# Patient Record
Sex: FEMALE | Race: WHITE | NOT HISPANIC OR LATINO | Employment: FULL TIME | URBAN - METROPOLITAN AREA
[De-identification: names, ages, dates, MRNs, and addresses within clinical notes are randomized per-mention and may not be internally consistent; named-entity substitution may affect disease eponyms.]

---

## 2018-01-03 ENCOUNTER — HOSPITAL ENCOUNTER (EMERGENCY)
Facility: HOSPITAL | Age: 48
Discharge: HOME | End: 2018-01-03
Attending: EMERGENCY MEDICINE
Payer: MEDICAID

## 2018-01-03 VITALS
RESPIRATION RATE: 18 BRPM | DIASTOLIC BLOOD PRESSURE: 84 MMHG | WEIGHT: 170 LBS | BODY MASS INDEX: 27.32 KG/M2 | HEART RATE: 110 BPM | TEMPERATURE: 100 F | SYSTOLIC BLOOD PRESSURE: 164 MMHG | OXYGEN SATURATION: 98 % | HEIGHT: 66 IN

## 2018-01-03 DIAGNOSIS — J40 BRONCHITIS: Primary | ICD-10-CM

## 2018-01-03 DIAGNOSIS — R05.9 COUGH: ICD-10-CM

## 2018-01-03 LAB
ALBUMIN SERPL BCP-MCNC: 3.8 G/DL
ALP SERPL-CCNC: 88 U/L
ALT SERPL W/O P-5'-P-CCNC: 50 U/L
ANION GAP SERPL CALC-SCNC: 11 MMOL/L
ANISOCYTOSIS BLD QL SMEAR: SLIGHT
AST SERPL-CCNC: 73 U/L
B-HCG UR QL: NEGATIVE
BASOPHILS # BLD AUTO: 0.03 K/UL
BASOPHILS NFR BLD: 0.4 %
BILIRUB SERPL-MCNC: 0.5 MG/DL
BUN SERPL-MCNC: 6 MG/DL
CALCIUM SERPL-MCNC: 9.2 MG/DL
CHLORIDE SERPL-SCNC: 101 MMOL/L
CO2 SERPL-SCNC: 26 MMOL/L
CREAT SERPL-MCNC: 0.6 MG/DL
CTP QC/QA: YES
DIFFERENTIAL METHOD: ABNORMAL
EOSINOPHIL # BLD AUTO: 0.2 K/UL
EOSINOPHIL NFR BLD: 2 %
ERYTHROCYTE [DISTWIDTH] IN BLOOD BY AUTOMATED COUNT: 17.1 %
EST. GFR  (AFRICAN AMERICAN): >60 ML/MIN/1.73 M^2
EST. GFR  (NON AFRICAN AMERICAN): >60 ML/MIN/1.73 M^2
FLUAV AG SPEC QL IA: NEGATIVE
FLUBV AG SPEC QL IA: NEGATIVE
GLUCOSE SERPL-MCNC: 104 MG/DL
HCT VFR BLD AUTO: 30 %
HGB BLD-MCNC: 8.4 G/DL
LYMPHOCYTES # BLD AUTO: 0.6 K/UL
LYMPHOCYTES NFR BLD: 7.8 %
MCH RBC QN AUTO: 19.8 PG
MCHC RBC AUTO-ENTMCNC: 28 G/DL
MCV RBC AUTO: 71 FL
MONOCYTES # BLD AUTO: 0.5 K/UL
MONOCYTES NFR BLD: 5.6 %
NEUTROPHILS # BLD AUTO: 6.8 K/UL
NEUTROPHILS NFR BLD: 84.2 %
PLATELET # BLD AUTO: 345 K/UL
PLATELET BLD QL SMEAR: ABNORMAL
PMV BLD AUTO: 9.1 FL
POIKILOCYTOSIS BLD QL SMEAR: SLIGHT
POTASSIUM SERPL-SCNC: 3.7 MMOL/L
PROT SERPL-MCNC: 8.1 G/DL
RBC # BLD AUTO: 4.24 M/UL
SODIUM SERPL-SCNC: 138 MMOL/L
SPECIMEN SOURCE: NORMAL
STOMATOCYTES BLD QL SMEAR: PRESENT
TROPONIN I SERPL DL<=0.01 NG/ML-MCNC: <0.006 NG/ML
WBC # BLD AUTO: 8.1 K/UL

## 2018-01-03 PROCEDURE — 25000003 PHARM REV CODE 250: Performed by: NURSE PRACTITIONER

## 2018-01-03 PROCEDURE — 85025 COMPLETE CBC W/AUTO DIFF WBC: CPT

## 2018-01-03 PROCEDURE — 93010 ELECTROCARDIOGRAM REPORT: CPT | Mod: ,,, | Performed by: INTERNAL MEDICINE

## 2018-01-03 PROCEDURE — 25000003 PHARM REV CODE 250: Performed by: EMERGENCY MEDICINE

## 2018-01-03 PROCEDURE — 96360 HYDRATION IV INFUSION INIT: CPT

## 2018-01-03 PROCEDURE — 94640 AIRWAY INHALATION TREATMENT: CPT

## 2018-01-03 PROCEDURE — 84484 ASSAY OF TROPONIN QUANT: CPT

## 2018-01-03 PROCEDURE — 93005 ELECTROCARDIOGRAM TRACING: CPT

## 2018-01-03 PROCEDURE — 81025 URINE PREGNANCY TEST: CPT | Performed by: EMERGENCY MEDICINE

## 2018-01-03 PROCEDURE — 25000242 PHARM REV CODE 250 ALT 637 W/ HCPCS: Performed by: NURSE PRACTITIONER

## 2018-01-03 PROCEDURE — 99284 EMERGENCY DEPT VISIT MOD MDM: CPT | Mod: 25

## 2018-01-03 PROCEDURE — 87400 INFLUENZA A/B EACH AG IA: CPT

## 2018-01-03 PROCEDURE — 96361 HYDRATE IV INFUSION ADD-ON: CPT

## 2018-01-03 PROCEDURE — 80053 COMPREHEN METABOLIC PANEL: CPT

## 2018-01-03 RX ORDER — ALBUTEROL SULFATE 0.83 MG/ML
2.5 SOLUTION RESPIRATORY (INHALATION) ONCE
Status: COMPLETED | OUTPATIENT
Start: 2018-01-03 | End: 2018-01-03

## 2018-01-03 RX ORDER — ALBUTEROL SULFATE 90 UG/1
1-2 AEROSOL, METERED RESPIRATORY (INHALATION) EVERY 6 HOURS PRN
Qty: 1 INHALER | Refills: 0 | Status: SHIPPED | OUTPATIENT
Start: 2018-01-03 | End: 2023-05-08

## 2018-01-03 RX ORDER — CETIRIZINE HYDROCHLORIDE 10 MG/1
10 TABLET ORAL DAILY
Qty: 30 TABLET | Refills: 0 | COMMUNITY
Start: 2018-01-03 | End: 2023-05-08

## 2018-01-03 RX ORDER — BENZONATATE 100 MG/1
100 CAPSULE ORAL 3 TIMES DAILY PRN
Qty: 20 CAPSULE | Refills: 0 | Status: SHIPPED | OUTPATIENT
Start: 2018-01-03 | End: 2018-01-03 | Stop reason: ALTCHOICE

## 2018-01-03 RX ORDER — ACETAMINOPHEN 325 MG/1
650 TABLET ORAL
Status: COMPLETED | OUTPATIENT
Start: 2018-01-03 | End: 2018-01-03

## 2018-01-03 RX ORDER — FLUTICASONE PROPIONATE 50 MCG
1 SPRAY, SUSPENSION (ML) NASAL 2 TIMES DAILY PRN
Qty: 15 G | Refills: 0 | Status: SHIPPED | OUTPATIENT
Start: 2018-01-03 | End: 2020-03-06

## 2018-01-03 RX ORDER — ALBUTEROL SULFATE 90 UG/1
1-2 AEROSOL, METERED RESPIRATORY (INHALATION) EVERY 6 HOURS PRN
Qty: 1 INHALER | Refills: 0 | Status: SHIPPED | OUTPATIENT
Start: 2018-01-03 | End: 2018-01-03

## 2018-01-03 RX ORDER — PROMETHAZINE HYDROCHLORIDE AND CODEINE PHOSPHATE 6.25; 1 MG/5ML; MG/5ML
5 SOLUTION ORAL EVERY 4 HOURS PRN
Qty: 120 ML | Refills: 0 | Status: SHIPPED | OUTPATIENT
Start: 2018-01-03 | End: 2018-01-13

## 2018-01-03 RX ADMIN — SODIUM CHLORIDE 1000 ML: 0.9 INJECTION, SOLUTION INTRAVENOUS at 04:01

## 2018-01-03 RX ADMIN — ACETAMINOPHEN 650 MG: 325 TABLET ORAL at 04:01

## 2018-01-03 RX ADMIN — ALBUTEROL SULFATE 2.5 MG: 2.5 SOLUTION RESPIRATORY (INHALATION) at 01:01

## 2018-01-03 RX ADMIN — SODIUM CHLORIDE 1000 ML: 0.9 INJECTION, SOLUTION INTRAVENOUS at 03:01

## 2018-01-03 NOTE — ED TRIAGE NOTES
Pt has been experiencing a productive cough for 4 days, fever for 1 day. Pt states she has sharp chest pain with cough.

## 2018-01-03 NOTE — ED PROVIDER NOTES
"Encounter Date: 1/3/2018       History     Chief Complaint   Patient presents with    Cough     productive cough with yellow sputum & chest congestion x 4 days.      46yo female with pmhx of GERD presents to the ED for evaluation of a cough.  Pt states that four days ago, she developed URI symptoms and believed that she got "a cold" from her daughter who had similar symptoms.  The patient believed she would get better, but has instead only felt worse.  She came to the ED today because she feels that her cough is worsening.  Pt reports that her cough was productive once, but is usually non-productive.  She denies SOB.  She does report mid-line chest pain with cough only.  No hemoptysis, palpitations, or LE swelling or pain.  No history of DVT or PE.  She is a former smoker, but denies history of COPD.  Pt denies abd pain, n/v/d, decreased urinary output.  She is tolerating PO fluids without difficulty.  No other complaints at this time.        The history is provided by the patient.   Cough   The current episode started several days ago. The problem occurs constantly. The problem has been gradually worsening. The cough is productive of sputum. Associated symptoms include chest pain (with cough only), chills, rhinorrhea and sore throat. Pertinent negatives include no ear pain, no headaches, no myalgias, no shortness of breath and no wheezing. The treatment provided no relief. She is a smoker (Former). Her past medical history is significant for pneumonia. Her past medical history does not include COPD or asthma.     Review of patient's allergies indicates:  No Known Allergies  Past Medical History:   Diagnosis Date    GERD (gastroesophageal reflux disease)      History reviewed. No pertinent surgical history.  History reviewed. No pertinent family history.  Social History   Substance Use Topics    Smoking status: Never Smoker    Smokeless tobacco: Not on file    Alcohol use Yes      Comment: social     Review of " Systems   Constitutional: Positive for activity change, appetite change, chills, fatigue and fever (subjective).   HENT: Positive for congestion, postnasal drip, rhinorrhea and sore throat. Negative for ear pain and trouble swallowing.    Eyes: Negative for visual disturbance.   Respiratory: Positive for cough. Negative for shortness of breath and wheezing.    Cardiovascular: Positive for chest pain (with cough only). Negative for palpitations and leg swelling.   Gastrointestinal: Negative for abdominal pain, diarrhea, nausea and vomiting.   Genitourinary: Negative for decreased urine volume and dysuria.   Musculoskeletal: Negative for myalgias, neck pain and neck stiffness.   Skin: Negative for rash.   Allergic/Immunologic: Negative for immunocompromised state.   Neurological: Negative for dizziness, weakness, light-headedness, numbness and headaches.   Hematological: Does not bruise/bleed easily.   Psychiatric/Behavioral: Negative for confusion.       Physical Exam     Initial Vitals [01/03/18 1217]   BP Pulse Resp Temp SpO2   (!) 198/84 110 20 98.2 °F (36.8 °C) 99 %      MAP       122         Physical Exam    Nursing note and vitals reviewed.  Constitutional: She appears well-developed and well-nourished. She is active and cooperative. She is easily aroused.  Non-toxic appearance. She does not have a sickly appearance. She appears ill. No distress.   HENT:   Head: Normocephalic and atraumatic.   Right Ear: External ear normal.   Left Ear: External ear normal.   Nose: Rhinorrhea present. No mucosal edema. Right sinus exhibits no maxillary sinus tenderness and no frontal sinus tenderness. Left sinus exhibits no maxillary sinus tenderness and no frontal sinus tenderness.   Mouth/Throat: Uvula is midline and oropharynx is clear and moist. Mucous membranes are dry.   Eyes: Conjunctivae and EOM are normal.   Neck: Normal range of motion. Neck supple.   Cardiovascular: Regular rhythm and normal heart sounds. Tachycardia  present.    Pulmonary/Chest: Effort normal. No accessory muscle usage. No tachypnea. No respiratory distress. She has no decreased breath sounds. She has wheezes (few expiratory scattered bilaterally). She has no rhonchi. She has no rales.   Abdominal: Soft. Normal appearance and bowel sounds are normal. She exhibits no distension. There is no tenderness. There is no rigidity, no rebound and no guarding.   Musculoskeletal:        Right lower leg: Normal.        Left lower leg: Normal.   Negative Selene's sign bilaterally.    Neurological: She is alert, oriented to person, place, and time and easily aroused. She has normal strength. GCS eye subscore is 4. GCS verbal subscore is 5. GCS motor subscore is 6.   Skin: Skin is warm, dry and intact. No rash noted.   Psychiatric: She has a normal mood and affect. Her speech is normal and behavior is normal. Judgment and thought content normal. Cognition and memory are normal.         ED Course   Procedures  Labs Reviewed   CBC W/ AUTO DIFFERENTIAL - Abnormal; Notable for the following:        Result Value    Hemoglobin 8.4 (*)     Hematocrit 30.0 (*)     MCV 71 (*)     MCH 19.8 (*)     MCHC 28.0 (*)     RDW 17.1 (*)     MPV 9.1 (*)     Lymph # 0.6 (*)     Gran% 84.2 (*)     Lymph% 7.8 (*)     All other components within normal limits   COMPREHENSIVE METABOLIC PANEL - Abnormal; Notable for the following:     AST 73 (*)     ALT 50 (*)     All other components within normal limits   TROPONIN I   INFLUENZA A AND B ANTIGEN   POCT URINE PREGNANCY         Imaging Results          X-Ray Chest PA And Lateral (Final result)  Result time 01/03/18 13:47:52    Final result by Mike Hernandez MD (01/03/18 13:47:52)                 Impression:        No acute radiographic findings in the chest.      Electronically signed by: MIKE HERNANDEZ MD  Date:     01/03/18  Time:    13:47              Narrative:    Comparison: 11/27/12    Technique: Chest PA and lateral.    Findings: The cardiac  silhouette and pulmonary vascularity are within normal limits.  The lungs are symmetrically expanded without evidence of significant focal consolidation, effusion, or pneumothorax.  The bones demonstrate no acute abnormality.                                   Medical Decision Making:   Initial Assessment:   48yo female here for URI with cough for four days.  She reports CP with cough only.  No SOB.  Pt is former smoker.  Pt appears ill but nontoxic.  Vitals stable.  MM dry.  HR tachycardic with normal rhythm.  Lungs with few scattered wheezes bilatearlly.  No respiratory distress.  LE normal without pain and swelling.  Negative Selene's bilaterally. Abd soft, non-tender.  No rash.   Differential Diagnosis:   URI, influenza, bronchitis, pneumonia, viral syndrome, electrolyte derangement, dehydration, STEMI, non-stemi, dysrhythmia  Clinical Tests:   Lab Tests: Ordered and Reviewed  Radiological Study: Ordered and Reviewed  Medical Tests: Ordered and Reviewed  ED Management:  Labs, CXR, EKG, Nebulizer, IV fluids, PO tylenol  H&H mildly decreased, but consistent with pt's usual values.  Influenza negative.  CXR read by radiologist and reviewed by me- negative for acute change.  I do not suspect ACS. No respiratory distress. This is a 47 y.o. female  who presents to the ED today with cough, congestion, symptoms consistent with an URI. Ember Rivera workup today does not show any sign of pneumonia on CXR. Likely this is a viral course that will need to run its course.  With rest, the pt's HR decreases to 90.  She is tolerating PO fluids without difficulty.  Pt continues to deny CP and SOB.  The patient is feeling improved following interventions here in the ED. The patient feels back to baseline and is ready to go home. There is no need for emergent intervention at this time. I will discharge home with symptom control. The patient is comfortable with this plan. After taking into careful account the historical factors and  physical exam findings of the patient's presentation today, in conjunction with the empirical and objective data obtained on ED workup, no acute emergent medical condition has been identified. The patient appears to be low risk for an emergent medical condition and I feel it is safe and appropriate at this time for the patient to be discharged to follow-up as detailed in their discharge instructions for reevaluation and possible continued outpatient workup and management. Discharge and follow-up instructions discussed with the patient who expressed understanding and willingness to comply with my recommendations. Pt to follow up with PCP within 2 days.  I reviewed strict return precautions. In addition, pt is to return to the ED if condition changes, progresses, or if there are any concerns.  Pt verbalized understanding, compliance, and agreement with the treatment plan.        This record utilized Dragon voice recognition system.     did see and examine this patient.  Pt's HR elevated at 110 at discharge, but  states that she is stable for discharge.   Past medical records reviewed.   RX Albuterol, Flonase, Zyrtec, Phenergan with Codeine cough syup                Attending Attestation:     Physician Attestation Statement for NP/PA:   I have conducted a face to face encounter with this patient in addition to the NP/PA, due to NP/PA Request    Other NP/PA Attestation Additions:    History of Present Illness: 47-year-old female with a cough productive for yellow sputum for the past 4 days.   Physical Exam: Lungs are clear to auscultation.                  ED Course      Clinical Impression:   The primary encounter diagnosis was Bronchitis. A diagnosis of Cough was also pertinent to this visit.                           MAYRA Méndez  01/03/18 1744       Riana Murguia, MAYRA  01/03/18 1749       Nirmal Bravo MD  01/03/18 2250

## 2019-06-03 ENCOUNTER — HOSPITAL ENCOUNTER (EMERGENCY)
Facility: HOSPITAL | Age: 49
Discharge: HOME | End: 2019-06-03
Attending: EMERGENCY MEDICINE
Payer: MEDICAID

## 2019-06-03 VITALS
HEIGHT: 66 IN | SYSTOLIC BLOOD PRESSURE: 175 MMHG | RESPIRATION RATE: 20 BRPM | DIASTOLIC BLOOD PRESSURE: 80 MMHG | HEART RATE: 88 BPM | TEMPERATURE: 98 F | WEIGHT: 188 LBS | OXYGEN SATURATION: 95 % | BODY MASS INDEX: 30.22 KG/M2

## 2019-06-03 DIAGNOSIS — T23.152A SUPERFICIAL BURN OF PALM OF LEFT HAND, INITIAL ENCOUNTER: Primary | ICD-10-CM

## 2019-06-03 PROCEDURE — 99283 EMERGENCY DEPT VISIT LOW MDM: CPT

## 2019-06-03 RX ORDER — KETOROLAC TROMETHAMINE 30 MG/ML
15 INJECTION, SOLUTION INTRAMUSCULAR; INTRAVENOUS
Status: DISCONTINUED | OUTPATIENT
Start: 2019-06-03 | End: 2019-06-03

## 2019-06-04 NOTE — DISCHARGE INSTRUCTIONS
Apply neosporin ointment and dressing at least twice daily. Take tylenol and motrin for pain. Follow up with your PCP in 2 days for wound check. Return to the ER with any new or worsening symptoms.

## 2019-06-04 NOTE — ED PROVIDER NOTES
Encounter Date: 6/3/2019       History     Chief Complaint   Patient presents with    Hand Burn     pt burned the palm of her left hand with a hot spatula 3 days ago. States it has been having some drainage     A 49-year-old female presents the ED with complaints of a burn to the palm of her left hand x3 days.  Patient states she grabbed a hot spatula.  She states that the burn has blistered and drained clear fluid for the last 2 days.  She states she cut part of the blister off yesterday and states that today the wound began draining some greenish fluid.  She also admits to pain.  She has not been applying anything to the burn.    The history is provided by the patient. No  was used.     Review of patient's allergies indicates:  No Known Allergies  Past Medical History:   Diagnosis Date    GERD (gastroesophageal reflux disease)      History reviewed. No pertinent surgical history.  History reviewed. No pertinent family history.  Social History     Tobacco Use    Smoking status: Former Smoker   Substance Use Topics    Alcohol use: Yes     Comment: social    Drug use: No     Review of Systems   Skin: Positive for wound.   All other systems reviewed and are negative.      Physical Exam     Initial Vitals [06/03/19 2021]   BP Pulse Resp Temp SpO2   (!) 175/80 88 20 98.3 °F (36.8 °C) 95 %      MAP       --         Physical Exam    Nursing note and vitals reviewed.  Constitutional: Vital signs are normal. She appears well-developed and well-nourished. No distress.   HENT:   Head: Normocephalic and atraumatic.   Nose: Nose normal.   Eyes: Conjunctivae and lids are normal.   Neck: Normal range of motion and phonation normal.   Cardiovascular: Normal rate.   Pulmonary/Chest: Effort normal.   Abdominal: Normal appearance.   Musculoskeletal: Normal range of motion.   Neurological: She is alert and oriented to person, place, and time.   Skin: Skin is warm and dry. Burn (2x3 cm superficial partial  thickness burn to palm of left hand partial debrided by patient, no active drainage noted, yellowish crust noted.  No blister formation at this time.  Moderately tender to palpation) noted. No erythema.   Psychiatric: She has a normal mood and affect. Her speech is normal and behavior is normal. Judgment and thought content normal. Cognition and memory are normal.         ED Course   Procedures  Labs Reviewed - No data to display       Imaging Results    None          Medical Decision Making:   Initial Assessment:   41-year-old female with 2 x 3 cm superficial partial-thickness burn to the palm the left hand x3 days, partially debrided by the patient, no active drainage, yellow crust noted, no blister formation at this time.  Differential Diagnosis:   Superficial partial-thickness burn, secondary infection  ED Management:  Wound was cleaned and dressed with triple antibiotic ointment and wet to dry dressing in the ED.  Patient was instructed to continue this wound care twice daily until wound heals.  She is to follow up with her PCP or return to the ED in 2 days for wound recheck.  Instructed to return sooner if wound worsens.  Patient states understanding and agreement with treatment plan.                      Clinical Impression:       ICD-10-CM ICD-9-CM   1. Superficial burn of palm of left hand, initial encounter T23.152A 944.15                                Katja Garcia PA-C  06/04/19 1800

## 2020-02-27 ENCOUNTER — OFFICE VISIT (OUTPATIENT)
Dept: URGENT CARE | Facility: CLINIC | Age: 50
End: 2020-02-27
Payer: MEDICAID

## 2020-02-27 VITALS
HEART RATE: 87 BPM | WEIGHT: 188 LBS | BODY MASS INDEX: 30.22 KG/M2 | OXYGEN SATURATION: 99 % | TEMPERATURE: 99 F | DIASTOLIC BLOOD PRESSURE: 86 MMHG | SYSTOLIC BLOOD PRESSURE: 184 MMHG | RESPIRATION RATE: 16 BRPM | HEIGHT: 66 IN

## 2020-02-27 DIAGNOSIS — R03.0 ELEVATED BLOOD PRESSURE READING: Primary | ICD-10-CM

## 2020-02-27 PROCEDURE — 99213 OFFICE O/P EST LOW 20 MIN: CPT | Mod: S$GLB,,, | Performed by: FAMILY MEDICINE

## 2020-02-27 PROCEDURE — 99213 PR OFFICE/OUTPT VISIT, EST, LEVL III, 20-29 MIN: ICD-10-PCS | Mod: S$GLB,,, | Performed by: FAMILY MEDICINE

## 2020-02-27 RX ORDER — IBUPROFEN 800 MG/1
800 TABLET ORAL 3 TIMES DAILY
COMMUNITY
End: 2022-02-11

## 2020-02-27 RX ORDER — LISINOPRIL 10 MG/1
10 TABLET ORAL DAILY
Qty: 90 TABLET | Refills: 3 | Status: SHIPPED | OUTPATIENT
Start: 2020-02-27 | End: 2020-06-25 | Stop reason: SDUPTHER

## 2020-02-27 RX ORDER — CLINDAMYCIN HYDROCHLORIDE 150 MG/1
150 CAPSULE ORAL EVERY 6 HOURS
COMMUNITY
End: 2022-02-11

## 2020-02-27 NOTE — PATIENT INSTRUCTIONS
Established High Blood Pressure    High blood pressure (hypertension) is a chronic disease. Often, healthcare providers dont know what causes it. But it can be caused by certain health conditions and medicines.  If you have high blood pressure, you may not have any symptoms. If you do have symptoms, they may include headache, dizziness, changes in your vision, chest pain, and shortness of breath. But even without symptoms, high blood pressure thats not treated raises your risk for heart attack and stroke. High blood pressure is a serious health risk and shouldnt be ignored.  A blood pressure reading is made up of two numbers: a higher number over a lower number. The top number is the systolic pressure. The bottom number is the diastolic pressure. A normal blood pressure is a systolic pressure of  less than 120 over a diastolic pressure of less than 80. You will see your blood pressure readings written together. For example, a person with a systolic pressure of 188 and a diastolic pressure of 78 will have 118/78 written in the medical record.  High blood pressure is when either the top number is 140 or higher, or the bottom number is 90 or higher. This must be the result when taking your blood pressure a number of times. The blood pressures between normal and high are called prehypertension.  Home care  If you have high blood pressure, you should do what is listed below to lower your blood pressure. If you are taking medicines for high blood pressure, these methods may reduce or end your need for medicines in the future.  · Begin a weight-loss program if you are overweight.  · Cut back on how much salt you get in your diet. Heres how to do this:  ¨ Dont eat foods that have a lot of salt. These include olives, pickles, smoked meats, and salted potato chips.  ¨ Dont add salt to your food at the table.  ¨ Use only small amounts of salt when cooking.  · Start an exercise program. Talk with your healthcare  provider about the type of exercise program that would be best for you. It doesn't have to be hard. Even brisk walking for 20 minutes 3 times a week is a good form of exercise.  · Dont take medicines that stimulate the heart. This includes many over-the-counter cold and sinus decongestant pills and sprays, as well as diet pills. Check the warnings about hypertension on the label. Before buying any over-the-counter medicines or supplements, always ask the pharmacist about the product's potential interaction with your high blood pressure and your high blood pressure medicines.  · Stimulants such as amphetamine or cocaine could be deadly for someone with high blood pressure. Never take these.  · Limit how much caffeine you get in your diet. Switch to caffeine-free products.  · Stop smoking. If you are a long-time smoker, this can be hard. Talk to your healthcare provider about medicines and nicotine replacement options to help you. Also, enroll in a stop-smoking program to make it more likely that you will quit for good.  · Learn how to handle stress. This is an important part of any program to lower blood pressure. Learn about relaxation methods like meditation, yoga, or biofeedback.  · If your provider prescribed medicines, take them exactly as directed. Missing doses may cause your blood pressure get out of control.  · If you miss a dose or doses, check with your healthcare provider or pharmacist about what to do.  · Consider buying an automatic blood pressure machine. Ask your provider for a recommendation. You can get one of these at most pharmacies.     The American Heart Association recommends the following guidelines for home blood pressure monitoring:  · Don't smoke or drink coffee for 30 minutes before taking your blood pressure.  · Go to the bathroom before the test.  · Relax for 5 minutes before taking the measurement.  · Sit with your back supported (don't sit on a couch or soft chair); keep your feet on  the floor uncrossed. Place your arm on a solid flat surface (like a table) with the upper part of the arm at heart level. Place the middle of the cuff directly above the eye of the elbow. Check the monitor's instruction manual for an illustration.  · Take multiple readings. When you measure, take 2 to 3 readings one minute apart and record all of the results.  · Take your blood pressure at the same time every day, or as your healthcare provider recommends.  · Record the date, time, and blood pressure reading.  · Take the record with you to your next medical appointment. If your blood pressure monitor has a built-in memory, simply take the monitor with you to your next appointment.  · Call your provider if you have several high readings. Don't be frightened by a single high blood pressure reading, but if you get several high readings, check in with your healthcare provider.  · Note: When blood pressure reaches a systolic (top number) of 180 or higher OR diastolic (bottom number) of 110 or higher, seek emergency medical treatment.  Follow-up care  You will need to see your healthcare provider regularly. This is to check your blood pressure and to make changes to your medicines. Make a follow-up appointment as directed. Bring the record of your home blood pressure readings to the appointment.  When to seek medical advice  Call your healthcare provider right away if any of these occur:  · Blood pressure reaches a systolic (upper number) of 180 or higher OR a diastolic (bottom number) of 110 or higher  · Chest pain or shortness of breath  · Severe headache  · Throbbing or rushing sound in the ears  · Nosebleed  · Sudden severe pain in your belly (abdomen)  · Extreme drowsiness, confusion, or fainting  · Dizziness or spinning sensation (vertigo)  · Weakness of an arm or leg or one side of the face  · You have problems speaking or seeing   Date Last Reviewed: 12/1/2016  © 1489-7514 [employer]. [address] [place] [address]  [place] [address]. All rights reserved. This information is not intended as a substitute for professional medical care. Always follow your healthcare professional's instructions.

## 2020-02-27 NOTE — PROGRESS NOTES
"Subjective:       Patient ID: Ember Rivera is a 50 y.o. female.    Vitals:  height is 5' 6" (1.676 m) and weight is 85.3 kg (188 lb). Her oral temperature is 98.5 °F (36.9 °C). Her blood pressure is 184/86 (abnormal) and her pulse is 87. Her respiration is 16 and oxygen saturation is 99%.     Chief Complaint: Hypertension    Patient went to the dentist today for a tooth extraction and was told to see a doctor due to her /90.  Patient states that she had a history of high blood pressure about 10 years ago used to takes questionable medication for blood pressure but then stop smoking and lost some weight and did discontinue blood pressure medicine since it was blood pressure was under normal range denies any headache dizziness or chest pain otherwise she is doing fine awaiting    Hypertension   This is a new problem. The current episode started today. The problem is unchanged. Pertinent negatives include no blurred vision, chest pain, headaches or shortness of breath. There are no associated agents to hypertension. There are no known risk factors for coronary artery disease.       Constitution: Negative for chills, fatigue and fever.   HENT: Negative for congestion and sore throat.    Neck: negative. Negative for painful lymph nodes.   Cardiovascular: Negative.  Negative for chest pain and leg swelling.   Eyes: Negative for double vision and blurred vision.   Respiratory: Negative for cough and shortness of breath.    Gastrointestinal: Negative for nausea, vomiting and diarrhea.   Genitourinary: Negative for dysuria, frequency, urgency and history of kidney stones.   Musculoskeletal: Negative for joint pain, joint swelling, muscle cramps and muscle ache.   Skin: Negative for color change, pale, rash and bruising.   Allergic/Immunologic: Negative for seasonal allergies.   Neurological: Positive for dizziness. Negative for history of vertigo, light-headedness, passing out and headaches.   Hematologic/Lymphatic: " Negative for swollen lymph nodes.   Psychiatric/Behavioral: Negative for nervous/anxious, sleep disturbance and depression. The patient is not nervous/anxious.        Objective:      Physical Exam   Constitutional: She is oriented to person, place, and time. She appears well-developed and well-nourished. She is cooperative.  Non-toxic appearance. She does not appear ill. No distress.   HENT:   Head: Normocephalic and atraumatic.   Right Ear: Hearing, tympanic membrane, external ear and ear canal normal.   Left Ear: Hearing, tympanic membrane, external ear and ear canal normal.   Nose: Nose normal. No mucosal edema, rhinorrhea or nasal deformity. No epistaxis. Right sinus exhibits no maxillary sinus tenderness and no frontal sinus tenderness. Left sinus exhibits no maxillary sinus tenderness and no frontal sinus tenderness.   Mouth/Throat: Uvula is midline, oropharynx is clear and moist and mucous membranes are normal. No trismus in the jaw. Normal dentition. No uvula swelling. No posterior oropharyngeal erythema.   Eyes: Conjunctivae and lids are normal. Right eye exhibits no discharge. Left eye exhibits no discharge. No scleral icterus.   Neck: Trachea normal, normal range of motion, full passive range of motion without pain and phonation normal. Neck supple.   Cardiovascular: Normal rate, regular rhythm, normal heart sounds, intact distal pulses and normal pulses.   Pulmonary/Chest: Effort normal and breath sounds normal. No respiratory distress.   Abdominal: Soft. Normal appearance and bowel sounds are normal. She exhibits no distension, no pulsatile midline mass and no mass. There is no tenderness.   Musculoskeletal: Normal range of motion. She exhibits no edema or deformity.   Neurological: She is alert and oriented to person, place, and time. She exhibits normal muscle tone. Coordination normal.   Skin: Skin is warm, dry, intact, not diaphoretic and not pale.   Psychiatric: She has a normal mood and affect.  Her speech is normal and behavior is normal. Judgment and thought content normal. Cognition and memory are normal.   Nursing note and vitals reviewed.        Assessment:       1. Elevated blood pressure reading        Plan:         Elevated blood pressure reading    Other orders  -     lisinopril 10 MG tablet; Take 1 tablet (10 mg total) by mouth once daily.  Dispense: 90 tablet; Refill: 3        patient advised to monitor her BP at the drug store and follow up with PCP

## 2020-03-06 ENCOUNTER — OFFICE VISIT (OUTPATIENT)
Dept: URGENT CARE | Facility: CLINIC | Age: 50
End: 2020-03-06
Payer: MEDICAID

## 2020-03-06 VITALS
BODY MASS INDEX: 30.22 KG/M2 | SYSTOLIC BLOOD PRESSURE: 168 MMHG | OXYGEN SATURATION: 98 % | DIASTOLIC BLOOD PRESSURE: 84 MMHG | HEIGHT: 66 IN | TEMPERATURE: 98 F | WEIGHT: 188 LBS | RESPIRATION RATE: 16 BRPM | HEART RATE: 81 BPM

## 2020-03-06 DIAGNOSIS — R03.0 ELEVATED BLOOD PRESSURE READING IN OFFICE WITHOUT DIAGNOSIS OF HYPERTENSION: Primary | ICD-10-CM

## 2020-03-06 PROCEDURE — 99214 OFFICE O/P EST MOD 30 MIN: CPT | Mod: S$GLB,,, | Performed by: NURSE PRACTITIONER

## 2020-03-06 PROCEDURE — 99214 PR OFFICE/OUTPT VISIT, EST, LEVL IV, 30-39 MIN: ICD-10-PCS | Mod: S$GLB,,, | Performed by: NURSE PRACTITIONER

## 2020-03-06 NOTE — PATIENT INSTRUCTIONS
Please return here or go to the Emergency Department for any concerns or worsening of condition.  If you were prescribed antibiotics, please take them to completion.  If you were prescribed a narcotic medication, do not drive or operate heavy equipment or machinery while taking these medications.  Please follow up with your primary care doctor or specialist as needed.  If you  smoke, please stop smoking.  Step-by-Step  Checking Your Blood Pressure    Date Last Reviewed: 4/27/2016  © 7731-8588 [employer]. [address] [place] [address]. All rights reserved. This information is not intended as a substitute for professional medical care. Always follow your healthcare professional's instructions.

## 2020-03-06 NOTE — PROGRESS NOTES
"Subjective:       Patient ID: Ember Rivera is a 50 y.o. female.    Vitals:  height is 5' 6" (1.676 m) and weight is 85.3 kg (188 lb). Her oral temperature is 98.1 °F (36.7 °C). Her blood pressure is 168/84 (abnormal) and her pulse is 81. Her respiration is 16 and oxygen saturation is 98%.     Chief Complaint: Hypertension    Patient went to Dentist for a procedure and BP was 190/90 a few weeks ago. They refused to do her procedure. Since then patient seen last week here and started on Lisinopril 10 mg daily for a week. She went to [facility] yesterday and BP was 184/106. Patient states she has a dry cough since the medication.  Patient does not have any recent lab work done and does not have primary care   Patient currently denies fever, chills, body aches, CP, SOB, wheezing, abdominal pain, nausea, vomiting, diarrhea, constipation, headache, blurry vision, dizziness or syncope        Hypertension   This is a new problem. The current episode started yesterday. The problem has been gradually improving since onset. The problem is uncontrolled. Pertinent negatives include no blurred vision, chest pain, headaches or shortness of breath. There are no associated agents to hypertension. Past treatments include nothing.       Constitution: Negative for chills, fatigue and fever.   HENT: Negative for congestion and sore throat.    Neck: Negative for painful lymph nodes.   Cardiovascular: Negative for chest pain and leg swelling.   Eyes: Negative for double vision and blurred vision.   Respiratory: Negative for cough and shortness of breath.    Gastrointestinal: Negative for nausea, vomiting and diarrhea.   Genitourinary: Negative for dysuria, frequency, urgency and history of kidney stones.   Musculoskeletal: Negative for joint pain, joint swelling, muscle cramps and muscle ache.        Hypertension   Skin: Negative for color change, pale, rash and bruising.   Allergic/Immunologic: Negative for seasonal allergies. "   Neurological: Negative for dizziness, history of vertigo, light-headedness, passing out and headaches.   Hematologic/Lymphatic: Negative for swollen lymph nodes.   Psychiatric/Behavioral: Negative for nervous/anxious, sleep disturbance and depression. The patient is not nervous/anxious.        Objective:      Physical Exam   Constitutional: She is oriented to person, place, and time. She appears well-developed and well-nourished. She is cooperative.  Non-toxic appearance. She does not have a sickly appearance. She does not appear ill. No distress.   HENT:   Head: Normocephalic and atraumatic.   Right Ear: Hearing, tympanic membrane, external ear and ear canal normal.   Left Ear: Hearing, tympanic membrane, external ear and ear canal normal.   Nose: Nose normal. No mucosal edema, rhinorrhea or nasal deformity. No epistaxis. Right sinus exhibits no maxillary sinus tenderness and no frontal sinus tenderness. Left sinus exhibits no maxillary sinus tenderness and no frontal sinus tenderness.   Mouth/Throat: Uvula is midline, oropharynx is clear and moist and mucous membranes are normal. No trismus in the jaw. Normal dentition. No uvula swelling. No oropharyngeal exudate, posterior oropharyngeal edema or posterior oropharyngeal erythema.   Eyes: Conjunctivae and lids are normal. No scleral icterus.   Neck: Trachea normal, full passive range of motion without pain and phonation normal. Neck supple. No neck rigidity. No edema and no erythema present.   Cardiovascular: Normal rate, regular rhythm, normal heart sounds, intact distal pulses and normal pulses.   Pulmonary/Chest: Effort normal and breath sounds normal. No respiratory distress. She has no decreased breath sounds. She has no rhonchi.   Abdominal: Normal appearance.   Musculoskeletal: Normal range of motion. She exhibits no edema or deformity.   Neurological: She is alert and oriented to person, place, and time. She exhibits normal muscle tone. Coordination  normal.   Skin: Skin is warm, dry, intact, not diaphoretic and not pale.   Psychiatric: She has a normal mood and affect. Her speech is normal and behavior is normal. Judgment and thought content normal. Cognition and memory are normal.   Nursing note and vitals reviewed.            Advised patient to follow up with primary for further lab work and evaluation and continue lisinopril until the appointment.  Side effects of medication discussed with patient.  Strict precautions given to patient to monitor for worsening signs and symptoms. Advised to follow up with primary.All questions answered. Strict ER precautions given. If your symptoms worsens of fail to improve you should go to the Emergency Room. Patient voiced understanding and in agreement with current treatment plan.          Assessment:       1. Elevated blood pressure reading in office without diagnosis of hypertension        Plan:         Elevated blood pressure reading in office without diagnosis of hypertension  -     Ambulatory referral/consult to Family Practice      Patient Instructions   Please return here or go to the Emergency Department for any concerns or worsening of condition.  If you were prescribed antibiotics, please take them to completion.  If you were prescribed a narcotic medication, do not drive or operate heavy equipment or machinery while taking these medications.  Please follow up with your primary care doctor or specialist as needed.  If you  smoke, please stop smoking.  Step-by-Step  Checking Your Blood Pressure    Date Last Reviewed: 4/27/2016  © 6677-4126 [employer]. [address] [place] [address], [place] [address]. All rights reserved. This information is not intended as a substitute for professional medical care. Always follow your healthcare professional's instructions.

## 2020-03-09 ENCOUNTER — TELEPHONE (OUTPATIENT)
Dept: URGENT CARE | Facility: CLINIC | Age: 50
End: 2020-03-09

## 2020-06-25 RX ORDER — LISINOPRIL 10 MG/1
10 TABLET ORAL DAILY
Qty: 90 TABLET | Refills: 3 | Status: SHIPPED | OUTPATIENT
Start: 2020-06-25 | End: 2021-03-27

## 2021-05-28 ENCOUNTER — HOSPITAL ENCOUNTER (EMERGENCY)
Facility: HOSPITAL | Age: 51
Discharge: HOME | End: 2021-05-28
Attending: EMERGENCY MEDICINE
Payer: MEDICAID

## 2021-05-28 VITALS
WEIGHT: 155 LBS | SYSTOLIC BLOOD PRESSURE: 180 MMHG | BODY MASS INDEX: 24.91 KG/M2 | TEMPERATURE: 100 F | DIASTOLIC BLOOD PRESSURE: 84 MMHG | RESPIRATION RATE: 18 BRPM | HEART RATE: 83 BPM | HEIGHT: 66 IN | OXYGEN SATURATION: 98 %

## 2021-05-28 DIAGNOSIS — S22.32XA CLOSED FRACTURE OF ONE RIB OF LEFT SIDE, INITIAL ENCOUNTER: Primary | ICD-10-CM

## 2021-05-28 DIAGNOSIS — S00.83XA CONTUSION OF FACE, INITIAL ENCOUNTER: ICD-10-CM
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
## 2021-05-28 LAB
B-HCG UR QL: NEGATIVE
CTP QC/QA: YES

## 2021-05-28 PROCEDURE — 99284 EMERGENCY DEPT VISIT MOD MDM: CPT | Mod: 25

## 2021-05-28 PROCEDURE — 81025 URINE PREGNANCY TEST: CPT | Performed by: EMERGENCY MEDICINE

## 2021-05-28 RX ORDER — OXYCODONE AND ACETAMINOPHEN 7.5; 325 MG/1; MG/1
1 TABLET ORAL EVERY 6 HOURS PRN
Qty: 12 TABLET | Refills: 0 | Status: SHIPPED | OUTPATIENT
Start: 2021-05-28 | End: 2022-02-11

## 2022-01-12 ENCOUNTER — LAB VISIT (OUTPATIENT)
Dept: PRIMARY CARE CLINIC | Facility: CLINIC | Age: 52
End: 2022-01-12
Payer: MEDICAID

## 2022-01-12 DIAGNOSIS — Z20.822 CONTACT WITH AND (SUSPECTED) EXPOSURE TO COVID-19: ICD-10-CM

## 2022-01-12 LAB
CTP QC/QA: YES
SARS-COV-2 AG RESP QL IA.RAPID: NEGATIVE

## 2022-01-12 PROCEDURE — 87811 SARS-COV-2 COVID19 W/OPTIC: CPT

## 2022-02-08 ENCOUNTER — OFFICE VISIT (OUTPATIENT)
Dept: FAMILY MEDICINE | Facility: HOSPITAL | Age: 52
End: 2022-02-08
Payer: MEDICAID

## 2022-02-08 VITALS
DIASTOLIC BLOOD PRESSURE: 88 MMHG | SYSTOLIC BLOOD PRESSURE: 168 MMHG | BODY MASS INDEX: 25.26 KG/M2 | HEIGHT: 66 IN | HEART RATE: 78 BPM | WEIGHT: 157.19 LBS

## 2022-02-08 DIAGNOSIS — I10 ESSENTIAL HYPERTENSION: ICD-10-CM

## 2022-02-08 DIAGNOSIS — Z76.89 ENCOUNTER TO ESTABLISH CARE: Primary | ICD-10-CM

## 2022-02-08 DIAGNOSIS — Z11.4 ENCOUNTER FOR SCREENING FOR HIV: ICD-10-CM

## 2022-02-08 DIAGNOSIS — Z72.0 TOBACCO ABUSE: ICD-10-CM

## 2022-02-08 DIAGNOSIS — Z11.59 ENCOUNTER FOR HEPATITIS C SCREENING TEST FOR LOW RISK PATIENT: ICD-10-CM

## 2022-02-08 DIAGNOSIS — Z00.00 ANNUAL PHYSICAL EXAM: ICD-10-CM

## 2022-02-08 PROCEDURE — 99213 OFFICE O/P EST LOW 20 MIN: CPT | Performed by: STUDENT IN AN ORGANIZED HEALTH CARE EDUCATION/TRAINING PROGRAM

## 2022-02-08 RX ORDER — LISINOPRIL 20 MG/1
20 TABLET ORAL DAILY
Qty: 30 TABLET | Refills: 11 | Status: SHIPPED | OUTPATIENT
Start: 2022-02-08 | End: 2023-05-08 | Stop reason: SDUPTHER

## 2022-02-08 NOTE — PROGRESS NOTES
Subjective:       Patient ID: Ember Rivera is a 52 y.o. female.    Chief Complaint: Establish Care and Hypertension    HPI 51 yo female presents to establish care. Has hypertension, takes lisinopril 10 mg. Home check as high as 205/95. Takes melatonin to help sleep. Lost 40-50 pounds in last 9 months which she feels has changed the look of her breasts and is interested in breast augmentation. Smokes cigarettes, since 16 yo smoked ppd, quit in 2012 but took back up 3 months ago 2/2 stress, 0.5 ppd at most. Interested in quitting, feels she can do it herself.     Past Medical History:   Diagnosis Date    GERD (gastroesophageal reflux disease)      Review of Systems     10 point review of systems was conducted and only the pertinent positives and pertinent negatives are noted above in the HPI section.  Objective:      Vitals:    02/08/22 0938   BP: (!) 168/88   Pulse: 78     Physical Exam  Vitals and nursing note reviewed.   Constitutional:       General: She is not in acute distress.     Appearance: Normal appearance. She is not ill-appearing.   HENT:      Head: Normocephalic and atraumatic.      Right Ear: Tympanic membrane, ear canal and external ear normal.      Left Ear: Tympanic membrane, ear canal and external ear normal.      Nose: Nose normal. No congestion.      Mouth/Throat:      Mouth: Mucous membranes are moist.      Pharynx: Oropharynx is clear. No oropharyngeal exudate or posterior oropharyngeal erythema.   Eyes:      General: No scleral icterus.     Extraocular Movements: Extraocular movements intact.      Conjunctiva/sclera: Conjunctivae normal.      Pupils: Pupils are equal, round, and reactive to light.   Cardiovascular:      Rate and Rhythm: Normal rate and regular rhythm.      Pulses: Normal pulses.      Heart sounds: Normal heart sounds. No murmur heard.      Pulmonary:      Effort: Pulmonary effort is normal. No respiratory distress.      Breath sounds: Normal breath sounds. No wheezing.    Abdominal:      General: Bowel sounds are normal. There is no distension.      Palpations: Abdomen is soft. There is no mass.      Tenderness: There is no abdominal tenderness.   Musculoskeletal:         General: Normal range of motion.      Cervical back: Normal range of motion and neck supple.      Right lower leg: No edema.      Left lower leg: No edema.   Skin:     General: Skin is warm.      Capillary Refill: Capillary refill takes less than 2 seconds.   Neurological:      General: No focal deficit present.      Mental Status: She is alert and oriented to person, place, and time.   Psychiatric:         Mood and Affect: Mood normal.         Behavior: Behavior normal.         Assessment:       1. Encounter to establish care    2. Essential hypertension    3. Annual physical exam    4. Tobacco abuse    5. Encounter for screening for HIV    6. Encounter for hepatitis C screening test for low risk patient        Plan:       Encounter to establish care  -     Comprehensive Metabolic Panel; Future; Expected date: 02/08/2022  -     Hepatitis C Antibody; Future; Expected date: 02/08/2022  -     HIV 1/2 Ag/Ab (4th Gen); Future; Expected date: 02/08/2022  -     CBC Auto Differential; Future; Expected date: 02/08/2022  -     Lipid Panel; Future; Expected date: 02/08/2022    Essential hypertension  -     Comprehensive Metabolic Panel; Future; Expected date: 02/08/2022  -     CBC Auto Differential; Future; Expected date: 02/08/2022  -     Lipid Panel; Future; Expected date: 02/08/2022  -     lisinopriL (PRINIVIL,ZESTRIL) 20 MG tablet; Take 1 tablet (20 mg total) by mouth once daily.  Dispense: 30 tablet; Refill: 11    Annual physical exam    Tobacco abuse  -     CT Chest Lung Screening Low Dose; Future; Expected date: 02/08/2022    Encounter for screening for HIV  -     HIV 1/2 Ag/Ab (4th Gen); Future; Expected date: 02/08/2022    Encounter for hepatitis C screening test for low risk patient  -     Hepatitis C Antibody;  Future; Expected date: 02/08/2022    - Colonoscopy: DUE - will discuss at next visit   (Grade A: adults 50-75; colonoscopy q10y or annual fecal immunochemical testing (FIT) as first-tier test; CT colonography q5y, FIT-fecal DNA testing q3y, or flexible sigmoidoscopy q5-10 years as second-tier tests; and capsule colonography q5y as a third-tier test)  - DM: Last A1c: Not Diabetic  (Grade B: adults 40-70 with BMI ?25; if normal, repeat q3y)  - If Diabetic:   - Foot Exam: N/A    - Eye Exam: N/A  - MMG: DUE - will discuss at next visit Has been years, no abnormal mammograms  (Grade B: q1-2y for women 40-75; >75 after discussion on harms and benefits with patient)  - PAP: DUE - will discuss at next visit Has been 9-10 years, no abnormal paps  (Grade A: q3y with cervical cytology alone in women 21-29 years. For women 30-65 years, q3y with cervical cytology alone, q5y with high-risk HPV (hrHPV) testing alone, or q5y with hrHPV testing in combination with cytology)  - Statin: NO The ASCVD Risk score (Ernesto FRANCISCO Jr., et al., 2013) failed to calculate for the following reasons:    Cannot find a previous HDL lab    Cannot find a previous total cholesterol lab  (Grade B: adults 40-75 years with no history of CVD, ?1 CVD risk factors (dyslipidemia, DM, HTN, or smoking), and ASCVD ?10%)  - Flu: Instructed patient to receive vaccine - patient DECLINED  (All patients ?6 months, qyear)  - PCV 13: N/A  (adults ?65 years; adults <64 years with HIV, asplenia, CKD, malignancy or solid organ transplant)  - PPSV 23: N/A  (adults ?65 years; adults <64 years who smoke, long-term facility care resident, heart disease (ex. HTN), lung disease, liver disease, DM or alcohol)   - If PPSV 23 is given, wait 1 year before giving PCV 13  - Shingles: Instructed patient to receive vaccine - patient AGREED  (adults ?50 years)  - HCV: DUE - ORDERED  (Grade B: screen all patients age 18-79)  - HIV: DUE - ORDERED  (Grade A: ages 15-65 years; ?66 if  high-risk)  - DXA: N/A  (Grade B: women ?65 years or post-menopausal women with risk-factors)  - LDCT: DUE - ORDERED  (Grade B: q1yr for adults 50-80 years with 20 PY and currently smoke or have quit ?15 years)  - US abdomen: N/A   (Grade B: one-time screening for AAA in men 65-75 years who have ever smoked)  - ASA: NO  (Grade B: low dose ASA for adults 50-59 years with a ?10% 10-year cardiovascular risk)  - Depression: NO  (All adults)  - Fall risk: NO  Get Up and Go Test (positive >30 seconds, or negative <20; get up, walk 10 feet, turn around and sit; adults ?65 years).  - Tobacco abuse: CURRENT SMOKER - pack years >=20  (Grade A: all adults)  Refused COVID vaccination    DUE AT NEXT/FUTURE VISIT:  Colon cancer screening, Diabetes screening, Mammogram and Pap Smear      Follow up in about 1 month (around 3/8/2022), or if symptoms worsen or fail to improve, for follow up, blood pressure check, pre-op forms.        Haleigh Zacarias MD, [facility] PGY-2  02/08/2022

## 2023-05-08 ENCOUNTER — OFFICE VISIT (OUTPATIENT)
Dept: FAMILY MEDICINE | Facility: HOSPITAL | Age: 53
End: 2023-05-08
Payer: MEDICAID

## 2023-05-08 ENCOUNTER — TELEPHONE (OUTPATIENT)
Dept: FAMILY MEDICINE | Facility: HOSPITAL | Age: 53
End: 2023-05-08

## 2023-05-08 VITALS
HEIGHT: 66 IN | SYSTOLIC BLOOD PRESSURE: 148 MMHG | HEART RATE: 105 BPM | BODY MASS INDEX: 24.73 KG/M2 | DIASTOLIC BLOOD PRESSURE: 82 MMHG | WEIGHT: 153.88 LBS

## 2023-05-08 DIAGNOSIS — G47.9 SLEEP DIFFICULTIES: ICD-10-CM

## 2023-05-08 DIAGNOSIS — I10 ESSENTIAL HYPERTENSION: Primary | ICD-10-CM

## 2023-05-08 DIAGNOSIS — Z11.59 ENCOUNTER FOR HEPATITIS C SCREENING TEST FOR LOW RISK PATIENT: ICD-10-CM

## 2023-05-08 DIAGNOSIS — R10.84 GENERALIZED ABDOMINAL PAIN: ICD-10-CM

## 2023-05-08 DIAGNOSIS — Z12.31 ENCOUNTER FOR SCREENING MAMMOGRAM FOR MALIGNANT NEOPLASM OF BREAST: ICD-10-CM

## 2023-05-08 DIAGNOSIS — G62.9 PERIPHERAL POLYNEUROPATHY: ICD-10-CM

## 2023-05-08 DIAGNOSIS — Z12.12 ENCOUNTER FOR SCREENING FOR COLORECTAL MALIGNANT NEOPLASM: ICD-10-CM

## 2023-05-08 DIAGNOSIS — Z11.4 ENCOUNTER FOR SCREENING FOR HIV: ICD-10-CM

## 2023-05-08 DIAGNOSIS — Z72.0 TOBACCO ABUSE: ICD-10-CM

## 2023-05-08 DIAGNOSIS — Z12.11 ENCOUNTER FOR SCREENING FOR COLORECTAL MALIGNANT NEOPLASM: ICD-10-CM

## 2023-05-08 DIAGNOSIS — F17.200 NEEDS SMOKING CESSATION EDUCATION: ICD-10-CM

## 2023-05-08 PROCEDURE — 99214 OFFICE O/P EST MOD 30 MIN: CPT | Performed by: STUDENT IN AN ORGANIZED HEALTH CARE EDUCATION/TRAINING PROGRAM

## 2023-05-08 RX ORDER — DICYCLOMINE HYDROCHLORIDE 10 MG/1
10 CAPSULE ORAL 3 TIMES DAILY
Qty: 90 CAPSULE | Refills: 0 | Status: SHIPPED | OUTPATIENT
Start: 2023-05-08 | End: 2023-06-07

## 2023-05-08 RX ORDER — DICLOFENAC SODIUM 10 MG/G
2 GEL TOPICAL 4 TIMES DAILY
Qty: 100 G | Refills: 5 | Status: SHIPPED | OUTPATIENT
Start: 2023-05-08 | End: 2023-05-15

## 2023-05-08 RX ORDER — LISINOPRIL 30 MG/1
30 TABLET ORAL DAILY
Qty: 90 TABLET | Refills: 3 | Status: ON HOLD | OUTPATIENT
Start: 2023-05-08 | End: 2023-06-19 | Stop reason: SDUPTHER

## 2023-05-08 RX ORDER — TRAZODONE HYDROCHLORIDE 50 MG/1
50 TABLET ORAL NIGHTLY PRN
Qty: 30 TABLET | Refills: 5 | Status: SHIPPED | OUTPATIENT
Start: 2023-05-08 | End: 2023-06-07 | Stop reason: SDUPTHER

## 2023-05-08 NOTE — PROGRESS NOTES
Subjective:      Patient ID: Ember Rivera is a 53 y.o. female.    Chief Complaint: Hypertension and Abdominal Pain    HPI      #HTN  Last visit increased lisinopril 10 to 20  Adherent to meds and lifestyle changes     #Abdominal pain and bloating  Couple of months, worse last couple of weeks. Not relieved with defecation  Tender to palpation, rib area +nausea and vomiting   LBM yesterday, every other day, normal formed stool   Drinks socially    #Leg and foot pain b/l  10 days, knees to toes. Restless and painful disrupting sleep     #Difficulty sleeping  Tried melatonin 20 mg and Ibuprofen without relief     #HCM  Did not get blood work after last visit  Discussed risk vs benefits of cologuard vs colonoscopy.   PAP due, patient to schedule  Mammo due, ordered     #Tobacco use  Cut back 1/4 to 1/2    Recommend Shingles and Pneumococcal vaccinations.     Past Medical History:   Diagnosis Date    GERD (gastroesophageal reflux disease)      Review of Systems     10 point review of systems was conducted and only the pertinent positives and pertinent negatives are noted above in the HPI section.    Objective:     Vitals:    05/08/23 1328   BP: (!) 148/82   Pulse: 105     Physical Exam  Vitals reviewed.   Constitutional:       General: She is not in acute distress.     Appearance: Normal appearance. She is normal weight. She is not ill-appearing or toxic-appearing.   Eyes:      Extraocular Movements: Extraocular movements intact.      Conjunctiva/sclera: Conjunctivae normal.      Pupils: Pupils are equal, round, and reactive to light.   Cardiovascular:      Rate and Rhythm: Regular rhythm. Tachycardia present.      Pulses: Normal pulses.      Heart sounds: Normal heart sounds. No murmur heard.  Pulmonary:      Effort: Pulmonary effort is normal. No respiratory distress.      Breath sounds: Normal breath sounds. No wheezing.   Abdominal:      General: A surgical scar is present. Bowel sounds are normal. There is  "distension. There is no abdominal bruit.      Palpations: Abdomen is soft. There is hepatomegaly. There is no splenomegaly or mass.      Tenderness: There is generalized abdominal tenderness. There is no guarding. Negative signs include Luevano's sign and McBurney's sign.   Musculoskeletal:         General: Normal range of motion.      Right lower leg: No edema.      Left lower leg: No edema.   Skin:     Coloration: Skin is not jaundiced.      Findings: Rash (post left forearm petechia rash 1" x 3") present. No bruising.   Neurological:      General: No focal deficit present.      Mental Status: She is alert and oriented to person, place, and time.      Cranial Nerves: No cranial nerve deficit.      Motor: No weakness.      Gait: Gait abnormal (mild ataxic).   Psychiatric:         Mood and Affect: Mood normal.         Behavior: Behavior normal.     Assessment:      1. Essential hypertension    2. Tobacco abuse    3. Encounter for screening for HIV    4. Encounter for hepatitis C screening test for low risk patient    5. Encounter for screening mammogram for malignant neoplasm of breast    6. Encounter for screening for colorectal malignant neoplasm    7. Sleep difficulties    8. Peripheral polyneuropathy    9. Generalized abdominal pain      Plan:     Essential hypertension  -     lisinopriL (PRINIVIL,ZESTRIL) 30 MG tablet; Take 1 tablet (30 mg total) by mouth once daily.  Dispense: 90 tablet; Refill: 3  -     CBC Auto Differential; Future; Expected date: 05/08/2023  -     Comprehensive Metabolic Panel; Future; Expected date: 05/08/2023  -     TSH; Future; Expected date: 05/08/2023    Tobacco abuse   Encourage to continue cutting back, next visit reiterate resources available      Encounter for screening for HIV  -     HIV 1/2 Ag/Ab (4th Gen); Future; Expected date: 05/08/2023    Encounter for hepatitis C screening test for low risk patient  -     Hepatitis C Antibody; Future; Expected date: 05/08/2023    Encounter " for screening mammogram for malignant neoplasm of breast  -     Mammo Digital Screening Bilat w/ Prasad; Future; Expected date: 05/08/2023    Encounter for screening for colorectal malignant neoplasm  -     Cologuard Screening (Multitarget Stool DNA); Future; Expected date: 05/08/2023    Sleep difficulties  -     traZODone (DESYREL) 50 MG tablet; Take 1 tablet (50 mg total) by mouth nightly as needed for Insomnia.  Dispense: 30 tablet; Refill: 5    Peripheral polyneuropathy  -     diclofenac sodium (VOLTAREN) 1 % Gel; Apply 2 g topically 4 (four) times daily.  Dispense: 100 g; Refill: 5  -     CBC Auto Differential; Future; Expected date: 05/08/2023  -     Comprehensive Metabolic Panel; Future; Expected date: 05/08/2023  -     Hemoglobin A1C; Future; Expected date: 05/08/2023    Generalized abdominal pain  -     CBC Auto Differential; Future; Expected date: 05/08/2023  -     Comprehensive Metabolic Panel; Future; Expected date: 05/08/2023  -     Hepatitis C Antibody; Future; Expected date: 05/08/2023  -     Lipid Panel; Future; Expected date: 05/08/2023  -     US Abdomen Complete; Future; Expected date: 05/08/2023  -     dicyclomine (BENTYL) 10 MG capsule; Take 1 capsule (10 mg total) by mouth 3 (three) times daily.  Dispense: 90 capsule; Refill: 0      Follow up in about 2 weeks (around 5/22/2023), or if symptoms worsen or fail to improve, for follow up. Scheduled appt 5/22    Haleigh Zacarias MD, [facility] PGY-3  05/08/2023

## 2023-05-09 ENCOUNTER — TELEPHONE (OUTPATIENT)
Dept: FAMILY MEDICINE | Facility: HOSPITAL | Age: 53
End: 2023-05-09
Payer: MEDICAID

## 2023-05-09 ENCOUNTER — HOSPITAL ENCOUNTER (EMERGENCY)
Facility: HOSPITAL | Age: 53
Discharge: HOME | End: 2023-05-09
Attending: STUDENT IN AN ORGANIZED HEALTH CARE EDUCATION/TRAINING PROGRAM
Payer: MEDICAID

## 2023-05-09 VITALS
DIASTOLIC BLOOD PRESSURE: 86 MMHG | WEIGHT: 153 LBS | SYSTOLIC BLOOD PRESSURE: 178 MMHG | OXYGEN SATURATION: 98 % | RESPIRATION RATE: 18 BRPM | TEMPERATURE: 98 F | BODY MASS INDEX: 24.59 KG/M2 | HEART RATE: 80 BPM | HEIGHT: 66 IN

## 2023-05-09 DIAGNOSIS — E83.42 HYPOMAGNESEMIA: ICD-10-CM

## 2023-05-09 DIAGNOSIS — R10.10 UPPER ABDOMINAL PAIN: ICD-10-CM

## 2023-05-09 DIAGNOSIS — R16.0 HEPATOMEGALY: ICD-10-CM

## 2023-05-09 DIAGNOSIS — K76.0 HEPATIC STEATOSIS: Primary | ICD-10-CM

## 2023-05-09 DIAGNOSIS — I10 PRIMARY HYPERTENSION: ICD-10-CM

## 2023-05-09 DIAGNOSIS — R93.89 ABNORMAL ULTRASOUND: ICD-10-CM

## 2023-05-09 DIAGNOSIS — N30.00 ACUTE CYSTITIS WITHOUT HEMATURIA: ICD-10-CM

## 2023-05-09 LAB
ALBUMIN SERPL BCP-MCNC: 3.1 G/DL (ref 3.5–5.2)
ALP SERPL-CCNC: 192 U/L (ref 55–135)
ALT SERPL W/O P-5'-P-CCNC: 38 U/L (ref 10–44)
ANION GAP SERPL CALC-SCNC: 18 MMOL/L (ref 8–16)
AST SERPL-CCNC: 129 U/L (ref 10–40)
B-HCG UR QL: NEGATIVE
BACTERIA #/AREA URNS HPF: ABNORMAL /HPF
BASOPHILS # BLD AUTO: 0.06 K/UL (ref 0–0.2)
BASOPHILS NFR BLD: 0.5 % (ref 0–1.9)
BILIRUB SERPL-MCNC: 0.4 MG/DL (ref 0.1–1)
BILIRUB UR QL STRIP: NEGATIVE
BUN SERPL-MCNC: 3 MG/DL (ref 6–20)
CALCIUM SERPL-MCNC: 9.4 MG/DL (ref 8.7–10.5)
CHLORIDE SERPL-SCNC: 100 MMOL/L (ref 95–110)
CLARITY UR: CLEAR
CO2 SERPL-SCNC: 22 MMOL/L (ref 23–29)
COLOR UR: YELLOW
CREAT SERPL-MCNC: 0.6 MG/DL (ref 0.5–1.4)
CTP QC/QA: YES
DIFFERENTIAL METHOD: ABNORMAL
EOSINOPHIL # BLD AUTO: 0.1 K/UL (ref 0–0.5)
EOSINOPHIL NFR BLD: 0.8 % (ref 0–8)
ERYTHROCYTE [DISTWIDTH] IN BLOOD BY AUTOMATED COUNT: 12.3 % (ref 11.5–14.5)
EST. GFR  (NO RACE VARIABLE): >60 ML/MIN/1.73 M^2
GLUCOSE SERPL-MCNC: 86 MG/DL (ref 70–110)
GLUCOSE UR QL STRIP: NEGATIVE
HCT VFR BLD AUTO: 36.8 % (ref 37–48.5)
HGB BLD-MCNC: 12.5 G/DL (ref 12–16)
HGB UR QL STRIP: NEGATIVE
IMM GRANULOCYTES # BLD AUTO: 0.03 K/UL (ref 0–0.04)
IMM GRANULOCYTES NFR BLD AUTO: 0.3 % (ref 0–0.5)
KETONES UR QL STRIP: NEGATIVE
LEUKOCYTE ESTERASE UR QL STRIP: ABNORMAL
LIPASE SERPL-CCNC: 59 U/L (ref 4–60)
LYMPHOCYTES # BLD AUTO: 1.7 K/UL (ref 1–4.8)
LYMPHOCYTES NFR BLD: 14.6 % (ref 18–48)
MAGNESIUM SERPL-MCNC: 1.5 MG/DL (ref 1.6–2.6)
MCH RBC QN AUTO: 32.1 PG (ref 27–31)
MCHC RBC AUTO-ENTMCNC: 34 G/DL (ref 32–36)
MCV RBC AUTO: 94 FL (ref 82–98)
MICROSCOPIC COMMENT: ABNORMAL
MONOCYTES # BLD AUTO: 0.9 K/UL (ref 0.3–1)
MONOCYTES NFR BLD: 7.6 % (ref 4–15)
NEUTROPHILS # BLD AUTO: 8.6 K/UL (ref 1.8–7.7)
NEUTROPHILS NFR BLD: 76.2 % (ref 38–73)
NITRITE UR QL STRIP: POSITIVE
NRBC BLD-RTO: 0 /100 WBC
PH UR STRIP: 8 [PH] (ref 5–8)
PLATELET # BLD AUTO: 347 K/UL (ref 150–450)
PMV BLD AUTO: 9.7 FL (ref 9.2–12.9)
POCT GLUCOSE: 88 MG/DL (ref 70–110)
POTASSIUM SERPL-SCNC: 4.1 MMOL/L (ref 3.5–5.1)
PROT SERPL-MCNC: 7.5 G/DL (ref 6–8.4)
PROT UR QL STRIP: NEGATIVE
RBC # BLD AUTO: 3.9 M/UL (ref 4–5.4)
SODIUM SERPL-SCNC: 140 MMOL/L (ref 136–145)
SP GR UR STRIP: 1.01 (ref 1–1.03)
SQUAMOUS #/AREA URNS HPF: 3 /HPF
URN SPEC COLLECT METH UR: ABNORMAL
UROBILINOGEN UR STRIP-ACNC: ABNORMAL EU/DL
WBC # BLD AUTO: 11.33 K/UL (ref 3.9–12.7)
WBC #/AREA URNS HPF: >100 /HPF (ref 0–5)

## 2023-05-09 PROCEDURE — 83735 ASSAY OF MAGNESIUM: CPT | Performed by: NURSE PRACTITIONER

## 2023-05-09 PROCEDURE — 93010 EKG 12-LEAD: ICD-10-PCS | Mod: ,,, | Performed by: INTERNAL MEDICINE

## 2023-05-09 PROCEDURE — 87086 URINE CULTURE/COLONY COUNT: CPT | Performed by: NURSE PRACTITIONER

## 2023-05-09 PROCEDURE — 93005 ELECTROCARDIOGRAM TRACING: CPT

## 2023-05-09 PROCEDURE — 25000003 PHARM REV CODE 250: Performed by: NURSE PRACTITIONER

## 2023-05-09 PROCEDURE — 93010 ELECTROCARDIOGRAM REPORT: CPT | Mod: ,,, | Performed by: INTERNAL MEDICINE

## 2023-05-09 PROCEDURE — 80053 COMPREHEN METABOLIC PANEL: CPT | Performed by: NURSE PRACTITIONER

## 2023-05-09 PROCEDURE — 87077 CULTURE AEROBIC IDENTIFY: CPT | Performed by: NURSE PRACTITIONER

## 2023-05-09 PROCEDURE — 96365 THER/PROPH/DIAG IV INF INIT: CPT

## 2023-05-09 PROCEDURE — 96361 HYDRATE IV INFUSION ADD-ON: CPT

## 2023-05-09 PROCEDURE — 81025 URINE PREGNANCY TEST: CPT | Performed by: NURSE PRACTITIONER

## 2023-05-09 PROCEDURE — 87186 SC STD MICRODIL/AGAR DIL: CPT | Performed by: NURSE PRACTITIONER

## 2023-05-09 PROCEDURE — 85025 COMPLETE CBC W/AUTO DIFF WBC: CPT | Performed by: NURSE PRACTITIONER

## 2023-05-09 PROCEDURE — 87088 URINE BACTERIA CULTURE: CPT | Performed by: NURSE PRACTITIONER

## 2023-05-09 PROCEDURE — 63600175 PHARM REV CODE 636 W HCPCS: Performed by: STUDENT IN AN ORGANIZED HEALTH CARE EDUCATION/TRAINING PROGRAM

## 2023-05-09 PROCEDURE — 81000 URINALYSIS NONAUTO W/SCOPE: CPT | Performed by: NURSE PRACTITIONER

## 2023-05-09 PROCEDURE — 63600175 PHARM REV CODE 636 W HCPCS: Performed by: NURSE PRACTITIONER

## 2023-05-09 PROCEDURE — 96375 TX/PRO/DX INJ NEW DRUG ADDON: CPT

## 2023-05-09 PROCEDURE — 83690 ASSAY OF LIPASE: CPT | Performed by: NURSE PRACTITIONER

## 2023-05-09 PROCEDURE — 99285 EMERGENCY DEPT VISIT HI MDM: CPT | Mod: 25

## 2023-05-09 PROCEDURE — 25500020 PHARM REV CODE 255: Performed by: STUDENT IN AN ORGANIZED HEALTH CARE EDUCATION/TRAINING PROGRAM

## 2023-05-09 RX ORDER — MORPHINE SULFATE 2 MG/ML
2 INJECTION, SOLUTION INTRAMUSCULAR; INTRAVENOUS
Status: COMPLETED | OUTPATIENT
Start: 2023-05-09 | End: 2023-05-09

## 2023-05-09 RX ORDER — ONDANSETRON 4 MG/1
4 TABLET, ORALLY DISINTEGRATING ORAL
Status: DISCONTINUED | OUTPATIENT
Start: 2023-05-09 | End: 2023-05-09

## 2023-05-09 RX ORDER — ONDANSETRON 2 MG/ML
4 INJECTION INTRAMUSCULAR; INTRAVENOUS
Status: COMPLETED | OUTPATIENT
Start: 2023-05-09 | End: 2023-05-09

## 2023-05-09 RX ORDER — NITROFURANTOIN 25; 75 MG/1; MG/1
100 CAPSULE ORAL 2 TIMES DAILY
Qty: 14 CAPSULE | Refills: 0 | Status: ON HOLD | OUTPATIENT
Start: 2023-05-09 | End: 2023-05-18 | Stop reason: HOSPADM

## 2023-05-09 RX ORDER — LISINOPRIL 10 MG/1
30 TABLET ORAL
Status: COMPLETED | OUTPATIENT
Start: 2023-05-09 | End: 2023-05-09

## 2023-05-09 RX ADMIN — SODIUM CHLORIDE 1000 ML: 0.9 INJECTION, SOLUTION INTRAVENOUS at 12:05

## 2023-05-09 RX ADMIN — ONDANSETRON 4 MG: 2 INJECTION INTRAMUSCULAR; INTRAVENOUS at 12:05

## 2023-05-09 RX ADMIN — MORPHINE SULFATE 2 MG: 2 INJECTION, SOLUTION INTRAMUSCULAR; INTRAVENOUS at 12:05

## 2023-05-09 RX ADMIN — CEFTRIAXONE 1 G: 1 INJECTION, POWDER, FOR SOLUTION INTRAMUSCULAR; INTRAVENOUS at 02:05

## 2023-05-09 RX ADMIN — IOHEXOL 75 ML: 350 INJECTION, SOLUTION INTRAVENOUS at 01:05

## 2023-05-09 RX ADMIN — LISINOPRIL 30 MG: 10 TABLET ORAL at 02:05

## 2023-05-09 NOTE — Clinical Note
"Ember Castellanomauri Rivera was seen and treated in our emergency department on 5/9/2023.  She may return to work on 05/12/2023.       If you have any questions or concerns, please don't hesitate to call.      Jessi Nathan NP"

## 2023-05-09 NOTE — ED TRIAGE NOTES
Ember Rivera, an 53 y.o. female presents to the ED after she was advised to come in because her labs were abnormal.       Review of patient's allergies indicates:   Allergen Reactions    Pcn [penicillins]      Chief Complaint   Patient presents with    Abnormal Lab     Sent by pcp for abnormal labs result drawn yesterday. She was seen by pcp for numbness/tingling/muscle weakness  in legs for 2 weeks and n/v with eating for approx. 2 months.      Past Medical History:   Diagnosis Date    GERD (gastroesophageal reflux disease)        Patient identifiers verified and correct.     LOC: The patient is awake, alert and aware of environment with an appropriate affect, the patient is oriented x 3 and speaking appropriately.     APPEARANCE: Patient appears comfortable and in no acute distress, patient is clean and well groomed.    HEENT: Head symmetrical. Eyes bilateral.  Bilateral ears without drainage. Bilateral nares patent, throat clear.    SKIN: The skin is warm and dry, color consistent with ethnicity, patient has normal skin turgor and moist mucus membranes, skin intact, no breakdown or bruising noted.     MUSCULOSKELETAL: Patient moving all extremities spontaneously, no swelling noted.    RESPIRATORY: Airway is open and patent, respirations are spontaneous, patient has a normal effort and rate, no accessory muscle use noted.     CARDIAC: Patient has a normal rate and regular rhythm, no edema noted, capillary refill < 3 seconds.     GASTRO: Abdomen soft and non-distended.     NEURO: Pt opens eyes spontaneously pupils equal, round, and reactive. behavior appropriate to situation, follows commands, facial expression symmetrical,    NEUROVASCULAR: All extremities are warm and pink.       Will continue to monitor.

## 2023-05-09 NOTE — DISCHARGE INSTRUCTIONS
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
## 2023-05-09 NOTE — TELEPHONE ENCOUNTER
Called patient to review critical lab results. No answer, left voicemail recommending patient to go to the emergency room for further work up and IV fluids plus potassium. Concern for SIRS criteria with WBC >14 and tachycardic.   K 3.0, Glucose 41, AST//41, AG 21 with delta gap 9.0 suggesting high anion gap acidosis.     Haleigh Zacarias MD, [facility] Family Medicine PGY-3  05/08/2023

## 2023-05-10 ENCOUNTER — TELEPHONE (OUTPATIENT)
Dept: FAMILY MEDICINE | Facility: HOSPITAL | Age: 53
End: 2023-05-10
Payer: MEDICAID

## 2023-05-10 ENCOUNTER — TELEPHONE (OUTPATIENT)
Dept: ADMINISTRATIVE | Facility: OTHER | Age: 53
End: 2023-05-10
Payer: MEDICAID

## 2023-05-10 NOTE — TELEPHONE ENCOUNTER
Dandy from [facility] called with a critical lab value of Glucose-41. High Priority message sent to Dr. Haleigh Zacarias and staff. Value entered in Flowsheets.

## 2023-05-11 LAB — BACTERIA UR CULT: ABNORMAL

## 2023-05-15 ENCOUNTER — HOSPITAL ENCOUNTER (INPATIENT)
Facility: HOSPITAL | Age: 53
LOS: 3 days | Discharge: HOME | DRG: 074 | End: 2023-05-18
Attending: EMERGENCY MEDICINE | Admitting: HOSPITALIST
Payer: MEDICAID

## 2023-05-15 DIAGNOSIS — I10 PRIMARY HYPERTENSION: ICD-10-CM

## 2023-05-15 DIAGNOSIS — G62.9 PERIPHERAL POLYNEUROPATHY: ICD-10-CM

## 2023-05-15 DIAGNOSIS — G62.1 ALCOHOL-INDUCED POLYNEUROPATHY: ICD-10-CM

## 2023-05-15 DIAGNOSIS — R29.898 WEAKNESS OF BOTH LOWER EXTREMITIES: ICD-10-CM

## 2023-05-15 DIAGNOSIS — R53.1 WEAKNESS: Primary | ICD-10-CM

## 2023-05-15 LAB
ALBUMIN SERPL BCP-MCNC: 3.4 G/DL (ref 3.5–5.2)
ALP SERPL-CCNC: 207 U/L (ref 55–135)
ALT SERPL W/O P-5'-P-CCNC: 44 U/L (ref 10–44)
AMORPH CRY URNS QL MICRO: ABNORMAL
ANION GAP SERPL CALC-SCNC: 16 MMOL/L (ref 8–16)
AST SERPL-CCNC: 174 U/L (ref 10–40)
BACTERIA #/AREA URNS HPF: ABNORMAL /HPF
BASOPHILS # BLD AUTO: 0.02 K/UL (ref 0–0.2)
BASOPHILS NFR BLD: 0.2 % (ref 0–1.9)
BILIRUB SERPL-MCNC: 0.8 MG/DL (ref 0.1–1)
BILIRUB UR QL STRIP: NEGATIVE
BUN SERPL-MCNC: 3 MG/DL (ref 6–20)
CALCIUM SERPL-MCNC: 9.3 MG/DL (ref 8.7–10.5)
CHLORIDE SERPL-SCNC: 101 MMOL/L (ref 95–110)
CLARITY UR: ABNORMAL
CO2 SERPL-SCNC: 22 MMOL/L (ref 23–29)
COLOR UR: ABNORMAL
CREAT SERPL-MCNC: 0.6 MG/DL (ref 0.5–1.4)
CRP SERPL-MCNC: 16.91 MG/L (ref 0–3.19)
DIFFERENTIAL METHOD: ABNORMAL
EOSINOPHIL # BLD AUTO: 0 K/UL (ref 0–0.5)
EOSINOPHIL NFR BLD: 0.2 % (ref 0–8)
ERYTHROCYTE [DISTWIDTH] IN BLOOD BY AUTOMATED COUNT: 12.1 % (ref 11.5–14.5)
EST. GFR  (NO RACE VARIABLE): >60 ML/MIN/1.73 M^2
GLUCOSE SERPL-MCNC: 108 MG/DL (ref 70–110)
GLUCOSE UR QL STRIP: NEGATIVE
HCT VFR BLD AUTO: 37 % (ref 37–48.5)
HGB BLD-MCNC: 12.5 G/DL (ref 12–16)
HGB UR QL STRIP: NEGATIVE
HYALINE CASTS #/AREA URNS LPF: 0 /LPF
IMM GRANULOCYTES # BLD AUTO: 0.06 K/UL (ref 0–0.04)
IMM GRANULOCYTES NFR BLD AUTO: 0.5 % (ref 0–0.5)
KETONES UR QL STRIP: ABNORMAL
LEUKOCYTE ESTERASE UR QL STRIP: NEGATIVE
LYMPHOCYTES # BLD AUTO: 0.9 K/UL (ref 1–4.8)
LYMPHOCYTES NFR BLD: 7.4 % (ref 18–48)
MAGNESIUM SERPL-MCNC: 1.7 MG/DL (ref 1.6–2.6)
MCH RBC QN AUTO: 32.2 PG (ref 27–31)
MCHC RBC AUTO-ENTMCNC: 33.8 G/DL (ref 32–36)
MCV RBC AUTO: 95 FL (ref 82–98)
MICROSCOPIC COMMENT: ABNORMAL
MONOCYTES # BLD AUTO: 0.6 K/UL (ref 0.3–1)
MONOCYTES NFR BLD: 5 % (ref 4–15)
NEUTROPHILS # BLD AUTO: 10.1 K/UL (ref 1.8–7.7)
NEUTROPHILS NFR BLD: 86.7 % (ref 38–73)
NITRITE UR QL STRIP: NEGATIVE
NRBC BLD-RTO: 0 /100 WBC
PH UR STRIP: 6 [PH] (ref 5–8)
PLATELET # BLD AUTO: 342 K/UL (ref 150–450)
PMV BLD AUTO: 9.2 FL (ref 9.2–12.9)
POTASSIUM SERPL-SCNC: 3.2 MMOL/L (ref 3.5–5.1)
PROT SERPL-MCNC: 7.6 G/DL (ref 6–8.4)
PROT UR QL STRIP: ABNORMAL
RBC # BLD AUTO: 3.88 M/UL (ref 4–5.4)
RBC #/AREA URNS HPF: 0 /HPF (ref 0–4)
SODIUM SERPL-SCNC: 139 MMOL/L (ref 136–145)
SP GR UR STRIP: 1.02 (ref 1–1.03)
SQUAMOUS #/AREA URNS HPF: 2 /HPF
T4 FREE SERPL-MCNC: 0.88 NG/DL (ref 0.71–1.51)
TSH SERPL DL<=0.005 MIU/L-ACNC: 5.96 UIU/ML (ref 0.4–4)
UNIDENT CRYS URNS QL MICRO: ABNORMAL
URN SPEC COLLECT METH UR: ABNORMAL
UROBILINOGEN UR STRIP-ACNC: NEGATIVE EU/DL
WBC # BLD AUTO: 11.68 K/UL (ref 3.9–12.7)
WBC #/AREA URNS HPF: 0 /HPF (ref 0–5)

## 2023-05-15 PROCEDURE — G0378 HOSPITAL OBSERVATION PER HR: HCPCS

## 2023-05-15 PROCEDURE — 80053 COMPREHEN METABOLIC PANEL: CPT | Performed by: EMERGENCY MEDICINE

## 2023-05-15 PROCEDURE — 63600175 PHARM REV CODE 636 W HCPCS: Performed by: INTERNAL MEDICINE

## 2023-05-15 PROCEDURE — 83735 ASSAY OF MAGNESIUM: CPT | Performed by: EMERGENCY MEDICINE

## 2023-05-15 PROCEDURE — A9585 GADOBUTROL INJECTION: HCPCS | Performed by: INTERNAL MEDICINE

## 2023-05-15 PROCEDURE — 11000001 HC ACUTE MED/SURG PRIVATE ROOM

## 2023-05-15 PROCEDURE — 93005 ELECTROCARDIOGRAM TRACING: CPT

## 2023-05-15 PROCEDURE — 84439 ASSAY OF FREE THYROXINE: CPT | Performed by: INTERNAL MEDICINE

## 2023-05-15 PROCEDURE — 25500020 PHARM REV CODE 255: Performed by: INTERNAL MEDICINE

## 2023-05-15 PROCEDURE — 93010 EKG 12-LEAD: ICD-10-PCS | Mod: ,,, | Performed by: INTERNAL MEDICINE

## 2023-05-15 PROCEDURE — 81000 URINALYSIS NONAUTO W/SCOPE: CPT | Performed by: EMERGENCY MEDICINE

## 2023-05-15 PROCEDURE — 86141 C-REACTIVE PROTEIN HS: CPT | Performed by: INTERNAL MEDICINE

## 2023-05-15 PROCEDURE — 25000003 PHARM REV CODE 250: Performed by: INTERNAL MEDICINE

## 2023-05-15 PROCEDURE — 99285 EMERGENCY DEPT VISIT HI MDM: CPT | Mod: 25

## 2023-05-15 PROCEDURE — 85025 COMPLETE CBC W/AUTO DIFF WBC: CPT | Performed by: EMERGENCY MEDICINE

## 2023-05-15 PROCEDURE — 84443 ASSAY THYROID STIM HORMONE: CPT | Performed by: INTERNAL MEDICINE

## 2023-05-15 PROCEDURE — 93010 ELECTROCARDIOGRAM REPORT: CPT | Mod: ,,, | Performed by: INTERNAL MEDICINE

## 2023-05-15 RX ORDER — THIAMINE HCL 100 MG
100 TABLET ORAL 3 TIMES DAILY
Status: DISCONTINUED | OUTPATIENT
Start: 2023-05-19 | End: 2023-05-17

## 2023-05-15 RX ORDER — KETOROLAC TROMETHAMINE 30 MG/ML
15 INJECTION, SOLUTION INTRAMUSCULAR; INTRAVENOUS EVERY 6 HOURS
Status: DISCONTINUED | OUTPATIENT
Start: 2023-05-16 | End: 2023-05-15

## 2023-05-15 RX ORDER — SODIUM CHLORIDE 0.9 % (FLUSH) 0.9 %
10 SYRINGE (ML) INJECTION
Status: DISCONTINUED | OUTPATIENT
Start: 2023-05-15 | End: 2023-05-18 | Stop reason: HOSPADM

## 2023-05-15 RX ORDER — GABAPENTIN 100 MG/1
200 CAPSULE ORAL 3 TIMES DAILY
Status: DISCONTINUED | OUTPATIENT
Start: 2023-05-15 | End: 2023-05-17

## 2023-05-15 RX ORDER — LANOLIN ALCOHOL/MO/W.PET/CERES
400 CREAM (GRAM) TOPICAL DAILY
COMMUNITY
End: 2023-07-27

## 2023-05-15 RX ORDER — KETOROLAC TROMETHAMINE 30 MG/ML
15 INJECTION, SOLUTION INTRAMUSCULAR; INTRAVENOUS EVERY 6 HOURS
Status: DISCONTINUED | OUTPATIENT
Start: 2023-05-15 | End: 2023-05-16

## 2023-05-15 RX ORDER — MAGNESIUM GLUCONATE 27 MG(500)
1 TABLET ORAL DAILY
Status: ON HOLD | COMMUNITY
End: 2023-05-18 | Stop reason: HOSPADM

## 2023-05-15 RX ORDER — GADOBUTROL 604.72 MG/ML
7.5 INJECTION INTRAVENOUS
Status: COMPLETED | OUTPATIENT
Start: 2023-05-15 | End: 2023-05-15

## 2023-05-15 RX ORDER — NITROFURANTOIN 25; 75 MG/1; MG/1
100 CAPSULE ORAL 2 TIMES DAILY
Status: DISCONTINUED | OUTPATIENT
Start: 2023-05-15 | End: 2023-05-15

## 2023-05-15 RX ORDER — HYDROCODONE BITARTRATE AND ACETAMINOPHEN 5; 325 MG/1; MG/1
1 TABLET ORAL EVERY 6 HOURS PRN
Status: DISCONTINUED | OUTPATIENT
Start: 2023-05-15 | End: 2023-05-18 | Stop reason: HOSPADM

## 2023-05-15 RX ADMIN — POTASSIUM BICARBONATE 50 MEQ: 978 TABLET, EFFERVESCENT ORAL at 07:05

## 2023-05-15 RX ADMIN — GABAPENTIN 200 MG: 100 CAPSULE ORAL at 07:05

## 2023-05-15 RX ADMIN — HYDROCODONE BITARTRATE AND ACETAMINOPHEN 1 TABLET: 5; 325 TABLET ORAL at 08:05

## 2023-05-15 RX ADMIN — KETOROLAC TROMETHAMINE 15 MG: 30 INJECTION, SOLUTION INTRAMUSCULAR; INTRAVENOUS at 08:05

## 2023-05-15 RX ADMIN — GADOBUTROL 7.5 ML: 604.72 INJECTION INTRAVENOUS at 06:05

## 2023-05-15 RX ADMIN — CEFTRIAXONE SODIUM 2 G: 2 INJECTION, POWDER, FOR SOLUTION INTRAMUSCULAR; INTRAVENOUS at 07:05
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
## 2023-05-15 NOTE — ED NOTES
Assisted with bedpan, pt. Had a small amt. Of liquid brown stool. Repositioned in bed for comfort.

## 2023-05-15 NOTE — ED NOTES
Admit team at bedside.  at bedside and brought food for patient at request. 

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
## 2023-05-15 NOTE — ED NOTES
Pt. Arrives via ems from home s/p fall walking out of her home. She reports numbness to her legs for approx. 3 weeks. Severity has been intermittent. She states today was not the first fall related to symptoms but first time she was unable to get up. C/o only (L) knee pain from fall-there is a mild abrasion to area. She is able to lift both legs off bed but with weakness. She also report numbness to all of her fingertips. She does not appear in distress.
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
## 2023-05-16 ENCOUNTER — CLINICAL SUPPORT (OUTPATIENT)
Dept: SMOKING CESSATION | Facility: CLINIC | Age: 53
End: 2023-05-16
Payer: MEDICAID

## 2023-05-16 DIAGNOSIS — F17.210 CIGARETTE SMOKER: Primary | ICD-10-CM

## 2023-05-16 PROBLEM — R94.31 PROLONGED QT INTERVAL: Status: ACTIVE | Noted: 2023-05-16

## 2023-05-16 PROBLEM — I10 PRIMARY HYPERTENSION: Status: ACTIVE | Noted: 2023-05-16

## 2023-05-16 PROBLEM — R74.01 TRANSAMINITIS: Status: ACTIVE | Noted: 2023-05-16

## 2023-05-16 PROBLEM — E87.6 HYPOKALEMIA: Status: ACTIVE | Noted: 2023-05-16

## 2023-05-16 PROBLEM — F10.20 ETOH DEPENDENCE: Status: ACTIVE | Noted: 2023-05-16

## 2023-05-16 PROBLEM — E83.39 HYPOPHOSPHATEMIA: Status: ACTIVE | Noted: 2023-05-16

## 2023-05-16 PROBLEM — E83.42 HYPOMAGNESEMIA: Status: ACTIVE | Noted: 2023-05-16

## 2023-05-16 PROBLEM — R29.898 WEAKNESS OF BOTH LOWER EXTREMITIES: Status: ACTIVE | Noted: 2023-05-16

## 2023-05-16 PROBLEM — N30.00 ACUTE CYSTITIS: Status: ACTIVE | Noted: 2023-05-16

## 2023-05-16 LAB
ALBUMIN SERPL BCP-MCNC: 2.9 G/DL (ref 3.5–5.2)
ALP SERPL-CCNC: 184 U/L (ref 55–135)
ALT SERPL W/O P-5'-P-CCNC: 35 U/L (ref 10–44)
ANION GAP SERPL CALC-SCNC: 14 MMOL/L (ref 8–16)
AST SERPL-CCNC: 105 U/L (ref 10–40)
BASOPHILS # BLD AUTO: 0.02 K/UL (ref 0–0.2)
BASOPHILS NFR BLD: 0.2 % (ref 0–1.9)
BASOPHILS NFR CSF MANUAL: ABNORMAL %
BILIRUB SERPL-MCNC: 0.6 MG/DL (ref 0.1–1)
BUN SERPL-MCNC: 6 MG/DL (ref 6–20)
CALCIUM SERPL-MCNC: 8.7 MG/DL (ref 8.7–10.5)
CHLORIDE SERPL-SCNC: 103 MMOL/L (ref 95–110)
CLARITY CSF: CLEAR
CO2 SERPL-SCNC: 21 MMOL/L (ref 23–29)
COLOR CSF: COLORLESS
CREAT SERPL-MCNC: 0.6 MG/DL (ref 0.5–1.4)
CSF TUBE NUMBER: 1
CSF TUBE NUMBER: 1
DIFFERENTIAL METHOD: ABNORMAL
EOSINOPHIL # BLD AUTO: 0.2 K/UL (ref 0–0.5)
EOSINOPHIL NFR BLD: 1.6 % (ref 0–8)
EOSINOPHIL NFR CSF MANUAL: ABNORMAL %
ERYTHROCYTE [DISTWIDTH] IN BLOOD BY AUTOMATED COUNT: 12.3 % (ref 11.5–14.5)
ERYTHROCYTE [SEDIMENTATION RATE] IN BLOOD BY PHOTOMETRIC METHOD: 58 MM/HR (ref 0–36)
EST. GFR  (NO RACE VARIABLE): >60 ML/MIN/1.73 M^2
GLUCOSE CSF-MCNC: 68 MG/DL (ref 40–70)
GLUCOSE SERPL-MCNC: 104 MG/DL (ref 70–110)
HCT VFR BLD AUTO: 35 % (ref 37–48.5)
HGB BLD-MCNC: 11.8 G/DL (ref 12–16)
IMM GRANULOCYTES # BLD AUTO: 0.02 K/UL (ref 0–0.04)
IMM GRANULOCYTES NFR BLD AUTO: 0.2 % (ref 0–0.5)
LYMPHOCYTES # BLD AUTO: 1.6 K/UL (ref 1–4.8)
LYMPHOCYTES NFR BLD: 16.1 % (ref 18–48)
LYMPHOCYTES NFR CSF MANUAL: ABNORMAL % (ref 40–80)
MAGNESIUM SERPL-MCNC: 1.6 MG/DL (ref 1.6–2.6)
MCH RBC QN AUTO: 32 PG (ref 27–31)
MCHC RBC AUTO-ENTMCNC: 33.7 G/DL (ref 32–36)
MCV RBC AUTO: 95 FL (ref 82–98)
MONOCYTES # BLD AUTO: 0.7 K/UL (ref 0.3–1)
MONOCYTES NFR BLD: 7.7 % (ref 4–15)
MONOS+MACROS NFR CSF MANUAL: ABNORMAL % (ref 15–45)
NEUTROPHILS # BLD AUTO: 7.2 K/UL (ref 1.8–7.7)
NEUTROPHILS NFR BLD: 74.2 % (ref 38–73)
NEUTROPHILS NFR CSF MANUAL: ABNORMAL % (ref 0–6)
NRBC BLD-RTO: 0 /100 WBC
OTHER CELLS CSF: ABNORMAL %
PHOSPHATE SERPL-MCNC: 2.4 MG/DL (ref 2.7–4.5)
PLATELET # BLD AUTO: 288 K/UL (ref 150–450)
PMV BLD AUTO: 9.6 FL (ref 9.2–12.9)
POTASSIUM SERPL-SCNC: 3.3 MMOL/L (ref 3.5–5.1)
PROT CSF-MCNC: 36 MG/DL (ref 15–40)
PROT SERPL-MCNC: 6.8 G/DL (ref 6–8.4)
RBC # BLD AUTO: 3.69 M/UL (ref 4–5.4)
SODIUM SERPL-SCNC: 138 MMOL/L (ref 136–145)
SPECIMEN VOL CSF: 3 ML
VIT B12 SERPL-MCNC: 309 PG/ML (ref 210–950)
WBC # BLD AUTO: 9.67 K/UL (ref 3.9–12.7)
WBC # CSF: 1 /CU MM (ref 0–5)

## 2023-05-16 PROCEDURE — A9585 GADOBUTROL INJECTION: HCPCS | Performed by: HOSPITALIST

## 2023-05-16 PROCEDURE — 25000003 PHARM REV CODE 250: Performed by: INTERNAL MEDICINE

## 2023-05-16 PROCEDURE — 82607 VITAMIN B-12: CPT | Performed by: HOSPITALIST

## 2023-05-16 PROCEDURE — 63600175 PHARM REV CODE 636 W HCPCS: Performed by: INTERNAL MEDICINE

## 2023-05-16 PROCEDURE — 84165 PATHOLOGIST INTERPRETATION SPE: ICD-10-PCS | Mod: 26,,, | Performed by: PATHOLOGY

## 2023-05-16 PROCEDURE — 97165 OT EVAL LOW COMPLEX 30 MIN: CPT

## 2023-05-16 PROCEDURE — 99406 BEHAV CHNG SMOKING 3-10 MIN: CPT | Mod: S$GLB,,,

## 2023-05-16 PROCEDURE — 99235 HOSP IP/OBS SAME DATE MOD 70: CPT | Mod: 25,,, | Performed by: PHYSICIAN ASSISTANT

## 2023-05-16 PROCEDURE — 97535 SELF CARE MNGMENT TRAINING: CPT

## 2023-05-16 PROCEDURE — 86334 IMMUNOFIX E-PHORESIS SERUM: CPT | Performed by: HOSPITALIST

## 2023-05-16 PROCEDURE — 87070 CULTURE OTHR SPECIMN AEROBIC: CPT | Performed by: INTERNAL MEDICINE

## 2023-05-16 PROCEDURE — 25000003 PHARM REV CODE 250: Performed by: HOSPITALIST

## 2023-05-16 PROCEDURE — 87205 SMEAR GRAM STAIN: CPT | Performed by: INTERNAL MEDICINE

## 2023-05-16 PROCEDURE — 94761 N-INVAS EAR/PLS OXIMETRY MLT: CPT

## 2023-05-16 PROCEDURE — 25000003 PHARM REV CODE 250: Performed by: PHYSICIAN ASSISTANT

## 2023-05-16 PROCEDURE — 99000 SPECIMEN HANDLING OFFICE-LAB: CPT | Performed by: HOSPITALIST

## 2023-05-16 PROCEDURE — 99232 PR SUBSEQUENT HOSPITAL CARE,LEVL II: ICD-10-PCS | Mod: ,,, | Performed by: PSYCHIATRY & NEUROLOGY

## 2023-05-16 PROCEDURE — 84165 PROTEIN E-PHORESIS SERUM: CPT | Mod: 26,,, | Performed by: PATHOLOGY

## 2023-05-16 PROCEDURE — 83735 ASSAY OF MAGNESIUM: CPT | Performed by: INTERNAL MEDICINE

## 2023-05-16 PROCEDURE — 80053 COMPREHEN METABOLIC PANEL: CPT | Performed by: INTERNAL MEDICINE

## 2023-05-16 PROCEDURE — 99406 PT REFUSED TOBACCO CESSATION: ICD-10-PCS | Mod: S$GLB,,,

## 2023-05-16 PROCEDURE — 36415 COLL VENOUS BLD VENIPUNCTURE: CPT | Performed by: INTERNAL MEDICINE

## 2023-05-16 PROCEDURE — 83615 LACTATE (LD) (LDH) ENZYME: CPT | Performed by: INTERNAL MEDICINE

## 2023-05-16 PROCEDURE — 86334 IMMUNOFIX E-PHORESIS SERUM: CPT | Mod: 26,,, | Performed by: PATHOLOGY

## 2023-05-16 PROCEDURE — 84157 ASSAY OF PROTEIN OTHER: CPT | Performed by: INTERNAL MEDICINE

## 2023-05-16 PROCEDURE — 89051 BODY FLUID CELL COUNT: CPT | Performed by: HOSPITALIST

## 2023-05-16 PROCEDURE — 99232 SBSQ HOSP IP/OBS MODERATE 35: CPT | Mod: ,,, | Performed by: PSYCHIATRY & NEUROLOGY

## 2023-05-16 PROCEDURE — 99235 PR OBSERV/HOSP SAME DATE,LEVL IV: ICD-10-PCS | Mod: 25,,, | Performed by: PHYSICIAN ASSISTANT

## 2023-05-16 PROCEDURE — 86334 PATHOLOGIST INTERPRETATION IFE: ICD-10-PCS | Mod: 26,,, | Performed by: PATHOLOGY

## 2023-05-16 PROCEDURE — 85025 COMPLETE CBC W/AUTO DIFF WBC: CPT | Performed by: INTERNAL MEDICINE

## 2023-05-16 PROCEDURE — 11000001 HC ACUTE MED/SURG PRIVATE ROOM

## 2023-05-16 PROCEDURE — 63600175 PHARM REV CODE 636 W HCPCS: Performed by: HOSPITALIST

## 2023-05-16 PROCEDURE — 84100 ASSAY OF PHOSPHORUS: CPT | Performed by: INTERNAL MEDICINE

## 2023-05-16 PROCEDURE — 84165 PROTEIN E-PHORESIS SERUM: CPT | Performed by: HOSPITALIST

## 2023-05-16 PROCEDURE — 97162 PT EVAL MOD COMPLEX 30 MIN: CPT

## 2023-05-16 PROCEDURE — 82945 GLUCOSE OTHER FLUID: CPT | Performed by: INTERNAL MEDICINE

## 2023-05-16 PROCEDURE — 84425 ASSAY OF VITAMIN B-1: CPT | Performed by: HOSPITALIST

## 2023-05-16 PROCEDURE — 99900035 HC TECH TIME PER 15 MIN (STAT)

## 2023-05-16 PROCEDURE — 85652 RBC SED RATE AUTOMATED: CPT | Performed by: INTERNAL MEDICINE

## 2023-05-16 PROCEDURE — 25500020 PHARM REV CODE 255: Performed by: HOSPITALIST

## 2023-05-16 RX ORDER — MAGNESIUM SULFATE HEPTAHYDRATE 40 MG/ML
2 INJECTION, SOLUTION INTRAVENOUS ONCE
Status: COMPLETED | OUTPATIENT
Start: 2023-05-16 | End: 2023-05-16

## 2023-05-16 RX ORDER — IBUPROFEN 200 MG
1 TABLET ORAL DAILY
Status: DISCONTINUED | OUTPATIENT
Start: 2023-05-16 | End: 2023-05-18 | Stop reason: HOSPADM

## 2023-05-16 RX ORDER — LIDOCAINE HYDROCHLORIDE 10 MG/ML
INJECTION INFILTRATION; PERINEURAL
Status: COMPLETED | OUTPATIENT
Start: 2023-05-16 | End: 2023-05-16

## 2023-05-16 RX ORDER — GADOBUTROL 604.72 MG/ML
7.5 INJECTION INTRAVENOUS
Status: COMPLETED | OUTPATIENT
Start: 2023-05-16 | End: 2023-05-16

## 2023-05-16 RX ORDER — LISINOPRIL 20 MG/1
20 TABLET ORAL DAILY
Status: DISCONTINUED | OUTPATIENT
Start: 2023-05-16 | End: 2023-05-17

## 2023-05-16 RX ORDER — SODIUM,POTASSIUM PHOSPHATES 280-250MG
1 POWDER IN PACKET (EA) ORAL
Status: COMPLETED | OUTPATIENT
Start: 2023-05-16 | End: 2023-05-17

## 2023-05-16 RX ADMIN — MAGNESIUM SULFATE 2 G: 2 INJECTION INTRAVENOUS at 10:05

## 2023-05-16 RX ADMIN — GABAPENTIN 200 MG: 100 CAPSULE ORAL at 10:05

## 2023-05-16 RX ADMIN — THERA TABS 1 TABLET: TAB at 10:05

## 2023-05-16 RX ADMIN — CEFTRIAXONE SODIUM 2 G: 2 INJECTION, POWDER, FOR SOLUTION INTRAMUSCULAR; INTRAVENOUS at 05:05

## 2023-05-16 RX ADMIN — GABAPENTIN 200 MG: 100 CAPSULE ORAL at 03:05

## 2023-05-16 RX ADMIN — GABAPENTIN 200 MG: 100 CAPSULE ORAL at 08:05

## 2023-05-16 RX ADMIN — POTASSIUM BICARBONATE 40 MEQ: 391 TABLET, EFFERVESCENT ORAL at 10:05

## 2023-05-16 RX ADMIN — POTASSIUM, SODIUM PHOSPHATES 280 MG-160 MG-250 MG ORAL POWDER PACKET 1 PACKET: POWDER IN PACKET at 06:05

## 2023-05-16 RX ADMIN — LIDOCAINE HYDROCHLORIDE 3 ML: 10 INJECTION, SOLUTION INFILTRATION; PERINEURAL at 01:05

## 2023-05-16 RX ADMIN — FOLIC ACID 1 MG: 5 INJECTION, SOLUTION INTRAMUSCULAR; INTRAVENOUS; SUBCUTANEOUS at 12:05

## 2023-05-16 RX ADMIN — POTASSIUM, SODIUM PHOSPHATES 280 MG-160 MG-250 MG ORAL POWDER PACKET 1 PACKET: POWDER IN PACKET at 08:05

## 2023-05-16 RX ADMIN — LISINOPRIL 20 MG: 20 TABLET ORAL at 10:05

## 2023-05-16 RX ADMIN — POTASSIUM BICARBONATE 40 MEQ: 391 TABLET, EFFERVESCENT ORAL at 12:05

## 2023-05-16 RX ADMIN — GADOBUTROL 7.5 ML: 604.72 INJECTION INTRAVENOUS at 04:05

## 2023-05-16 RX ADMIN — THIAMINE HYDROCHLORIDE 250 MG: 100 INJECTION, SOLUTION INTRAMUSCULAR; INTRAVENOUS at 10:05

## 2023-05-16 RX ADMIN — KETOROLAC TROMETHAMINE 15 MG: 30 INJECTION, SOLUTION INTRAMUSCULAR; INTRAVENOUS at 05:05

## 2023-05-16 RX ADMIN — HYDROCODONE BITARTRATE AND ACETAMINOPHEN 1 TABLET: 5; 325 TABLET ORAL at 05:05

## 2023-05-16 RX ADMIN — POTASSIUM, SODIUM PHOSPHATES 280 MG-160 MG-250 MG ORAL POWDER PACKET 1 PACKET: POWDER IN PACKET at 10:05

## 2023-05-16 NOTE — SEDATION DOCUMENTATION
IR LP completed, tolerated well, applied bandaid to puncture site VS stable, placed remote tele box back onto pt, called and gave phone report to LILLIAN Schroeder at bedside, pt left IR with transport via bed, to remain supine for 1 hour, IR care complete

## 2023-05-16 NOTE — PLAN OF CARE
SW met with pt at bedside to discuss dc planning. Pt confirmed information on chart. Pt reports that she resides in home with her s/o and two children. Pt reports that she has no dme/hh services. Pt expressed upon dc family will provide transport home. SW will continue to follow pt throughout her transitions of care and assist with any dc needs.     Future Appointments   Date Time Provider Department Center   5/18/2023  3:20 PM Young Kendall MD [facility]   5/19/2023 11:20 AM [facility]        05/16/23 0913   Discharge Assessment   Assessment Type Discharge Planning Assessment   Confirmed/corrected address, phone number and insurance Yes   Confirmed Demographics Correct on Facesheet   Source of Information patient   Communicated YUN with patient/caregiver Yes   People in Home significant other;child(melvina), dependent   Do you expect to return to your current living situation? Yes   Do you have help at home or someone to help you manage your care at home? Yes   Who are your caregiver(s) and their phone number(s)? Des Parker (Friend)   [phone]   Prior to hospitilization cognitive status: Alert/Oriented   Current cognitive status: Alert/Oriented   Home Layout Able to live on 1st floor   Equipment Currently Used at Home none   Readmission within 30 days? No   Patient currently being followed by outpatient case management? No   Do you currently have service(s) that help you manage your care at home? No   Do you take prescription medications? Yes   Do you have prescription coverage? Yes   Coverage MEDICAID - [place] [employer]   Do you have any problems affording any of your prescribed medications? TBD   Is the patient taking medications as prescribed? yes   Who is going to help you get home at discharge? Des Parker (Friend)   [phone]   How do you get to doctors appointments? car, drives self;family or friend will provide   Are you on dialysis? No   Do you take coumadin? No    Discharge Plan A Home   DME Needed Upon Discharge  none   Discharge Plan discussed with: Patient   Transition of Care Barriers None   Financial Resource Strain   How hard is it for you to pay for the very basics like food, housing, medical care, and heating? Not hard   Housing Stability   In the last 12 months, was there a time when you were not able to pay the mortgage or rent on time? N   In the last 12 months, was there a time when you did not have a steady place to sleep or slept in a shelter (including now)? N   Transportation Needs   In the past 12 months, has lack of transportation kept you from medical appointments or from getting medications? no   In the past 12 months, has lack of transportation kept you from meetings, work, or from getting things needed for daily living? No   Food Insecurity   Within the past 12 months, you worried that your food would run out before you got the money to buy more. Never true   Within the past 12 months, the food you bought just didn't last and you didn't have money to get more. Never true   Alcohol Use   Q1: How often do you have a drink containing alcohol? 4 or more ti   Q2: How many drinks containing alcohol do you have on a typical day when you are drinking? 3 or 4   Q3: How often do you have six or more drinks on one occasion? Weekly   OTHER   Name(s) of People in Home S/O and children.

## 2023-05-16 NOTE — PLAN OF CARE
Problem: Physical Therapy  Goal: Physical Therapy Goal  Description: Goals to be met by: 23     Patient will increase functional independence with mobility by performin. Supine <> sit with supervision  2. Sit to stand transfer with Contact Guard Assistance  3. Bed to chair transfer with Contact Guard Assistance using Rolling Walker  4. Gait  x 10 feet with Minimal Assistance using Rolling Walker.   5. Lower extremity exercise program x 10 reps per handout, with supervision    Outcome: Ongoing, Progressing     PT evaluation completed, note to follow. Pt presenting with ascending progressive BLE numbness/weakness over the last month, BLE ataxia (LLE worse than RLE), and pt becoming significantly fatigued with all mobility.   Despite patient's co-morbidities, patient was living in community setting functioning at independent level for ADLs, and mobility prior to this event.  There is an expectation of returning to prior level of function to maintain independence thus avoiding readmission.  Patient's clinical condition meets full Inpatient Rehab (IPR) criteria; including the ability to actively participate in 3 hours of therapy.

## 2023-05-16 NOTE — HPI
Ember Rivera is a 53 y.o.  female who  has a past medical history of GERD (gastroesophageal reflux disease).. The patient presented to [facility] on 5/15/2023 with a primary complaint of Numbness (Pt presents to Ed from home for bilateral lower extremity weakness, intermittent tingling to bilateral hands and feet, and numbness to bilateral breast, suprapubic region, and parts of her bilateral lower extremities. All s/s have been on going for 2-3 weeks. Today she suffered a fall injuring her knee.  Pt denies relief with magnesium supplements that were prescribed on her last visit. )     The patient was in their usual state of health until Last 2-3 weeks have had difficulty walking and fell today. She was having numbness and pain with ascending symptoms up  to leg and genitalia to breasts then fingers described as numbness. 7 days ago had a blood draw, hypokalemic, then hypomagnesemia and though the be reason of weakness. She has been having pins and needles sensations and has required trazodone at nighttime to help her sleep.. No recent URI, no travel. She has intermittent back pain and hot flashes she believes are due to menopause. Patient is a smoker and drinks 2 beers and 4-5 Fireball whiskey shots.Feet blue, top orange and brown

## 2023-05-16 NOTE — ASSESSMENT & PLAN NOTE
Progressive for the last 2-3 weeks new ascending bilateral weakness and numbness associated with skin color changes and a fall on day of admission  - MRI L spine w/wo contrast unremarkable  - IR guided LP with cell count, protein, glucose today  Consider IVIG   Gabapentin for neuropathic pain    Neurology saw patient in the ED, appreciate recs:   53 yr old female patient with a pmh of HTN, alcohol use, who presents with 1 month of subjective progressive ascending LE numbness paresthesias and weakness.   Differential includes AIDP vs. Acute onset CIDP   Treatment Plan  -Admit to floor with telemetry, pt should be monitored for dysautonomia  -Needs lumbar puncture with CSF protein, glucose, and cell count w/ diff.   -MRI Lumbar spine w wo contrast  -Will consider treatment with IVIG once these studies return.   -PT/OT

## 2023-05-16 NOTE — PROGRESS NOTES
05/15/23 2130   Admission   Initial VN Admission Questions Complete   Shift   Pain Management Interventions pain management plan reviewed with patient/caregiver   Virtual Nurse - Patient Verbalized Approval Of Camera Use;VN Rounding   Safety/Activity   Safety Promotion/Fall Prevention Fall Risk reviewed with patient/family  (room dark; difficult to visualize pt and surroundings)   Pain/Comfort/Sleep   Comfort/Acceptable Pain Level 8   VN cued in to pt's room with permission. Admission questions completed. Plan of care reviewed with pt. Denies any needs at this time. Instructed to call for needs/assist.

## 2023-05-16 NOTE — PHARMACY MED REC
"    Admission Medication History     The home medication history was taken by Bushra Tabares CPhT.    Medication history obtained from, Patient Verified    You may go to "Admission" then "Reconcile Home Medications" tabs to review and/or act upon these items.     The home medication list has been updated by the Pharmacy department.   Please read ALL comments highlighted in yellow.   Please address this information as you see fit.    Feel free to contact us if you have any questions or require assistance.      The medications listed below were removed from the home medication list.  Please reorder if appropriate:  Patient reports no longer taking the following medication(s):  Diclofenac 1% gel      Bushra Tabares CPhT.  Ext [phone]           .        "

## 2023-05-16 NOTE — PROGRESS NOTES
Pt started smoking age 17, quit in 2012, then recently relapsed. She states that she smokes 0.5 tp 1 pk/day cigarettes. Order obtained for 21 mg nicotine patch Q day.  Pt declines referral to Ambulatory Smoking Cessation clinic, stating that she is confident that she will be able to quit on her own again.
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
## 2023-05-16 NOTE — SUBJECTIVE & OBJECTIVE
Interval History: significant bilateral LE weakness, areflexia, burning/pins and needles pain. Also significant numbness as well.    Review of Systems   Neurological:  Positive for weakness and numbness.   Objective:     Vital Signs (Most Recent):  Temp: 96.5 °F (35.8 °C) (05/16/23 1206)  Pulse: 88 (05/16/23 1346)  Resp: 14 (05/16/23 1346)  BP: (!) 166/99 (05/16/23 1346)  SpO2: 98 % (05/16/23 1346) Vital Signs (24h Range):  Temp:  [96.5 °F (35.8 °C)-97.3 °F (36.3 °C)] 96.5 °F (35.8 °C)  Pulse:  [81-99] 88  Resp:  [14-19] 14  SpO2:  [95 %-100 %] 98 %  BP: (160-200)/() 166/99     Weight: 70.7 kg (155 lb 13.8 oz)  Body mass index is 25.16 kg/m².    Intake/Output Summary (Last 24 hours) at 5/16/2023 1347  Last data filed at 5/16/2023 1320  Gross per 24 hour   Intake 240 ml   Output --   Net 240 ml         Physical Exam  Vitals reviewed.   Constitutional:       General: She is not in acute distress.     Appearance: She is well-developed. She is not diaphoretic.   HENT:      Head: Normocephalic and atraumatic.      Nose: Nose normal.   Eyes:      General: No scleral icterus.     Pupils: Pupils are equal, round, and reactive to light.   Neck:      Vascular: No JVD.      Trachea: No tracheal deviation.   Cardiovascular:      Rate and Rhythm: Normal rate and regular rhythm.      Heart sounds: Normal heart sounds.   Pulmonary:      Effort: Pulmonary effort is normal. No respiratory distress.      Breath sounds: Normal breath sounds.   Abdominal:      General: There is no distension.      Palpations: Abdomen is soft.      Tenderness: There is no abdominal tenderness.   Musculoskeletal:         General: No deformity.      Cervical back: Normal range of motion.   Skin:     General: Skin is warm and dry.      Findings: No rash.   Neurological:      Mental Status: She is alert and oriented to person, place, and time.      Sensory: Sensory deficit present.      Motor: Weakness (bilateral LE 4/5) present.      Deep Tendon  Reflexes: Reflexes abnormal.   Psychiatric:         Behavior: Behavior normal.           Significant Labs: All pertinent labs within the past 24 hours have been reviewed.    Significant Imaging: I have reviewed all pertinent imaging results/findings within the past 24 hours.

## 2023-05-16 NOTE — PROCEDURES
Interventional Radiology Immediate Post-Procedure Note    Pre-Op Diagnosis: numbness and weakness  Post-Op Diagnosis: Same    Procedure: LP    Procedure performed by: Buffy Dumont PA-C and Wilder Castorena MD  Assistants: None    Estimated Blood Loss: Minimal  Specimen Removed: Yes    Findings/description of procedure:  Successful LP at L5/S1 with removal of 12 cc spinal fluid.    No immediate complications. Patient tolerated procedure well. Please see full dictated procedure report for additional details and recommendations.    Buffy Dumont PA-C  [facility]  Pager [phone]

## 2023-05-16 NOTE — ASSESSMENT & PLAN NOTE
Alk Phos 207  ALT 44    Reportedly positive sonographic Luevano sign however no secondary sonographic findings to suggest acute cholecystitis.  Correlation is advised, HIDA scan could be performed for further evaluation as warranted.

## 2023-05-16 NOTE — PROGRESS NOTES
[facility] Progress Note    Ember Rivera  1970  [id number]      Subjective:   Patient is a 53 y.o. female who was admitted on 5/15/2023 for ascending progressive numbness and weakness of lower extremities.     Today the patient states that her symptoms are unchanged from yesterday. She has been working with PT/OT to improve mobility. Her paresthesias are somewhat distressing and current dose of gabapentin 200 mg TID only provides mild relief.     ROS: pertinent items noted above    Objective:  Vitals:    05/16/23 1217   BP:    Pulse: 89   Resp:    Temp:      Scheduled Meds:   cefTRIAXone (ROCEPHIN) IVPB  2 g Intravenous Q24H    folic acid (FOLVITE) IVPB  1 mg Intravenous Daily    gabapentin  200 mg Oral TID    lisinopriL  20 mg Oral Daily    multivitamin  1 tablet Oral Daily    nicotine  1 patch Transdermal Daily    potassium, sodium phosphates  1 packet Oral QID (AC & HS)    thiamine (VITAMIN B1) IVPB  250 mg Intravenous Daily    Followed by    [START ON 5/19/2023] thiamine  100 mg Oral TID       Neurologic Physical Examination:   Neurological Examination   Orientation  Alert, awake, oriented to self, place, time, and situation.  Memory  Recent and remote memory intact.  Language  No dysarthria, No aphasia.   Cranial Nerves  PERRL, VF intact, EOMI, V1-V3 intact, symmetric facial expression, hearing grossly intact, SCM & TPZ 5/5, tongue midline, symmetric palate elevation.  Motor  Normal Bulk  Normal Tone  5/5 strength except 4-/5 in bilateral hip flexors and knee flexors, 4+/5 in bilateral knee flexors  Sensory  Decreased sensation to pinprick below knees  Decreased vibration in knees and feet  DTR   +2 symmetric in UE, 0 patellar and Achilles reflexes bilaterally   Cerebellar/Gait  Dysmetria bilaterally on FNF, ataxia on heel to shin.   Gait deferred   Laboratory Findings:   Recent Results (from the past 24 hour(s))   CBC auto differential    Collection Time: 05/15/23  1:02 PM   Result Value Ref Range     WBC 11.68 3.90 - 12.70 K/uL    RBC 3.88 (L) 4.00 - 5.40 M/uL    Hemoglobin 12.5 12.0 - 16.0 g/dL    Hematocrit 37.0 37.0 - 48.5 %    MCV 95 82 - 98 fL    MCH 32.2 (H) 27.0 - 31.0 pg    MCHC 33.8 32.0 - 36.0 g/dL    RDW 12.1 11.5 - 14.5 %    Platelets 342 150 - 450 K/uL    MPV 9.2 9.2 - 12.9 fL    Immature Granulocytes 0.5 0.0 - 0.5 %    Gran # (ANC) 10.1 (H) 1.8 - 7.7 K/uL    Immature Grans (Abs) 0.06 (H) 0.00 - 0.04 K/uL    Lymph # 0.9 (L) 1.0 - 4.8 K/uL    Mono # 0.6 0.3 - 1.0 K/uL    Eos # 0.0 0.0 - 0.5 K/uL    Baso # 0.02 0.00 - 0.20 K/uL    nRBC 0 0 /100 WBC    Gran % 86.7 (H) 38.0 - 73.0 %    Lymph % 7.4 (L) 18.0 - 48.0 %    Mono % 5.0 4.0 - 15.0 %    Eosinophil % 0.2 0.0 - 8.0 %    Basophil % 0.2 0.0 - 1.9 %    Differential Method Automated    Comprehensive metabolic panel    Collection Time: 05/15/23  1:02 PM   Result Value Ref Range    Sodium 139 136 - 145 mmol/L    Potassium 3.2 (L) 3.5 - 5.1 mmol/L    Chloride 101 95 - 110 mmol/L    CO2 22 (L) 23 - 29 mmol/L    Glucose 108 70 - 110 mg/dL    BUN 3 (L) 6 - 20 mg/dL    Creatinine 0.6 0.5 - 1.4 mg/dL    Calcium 9.3 8.7 - 10.5 mg/dL    Total Protein 7.6 6.0 - 8.4 g/dL    Albumin 3.4 (L) 3.5 - 5.2 g/dL    Total Bilirubin 0.8 0.1 - 1.0 mg/dL    Alkaline Phosphatase 207 (H) 55 - 135 U/L     (H) 10 - 40 U/L    ALT 44 10 - 44 U/L    Anion Gap 16 8 - 16 mmol/L    eGFR >60 >60 mL/min/1.73 m^2   Magnesium    Collection Time: 05/15/23  1:02 PM   Result Value Ref Range    Magnesium 1.7 1.6 - 2.6 mg/dL   Urinalysis, Reflex to Urine Culture Urine, Clean Catch    Collection Time: 05/15/23  2:51 PM    Specimen: Urine   Result Value Ref Range    Specimen UA Urine, Clean Catch     Color, UA Orange (A) Yellow, Straw, Celine    Appearance, UA Cloudy (A) Clear    pH, UA 6.0 5.0 - 8.0    Specific Gravity, UA 1.020 1.005 - 1.030    Protein, UA 1+ (A) Negative    Glucose, UA Negative Negative    Ketones, UA 1+ (A) Negative    Bilirubin (UA) Negative Negative    Occult Blood  UA Negative Negative    Nitrite, UA Negative Negative    Urobilinogen, UA Negative <2.0 EU/dL    Leukocytes, UA Negative Negative   Urinalysis Microscopic    Collection Time: 05/15/23  2:51 PM   Result Value Ref Range    RBC, UA 0 0 - 4 /hpf    WBC, UA 0 0 - 5 /hpf    Bacteria None None-Occ /hpf    Squam Epithel, UA 2 /hpf    Hyaline Casts, UA 0 0-1/lpf /lpf    Amorphous, UA Many (A) None-Moderate    Unclass Kyara UA None None-Moderate    Microscopic Comment SEE COMMENT    TSH    Collection Time: 05/15/23  8:57 PM   Result Value Ref Range    TSH 5.957 (H) 0.400 - 4.000 uIU/mL   High sensitivity CRP    Collection Time: 05/15/23  8:57 PM   Result Value Ref Range    CRP, High Sensitivity 16.91 (H) 0.00 - 3.19 mg/L   T4, Free    Collection Time: 05/15/23  8:57 PM   Result Value Ref Range    Free T4 0.88 0.71 - 1.51 ng/dL   Comprehensive metabolic panel    Collection Time: 05/16/23  4:24 AM   Result Value Ref Range    Sodium 138 136 - 145 mmol/L    Potassium 3.3 (L) 3.5 - 5.1 mmol/L    Chloride 103 95 - 110 mmol/L    CO2 21 (L) 23 - 29 mmol/L    Glucose 104 70 - 110 mg/dL    BUN 6 6 - 20 mg/dL    Creatinine 0.6 0.5 - 1.4 mg/dL    Calcium 8.7 8.7 - 10.5 mg/dL    Total Protein 6.8 6.0 - 8.4 g/dL    Albumin 2.9 (L) 3.5 - 5.2 g/dL    Total Bilirubin 0.6 0.1 - 1.0 mg/dL    Alkaline Phosphatase 184 (H) 55 - 135 U/L     (H) 10 - 40 U/L    ALT 35 10 - 44 U/L    Anion Gap 14 8 - 16 mmol/L    eGFR >60 >60 mL/min/1.73 m^2   Magnesium    Collection Time: 05/16/23  4:24 AM   Result Value Ref Range    Magnesium 1.6 1.6 - 2.6 mg/dL   Phosphorus    Collection Time: 05/16/23  4:24 AM   Result Value Ref Range    Phosphorus 2.4 (L) 2.7 - 4.5 mg/dL   CBC auto differential    Collection Time: 05/16/23  4:24 AM   Result Value Ref Range    WBC 9.67 3.90 - 12.70 K/uL    RBC 3.69 (L) 4.00 - 5.40 M/uL    Hemoglobin 11.8 (L) 12.0 - 16.0 g/dL    Hematocrit 35.0 (L) 37.0 - 48.5 %    MCV 95 82 - 98 fL    MCH 32.0 (H) 27.0 - 31.0 pg    MCHC 33.7  32.0 - 36.0 g/dL    RDW 12.3 11.5 - 14.5 %    Platelets 288 150 - 450 K/uL    MPV 9.6 9.2 - 12.9 fL    Immature Granulocytes 0.2 0.0 - 0.5 %    Gran # (ANC) 7.2 1.8 - 7.7 K/uL    Immature Grans (Abs) 0.02 0.00 - 0.04 K/uL    Lymph # 1.6 1.0 - 4.8 K/uL    Mono # 0.7 0.3 - 1.0 K/uL    Eos # 0.2 0.0 - 0.5 K/uL    Baso # 0.02 0.00 - 0.20 K/uL    nRBC 0 0 /100 WBC    Gran % 74.2 (H) 38.0 - 73.0 %    Lymph % 16.1 (L) 18.0 - 48.0 %    Mono % 7.7 4.0 - 15.0 %    Eosinophil % 1.6 0.0 - 8.0 %    Basophil % 0.2 0.0 - 1.9 %    Differential Method Automated        Neuroimaging:   CT Head Without Contrast    Result Date: 5/15/2023  EXAMINATION: CT HEAD WITHOUT CONTRAST CLINICAL HISTORY: Dizziness, persistent/recurrent, cardiac or vascular cause suspected; TECHNIQUE: Low dose axial CT images obtained throughout the head without intravenous contrast. Sagittal and coronal reconstructions were performed. COMPARISON: None. FINDINGS: Intracranial compartment: Ventricles and sulci are normal in size for age without evidence of hydrocephalus. No extra-axial blood or fluid collections. The brain parenchyma appears normal. No parenchymal mass, hemorrhage, edema or major vascular distribution infarct. Skull/extracranial contents (limited evaluation): No fracture. Mastoid air cells and paranasal sinuses are essentially clear, with mucosal thickening in the right maxillary sinus..     No acute abnormality. Electronically signed by: Sage Gregory Date:    05/15/2023 Time:    18:36    MRI Lumbar Spine W WO Cont    Result Date: 5/16/2023  EXAMINATION: MRI LUMBAR SPINE W WO CONTRAST CLINICAL HISTORY: Myelopathy, acute, lumbar spine; TECHNIQUE: Multiplanar multi sequence images of the lumbar spine were obtained both prior to and following the administration of 7.5 mL of Gadavist contrast material.  The COMPARISON: None FINDINGS: Signal alignment morphology of the vertebral bodies appear normal.  There is no evidence of bone marrow replacement or  edema. Schmorl's nodes are noted in the endplates of T11/T12 and T12-L1.  No bone marrow edema to suggest acute or subacute nature.  Hemangioma in L5 is present. There is no disc protrusion or central canal stenosis.  The cauda equina appear normal as do the lateral recesses and subarticular foraminal spaces with normal exiting nerve roots surrounded by epidural fat. Following gadolinium administration there is no abnormal enhancement of the vertebral bodies surrounding soft tissues or neural elements.  The conus terminates at T12-L1 with no enlargement or enhancement. The aorta and kidneys and vena cava appear unremarkable as imaged.  The upper iliac wings and sacrum and SI joints appear unremarkable.     Normal MRI of the lumbar spine with and without gadolinium. Electronically signed by: Sage Gregory Date:    05/16/2023 Time:    00:37    CT Abdomen Pelvis With Contrast    Result Date: 5/9/2023  EXAMINATION: CT ABDOMEN PELVIS WITH CONTRAST CLINICAL HISTORY: Abdominal pain, acute, nonlocalized; TECHNIQUE: Low dose axial images, sagittal and coronal reformations were obtained from the lung bases to the pubic symphysis following the IV administration of 75 mL of Omnipaque 350 .  Oral contrast was not given. COMPARISON: Ultrasound, 05/09/2023. FINDINGS: Lower Chest: Lung bases are clear.  Heart size is normal. Abdomen: Liver is enlarged, measuring up to 24 cm in craniocaudal length.  Diffuse hypoattenuation of the liver parenchyma suggestive of steatosis with smaller areas of focal fatty sparing.  Gallbladder is decompressed and otherwise unremarkable.  No intrahepatic biliary ductal dilatation. Spleen is borderline enlarged and otherwise unremarkable.  Adrenal glands and pancreas are unremarkable. The kidneys are symmetric.  No hydronephrosis. Minimal bilateral perinephric inflammatory fat stranding. Postoperative changes in small bowel in the left hemiabdomen.  No small bowel obstruction.  Appendix is  unremarkable.  Few scattered colonic diverticula.  No convincing findings of acute diverticulitis. No pneumoperitoneum or organized fluid collection.  Mild mesenteric edema. No bulky lymphadenopathy. Abdominal aorta is normal in caliber with mild atherosclerosis. Portal, splenic, and superior mesenteric veins are patent. Pelvis: Rectum and uterus are unremarkable.  Small volume pelvic free fluid.  Probable dominant follicle in the right ovary measuring roughly 2 cm in size.  Urinary bladder is decompressed and demonstrates diffuse symmetric wall thickening.  No bulky pelvic lymphadenopathy. Bones and soft tissues: No aggressive osseous lesions.  Mild degenerative changes.  Minimal body wall edema.  Extraperitoneal soft tissues are negative for acute finding.     Significant hepatomegaly and hepatic steatosis.  Mild splenomegaly. Decompressed urinary bladder with mild diffuse wall thickening.  Suggest correlation with urinalysis if there is concern for cystitis. Mild mesenteric edema and trace pelvic free fluid. Additional findings discussed in the body of the report. Electronically signed by: Jonny Fraser MD Date:    05/09/2023 Time:    14:39    US Abdomen Limited (Gallbladder)    Result Date: 5/9/2023  EXAMINATION: US ABDOMEN LIMITED CLINICAL HISTORY: Upper abdominal pain, unspecified TECHNIQUE: Limited ultrasound of the right upper quadrant of the abdomen (including pancreas, liver, gallbladder, common bile duct, and spleen) was performed. COMPARISON: None. FINDINGS: The visualized portions of the pancreas are unremarkable.  The hepatic parenchyma is echogenic suggesting steatosis.  The liver is enlarged.  The IVC is unremarkable.  The gallbladder is nondistended.  No pericholecystic fluid or gallbladder wall hyperemia.  Sonographic Luevano sign is reportedly positive.  The gallbladder wall is not thickened.  The common duct is not enlarged measuring 0.3 cm.  The visualized portions of the right kidney are  unremarkable.  No ascites.  The spleen is not enlarged.     Reportedly positive sonographic Luevano sign however no secondary sonographic findings to suggest acute cholecystitis.  Correlation is advised, HIDA scan could be performed for further evaluation as warranted. Findings suggesting hepatic steatosis noting hepatomegaly, correlation with LFTs recommended. Electronically signed by: Alex Ni MD Date:    05/09/2023 Time:    13:39          Assessment/Plan:   53 yr old female patient with a pmh of HTN, alcohol use, who presents with 1 month of subjective progressive ascending LE numbness paresthesias and weakness. Differential includes AIDP vs.  CIDP. However, CIDP is more likely given pt's symptoms are predominantly sensory with progression over one month.    -Lumbar puncture with CSF glucose, protein, cell count with differential.   -Obtain MRI C spine and T spine w wo contrast to rule out spinal cord disease  -SPEP and UPEP with immunofixation to rule out secondary causes of potential demyelinating neuropathy.   -Obtain Vit B12 levels and B1 levels  -Empiric supplementation with thiamine 500 mg daily   -Will consider treatment with course of IVIG after LP performed.   -Pt will need NCS/EMG after discharge for more definitive diagnosis.   -Can increase gabapentin to 600 mg TID for painful paresthesias.      Will continue to follow and monitor progression of current neurologic condition.      Mustapha Galvan MD,   [employer] Neurology PGY-3

## 2023-05-16 NOTE — ASSESSMENT & PLAN NOTE
UA c/w infection from 5/9, symptoms have improved  Started on macrobid; will continue while IV abx while inpatient to complete course
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
## 2023-05-16 NOTE — ED NOTES
Bedside care hand off to LIZ Puente RN. Pt. C/o moderate pain in her lower extremities. No visible distress.

## 2023-05-16 NOTE — ASSESSMENT & PLAN NOTE
Drinks 2 beers + 4-5 shots of Fireball. Not sure if she gets withdrawal symptoms  CIWA qshift  Thiamine, folic acid, MVI  PRN Benzo for withdrawals

## 2023-05-16 NOTE — PLAN OF CARE
Problem: Occupational Therapy  Goal: Occupational Therapy Goal  Description: Goals to be met by: 6/16/23     Patient will increase functional independence with ADLs by performing:    LE Dressing with Supervision.  Grooming while standing with Supervision.  Toileting from toilet with Supervision for hygiene and clothing management.   Sitting at edge of bed x10 minutes with Supervision.  Supine to sit with Supervision.  Step transfer with Supervision  Toilet transfer to toilet with Supervision.  Upper extremity exercise program x10 reps per handout, with independence.    Outcome: Ongoing, Progressing    Patient was living in community setting functioning at independent level for ADLs, and mobility prior to this event.  There is an expectation of returning to prior level of function to maintain independence thus avoiding readmission.  Patient's clinical condition meets full Inpatient Rehab (IPR) criteria; including the ability to actively participate in 3 hours of therapy.  A lower level of care(SNF) cannot provide the interdisciplinary treatment approach needed.   Pt has experienced ~ 1 month progression of BLE weakness and numbness/tingling, as well as BLE pain. Pt has experienced falls recently, impairments in ADLs, and inability to perform IADLs. Pt motivated to return to [facility] and is an excellent rehab candidate. Will cont to progress pt as able.

## 2023-05-16 NOTE — PROGRESS NOTES
[facility] Medicine  Progress Note    Patient Name: Ember Rivera  MRN: [id number]  Patient Class: OP- Observation   Admission Date: 5/15/2023  Length of Stay: 0 days  Attending Physician: Miles Berman, *  Primary Care Provider: Primary Doctor No        Subjective:     Principal Problem:Weakness of both lower extremities        HPI:  Ember Rivera is a 53 y.o.  female who  has a past medical history of GERD (gastroesophageal reflux disease).. The patient presented to [facility] Medicine on 5/15/2023 with a primary complaint of Numbness (Pt presents to Ed from home for bilateral lower extremity weakness, intermittent tingling to bilateral hands and feet, and numbness to bilateral breast, suprapubic region, and parts of her bilateral lower extremities. All s/s have been on going for 2-3 weeks. Today she suffered a fall injuring her knee.  Pt denies relief with magnesium supplements that were prescribed on her last visit. )     The patient was in their usual state of health until Last 2-3 weeks have had difficulty walking and fell today. She was having numbness and pain with ascending symptoms up  to leg and genitalia to breasts then fingers described as numbness. 7 days ago had a blood draw, hypokalemic, then hypomagnesemia and though the be reason of weakness. She has been having pins and needles sensations and has required trazodone at nighttime to help her sleep.. No recent URI, no travel. She has intermittent back pain and hot flashes she believes are due to menopause. Patient is a smoker and drinks 2 beers and 4-5 Fireball whiskey shots.Feet blue, top orange and brown      Overview/Hospital Course:  No notes on file    Interval History: significant bilateral LE weakness, areflexia, burning/pins and needles pain. Also significant numbness as well.    Review of Systems   Neurological:  Positive for weakness and numbness.   Objective:     Vital Signs (Most Recent):  Temp: 96.5 °F  (35.8 °C) (05/16/23 1206)  Pulse: 88 (05/16/23 1346)  Resp: 14 (05/16/23 1346)  BP: (!) 166/99 (05/16/23 1346)  SpO2: 98 % (05/16/23 1346) Vital Signs (24h Range):  Temp:  [96.5 °F (35.8 °C)-97.3 °F (36.3 °C)] 96.5 °F (35.8 °C)  Pulse:  [81-99] 88  Resp:  [14-19] 14  SpO2:  [95 %-100 %] 98 %  BP: (160-200)/() 166/99     Weight: 70.7 kg (155 lb 13.8 oz)  Body mass index is 25.16 kg/m².    Intake/Output Summary (Last 24 hours) at 5/16/2023 1347  Last data filed at 5/16/2023 1320  Gross per 24 hour   Intake 240 ml   Output --   Net 240 ml         Physical Exam  Vitals reviewed.   Constitutional:       General: She is not in acute distress.     Appearance: She is well-developed. She is not diaphoretic.   HENT:      Head: Normocephalic and atraumatic.      Nose: Nose normal.   Eyes:      General: No scleral icterus.     Pupils: Pupils are equal, round, and reactive to light.   Neck:      Vascular: No JVD.      Trachea: No tracheal deviation.   Cardiovascular:      Rate and Rhythm: Normal rate and regular rhythm.      Heart sounds: Normal heart sounds.   Pulmonary:      Effort: Pulmonary effort is normal. No respiratory distress.      Breath sounds: Normal breath sounds.   Abdominal:      General: There is no distension.      Palpations: Abdomen is soft.      Tenderness: There is no abdominal tenderness.   Musculoskeletal:         General: No deformity.      Cervical back: Normal range of motion.   Skin:     General: Skin is warm and dry.      Findings: No rash.   Neurological:      Mental Status: She is alert and oriented to person, place, and time.      Sensory: Sensory deficit present.      Motor: Weakness (bilateral LE 4/5) present.      Deep Tendon Reflexes: Reflexes abnormal.   Psychiatric:         Behavior: Behavior normal.           Significant Labs: All pertinent labs within the past 24 hours have been reviewed.    Significant Imaging: I have reviewed all pertinent imaging results/findings within the past  24 hours.      Assessment/Plan:      * Weakness of both lower extremities  Progressive for the last 2-3 weeks new ascending bilateral weakness and numbness associated with skin color changes and a fall on day of admission  - MRI L spine w/wo contrast unremarkable  - IR guided LP with cell count, protein, glucose today  Consider IVIG   Gabapentin for neuropathic pain    Neurology saw patient in the ED, appreciate recs:   53 yr old female patient with a pmh of HTN, alcohol use, who presents with 1 month of subjective progressive ascending LE numbness paresthesias and weakness.   Differential includes AIDP vs. Acute onset CIDP   Treatment Plan  -Admit to floor with telemetry, pt should be monitored for dysautonomia  -Needs lumbar puncture with CSF protein, glucose, and cell count w/ diff.   -MRI Lumbar spine w wo contrast  -Will consider treatment with IVIG once these studies return.   -PT/OT    Primary hypertension  - resume home lisinopril      Hypomagnesemia  - replace      Hypophosphatemia  - replace      Hypokalemia  - replace      EtOH dependence  Drinks 2 beers + 4-5 shots of Fireball. Not sure if she gets withdrawal symptoms  CIWA qshift  Thiamine, folic acid, MVI  PRN Benzo for withdrawals    Transaminitis  Alk Phos 207  ALT 44    Reportedly positive sonographic Luevano sign however no secondary sonographic findings to suggest acute cholecystitis.  Correlation is advised, HIDA scan could be performed for further evaluation as warranted.      Prolonged QT interval  Correct electrolytes  Monitor  Will have to hold trazadone    Acute cystitis  UA c/w infection from 5/9, symptoms have improved  Started on macrobid; will continue while IV abx while inpatient to complete course    VTE Risk Mitigation (From admission, onward)         Ordered     IP VTE LOW RISK PATIENT  Once         05/15/23 1700     Place sequential compression device  Until discontinued         05/15/23 1700                Discharge Planning    YUN:      Code Status: Full Code   Is the patient medically ready for discharge?:     Reason for patient still in hospital (select all that apply): Laboratory test, Treatment and Consult recommendations  Discharge Plan A: Home                  Miles Berman MD  Department of [facility] Medicine   [facility]
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
## 2023-05-17 PROBLEM — E83.39 HYPOPHOSPHATEMIA: Status: RESOLVED | Noted: 2023-05-16 | Resolved: 2023-05-17

## 2023-05-17 PROBLEM — E83.42 HYPOMAGNESEMIA: Status: RESOLVED | Noted: 2023-05-16 | Resolved: 2023-05-17

## 2023-05-17 LAB
ALBUMIN SERPL BCP-MCNC: 2.9 G/DL (ref 3.5–5.2)
ALBUMIN SERPL ELPH-MCNC: 3.27 G/DL (ref 3.35–5.55)
ALP SERPL-CCNC: 176 U/L (ref 55–135)
ALPHA1 GLOB SERPL ELPH-MCNC: 0.42 G/DL (ref 0.17–0.41)
ALPHA2 GLOB SERPL ELPH-MCNC: 0.92 G/DL (ref 0.43–0.99)
ALT SERPL W/O P-5'-P-CCNC: 33 U/L (ref 10–44)
ANION GAP SERPL CALC-SCNC: 13 MMOL/L (ref 8–16)
AST SERPL-CCNC: 87 U/L (ref 10–40)
B-GLOBULIN SERPL ELPH-MCNC: 0.88 G/DL (ref 0.5–1.1)
BILIRUB SERPL-MCNC: 0.5 MG/DL (ref 0.1–1)
BUN SERPL-MCNC: 6 MG/DL (ref 6–20)
CALCIUM SERPL-MCNC: 8.6 MG/DL (ref 8.7–10.5)
CHLORIDE SERPL-SCNC: 102 MMOL/L (ref 95–110)
CO2 SERPL-SCNC: 26 MMOL/L (ref 23–29)
CREAT SERPL-MCNC: 0.5 MG/DL (ref 0.5–1.4)
EST. GFR  (NO RACE VARIABLE): >60 ML/MIN/1.73 M^2
GAMMA GLOB SERPL ELPH-MCNC: 1.02 G/DL (ref 0.67–1.58)
GLUCOSE SERPL-MCNC: 105 MG/DL (ref 70–110)
INTERPRETATION SERPL IFE-IMP: NORMAL
MAGNESIUM SERPL-MCNC: 2 MG/DL (ref 1.6–2.6)
PATHOLOGIST INTERPRETATION IFE: NORMAL
PHOSPHATE SERPL-MCNC: 3.6 MG/DL (ref 2.7–4.5)
POTASSIUM SERPL-SCNC: 3.7 MMOL/L (ref 3.5–5.1)
PROT SERPL-MCNC: 6.5 G/DL (ref 6–8.4)
PROT SERPL-MCNC: 6.7 G/DL (ref 6–8.4)
SODIUM SERPL-SCNC: 141 MMOL/L (ref 136–145)

## 2023-05-17 PROCEDURE — 97116 GAIT TRAINING THERAPY: CPT | Mod: CQ

## 2023-05-17 PROCEDURE — 25000003 PHARM REV CODE 250: Performed by: HOSPITALIST

## 2023-05-17 PROCEDURE — 83735 ASSAY OF MAGNESIUM: CPT | Performed by: HOSPITALIST

## 2023-05-17 PROCEDURE — 25000003 PHARM REV CODE 250: Performed by: INTERNAL MEDICINE

## 2023-05-17 PROCEDURE — 97530 THERAPEUTIC ACTIVITIES: CPT | Mod: CQ

## 2023-05-17 PROCEDURE — 36415 COLL VENOUS BLD VENIPUNCTURE: CPT | Performed by: INTERNAL MEDICINE

## 2023-05-17 PROCEDURE — 11000001 HC ACUTE MED/SURG PRIVATE ROOM

## 2023-05-17 PROCEDURE — 80053 COMPREHEN METABOLIC PANEL: CPT | Performed by: INTERNAL MEDICINE

## 2023-05-17 PROCEDURE — 97110 THERAPEUTIC EXERCISES: CPT | Mod: CQ

## 2023-05-17 PROCEDURE — 94761 N-INVAS EAR/PLS OXIMETRY MLT: CPT

## 2023-05-17 PROCEDURE — 84100 ASSAY OF PHOSPHORUS: CPT | Performed by: HOSPITALIST

## 2023-05-17 PROCEDURE — 97535 SELF CARE MNGMENT TRAINING: CPT | Mod: CO

## 2023-05-17 PROCEDURE — 99232 PR SUBSEQUENT HOSPITAL CARE,LEVL II: ICD-10-PCS | Mod: ,,, | Performed by: PSYCHIATRY & NEUROLOGY

## 2023-05-17 PROCEDURE — 99232 SBSQ HOSP IP/OBS MODERATE 35: CPT | Mod: ,,, | Performed by: PSYCHIATRY & NEUROLOGY

## 2023-05-17 PROCEDURE — 99900035 HC TECH TIME PER 15 MIN (STAT)

## 2023-05-17 PROCEDURE — 97530 THERAPEUTIC ACTIVITIES: CPT | Mod: CO

## 2023-05-17 RX ORDER — FOLIC ACID 1 MG/1
1 TABLET ORAL DAILY
Status: DISCONTINUED | OUTPATIENT
Start: 2023-05-17 | End: 2023-05-18 | Stop reason: HOSPADM

## 2023-05-17 RX ORDER — HYDROCHLOROTHIAZIDE 25 MG/1
25 TABLET ORAL DAILY
Status: DISCONTINUED | OUTPATIENT
Start: 2023-05-18 | End: 2023-05-18 | Stop reason: HOSPADM

## 2023-05-17 RX ORDER — LISINOPRIL 20 MG/1
40 TABLET ORAL DAILY
Status: DISCONTINUED | OUTPATIENT
Start: 2023-05-17 | End: 2023-05-18 | Stop reason: HOSPADM

## 2023-05-17 RX ORDER — GABAPENTIN 300 MG/1
600 CAPSULE ORAL 3 TIMES DAILY
Status: DISCONTINUED | OUTPATIENT
Start: 2023-05-17 | End: 2023-05-18 | Stop reason: HOSPADM

## 2023-05-17 RX ORDER — THIAMINE HCL 100 MG
500 TABLET ORAL DAILY
Status: DISCONTINUED | OUTPATIENT
Start: 2023-05-17 | End: 2023-05-18 | Stop reason: HOSPADM

## 2023-05-17 RX ORDER — HYDROCHLOROTHIAZIDE 12.5 MG/1
12.5 TABLET ORAL DAILY
Status: DISCONTINUED | OUTPATIENT
Start: 2023-05-17 | End: 2023-05-17

## 2023-05-17 RX ADMIN — POTASSIUM, SODIUM PHOSPHATES 280 MG-160 MG-250 MG ORAL POWDER PACKET 1 PACKET: POWDER IN PACKET at 05:05

## 2023-05-17 RX ADMIN — GABAPENTIN 600 MG: 300 CAPSULE ORAL at 02:05

## 2023-05-17 RX ADMIN — GABAPENTIN 600 MG: 300 CAPSULE ORAL at 09:05

## 2023-05-17 RX ADMIN — THERA TABS 1 TABLET: TAB at 09:05

## 2023-05-17 RX ADMIN — HYDROCHLOROTHIAZIDE 12.5 MG: 12.5 TABLET ORAL at 09:05

## 2023-05-17 RX ADMIN — HYDROCODONE BITARTRATE AND ACETAMINOPHEN 1 TABLET: 5; 325 TABLET ORAL at 03:05

## 2023-05-17 RX ADMIN — Medication 500 MG: at 09:05

## 2023-05-17 RX ADMIN — LISINOPRIL 40 MG: 20 TABLET ORAL at 09:05

## 2023-05-17 RX ADMIN — FOLIC ACID 1 MG: 1 TABLET ORAL at 09:05

## 2023-05-17 NOTE — ASSESSMENT & PLAN NOTE
-improved   -right upper quadrant ultrasound at initial ED visit on 05/09 without evidence of cholecystitis although did have positive Luevano's sign; could consider HIDA scan for further evaluation if this becomes concern while in house

## 2023-05-17 NOTE — PLAN OF CARE
Problem: Occupational Therapy  Goal: Occupational Therapy Goal  Description: Goals to be met by: 6/16/23     Patient will increase functional independence with ADLs by performing:    LE Dressing with Supervision.  Grooming while standing with Supervision.  Toileting from toilet with Supervision for hygiene and clothing management.   Sitting at edge of bed x10 minutes with Supervision. --MET 5/17  Supine to sit with Supervision. --MET 5/17  Step transfer with Supervision  Toilet transfer to toilet with Supervision.  Upper extremity exercise program x10 reps per handout, with independence.    5/17/2023 1318 by ZOFIA Fu/L  Outcome: Ongoing, Progressing  5/17/2023 1307 by ZOFIA Fu/L  Outcome: Ongoing, Progressing     Patient making progress towards goals and demo's slightly decreased BLE ataxia during gait. Despite patient's co-morbidities, patient was living in community setting functioning at independent level for ADLs, and mobility prior to this event.  There is an expectation of returning to prior level of function to maintain independence thus avoiding readmission.  Patient's clinical condition meets full Inpatient Rehab (IPR) criteria; including the ability to actively participate in 3 hours of therapy.  A lower level of care(SNF) cannot provide the interdisciplinary treatment approach needed.

## 2023-05-17 NOTE — PLAN OF CARE
SW met with pt during rounding with MD. Pt not medically stable at this time. No dc today. SW will continue to follow pt throughout her transitions of care and assist with any dc needs.     Future Appointments   Date Time Provider Department Center   5/19/2023 11:20 AM [facility]   5/25/2023  3:00 PM Young Kendall MD [facility] GEN LOU [place]   5/29/2023  2:30 PM Tanna Mccabe MD [facility] IMPRI [facility]          05/17/23 0827   Post-Acute Status   Post-Acute Authorization Other

## 2023-05-17 NOTE — PT/OT/SLP PROGRESS
Physical Therapy Treatment    Patient Name:  Ember Rivera   MRN:  [id number]    Recommendations:     Discharge Recommendations: rehabilitation facility  Discharge Equipment Recommendations:  (TBD)  Barriers to discharge: Decreased caregiver support and decreased mobility,strength and endurance    Assessment:     Ember Rivera is a 53 y.o. female admitted with a medical diagnosis of Weakness of both lower extremities.  She presents with the following impairments/functional limitations: weakness, impaired endurance, impaired functional mobility, gait instability, impaired balance, decreased upper extremity function, decreased lower extremity function, pain, decreased ROM, abnormal tone, impaired joint extensibility,pt with good participation and requires assistance with all mobility at this time,pt has limited assistance and will benefit from IPR upon discharge.    Rehab Prognosis: Fair; patient would benefit from acute skilled PT services to address these deficits and reach maximum level of function.    Recent Surgery: * No surgery found *      Plan:     During this hospitalization, patient to be seen 6 x/week to address the identified rehab impairments via gait training, therapeutic activities, therapeutic exercises, neuromuscular re-education and progress toward the following goals:    Plan of Care Expires:  06/16/23    Subjective     Chief Complaint: n/a  Patient/Family Comments/goals: pt has feeling in B calves today.  Pain/Comfort:  Pain Rating 1:  (no rating)  Location - Side 1: Bilateral  Location 1:  (feet to knees)  Pain Addressed 1: Reposition, Distraction      Objective:     Communicated with nsg prior to session.  Patient found supine with bed alarm, telemetry upon PT entry to room.     General Precautions: Standard, fall  Orthopedic Precautions: N/A  Braces: N/A  Respiratory Status: Room air     Functional Mobility:  Bed Mobility:     Supine to Sit: stand by assistance  Transfers:     Sit to Stand:   minimum assistance with rolling walker  Bed to Chair: minimum assistance with  rolling walker  using  ambulation  Gait: amb ~25' with RW and Min A f/b chair for safety,pt's ataxia is improving but still remains.  Balance: fair standing balance with RW      AM-PAC 6 CLICK MOBILITY  Turning over in bed (including adjusting bedclothes, sheets and blankets)?: 3  Sitting down on and standing up from a chair with arms (e.g., wheelchair, bedside commode, etc.): 3  Moving from lying on back to sitting on the side of the bed?: 3  Moving to and from a bed to a chair (including a wheelchair)?: 3  Need to walk in hospital room?: 3 (f/b chair)  Climbing 3-5 steps with a railing?: 1  Basic Mobility Total Score: 16       Treatment & Education: le seated ex's X 10 reps inc: laq's and hip flex,L le is weaker than R,pt performed hygiene at sink with OT.      Patient left up in chair with all lines intact, call button in reach, bed alarm on, and nsg notified..    GOALS: see general POC  Multidisciplinary Problems       Physical Therapy Goals          Problem: Physical Therapy    Goal Priority Disciplines Outcome Goal Variances Interventions   Physical Therapy Goal     PT, PT/OT Ongoing, Progressing     Description: Goals to be met by: 23     Patient will increase functional independence with mobility by performin. Supine <> sit with supervision  2. Sit to stand transfer with Contact Guard Assistance  3. Bed to chair transfer with Contact Guard Assistance using Rolling Walker  4. Gait  x 10 feet with Minimal Assistance using Rolling Walker.   5. Lower extremity exercise program x 10 reps per handout, with supervision                         Time Tracking:     PT Received On: 23  PT Start Time: 917     PT Stop Time: 955  PT Total Time (min): 38 min     Billable Minutes: Gait Training 16, Therapeutic Activity 12, and Therapeutic Exercise 10       PT/PTA: PTA     Number of PTA visits since last PT visit: 1      05/17/2023

## 2023-05-17 NOTE — PROGRESS NOTES
[facility] Medicine  Progress Note    Patient Name: Ember Rivera  MRN: [id number]  Patient Class: IP- Inpatient   Admission Date: 5/15/2023  Length of Stay: 1 days  Attending Physician: Miles Berman, *  Primary Care Provider: Primary Doctor No        Subjective:     Principal Problem:Weakness of both lower extremities        HPI:  Ember Rivera is a 53 y.o.  female who  has a past medical history of GERD (gastroesophageal reflux disease).. The patient presented to [facility] Medicine on 5/15/2023 with a primary complaint of Numbness (Pt presents to Ed from home for bilateral lower extremity weakness, intermittent tingling to bilateral hands and feet, and numbness to bilateral breast, suprapubic region, and parts of her bilateral lower extremities. All s/s have been on going for 2-3 weeks. Today she suffered a fall injuring her knee.  Pt denies relief with magnesium supplements that were prescribed on her last visit. )     The patient was in their usual state of health until Last 2-3 weeks have had difficulty walking and fell today. She was having numbness and pain with ascending symptoms up  to leg and genitalia to breasts then fingers described as numbness. 7 days ago had a blood draw, hypokalemic, then hypomagnesemia and though the be reason of weakness. She has been having pins and needles sensations and has required trazodone at nighttime to help her sleep.. No recent URI, no travel. She has intermittent back pain and hot flashes she believes are due to menopause. Patient is a smoker and drinks 2 beers and 4-5 Fireball whiskey shots.Feet blue, top orange and brown      Overview/Hospital Course:  No notes on file    Interval History:  Reports improvement in calf numbness today as well as with weakness.  Still having significant paresthesias.    Review of Systems   Neurological:  Positive for weakness and numbness.   Objective:     Vital Signs (Most Recent):  Temp: 96.9 °F  (36.1 °C) (05/17/23 0759)  Pulse: 84 (05/17/23 0759)  Resp: 18 (05/17/23 0759)  BP: (!) 160/93 (05/17/23 0759)  SpO2: 97 % (05/17/23 0825) Vital Signs (24h Range):  Temp:  [96.5 °F (35.8 °C)-98.7 °F (37.1 °C)] 96.9 °F (36.1 °C)  Pulse:  [] 84  Resp:  [14-18] 18  SpO2:  [95 %-100 %] 97 %  BP: (160-200)/() 160/93     Weight: 70.7 kg (155 lb 13.8 oz)  Body mass index is 25.16 kg/m².    Intake/Output Summary (Last 24 hours) at 5/17/2023 1044  Last data filed at 5/16/2023 1846  Gross per 24 hour   Intake 360 ml   Output 12 ml   Net 348 ml           Physical Exam  Vitals reviewed.   Constitutional:       General: She is not in acute distress.     Appearance: She is well-developed. She is not diaphoretic.   HENT:      Head: Normocephalic and atraumatic.      Nose: Nose normal.   Eyes:      General: No scleral icterus.     Pupils: Pupils are equal, round, and reactive to light.   Neck:      Vascular: No JVD.      Trachea: No tracheal deviation.   Cardiovascular:      Rate and Rhythm: Normal rate and regular rhythm.      Heart sounds: Normal heart sounds.   Pulmonary:      Effort: Pulmonary effort is normal. No respiratory distress.      Breath sounds: Normal breath sounds.   Abdominal:      General: There is no distension.      Palpations: Abdomen is soft.      Tenderness: There is no abdominal tenderness.   Musculoskeletal:         General: No deformity.      Cervical back: Normal range of motion.   Skin:     General: Skin is warm and dry.      Findings: No rash.   Neurological:      Mental Status: She is alert and oriented to person, place, and time.      Sensory: Sensory deficit present.      Motor: Weakness (bilateral LE 4/5) present.      Deep Tendon Reflexes: Reflexes abnormal.   Psychiatric:         Behavior: Behavior normal.           Significant Labs: All pertinent labs within the past 24 hours have been reviewed.    Significant Imaging: I have reviewed all pertinent imaging results/findings within the  past 24 hours.      Assessment/Plan:      * Weakness of both lower extremities  Progressive for the last 2-3 weeks new ascending bilateral weakness and numbness associated with skin color changes and a fall on day of admission  - MRI C/T/L spine w/wo contrast with multilevel degenerative changes, most advanced at C5 through C6 where there is moderate canal stenosis, mild bilateral foraminal stenosis, and end-plate edema but no evidence of demyelination  - IR guided LP with cell count, protein, glucose unremarkable  - gabapentin for neuropathic pain, increase dose  - SPEP and UPEP with immunofixation pending; B12 low normal, will send MMA  - empiric thiamine supplementation  -  neurology following, appreciate recs    Primary hypertension  - resumed home lisinopril  -add low-dose HCTZ      Hypokalemia  - replace      EtOH dependence  Drinks 2 beers + 4-5 shots of Fireball. Not sure if she gets withdrawal symptoms  CIWA qshift  Thiamine, folic acid, MVI  PRN Benzo for withdrawals    Transaminitis  -improved   -right upper quadrant ultrasound at initial ED visit on 05/09 without evidence of cholecystitis although did have positive Luevano's sign; could consider HIDA scan for further evaluation if this becomes concern while in house      Prolonged QT interval  Correct electrolytes  Monitor  Will have to hold trazadone    Acute cystitis  UA c/w infection from 5/9, symptoms have improved  Started on macrobid at that time; initially started on IV antibiotics to complete a course while in house; will discontinue today given negative UA and improvement in symptoms on antibiotics      VTE Risk Mitigation (From admission, onward)         Ordered     IP VTE LOW RISK PATIENT  Once         05/15/23 1700     Place sequential compression device  Until discontinued         05/15/23 1700                Discharge Planning   YUN:      Code Status: Full Code   Is the patient medically ready for discharge?:     Reason for patient still in  hospital (select all that apply): Patient trending condition, Consult recommendations and PT / OT recommendations  Discharge Plan A: Home                  Miles Berman MD  Department of Hospital Medicine   [facility]

## 2023-05-17 NOTE — NURSING
Informed Dr. Berman of elevated BP with noon VS (see flowsheets). Instructed to continue to monitor as pt started new medication today and wanted to see what BP would be with next assessment of VS.

## 2023-05-17 NOTE — PLAN OF CARE
VIRTUAL NURSE:  Labs, notes, orders, and careplan reviewed. VN to be available as needed  Problem: Adult Inpatient Plan of Care  Goal: Plan of Care Review  Outcome: Ongoing, Progressing  Goal: Patient-Specific Goal (Individualized)  Outcome: Ongoing, Progressing  Goal: Absence of Hospital-Acquired Illness or Injury  Outcome: Ongoing, Progressing  Goal: Optimal Comfort and Wellbeing  Outcome: Ongoing, Progressing  Goal: Readiness for Transition of Care  Outcome: Ongoing, Progressing

## 2023-05-17 NOTE — PROGRESS NOTES
[facility] Progress Note    Ember Rivera  1970  [id number]      Subjective:   Patient is a 53 y.o. female who was admitted on 5/15/2023 for ascending progressive numbness and weakness of lower extremities.     Today the patient states that her symptoms are slightly improved, especially her weakness, she has been able to walk with assistance and continues to work with PT/OT. Current dose of Gabapentin is controlling her pain adequately.     ROS: pertinent items noted above    Objective:  Vitals:    05/17/23 1653   BP: (!) 179/93   Pulse: 92   Resp: 18   Temp: 97.7 °F (36.5 °C)     Scheduled Meds:   folic acid  1 mg Oral Daily    gabapentin  600 mg Oral TID    [START ON 5/18/2023] hydroCHLOROthiazide  25 mg Oral Daily    lisinopriL  40 mg Oral Daily    multivitamin  1 tablet Oral Daily    nicotine  1 patch Transdermal Daily    thiamine  500 mg Oral Daily       Neurologic Physical Examination:   Neurological Examination   Orientation  Alert, awake, oriented to self, place, time, and situation.  Memory  Recent and remote memory intact.  Language  No dysarthria, No aphasia.   Cranial Nerves  PERRL, VF intact, EOMI, V1-V3 intact, symmetric facial expression, hearing grossly intact, SCM & TPZ 5/5, tongue midline, symmetric palate elevation.  Motor  Normal Bulk  Normal Tone  5/5 strength except 4/5 in bilateral hip flexors Sensory  Decreased sensation to pinprick below knees  Decreased vibration in knees and feet  DTR   +2 symmetric in UE, 0 patellar and Achilles reflexes bilaterally   Cerebellar/Gait  Dysmetria bilaterally on FNF, ataxia on heel to shin.   Gait deferred   Laboratory Findings:   Recent Results (from the past 24 hour(s))   Comprehensive metabolic panel    Collection Time: 05/17/23  3:36 AM   Result Value Ref Range    Sodium 141 136 - 145 mmol/L    Potassium 3.7 3.5 - 5.1 mmol/L    Chloride 102 95 - 110 mmol/L    CO2 26 23 - 29 mmol/L    Glucose 105 70 - 110 mg/dL    BUN 6 6 - 20 mg/dL    Creatinine  0.5 0.5 - 1.4 mg/dL    Calcium 8.6 (L) 8.7 - 10.5 mg/dL    Total Protein 6.7 6.0 - 8.4 g/dL    Albumin 2.9 (L) 3.5 - 5.2 g/dL    Total Bilirubin 0.5 0.1 - 1.0 mg/dL    Alkaline Phosphatase 176 (H) 55 - 135 U/L    AST 87 (H) 10 - 40 U/L    ALT 33 10 - 44 U/L    Anion Gap 13 8 - 16 mmol/L    eGFR >60 >60 mL/min/1.73 m^2   Phosphorus    Collection Time: 05/17/23  3:36 AM   Result Value Ref Range    Phosphorus 3.6 2.7 - 4.5 mg/dL   Magnesium    Collection Time: 05/17/23  3:36 AM   Result Value Ref Range    Magnesium 2.0 1.6 - 2.6 mg/dL       Neuroimaging:   CT Head Without Contrast    Result Date: 5/15/2023  EXAMINATION: CT HEAD WITHOUT CONTRAST CLINICAL HISTORY: Dizziness, persistent/recurrent, cardiac or vascular cause suspected; TECHNIQUE: Low dose axial CT images obtained throughout the head without intravenous contrast. Sagittal and coronal reconstructions were performed. COMPARISON: None. FINDINGS: Intracranial compartment: Ventricles and sulci are normal in size for age without evidence of hydrocephalus. No extra-axial blood or fluid collections. The brain parenchyma appears normal. No parenchymal mass, hemorrhage, edema or major vascular distribution infarct. Skull/extracranial contents (limited evaluation): No fracture. Mastoid air cells and paranasal sinuses are essentially clear, with mucosal thickening in the right maxillary sinus..     No acute abnormality. Electronically signed by: Sage Gregory Date:    05/15/2023 Time:    18:36    MRI Lumbar Spine W WO Cont    Result Date: 5/16/2023  EXAMINATION: MRI LUMBAR SPINE W WO CONTRAST CLINICAL HISTORY: Myelopathy, acute, lumbar spine; TECHNIQUE: Multiplanar multi sequence images of the lumbar spine were obtained both prior to and following the administration of 7.5 mL of Gadavist contrast material.  The COMPARISON: None FINDINGS: Signal alignment morphology of the vertebral bodies appear normal.  There is no evidence of bone marrow replacement or edema.  Schmorl's nodes are noted in the endplates of T11/T12 and T12-L1.  No bone marrow edema to suggest acute or subacute nature.  Hemangioma in L5 is present. There is no disc protrusion or central canal stenosis.  The cauda equina appear normal as do the lateral recesses and subarticular foraminal spaces with normal exiting nerve roots surrounded by epidural fat. Following gadolinium administration there is no abnormal enhancement of the vertebral bodies surrounding soft tissues or neural elements.  The conus terminates at T12-L1 with no enlargement or enhancement. The aorta and kidneys and vena cava appear unremarkable as imaged.  The upper iliac wings and sacrum and SI joints appear unremarkable.     Normal MRI of the lumbar spine with and without gadolinium. Electronically signed by: Sage Gregory Date:    05/16/2023 Time:    00:37    CT Abdomen Pelvis With Contrast    Result Date: 5/9/2023  EXAMINATION: CT ABDOMEN PELVIS WITH CONTRAST CLINICAL HISTORY: Abdominal pain, acute, nonlocalized; TECHNIQUE: Low dose axial images, sagittal and coronal reformations were obtained from the lung bases to the pubic symphysis following the IV administration of 75 mL of Omnipaque 350 .  Oral contrast was not given. COMPARISON: Ultrasound, 05/09/2023. FINDINGS: Lower Chest: Lung bases are clear.  Heart size is normal. Abdomen: Liver is enlarged, measuring up to 24 cm in craniocaudal length.  Diffuse hypoattenuation of the liver parenchyma suggestive of steatosis with smaller areas of focal fatty sparing.  Gallbladder is decompressed and otherwise unremarkable.  No intrahepatic biliary ductal dilatation. Spleen is borderline enlarged and otherwise unremarkable.  Adrenal glands and pancreas are unremarkable. The kidneys are symmetric.  No hydronephrosis. Minimal bilateral perinephric inflammatory fat stranding. Postoperative changes in small bowel in the left hemiabdomen.  No small bowel obstruction.  Appendix is unremarkable.  Few  scattered colonic diverticula.  No convincing findings of acute diverticulitis. No pneumoperitoneum or organized fluid collection.  Mild mesenteric edema. No bulky lymphadenopathy. Abdominal aorta is normal in caliber with mild atherosclerosis. Portal, splenic, and superior mesenteric veins are patent. Pelvis: Rectum and uterus are unremarkable.  Small volume pelvic free fluid.  Probable dominant follicle in the right ovary measuring roughly 2 cm in size.  Urinary bladder is decompressed and demonstrates diffuse symmetric wall thickening.  No bulky pelvic lymphadenopathy. Bones and soft tissues: No aggressive osseous lesions.  Mild degenerative changes.  Minimal body wall edema.  Extraperitoneal soft tissues are negative for acute finding.     Significant hepatomegaly and hepatic steatosis.  Mild splenomegaly. Decompressed urinary bladder with mild diffuse wall thickening.  Suggest correlation with urinalysis if there is concern for cystitis. Mild mesenteric edema and trace pelvic free fluid. Additional findings discussed in the body of the report. Electronically signed by: Jonny Fraser MD Date:    05/09/2023 Time:    14:39    US Abdomen Limited (Gallbladder)    Result Date: 5/9/2023  EXAMINATION: US ABDOMEN LIMITED CLINICAL HISTORY: Upper abdominal pain, unspecified TECHNIQUE: Limited ultrasound of the right upper quadrant of the abdomen (including pancreas, liver, gallbladder, common bile duct, and spleen) was performed. COMPARISON: None. FINDINGS: The visualized portions of the pancreas are unremarkable.  The hepatic parenchyma is echogenic suggesting steatosis.  The liver is enlarged.  The IVC is unremarkable.  The gallbladder is nondistended.  No pericholecystic fluid or gallbladder wall hyperemia.  Sonographic Luevano sign is reportedly positive.  The gallbladder wall is not thickened.  The common duct is not enlarged measuring 0.3 cm.  The visualized portions of the right kidney are unremarkable.  No  ascites.  The spleen is not enlarged.     Reportedly positive sonographic Luevano sign however no secondary sonographic findings to suggest acute cholecystitis.  Correlation is advised, HIDA scan could be performed for further evaluation as warranted. Findings suggesting hepatic steatosis noting hepatomegaly, correlation with LFTs recommended. Electronically signed by: Alex Ni MD Date:    05/09/2023 Time:    13:39          Assessment/Plan:   53 yr old female patient with a pmh of HTN, alcohol use, who presents with 1 month of subjective progressive ascending LE numbness paresthesias and weakness. AIDP and CIDP are less likely given normal CSF protein. 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       -Empiric supplementation with thiamine 500 mg daily   -Pt would also benefit from MVI and Vit B6 supplementation   -Counseled pt on cessation of alcohol use.   -Pt will need NCS/EMG after discharge for more definitive diagnosis.   -Continue  gabapentin to 600 mg TID for painful paresthesias.   -stable for discharge from neurologic perspective.      Will continue to follow and monitor progression of current neurologic condition.      Mustapha Galvan MD,   [employer] Neurology PGY-3

## 2023-05-17 NOTE — SUBJECTIVE & OBJECTIVE
Interval History:  Reports improvement in calf numbness today as well as with weakness.  Still having significant paresthesias.    Review of Systems   Neurological:  Positive for weakness and numbness.   Objective:     Vital Signs (Most Recent):  Temp: 96.9 °F (36.1 °C) (05/17/23 0759)  Pulse: 84 (05/17/23 0759)  Resp: 18 (05/17/23 0759)  BP: (!) 160/93 (05/17/23 0759)  SpO2: 97 % (05/17/23 0825) Vital Signs (24h Range):  Temp:  [96.5 °F (35.8 °C)-98.7 °F (37.1 °C)] 96.9 °F (36.1 °C)  Pulse:  [] 84  Resp:  [14-18] 18  SpO2:  [95 %-100 %] 97 %  BP: (160-200)/() 160/93     Weight: 70.7 kg (155 lb 13.8 oz)  Body mass index is 25.16 kg/m².    Intake/Output Summary (Last 24 hours) at 5/17/2023 1044  Last data filed at 5/16/2023 1846  Gross per 24 hour   Intake 360 ml   Output 12 ml   Net 348 ml           Physical Exam  Vitals reviewed.   Constitutional:       General: She is not in acute distress.     Appearance: She is well-developed. She is not diaphoretic.   HENT:      Head: Normocephalic and atraumatic.      Nose: Nose normal.   Eyes:      General: No scleral icterus.     Pupils: Pupils are equal, round, and reactive to light.   Neck:      Vascular: No JVD.      Trachea: No tracheal deviation.   Cardiovascular:      Rate and Rhythm: Normal rate and regular rhythm.      Heart sounds: Normal heart sounds.   Pulmonary:      Effort: Pulmonary effort is normal. No respiratory distress.      Breath sounds: Normal breath sounds.   Abdominal:      General: There is no distension.      Palpations: Abdomen is soft.      Tenderness: There is no abdominal tenderness.   Musculoskeletal:         General: No deformity.      Cervical back: Normal range of motion.   Skin:     General: Skin is warm and dry.      Findings: No rash.   Neurological:      Mental Status: She is alert and oriented to person, place, and time.      Sensory: Sensory deficit present.      Motor: Weakness (bilateral LE 4/5) present.      Deep Tendon  Reflexes: Reflexes abnormal.   Psychiatric:         Behavior: Behavior normal.           Significant Labs: All pertinent labs within the past 24 hours have been reviewed.    Significant Imaging: I have reviewed all pertinent imaging results/findings within the past 24 hours.

## 2023-05-17 NOTE — PLAN OF CARE
Problem: Adjustment to Illness (Stroke, Ischemic/Transient Ischemic Attack)  Goal: Optimal Coping  Outcome: Ongoing, Progressing     Problem: Fall Injury Risk  Goal: Absence of Fall and Fall-Related Injury  Outcome: Ongoing, Progressing     Problem: Adult Inpatient Plan of Care  Goal: Absence of Hospital-Acquired Illness or Injury  Outcome: Ongoing, Progressing  Goal: Optimal Comfort and Wellbeing  Outcome: Ongoing, Progressing  Goal: Readiness for Transition of Care  Outcome: Ongoing, Progressing

## 2023-05-17 NOTE — PROGRESS NOTES
Pharmacist Intervention IV to PO Note    Ember Rivera is a 53 y.o. female being treated with IV medication thiamine    Patient Data:    Vital Signs (Most Recent):  Temp: 96.9 °F (36.1 °C) (05/17/23 0759)  Pulse: 84 (05/17/23 0759)  Resp: 18 (05/17/23 0759)  BP: (!) 160/93 (05/17/23 0759)  SpO2: 97 % (05/17/23 0759) Vital Signs (72h Range):  Temp:  [96.5 °F (35.8 °C)-98.7 °F (37.1 °C)]   Pulse:  []   Resp:  [14-19]   BP: (160-200)/()   SpO2:  [95 %-100 %]      CBC:  Recent Labs   Lab 05/15/23  1302 05/16/23  0424   WBC 11.68 9.67   RBC 3.88* 3.69*   HGB 12.5 11.8*   HCT 37.0 35.0*    288   MCV 95 95   MCH 32.2* 32.0*   MCHC 33.8 33.7     CMP:     Recent Labs   Lab 05/15/23  1302 05/16/23  0424 05/17/23  0336    104 105   CALCIUM 9.3 8.7 8.6*   ALBUMIN 3.4* 2.9* 2.9*   PROT 7.6 6.8 6.7    138 141   K 3.2* 3.3* 3.7   CO2 22* 21* 26    103 102   BUN 3* 6 6   CREATININE 0.6 0.6 0.5   ALKPHOS 207* 184* 176*   ALT 44 35 33   * 105* 87*   BILITOT 0.8 0.6 0.5       Dietary Orders:  Diet Orders            Diet Adult Regular (IDDSI Level 7): Regular starting at 05/16 0916            Based on the following criteria, this patient qualifies for intravenous to oral conversion:  [x] The patients gastrointestinal tract is functioning (tolerating medications via oral or enteral route for 24 hours and tolerating food or enteral feeds for 24 hours).  [x] The patient is hemodynamically stable for 24 hours (heart rate <100 beats per minute, systolic blood pressure >99 mm Hg, and respiratory rate <20 breaths per minute).  [x] The patient shows clinical improvement (afebrile for at least 24 hours and white blood cell count downtrending or normalized). Additionally, the patient must be non-neutropenic (absolute neutrophil count >500 cells/mm3).  [x] For antimicrobials, the patient has received IV therapy for at least 24 hours.    IV medication thiamine will be changed to oral medication      Pharmacist's Name: Juan Tong  Pharmacist's Extension: [phone]

## 2023-05-17 NOTE — PT/OT/SLP PROGRESS
"Occupational Therapy   Treatment    Name: Ember Rivera  MRN: [id number]  Admitting Diagnosis:  Weakness of both lower extremities       Recommendations:     Discharge Recommendations: rehabilitation facility  Discharge Equipment Recommendations:  walker, rolling, shower chair (should patient D/C home)  Barriers to discharge:  None    Assessment:     Ember Rivera is a 53 y.o. female with a medical diagnosis of Weakness of both lower extremities.  Performance deficits affecting function are weakness, impaired endurance, impaired self care skills, impaired functional mobility, gait instability, impaired balance, decreased coordination, impaired coordination, decreased upper extremity function, decreased lower extremity function, decreased safety awareness.     Rehab Prognosis:  Good; patient would benefit from acute skilled OT services to address these deficits and reach maximum level of function.       Plan:     Patient to be seen 5 x/week to address the above listed problems via self-care/home management, therapeutic activities, therapeutic exercises  Plan of Care Expires: 06/16/23  Plan of Care Reviewed with: patient    Subjective     Chief Complaint: "I'm so ready to move"  Patient/Family Comments/goals: return to PLOF  Pain/Comfort:  Pain Rating 1: 8/10  Location - Side 1: Bilateral  Location 1:  (toes, heels and balls of feet)  Pain Addressed 1: Reposition, Distraction, Cessation of Activity, Nurse notified    Objective:     Communicated with: Teri katz prior to session.  Patient found HOB elevated with bed alarm, telemetry upon OT entry to room.    General Precautions: Standard, fall    Orthopedic Precautions:N/A  Braces: N/A  Respiratory Status: Room air    Bed Mobility:    Patient completed Scooting/Bridging with stand by assistance  Patient completed Supine to Sit with supervision and with side rail     Functional Mobility/Transfers:  Patient completed Sit <> Stand Transfer with minimum assistance  with "  rolling walker   Patient completed Bed <> Chair Transfer using Step Transfer technique with minimum assistance with rolling walker    Activities of Daily Living:  Grooming: set-up and CGA/Lavonne for balance in stance at sink; patient /c firm  on counter during tasks    Lower Body Dressing: martha B socks - set-up and increased time/effort from bed level; long-sit position      [facility] 6 Click ADL: 19    Treatment & Education:  Patient eager for therapy and reports feeling slightly improved from previous date. Reports she has more sensation in B calves today. Increased time to transition to EOB sitting. Patient instructed in sit > stand using RW and able to take 4-5 turning steps to chair /c Min/ModA. Seated rest break in chair; coordination activities /c BLEs. Increased difficulty placing LLE in Fig 4 position than RLE. Patient educated on sequencing of gait (LLE first) and demo'd decreased ataxia /c proper sequence. Patient ambulated ~25 ft /c Lavonne.     Patient left up in chair with all lines intact, call button in reach, and chair alarm on    GOALS:   Multidisciplinary Problems       Occupational Therapy Goals          Problem: Occupational Therapy    Goal Priority Disciplines Outcome Interventions   Occupational Therapy Goal     OT, PT/OT Ongoing, Progressing    Description: Goals to be met by: 6/16/23     Patient will increase functional independence with ADLs by performing:    LE Dressing with Supervision.  Grooming while standing with Supervision.  Toileting from toilet with Supervision for hygiene and clothing management.   Sitting at edge of bed x10 minutes with Supervision. --MET 5/17  Supine to sit with Supervision. --MET 5/17  Step transfer with Supervision  Toilet transfer to toilet with Supervision.  Upper extremity exercise program x10 reps per handout, with independence.                         Time Tracking:     OT Date of Treatment: 05/17/23  OT Start Time: 0917  OT Stop Time: 0955  OT Total Time  (min): 38 min  Overlap with PT for portions of session due to complex nature of patient and for safety with mobility to decrease fall risk for patient and caregiver injury requiring two skilled therapists to provide different interventions.    Billable Minutes:Self Care/Home Management 15  Therapeutic Activity 23    OT/LEVI: LEVI     Number of LEVI visits since last OT visit: 1    5/17/2023

## 2023-05-17 NOTE — ASSESSMENT & PLAN NOTE
Progressive for the last 2-3 weeks new ascending bilateral weakness and numbness associated with skin color changes and a fall on day of admission  - MRI C/T/L spine w/wo contrast with multilevel degenerative changes, most advanced at C5 through C6 where there is moderate canal stenosis, mild bilateral foraminal stenosis, and end-plate edema but no evidence of demyelination  - IR guided LP with cell count, protein, glucose unremarkable  - gabapentin for neuropathic pain, increase dose  - SPEP and UPEP with immunofixation pending; B12 low normal, will send MMA  - empiric thiamine supplementation  -  neurology following, appreciate recs

## 2023-05-17 NOTE — ASSESSMENT & PLAN NOTE
UA c/w infection from 5/9, symptoms have improved  Started on macrobid at that time; initially started on IV antibiotics to complete a course while in house; will discontinue today given negative UA and improvement in symptoms on antibiotics

## 2023-05-18 VITALS
HEIGHT: 66 IN | RESPIRATION RATE: 18 BRPM | OXYGEN SATURATION: 98 % | WEIGHT: 155.88 LBS | HEART RATE: 100 BPM | TEMPERATURE: 98 F | BODY MASS INDEX: 25.05 KG/M2 | DIASTOLIC BLOOD PRESSURE: 91 MMHG | SYSTOLIC BLOOD PRESSURE: 125 MMHG

## 2023-05-18 LAB
ANION GAP SERPL CALC-SCNC: 13 MMOL/L (ref 8–16)
BASOPHILS # BLD AUTO: 0.03 K/UL (ref 0–0.2)
BASOPHILS NFR BLD: 0.4 % (ref 0–1.9)
BUN SERPL-MCNC: 5 MG/DL (ref 6–20)
CALCIUM SERPL-MCNC: 9.3 MG/DL (ref 8.7–10.5)
CHLORIDE SERPL-SCNC: 103 MMOL/L (ref 95–110)
CO2 SERPL-SCNC: 26 MMOL/L (ref 23–29)
CREAT SERPL-MCNC: 0.6 MG/DL (ref 0.5–1.4)
DIFFERENTIAL METHOD: ABNORMAL
EOSINOPHIL # BLD AUTO: 0.1 K/UL (ref 0–0.5)
EOSINOPHIL NFR BLD: 1.6 % (ref 0–8)
ERYTHROCYTE [DISTWIDTH] IN BLOOD BY AUTOMATED COUNT: 12.5 % (ref 11.5–14.5)
EST. GFR  (NO RACE VARIABLE): >60 ML/MIN/1.73 M^2
GLUCOSE SERPL-MCNC: 104 MG/DL (ref 70–110)
HCT VFR BLD AUTO: 36.8 % (ref 37–48.5)
HGB BLD-MCNC: 11.9 G/DL (ref 12–16)
IMM GRANULOCYTES # BLD AUTO: 0.05 K/UL (ref 0–0.04)
IMM GRANULOCYTES NFR BLD AUTO: 0.6 % (ref 0–0.5)
LACTATE DEHYDROGENASE FLUID: 16 U/L
LYMPHOCYTES # BLD AUTO: 1.6 K/UL (ref 1–4.8)
LYMPHOCYTES NFR BLD: 19.4 % (ref 18–48)
MCH RBC QN AUTO: 31.4 PG (ref 27–31)
MCHC RBC AUTO-ENTMCNC: 32.3 G/DL (ref 32–36)
MCV RBC AUTO: 97 FL (ref 82–98)
MONOCYTES # BLD AUTO: 0.6 K/UL (ref 0.3–1)
MONOCYTES NFR BLD: 7.5 % (ref 4–15)
NEUTROPHILS # BLD AUTO: 5.7 K/UL (ref 1.8–7.7)
NEUTROPHILS NFR BLD: 70.5 % (ref 38–73)
NRBC BLD-RTO: 0 /100 WBC
PATHOLOGIST INTERPRETATION SPE: NORMAL
PLATELET # BLD AUTO: 283 K/UL (ref 150–450)
PMV BLD AUTO: 9.2 FL (ref 9.2–12.9)
POTASSIUM SERPL-SCNC: 3.7 MMOL/L (ref 3.5–5.1)
RBC # BLD AUTO: 3.79 M/UL (ref 4–5.4)
SODIUM SERPL-SCNC: 142 MMOL/L (ref 136–145)
WBC # BLD AUTO: 8.14 K/UL (ref 3.9–12.7)

## 2023-05-18 PROCEDURE — 97110 THERAPEUTIC EXERCISES: CPT | Mod: CQ

## 2023-05-18 PROCEDURE — 80048 BASIC METABOLIC PNL TOTAL CA: CPT | Performed by: HOSPITALIST

## 2023-05-18 PROCEDURE — 25000003 PHARM REV CODE 250: Performed by: HOSPITALIST

## 2023-05-18 PROCEDURE — 97530 THERAPEUTIC ACTIVITIES: CPT

## 2023-05-18 PROCEDURE — 94761 N-INVAS EAR/PLS OXIMETRY MLT: CPT

## 2023-05-18 PROCEDURE — 25000003 PHARM REV CODE 250: Performed by: INTERNAL MEDICINE

## 2023-05-18 PROCEDURE — 25000003 PHARM REV CODE 250: Performed by: STUDENT IN AN ORGANIZED HEALTH CARE EDUCATION/TRAINING PROGRAM

## 2023-05-18 PROCEDURE — 97116 GAIT TRAINING THERAPY: CPT | Mod: CQ

## 2023-05-18 PROCEDURE — 36415 COLL VENOUS BLD VENIPUNCTURE: CPT | Performed by: HOSPITALIST

## 2023-05-18 PROCEDURE — 97535 SELF CARE MNGMENT TRAINING: CPT

## 2023-05-18 PROCEDURE — 85025 COMPLETE CBC W/AUTO DIFF WBC: CPT | Performed by: HOSPITALIST

## 2023-05-18 RX ORDER — GABAPENTIN 300 MG/1
600 CAPSULE ORAL 3 TIMES DAILY
Qty: 180 CAPSULE | Refills: 2 | Status: SHIPPED | OUTPATIENT
Start: 2023-05-18 | End: 2023-06-07 | Stop reason: SDUPTHER

## 2023-05-18 RX ORDER — HYDROCHLOROTHIAZIDE 25 MG/1
25 TABLET ORAL DAILY
Qty: 60 TABLET | Refills: 2 | Status: ON HOLD | OUTPATIENT
Start: 2023-05-19 | End: 2023-06-19 | Stop reason: HOSPADM

## 2023-05-18 RX ORDER — FOLIC ACID 1 MG/1
1 TABLET ORAL DAILY
Qty: 60 TABLET | Refills: 2 | Status: ON HOLD | OUTPATIENT
Start: 2023-05-19 | End: 2023-06-19 | Stop reason: SDUPTHER

## 2023-05-18 RX ORDER — LANOLIN ALCOHOL/MO/W.PET/CERES
1000 CREAM (GRAM) TOPICAL DAILY
Qty: 60 TABLET | Refills: 2 | Status: ON HOLD | OUTPATIENT
Start: 2023-05-18 | End: 2023-06-19 | Stop reason: SDUPTHER

## 2023-05-18 RX ORDER — THIAMINE HCL 500 MG
500 TABLET ORAL DAILY
Qty: 60 TABLET | Refills: 2 | Status: SHIPPED | OUTPATIENT
Start: 2023-05-19 | End: 2023-09-15

## 2023-05-18 RX ORDER — POTASSIUM CHLORIDE 750 MG/1
10 TABLET, EXTENDED RELEASE ORAL DAILY
Qty: 30 TABLET | Refills: 0 | Status: ON HOLD | OUTPATIENT
Start: 2023-05-18 | End: 2023-06-19 | Stop reason: HOSPADM

## 2023-05-18 RX ORDER — IBUPROFEN 200 MG
1 TABLET ORAL DAILY
Qty: 30 PATCH | Refills: 1 | Status: SHIPPED | OUTPATIENT
Start: 2023-05-18 | End: 2023-05-18 | Stop reason: HOSPADM

## 2023-05-18 RX ORDER — IBUPROFEN 200 MG
1 TABLET ORAL DAILY
Qty: 28 PATCH | Refills: 1 | Status: SHIPPED | OUTPATIENT
Start: 2023-05-19 | End: 2023-06-07 | Stop reason: ALTCHOICE

## 2023-05-18 RX ORDER — LANOLIN ALCOHOL/MO/W.PET/CERES
1000 CREAM (GRAM) TOPICAL DAILY
Status: DISCONTINUED | OUTPATIENT
Start: 2023-05-18 | End: 2023-05-18 | Stop reason: HOSPADM

## 2023-05-18 RX ADMIN — HYDROCODONE BITARTRATE AND ACETAMINOPHEN 1 TABLET: 5; 325 TABLET ORAL at 01:05

## 2023-05-18 RX ADMIN — FOLIC ACID 1 MG: 1 TABLET ORAL at 08:05

## 2023-05-18 RX ADMIN — CYANOCOBALAMIN TAB 1000 MCG 1000 MCG: 1000 TAB at 12:05

## 2023-05-18 RX ADMIN — THERA TABS 1 TABLET: TAB at 08:05

## 2023-05-18 RX ADMIN — LISINOPRIL 40 MG: 20 TABLET ORAL at 08:05

## 2023-05-18 RX ADMIN — Medication 500 MG: at 08:05

## 2023-05-18 RX ADMIN — HYDROCODONE BITARTRATE AND ACETAMINOPHEN 1 TABLET: 5; 325 TABLET ORAL at 03:05

## 2023-05-18 RX ADMIN — GABAPENTIN 600 MG: 300 CAPSULE ORAL at 08:05

## 2023-05-18 RX ADMIN — HYDROCHLOROTHIAZIDE 25 MG: 25 TABLET ORAL at 08:05

## 2023-05-18 NOTE — PLAN OF CARE
Problem: Physical Therapy  Goal: Physical Therapy Goal  Description: Goals to be met by: 23     Patient will increase functional independence with mobility by performin. Supine <> sit with supervision  2. Sit to stand transfer with Contact Guard Assistance  3. Bed to chair transfer with Contact Guard Assistance using Rolling Walker  4. Gait  x 10 feet with Minimal Assistance using Rolling Walker.   5. Lower extremity exercise program x 10 reps per handout, with supervision    Outcome: Ongoing, Progressing

## 2023-05-18 NOTE — PT/OT/SLP PROGRESS
Physical Therapy Treatment    Patient Name:  Ember Rivera   MRN:  [id number]    Recommendations:     Discharge Recommendations: outpatient PT (Pt has no HH benefits and declined IPR)  Discharge Equipment Recommendations: shower chair, walker, rolling  Barriers to discharge:  decreased mobility,strength and endurance    Assessment:     Ember Rivera is a 53 y.o. female admitted with a medical diagnosis of Weakness of both lower extremities.  She presents with the following impairments/functional limitations: weakness, impaired endurance, impaired functional mobility, gait instability, impaired balance, decreased upper extremity function, decreased lower extremity function, abnormal tone, decreased ROM, impaired coordination,pt with improving status and requires assistance with gait secondary decreased strength and mild ataxia,pt will benefit from continuing PT services upon discharge.    Rehab Prognosis: Good; patient would benefit from acute skilled PT services to address these deficits and reach maximum level of function.    Recent Surgery: * No surgery found *      Plan:     During this hospitalization, patient to be seen 6 x/week to address the identified rehab impairments via gait training, therapeutic activities, therapeutic exercises, neuromuscular re-education and progress toward the following goals:    Plan of Care Expires:  06/16/23    Subjective     Chief Complaint: n/a  Patient/Family Comments/goals: Pt agreeable to rx.  Pain/Comfort:  Pain Rating 1:  (no c/o's)      Objective:     Communicated with nsg prior to session.  Patient found supine with telemetry upon PT entry to room.     General Precautions: Standard, fall  Orthopedic Precautions: N/A  Braces: N/A  Respiratory Status: Room air     Functional Mobility:  Bed Mobility:     Supine to Sit: modified independence  Sit to Supine: modified independence  Transfers:     Sit to Stand:  stand by assistance with rolling walker  Bed to Chair: stand by  assistance with  rolling walker  using  ambulation  Gait: amb ~70' with RW and SBA/CGA  Balance: fair standing balance with RW      AM-PAC 6 CLICK MOBILITY  Turning over in bed (including adjusting bedclothes, sheets and blankets)?: 4  Sitting down on and standing up from a chair with arms (e.g., wheelchair, bedside commode, etc.): 3  Moving from lying on back to sitting on the side of the bed?: 4  Moving to and from a bed to a chair (including a wheelchair)?: 3  Need to walk in hospital room?: 3  Climbing 3-5 steps with a railing?: 1  Basic Mobility Total Score: 18       Treatment & Education: le seated ex's x 10-12 reps inc:laq's and hip flex,pt remains fearful of legs wanting to buckle with standing activities,no buckling during rx, states pt has out pt benefits.      Patient left supine with all lines intact and call button in reach..    GOALS: see general POC  Multidisciplinary Problems       Physical Therapy Goals          Problem: Physical Therapy    Goal Priority Disciplines Outcome Goal Variances Interventions   Physical Therapy Goal     PT, PT/OT Ongoing, Progressing     Description: Goals to be met by: 23     Patient will increase functional independence with mobility by performin. Supine <> sit with supervision  2. Sit to stand transfer with Contact Guard Assistance  3. Bed to chair transfer with Contact Guard Assistance using Rolling Walker  4. Gait  x 10 feet with Minimal Assistance using Rolling Walker.   5. Lower extremity exercise program x 10 reps per handout, with supervision                         Time Tracking:     PT Received On: 23  PT Start Time: 1300     PT Stop Time: 1324  PT Total Time (min): 24 min     Billable Minutes: Gait Training 14 and Therapeutic Exercise 10    Treatment Type: Treatment  PT/PTA: PTA     Number of PTA visits since last PT visit: 2     2023

## 2023-05-18 NOTE — PLAN OF CARE
"  Problem: Adjustment to Illness (Stroke, Ischemic/Transient Ischemic Attack)  Goal: Optimal Coping  Outcome: Ongoing, Progressing     Problem: Adult Inpatient Plan of Care  Goal: Optimal Comfort and Wellbeing  Outcome: Ongoing, Progressing     Problem: Adult Inpatient Plan of Care  Goal: Plan of Care Review  Outcome: Ongoing, Progressing     Problem: Alcohol Withdrawal  Goal: Alcohol Withdrawal Symptom Control  Outcome: Ongoing, Progressing     Problem: Acute Neurologic Deterioration (Alcohol Withdrawal)  Goal: Optimal Neurologic Function  Outcome: Ongoing, Progressing     Problem: Balance Impairment (Functional Deficit)  Goal: Improved Balance and Postural Control  Outcome: Ongoing, Progressing     Problem: Muscle Strength Impairment (Functional Deficit)  Goal: Improved Muscle Strength  Outcome: Ongoing, Progressing    .Plan of care reviewed with the patient. Scheduled medicines given and the patient tolerated well. Given Hydrocodone- acetaminophen 5- 325 mg for feet pain 9/10. Fall and safety precautions taken and the standard interventions are in place. On Telemetry monitoring with NSR, no true "red alarms' noted, no acute distress reported either in the shift. Advised the patient to call for the assistance. Continued monitoring the patient.      "

## 2023-05-18 NOTE — HOSPITAL COURSE
53-year-old female with PMH of GERD, reported significant alcohol use presented to the ER with bilateral lower extremity numbness and tingling progressively ascending in last 2-3 weeks.  MRI cervical, thoracolumbar spine with and without contrast showed multilevel degenerated dizzy but no evidence of demyelination.  IR guided lumbar puncture initial cell count and studies are unremarkable.  Patient was seen by Neurology and started on thiamine, folic acid, multivitamin and increased dose of gabapentin.  SPEP, UPEP, MMA were sent. Neurology recommended outpatient follow-up for nerve conduction studies if symptoms did not improve. Neurology referral placed at [place]. Patient was on Macrobid for UTI and per discussion with pharmacy, peripheral neuropathy is a rare side effect of Macrobid.  Patient's neuropathy symptoms gradually improved and was thought to be in setting of nutritional deficiency/ alcohol use.  She was able to work with physical therapy and discharged home with outpatient PT/OT.  Therapy recommended IPR but patient declined and home health would not be covered per insurance.  HCTZ added for hypertension.  Low-dose potassium supplements added for hypokalemia.  Recommend follow-up BMP in 2 weeks and blood pressure to modify medications as needed.    Physical Exam  Vitals reviewed.   Constitutional:       General: She is not in acute distress.  Eyes:      General: No scleral icterus.     Pupils: Pupils are equal, round, and reactive to light.   Cardiovascular:      Rate and Rhythm: Normal rate and regular rhythm.      Heart sounds: Normal heart sounds.   Pulmonary:      Effort: Pulmonary effort is normal. No respiratory distress.      Breath sounds: Normal breath sounds.   Abdominal:      General: There is no distension.      Palpations: Abdomen is soft.      Tenderness: There is no abdominal tenderness.      General: Skin is warm and dry.      Findings: No rash.   Neurological:      Mental Status: She is  alert and oriented to person, place, and time.      Sensory: Sensory deficit present.      Motor: Weakness (bilateral LE 4/5) present.   Psychiatric:         Behavior: Behavior normal.        This report has been created through the use of M-Modal dictation software. Typographical and content errors may occur with this process. While efforts are made to detect and correct such errors, in some cases errors will persist. For this reason, wording in this document should be considered in the proper context and not strictly verbatim

## 2023-05-18 NOTE — PLAN OF CARE
SW notified of possible dc today. Pt reports that her  will provide transport home. Pt declined shower chair. SW will continue to follow pt throughout her transitions of care and assist with any dc needs. Pt will dc with outpatient PT/OT. SW placed medicaid transport contact information on avs. SW will continue to follow pt throughout her transitions of care and assist with any dc needs.     SW sent RW for review to [facility] with [facility]. If dme is improved it will be delivered to pts bedside.     SW requested Neuro follow up.     Cleared from  . Bedside Nurse and VN notified.      SCHED NEURO at [facility]/Dr Ricki Sandoval on 10/3 at 9:00    Future Appointments   Date Time Provider Department Center   5/19/2023 11:20 AM [facility] PITER[facility]   5/29/2023  2:30 PM Tanna Mccabe MD [facility]        05/18/23 0909   Final Note   Assessment Type Final Discharge Note   Anticipated Discharge Disposition Home   Hospital Resources/Appts/Education Provided Appointments scheduled by Navigator/Coordinator   Post-Acute Status   Discharge Delays None known at this time

## 2023-05-18 NOTE — DISCHARGE SUMMARY
[facility] Medicine  Discharge Summary      Patient Name: Ember Rivera  MRN: [id number]  NAVIN: [id number]  Patient Class: IP- Inpatient  Admission Date: 5/15/2023  Hospital Length of Stay: 2 days  Discharge Date and Time: No discharge date for patient encounter.  Attending Physician: Kelsey John MD   Discharging Provider: Kelsey John MD  Primary Care Provider: Primary Doctor No    Primary Care Team: Networked reference to record PCT     HPI:   Ember Rivera is a 53 y.o.  female who  has a past medical history of GERD (gastroesophageal reflux disease).. The patient presented to [facility] [place], operated by [facility] on 5/15/2023 with a primary complaint of Numbness (Pt presents to Ed from home for bilateral lower extremity weakness, intermittent tingling to bilateral hands and feet, and numbness to bilateral breast, suprapubic region, and parts of her bilateral lower extremities. All s/s have been on going for 2-3 weeks. Today she suffered a fall injuring her knee.  Pt denies relief with magnesium supplements that were prescribed on her last visit. )     The patient was in their usual state of health until Last 2-3 weeks have had difficulty walking and fell today. She was having numbness and pain with ascending symptoms up  to leg and genitalia to breasts then fingers described as numbness. 7 days ago had a blood draw, hypokalemic, then hypomagnesemia and though the be reason of weakness. She has been having pins and needles sensations and has required trazodone at nighttime to help her sleep.. No recent URI, no travel. She has intermittent back pain and hot flashes she believes are due to menopause. Patient is a smoker and drinks 2 beers and 4-5 Fireball whiskey shots.Feet blue, top orange and brown      * No surgery found *      Hospital Course:   53-year-old female with PMH of GERD, reported significant alcohol use presented to the ER with bilateral lower extremity numbness and tingling  progressively ascending in last 2-3 weeks.  MRI cervical, thoracolumbar spine with and without contrast showed multilevel degenerated dizzy but no evidence of demyelination.  IR guided lumbar puncture initial cell count and studies are unremarkable.  Patient was seen by Neurology and started on thiamine, folic acid, multivitamin and increased dose of gabapentin.  SPEP, UPEP, MMA were sent. Neurology recommended outpatient follow-up for nerve conduction studies if symptoms did not improve. Neurology referral placed at [place]. Patient was on Macrobid for UTI and per discussion with pharmacy, peripheral neuropathy is a rare side effect of Macrobid.  Patient's neuropathy symptoms gradually improved and was thought to be in setting of nutritional deficiency/ alcohol use.  She was able to work with physical therapy and discharged home with outpatient PT/OT.  Therapy recommended IPR but patient declined and home health would not be covered per insurance.  HCTZ added for hypertension.  Low-dose potassium supplements added for hypokalemia.  Recommend follow-up BMP in 2 weeks and blood pressure to modify medications as needed.    Physical Exam  Vitals reviewed.   Constitutional:       General: She is not in acute distress.  Eyes:      General: No scleral icterus.     Pupils: Pupils are equal, round, and reactive to light.   Cardiovascular:      Rate and Rhythm: Normal rate and regular rhythm.      Heart sounds: Normal heart sounds.   Pulmonary:      Effort: Pulmonary effort is normal. No respiratory distress.      Breath sounds: Normal breath sounds.   Abdominal:      General: There is no distension.      Palpations: Abdomen is soft.      Tenderness: There is no abdominal tenderness.      General: Skin is warm and dry.      Findings: No rash.   Neurological:      Mental Status: She is alert and oriented to person, place, and time.      Sensory: Sensory deficit present.      Motor: Weakness (bilateral LE 4/5) present.    Psychiatric:         Behavior: Behavior normal.        This report has been created through the use of [employer] dictation software. Typographical and content errors may occur with this process. While efforts are made to detect and correct such errors, in some cases errors will persist. For this reason, wording in this document should be considered in the proper context and not strictly verbatim             Goals of Care Treatment Preferences:  Code Status: Full Code      Consults:   Consults (From admission, onward)          Status Ordering Provider     Inpatient consult to Interventional Radiology  Once        Provider:  (Not yet assigned)    Completed MISA LAWRENCE     Inpatient consult to Social Work/Case Management  Once        Provider:  (Not yet assigned)    Completed KATLYN SCHMID     Inpatient consult to Physical Medicine Rehab  Once        Provider:  (Not yet assigned)    Acknowledged KATLYN SCHMID     IP consult to case management/social work  Once        Provider:  (Not yet assigned)    Completed KATLYN SCHMID     Inpatient consult to [facility]  Once        Provider:  (Not yet assigned)    Completed ATTILA ERAZO            Final Active Diagnoses:    Diagnosis Date Noted POA    PRINCIPAL PROBLEM:  Weakness of both lower extremities [R29.898] 05/16/2023 Yes    Acute cystitis [N30.00] 05/16/2023 Yes    Prolonged QT interval [R94.31] 05/16/2023 Yes    Transaminitis [R74.01] 05/16/2023 Yes    EtOH dependence [F10.20] 05/16/2023 Yes    Hypokalemia [E87.6] 05/16/2023 Yes    Primary hypertension [I10] 05/16/2023 Yes      Problems Resolved During this Admission:    Diagnosis Date Noted Date Resolved POA    Hypophosphatemia [E83.39] 05/16/2023 05/17/2023 Yes    Hypomagnesemia [E83.42] 05/16/2023 05/17/2023 Yes       Discharged Condition: stable    Disposition: Home or Self Care    Follow Up:   Follow-up Information       [facility]. Call.    Why: Please call  "medicaid transportation 24/48 before scheduling appointment.  [facility]  [phone]  Contact information:  [facility]  [phone]             [facility] Neurology. Go on 10/3/2023.    Why: Patient has Neurology follow up at [facility] with Dr. Ricki Sandoval on 10/3/23 at 9:00 am  Contact information:  [address]  [address]  [place], [place] [address] [place] [address] on corner of [address] and [address] on [facility] side of [facility]  PH: [phone]                         Patient Instructions:      WALKER FOR HOME USE     Order Specific Question Answer Comments   Type of Walker: Adult (5'4"-6'6")    With wheels? No    Height: 5' 6" (1.676 m)    Weight: 70.7 kg (155 lb 13.8 oz)    Length of need (1-99 months): 99    Does patient have medical equipment at home? none    Please check all that apply: Patient's condition impairs ambulation.    Please check all that apply: Walker will be used for gait training.    Please check all that apply: Patient needs help to get in and out of chair.      Ambulatory referral/consult to Neurology   Standing Status: Future   Referral Priority: Routine Referral Type: Consultation   Referral Reason: Specialty Services Required   Requested Specialty: Neurology   Number of Visits Requested: 1     Ambulatory referral/consult to Physical/Occupational Therapy   Standing Status: Future   Referral Priority: Routine Referral Type: Physical Medicine   Referral Reason: Specialty Services Required   Number of Visits Requested: 1       Significant Diagnostic Studies: Labs:   BMP:   Recent Labs   Lab 05/17/23  0336 05/18/23  0405    104    142   K 3.7 3.7    103   CO2 26 26   BUN 6 5*   CREATININE 0.5 0.6   CALCIUM 8.6* 9.3   MG 2.0  --     and CBC   Recent Labs   Lab 05/18/23  0405   WBC 8.14   HGB 11.9*   HCT 36.8*          Pending Diagnostic Studies:       Procedure Component Value Units Date/Time    Freeze and Hold,  " [[id number]] Collected: 05/16/23 1342    Order Status: Sent Lab Status: No result     Specimen: CSF (Spinal Fluid) from Cerebrospinal Fluid     Vitamin B1 [[id number]] Collected: 05/16/23 1551    Order Status: Sent Lab Status: In process Updated: 05/16/23 1843    Specimen: Blood            Medications:  Reconciled Home Medications:      Medication List        START taking these medications      cyanocobalamin 1000 MCG tablet  Commonly known as: VITAMIN B-12  Take 1 tablet (1,000 mcg total) by mouth once daily.     folic acid 1 MG tablet  Commonly known as: FOLVITE  Take 1 tablet (1 mg total) by mouth once daily.  Start taking on: May 19, 2023     gabapentin 300 MG capsule  Commonly known as: NEURONTIN  Take 2 capsules (600 mg total) by mouth 3 (three) times daily.     hydroCHLOROthiazide 25 MG tablet  Commonly known as: HYDRODIURIL  Take 1 tablet (25 mg total) by mouth once daily.  Start taking on: May 19, 2023     nicotine 21 mg/24 hr  Commonly known as: NICODERM CQ  Place 1 patch onto the skin once daily.  Start taking on: May 19, 2023     potassium chloride 10 MEQ Tbsr  Commonly known as: KLOR-CON  Take 1 tablet (10 mEq total) by mouth once daily.     THERA-M 9 mg iron-400 mcg Tab tablet  Generic drug: multivit-iron-FA-calcium-mins  Take 1 tablet by mouth once daily.  Start taking on: May 19, 2023     vitamin B-1 500 MG tablet  Take 1 tablet (500 mg total) by mouth once daily.  Start taking on: May 19, 2023            CONTINUE taking these medications      dicyclomine 10 MG capsule  Commonly known as: BENTYL  Take 1 capsule (10 mg total) by mouth 3 (three) times daily.     lisinopriL 30 MG tablet  Commonly known as: PRINIVIL,ZESTRIL  Take 1 tablet (30 mg total) by mouth once daily.     magnesium oxide 400 mg (241.3 mg magnesium) tablet  Commonly known as: MAG-OX  Take 400 mg by mouth once daily.     traZODone 50 MG tablet  Commonly known as: DESYREL  Take 1 tablet (50 mg total) by mouth nightly as needed for  Insomnia.            STOP taking these medications      nitrofurantoin (macrocrystal-monohydrate) 100 MG capsule  Commonly known as: MACROBID     potassium gluconate 600 mg (99 mg) Tab              Indwelling Lines/Drains at time of discharge:   Lines/Drains/Airways       None                   Time spent on the discharge of patient: 38 minutes         Kelsey John MD  Department of Hospital Medicine  [facility]

## 2023-05-18 NOTE — PROGRESS NOTES
Virtual Nurse:Discharge orders noted; additional clinical references attached.  and pharmacy tech notified.  Patient's discharge instruction packet given by bedside RN.    Cued into patient's room.  Permission received per patient to turn camera to view patient.  Introduced as VN that will be instructing on discharge instructions. Educated patient on reason for admission; medications to hold, continue, and start, appointment to follow-up with doctor, and when to return to ED. Teach back method used. Verbalized understanding  Number given for 24/7 Nurse Line. Opportunity given for questions and questions answered.  Bedside nurse updated.        05/18/23 1540   Shift   Virtual Nurse - Patient Verbalized Approval Of Camera Use;VN Rounding

## 2023-05-18 NOTE — PT/OT/SLP PROGRESS
Occupational Therapy   Treatment    Name: Ember Rivera  MRN: [id number]  Admitting Diagnosis:  Weakness of both lower extremities       Recommendations:     Discharge Recommendations:  (per chart, pt to d/c wtih OP OT/PT)  Discharge Equipment Recommendations:  walker, rolling, bath bench, shower chair  Barriers to discharge:  None    Assessment:     Ember Rivera is a 53 y.o. female with a medical diagnosis of Weakness of both lower extremities.  She presents with The primary encounter diagnosis was Weakness. Diagnoses of Weakness of both lower extremities, Primary hypertension, and Peripheral polyneuropathy were also pertinent to this visit.  . Performance deficits affecting function are weakness, impaired endurance, abnormal tone, impaired sensation, decreased coordination, impaired coordination, impaired self care skills, impaired functional mobility, gait instability, impaired balance, decreased lower extremity function.   Pt progressing towards goals. Improvement noted in coordination, ADLs and functional mobility. Participating in toileting trials, G&H axs standing at sink, functional mobility, and education on home safety. Discussed DME needs. Cont OT POC     Rehab Prognosis:  Good; patient would benefit from acute skilled OT services to address these deficits and reach maximum level of function.       Plan:     Patient to be seen 5 x/week to address the above listed problems via self-care/home management, therapeutic activities, therapeutic exercises  Plan of Care Expires: 06/16/23  Plan of Care Reviewed with: patient    Subjective     Chief Complaint: concerns about when to d/c home   Patient/Family Comments/goals: be home with dghtrs   Pain/Comfort:  Pain Rating 1: 0/10    Objective:     Communicated with: nsmiguel angel prior to session.  Patient found supine with   upon OT entry to room.    General Precautions: Standard, fall    Orthopedic Precautions:N/A  Braces: N/A  Respiratory Status: Room air     Occupational  Performance:     Bed Mobility:    Patient completed Scooting/Bridging with stand by assistance  Patient completed Supine to Sit with stand by assistance     Functional Mobility/Transfers:  Patient completed Sit <> Stand Transfer with contact guard assistance and minimum assistance  with  rolling walker   Patient completed Bed <> Chair Transfer using Step Transfer technique with minimum assistance with rolling walker  Patient completed Toilet Transfer Step Transfer technique with moderate assistance with  rolling walker  Functional Mobility: CGA- Min A with RW in room; Min A with HHA     Activities of Daily Living:  Grooming: contact guard assistance standing at sink  Toileting: moderate assistance clothing management; simulated       AMPAC 6 Click ADL: 20    Treatment & Education:  Pt found supine; concerned with returning home and current functional level   Improvement noted in bed mobility and standing from EOB  Functional mobility performed in room with RW for participation in ADLs   Pt requires increased assist to stand from toilet ; educated on toilet riser and/or BSC frame over toilet   Stood at sink for G&H axs; pt with impaired balance; demo'd adaptive ways to improve balance with BUE tasks in stance   3 seated rest breaks required during session; education on home safety and DME recs; demo'd how to set up RW, navigation of narrow doorways with RW, and given written instructions per pt request; distributed gait belt to pt with demo on donning/doffing and how family to utilize for assist   Pt requesting to ambulate without AD; Min- Mod A required with HHA; discussed/urged use of RW for decreased falls risk   Left UIC     Patient left up in chair with all lines intact, call button in reach, chair alarm on, and nsg notified    GOALS:   Multidisciplinary Problems       Occupational Therapy Goals          Problem: Occupational Therapy    Goal Priority Disciplines Outcome Interventions   Occupational Therapy Goal      OT, PT/OT Ongoing, Progressing    Description: Goals to be met by: 6/16/23     Patient will increase functional independence with ADLs by performing:    LE Dressing with Supervision.  Grooming while standing with Supervision.  Toileting from toilet with Supervision for hygiene and clothing management.   Sitting at edge of bed x10 minutes with Supervision. --MET 5/17  Supine to sit with Supervision. --MET 5/17  Step transfer with Supervision  Toilet transfer to toilet with Supervision.  Upper extremity exercise program x10 reps per handout, with independence.                         Time Tracking:     OT Date of Treatment: 05/18/23  OT Start Time: 1038  OT Stop Time: 1117  OT Total Time (min): 39 min    Billable Minutes:Self Care/Home Management 25  Therapeutic Activity 14    OT/LEVI: OT     Number of LEVI visits since last OT visit: 0    5/18/2023

## 2023-05-18 NOTE — PLAN OF CARE
Problem: Occupational Therapy  Goal: Occupational Therapy Goal  Description: Goals to be met by: 6/16/23     Patient will increase functional independence with ADLs by performing:    LE Dressing with Supervision.  Grooming while standing with Supervision.  Toileting from toilet with Supervision for hygiene and clothing management.   Sitting at edge of bed x10 minutes with Supervision. --MET 5/17  Supine to sit with Supervision. --MET 5/17  Step transfer with Supervision  Toilet transfer to toilet with Supervision.  Upper extremity exercise program x10 reps per handout, with independence.    Outcome: Ongoing, Progressing     Pt progressing towards goals. Improvement noted in coordination, ADLs and functional mobility. Participating in toileting trials, G&H axs standing at sink, functional mobility, and education on home safety. Discussed DME needs. Cont OT POC

## 2023-05-21 LAB
BACTERIA CSF CULT: NO GROWTH
GRAM STN SPEC: NORMAL
GRAM STN SPEC: NORMAL

## 2023-05-22 LAB — VIT B1 BLD-MCNC: 96 UG/L (ref 38–122)

## 2023-05-23 PROBLEM — G62.1 ALCOHOL-INDUCED POLYNEUROPATHY: Status: ACTIVE | Noted: 2023-05-23

## 2023-05-24 ENCOUNTER — CLINICAL SUPPORT (OUTPATIENT)
Dept: REHABILITATION | Facility: HOSPITAL | Age: 53
End: 2023-05-24
Payer: MEDICAID

## 2023-05-24 DIAGNOSIS — R29.898 WEAKNESS OF BOTH LOWER EXTREMITIES: ICD-10-CM

## 2023-05-24 DIAGNOSIS — G62.9 PERIPHERAL POLYNEUROPATHY: ICD-10-CM

## 2023-05-24 PROCEDURE — 97162 PT EVAL MOD COMPLEX 30 MIN: CPT
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
## 2023-05-29 ENCOUNTER — LAB VISIT (OUTPATIENT)
Dept: LAB | Facility: HOSPITAL | Age: 53
End: 2023-05-29
Attending: INTERNAL MEDICINE
Payer: MEDICAID

## 2023-05-29 ENCOUNTER — OFFICE VISIT (OUTPATIENT)
Dept: PRIMARY CARE CLINIC | Facility: CLINIC | Age: 53
End: 2023-05-29
Payer: MEDICAID

## 2023-05-29 VITALS
HEART RATE: 107 BPM | BODY MASS INDEX: 25.34 KG/M2 | OXYGEN SATURATION: 98 % | WEIGHT: 156.94 LBS | SYSTOLIC BLOOD PRESSURE: 105 MMHG | DIASTOLIC BLOOD PRESSURE: 71 MMHG | TEMPERATURE: 99 F

## 2023-05-29 DIAGNOSIS — T14.8XXA BRUISING: ICD-10-CM

## 2023-05-29 DIAGNOSIS — R29.898 WEAKNESS OF BOTH LOWER EXTREMITIES: ICD-10-CM

## 2023-05-29 DIAGNOSIS — G62.9 PERIPHERAL POLYNEUROPATHY: Primary | ICD-10-CM

## 2023-05-29 DIAGNOSIS — F10.20 UNCOMPLICATED ALCOHOL DEPENDENCE: ICD-10-CM

## 2023-05-29 DIAGNOSIS — E87.6 HYPOKALEMIA: ICD-10-CM

## 2023-05-29 DIAGNOSIS — Z72.0 TOBACCO ABUSE: ICD-10-CM

## 2023-05-29 PROBLEM — N30.00 ACUTE CYSTITIS: Status: RESOLVED | Noted: 2023-05-16 | Resolved: 2023-05-29

## 2023-05-29 LAB
ALBUMIN SERPL BCP-MCNC: 4 G/DL (ref 3.5–5.2)
ALP SERPL-CCNC: 133 U/L (ref 55–135)
ALT SERPL W/O P-5'-P-CCNC: 68 U/L (ref 10–44)
ANION GAP SERPL CALC-SCNC: 16 MMOL/L (ref 8–16)
AST SERPL-CCNC: 134 U/L (ref 10–40)
BASOPHILS # BLD AUTO: 0.04 K/UL (ref 0–0.2)
BASOPHILS NFR BLD: 0.4 % (ref 0–1.9)
BILIRUB SERPL-MCNC: 0.4 MG/DL (ref 0.1–1)
BUN SERPL-MCNC: 5 MG/DL (ref 6–20)
CALCIUM SERPL-MCNC: 10.1 MG/DL (ref 8.7–10.5)
CHLORIDE SERPL-SCNC: 98 MMOL/L (ref 95–110)
CO2 SERPL-SCNC: 21 MMOL/L (ref 23–29)
CREAT SERPL-MCNC: 0.7 MG/DL (ref 0.5–1.4)
DIFFERENTIAL METHOD: ABNORMAL
EOSINOPHIL # BLD AUTO: 0 K/UL (ref 0–0.5)
EOSINOPHIL NFR BLD: 0.4 % (ref 0–8)
ERYTHROCYTE [DISTWIDTH] IN BLOOD BY AUTOMATED COUNT: 12.7 % (ref 11.5–14.5)
EST. GFR  (NO RACE VARIABLE): >60 ML/MIN/1.73 M^2
GLUCOSE SERPL-MCNC: 77 MG/DL (ref 70–110)
HCT VFR BLD AUTO: 36.5 % (ref 37–48.5)
HGB BLD-MCNC: 12.2 G/DL (ref 12–16)
IMM GRANULOCYTES # BLD AUTO: 0.05 K/UL (ref 0–0.04)
IMM GRANULOCYTES NFR BLD AUTO: 0.5 % (ref 0–0.5)
INR PPP: 1 (ref 0.8–1.2)
LYMPHOCYTES # BLD AUTO: 2.1 K/UL (ref 1–4.8)
LYMPHOCYTES NFR BLD: 18.9 % (ref 18–48)
MCH RBC QN AUTO: 32.9 PG (ref 27–31)
MCHC RBC AUTO-ENTMCNC: 33.4 G/DL (ref 32–36)
MCV RBC AUTO: 98 FL (ref 82–98)
MONOCYTES # BLD AUTO: 0.7 K/UL (ref 0.3–1)
MONOCYTES NFR BLD: 6.2 % (ref 4–15)
NEUTROPHILS # BLD AUTO: 8.2 K/UL (ref 1.8–7.7)
NEUTROPHILS NFR BLD: 73.6 % (ref 38–73)
NRBC BLD-RTO: 0 /100 WBC
PLATELET # BLD AUTO: 431 K/UL (ref 150–450)
PMV BLD AUTO: 9.7 FL (ref 9.2–12.9)
POTASSIUM SERPL-SCNC: 4.2 MMOL/L (ref 3.5–5.1)
PROT SERPL-MCNC: 8 G/DL (ref 6–8.4)
PROTHROMBIN TIME: 11.2 SEC (ref 9–12.5)
RBC # BLD AUTO: 3.71 M/UL (ref 4–5.4)
SODIUM SERPL-SCNC: 135 MMOL/L (ref 136–145)
WBC # BLD AUTO: 11.09 K/UL (ref 3.9–12.7)

## 2023-05-29 PROCEDURE — 3008F BODY MASS INDEX DOCD: CPT | Mod: CPTII,,, | Performed by: INTERNAL MEDICINE

## 2023-05-29 PROCEDURE — 3074F SYST BP LT 130 MM HG: CPT | Mod: CPTII,,, | Performed by: INTERNAL MEDICINE

## 2023-05-29 PROCEDURE — 1159F MED LIST DOCD IN RCRD: CPT | Mod: CPTII,,, | Performed by: INTERNAL MEDICINE

## 2023-05-29 PROCEDURE — 1111F PR DISCHARGE MEDS RECONCILED W/ CURRENT OUTPATIENT MED LIST: ICD-10-PCS | Mod: CPTII,,, | Performed by: INTERNAL MEDICINE

## 2023-05-29 PROCEDURE — 1111F DSCHRG MED/CURRENT MED MERGE: CPT | Mod: CPTII,,, | Performed by: INTERNAL MEDICINE

## 2023-05-29 PROCEDURE — 3044F HG A1C LEVEL LT 7.0%: CPT | Mod: CPTII,,, | Performed by: INTERNAL MEDICINE

## 2023-05-29 PROCEDURE — 99205 PR OFFICE/OUTPT VISIT, NEW, LEVL V, 60-74 MIN: ICD-10-PCS | Mod: S$PBB,,, | Performed by: INTERNAL MEDICINE

## 2023-05-29 PROCEDURE — 1159F PR MEDICATION LIST DOCUMENTED IN MEDICAL RECORD: ICD-10-PCS | Mod: CPTII,,, | Performed by: INTERNAL MEDICINE

## 2023-05-29 PROCEDURE — 3078F DIAST BP <80 MM HG: CPT | Mod: CPTII,,, | Performed by: INTERNAL MEDICINE

## 2023-05-29 PROCEDURE — 3044F PR MOST RECENT HEMOGLOBIN A1C LEVEL <7.0%: ICD-10-PCS | Mod: CPTII,,, | Performed by: INTERNAL MEDICINE

## 2023-05-29 PROCEDURE — 3074F PR MOST RECENT SYSTOLIC BLOOD PRESSURE < 130 MM HG: ICD-10-PCS | Mod: CPTII,,, | Performed by: INTERNAL MEDICINE

## 2023-05-29 PROCEDURE — 3008F PR BODY MASS INDEX (BMI) DOCUMENTED: ICD-10-PCS | Mod: CPTII,,, | Performed by: INTERNAL MEDICINE

## 2023-05-29 PROCEDURE — 80053 COMPREHEN METABOLIC PANEL: CPT | Performed by: INTERNAL MEDICINE

## 2023-05-29 PROCEDURE — 99205 OFFICE O/P NEW HI 60 MIN: CPT | Mod: S$PBB,,, | Performed by: INTERNAL MEDICINE

## 2023-05-29 PROCEDURE — 85025 COMPLETE CBC W/AUTO DIFF WBC: CPT | Performed by: INTERNAL MEDICINE

## 2023-05-29 PROCEDURE — 85610 PROTHROMBIN TIME: CPT | Performed by: INTERNAL MEDICINE

## 2023-05-29 PROCEDURE — 99999 PR PBB SHADOW E&M-EST. PATIENT-LVL III: ICD-10-PCS | Mod: PBBFAC,,, | Performed by: INTERNAL MEDICINE

## 2023-05-29 PROCEDURE — 99999 PR PBB SHADOW E&M-EST. PATIENT-LVL III: CPT | Mod: PBBFAC,,, | Performed by: INTERNAL MEDICINE

## 2023-05-29 PROCEDURE — 3078F PR MOST RECENT DIASTOLIC BLOOD PRESSURE < 80 MM HG: ICD-10-PCS | Mod: CPTII,,, | Performed by: INTERNAL MEDICINE

## 2023-05-29 PROCEDURE — 1160F RVW MEDS BY RX/DR IN RCRD: CPT | Mod: CPTII,,, | Performed by: INTERNAL MEDICINE

## 2023-05-29 PROCEDURE — 36415 COLL VENOUS BLD VENIPUNCTURE: CPT | Performed by: INTERNAL MEDICINE

## 2023-05-29 PROCEDURE — 4010F PR ACE/ARB THEARPY RXD/TAKEN: ICD-10-PCS | Mod: CPTII,,, | Performed by: INTERNAL MEDICINE

## 2023-05-29 PROCEDURE — 1160F PR REVIEW ALL MEDS BY PRESCRIBER/CLIN PHARMACIST DOCUMENTED: ICD-10-PCS | Mod: CPTII,,, | Performed by: INTERNAL MEDICINE

## 2023-05-29 PROCEDURE — 99213 OFFICE O/P EST LOW 20 MIN: CPT | Mod: PBBFAC,PO | Performed by: INTERNAL MEDICINE

## 2023-05-29 PROCEDURE — 4010F ACE/ARB THERAPY RXD/TAKEN: CPT | Mod: CPTII,,, | Performed by: INTERNAL MEDICINE
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
## 2023-05-30 ENCOUNTER — CLINICAL SUPPORT (OUTPATIENT)
Dept: REHABILITATION | Facility: HOSPITAL | Age: 53
End: 2023-05-30
Payer: MEDICAID

## 2023-05-30 ENCOUNTER — TELEPHONE (OUTPATIENT)
Dept: PRIMARY CARE CLINIC | Facility: CLINIC | Age: 53
End: 2023-05-30
Payer: MEDICAID

## 2023-05-30 DIAGNOSIS — Z74.09 IMPAIRED FUNCTIONAL MOBILITY, BALANCE, GAIT, AND ENDURANCE: ICD-10-CM

## 2023-05-30 PROCEDURE — 97110 THERAPEUTIC EXERCISES: CPT | Mod: PO

## 2023-05-30 NOTE — TELEPHONE ENCOUNTER
----- Message from Tanna Mccabe MD sent at 5/30/2023 12:31 PM CDT -----  Please let her know labs look good with exception of mildly elevated liver enzymes which will need to be followed. I have communicated this to her PCP, who patient sees on 6/7/23.

## 2023-05-30 NOTE — PROGRESS NOTES
[facility]   Physical Therapy Treatment Note     Name: Ember Rivera  Clinic Number: [id number]    Therapy Diagnosis:   Encounter Diagnosis   Name Primary?    Impaired functional mobility, balance, gait, and endurance      Physician: Kelsey John MD    Visit Date: 5/30/2023    Physician Orders: PT Eval and Treat   Medical Diagnosis from Referral: Weakness of both lower extremities [R29.898], Peripheral polyneuropathy [G62.9]  Evaluation Date: 5/24/2023  Authorization Period Expiration: 08/28/2023  Plan of Care Expiration: 8/24/2023  Progress Note Due: 7/12/2023  Visit # / Visits authorized: 1/ 8 (+eval)   FOTO: 1/3     Precautions: Standard      Time In: 715  Time Out: 800  Total Appointment Time (timed & untimed codes): 45 minutes    SUBJECTIVE     Pt reports: that's she was completely independent and living with  and two kids prior to onset of medical issues. The patient reports she is in significant pain in BLE due to pins and needle sensation and reports it feels like she just got numbed at this dentist in her calves. She reports this onset of this conditions began following a kidney infection that spread to her kidneys. The patient reports she is no longer able to cook/clean and she requires assistance for getting into the shower. The patient reports her knees frequently buckle but she does not fall due to having RW.      She will be given home exercise program at follow up session due to objective measures lasting duration of session.   Response to previous treatment: good  Functional change: ongoing     Pain: 9/10  Location: Bilateral feet      OBJECTIVE     Objective Measures updated at progress report unless specified.   Sensation: Light Touch: Impaired: impaired light touch sensation to BLE and bilateral calves         Tone: no increase in tone noted     Visual/Auditory: denies changes in vision or hearing     Coordination:   - UE coordination: intact     - LE  coordination:  impaired coordination of BLE, unable to run heel of foot against opposite shin     ROM:            RANGE OF MOTION--LOWER EXTREMITIES  (R) LE Hip: normal   Knee: normal   Ankle: normal    (L) LE: Hip: normal   Knee: normal   Ankle: normal    Strength: manual muscle test grades below     Lower Extremity Strength  Right LE  Left LE    Hip Flexion: 3+/5 Hip Flexion: 3+/5   Hip Extension:  4-/5 Hip Extension: 4-/5   Hip Abduction: 4/5 Hip Abduction: 4/5   Hip Adduction: 4/5 Hip Adduction 4/5   Knee Extension: 4/5 Knee Extension: 4/5   Knee Flexion: 4-/5 Knee Flexion: 4-/5   Ankle Dorsiflexion: 3/5 Ankle Dorsiflexion: 3/5   Ankle Plantarflexion: 4/5 Ankle Plantarflexion: 4/5     Abdominal Strength: 3/5    Flexibility: WNL     5/30/2023   Single Limb Stance R LE 0  (<10 sec = HIGH FALL RISK)   Single Limb Stance L LE 0  (<10 sec = HIGH FALL RISK)      5/30/2023   30 second Chair Rise  (adults > 61 y/o) 6 completed with arms with RW   5 times sit-stand  (adults 18-65 y/o) 25 seconds with arms with RW   >12 sec= fall risk for general elderly  >16 sec= fall risk for Parkinson's disease  >10 sec= balance/vestibular dysfunction (<61 y/o)  >14.2 sec= balance/vestibular dysfunction (>61 y/o)  >12 sec= fall risk for CVA     Postural control:  MCTSIB:  1. Eyes Open/feet together/Firm: 30 seconds moderate sway   2. Eyes Closed/feet together/Firm: 6 seconds  3. Eyes Open/feet together/Foam: 24  seconds  4. Eyes Closed/feet together/Foam: 0 seconds    Gait Assessment:   - AD used: RW  - Assistance: SBA  - Distance: ambulatory for short distances throughout session     GAIT DEVIATIONS:  Ember displays the following deviations with ambulation: significant hyperextension of BLE knees in stance phase of gait with occasional buckling of RLE. Flat foot initial contact. Decreased BLE foot clearance, aurea and velocity. Decreased BLE step length.     Impairments contributing to deviations: impaired motor control, decreased  coordination, impaired BLE strength, impaired endurance     Endurance Deficit: severe       5/30/2023   Timed Up and Go 37 sec with RW (score taken at evaluation)  < 20 sec safe for independent transfers, < 30 sec safe for dependent transfers/assist required   Self Selected Walking Speed 0.4 m/sec (6m/15s)        Treatment     Ember received the treatments listed below:      therapeutic exercises to develop strength, endurance, ROM, flexibility, posture, and core stabilization for 45 minutes including:    Time above includes time to complete objective measures and discuss the purpose of neuro PT, etc.       neuromuscular re-education activities to improve: Balance, Coordination, Kinesthetic, Sense, Proprioception, and Posture for 0 minutes. The following activities were included:      therapeutic activities to improve functional performance for 0  minutes, including:      gait training to improve functional mobility and safety for 0  minutes, including:            Patient Education and Home Exercises     Home Exercises Provided and Patient Education Provided     Education provided:   - POC, purpose of PT, discharge planning    Written Home Exercises Provided:home exercise program to be given at following session     ASSESSMENT     Ember tolerated today's first follow up session well.  Based on the patient's MMT scores, the patient presents with impaired BLE strength. Hip flexion and ankle DF most limited. Based on the patient's SSWS, the patient falls into the household ambulator only category. The patient's time to complete the TUG places the patient in the elevated fall risk category. The patient's 5 time sit to stand score also places the patient in the elevated fall risk category and indicates impaired LE strength and endurance. Decreased eccentric control when sitting down noted. Based on the patient's performance on the MCTSIB, the patient has the most difficulty with vision eliminated conditions. The  patient's SLS time places the patient in the elevated fall risk category. The patient will benefit from skilled physical therapy intervention to address the deficits listed above. Plan to focus on endurance, balance and LE strength.      Ember Is progressing well towards her goals.   Pt prognosis is Good.     Pt will continue to benefit from skilled outpatient physical therapy to address the deficits listed in the problem list box on initial evaluation, provide pt/family education and to maximize pt's level of independence in the home and community environment.     Pt's spiritual, cultural and educational needs considered and pt agreeable to plan of care and goals.     Anticipated barriers to physical therapy: co-morbidities     Goals:   Short Term Goals: 4 weeks   1. Patient to be (I) with established home exercise program.  2. Patient to improve bilateral hip flexion by 1/3 MMT to improve balance and functional mobility.   3. Patient to improve bilateral knee flexion strength by 1/3 MMT to improve balance and functional mobility.   4. Patient to improve TUG time to 30 seconds to decrease with of falls when ambulating in the community.  5. Patient to improve 5 time sit to stand time to 20 seconds for improved functional mobility and strength.   6. Patient to improve SSWS to 0.66  m/sec for improved safety with ambulation.   7. Patient to improve MCTSIB condition 4 to 7 seconds for  improved static balance with vision eliminated.      Long Term Goals: 8 weeks   1. Patient to be (I) with advanced home exercise program.  2. Patient to improve bilateral hip flexion by 2/3 MMT to improve balance and functional mobility.   3. Patient to improve bilateral knee flexion strength by 2/3 MMT to improve balance and functional mobility.   4. Patient to improve TUG time to 22 seconds to decrease with of falls when ambulating in the community.  5. Patient to improve 5 time sit to stand time to 14 seconds for improved functional  mobility and strength.   6. Patient to improve SSWS to 0.71  m/sec for improved safety with ambulation.   7. Patient to improve MCTSIB condition 4 to 14 seconds for  improved static balance with vision eliminated.         PLAN     Continue to progress strength, endurance and balance     Give home exercise program at following session     Shantal Colby, PT

## 2023-05-31 ENCOUNTER — TELEPHONE (OUTPATIENT)
Dept: NEUROLOGY | Facility: CLINIC | Age: 53
End: 2023-05-31
Payer: MEDICAID

## 2023-05-31 NOTE — TELEPHONE ENCOUNTER
----- Message from Kerrie Baum sent at 5/31/2023 10:17 AM CDT -----  Type:  Sooner Apoointment Request    Caller is requesting a sooner appointment.  Caller declined first available appointment listed below.  Caller will not accept being placed on the waitlist and is requesting a message be sent to doctor.  Name of Caller:pt   When is the first available appointment?none   Symptoms:weakness   Would the patient rather a call back or a response via [facility]? call  Best Call Back Number:[phone]  Additional Information: pt would like appt asap with first available provider that accepts insurance      Called pt about scheduling an appt, she's scheduled for 8/4.

## 2023-06-05 NOTE — PROGRESS NOTES
[facility]   Physical Therapy Treatment Note     Name: Ember Rivera  Clinic Number: [id number]    Therapy Diagnosis:   Encounter Diagnosis   Name Primary?    Impaired functional mobility, balance, gait, and endurance Yes       Physician: Kelsey John MD    Visit Date: 6/6/2023    Physician Orders: PT Eval and Treat   Medical Diagnosis from Referral: Weakness of both lower extremities [R29.898], Peripheral polyneuropathy [G62.9]  Evaluation Date: 5/24/2023  Authorization Period Expiration: 08/28/2023  Plan of Care Expiration: 8/24/2023  Progress Note Due: 7/12/2023  Visit # / Visits authorized: 1/ 8 (+eval)   FOTO: 1/3     Precautions: Standard      Time In: 0815  Time Out: 0900  Total Appointment Time (timed & untimed codes): 45 minutes (medicaid, charge TE)     SUBJECTIVE     Pt reports: that's she is feeling a little weak today but reports no recent falls      She will be given home exercise program at follow up session due to objective measures lasting duration of session.   Response to previous treatment: good  Functional change: ongoing     Pain: 9/10  Location: Bilateral feet      OBJECTIVE     Objective Measures updated at progress report unless specified.     Treatment     Ember received the treatments listed below:      therapeutic exercises to develop strength, endurance, ROM, flexibility, posture, and core stabilization for 47 minutes including:    10 minutes on SCIFIT seated elliptical at level 1.0 for CV endurance and LE strength     Patient performed 3 x 10 reps of the following exercises that are included in the patient's home exercise program. Patient demonstrates and verbalizes understanding of home exercise program.    Sit to stands  Seated marches   Seated hip abduction   Supine bridges     neuromuscular re-education activities to improve: Balance, Coordination, Kinesthetic, Sense, Proprioception, and Posture for 8 minutes. The following activities were  included:    2 x 30 seconds static standing one foam pad with minimal assist   30 seconds attempted static standing on foam pad but patient reports she is unable to do this and that it is too challenging for her     2 x 30 seconds static standing on firm ground with no UE support and CGA    therapeutic activities to improve functional performance for 0  minutes, including:      gait training to improve functional mobility and safety for 0  minutes, including:            Patient Education and Home Exercises     Home Exercises Provided and Patient Education Provided     Education provided:   - POC, purpose of PT, discharge planning    Written Home Exercises Provided:home exercise program to be given at following session     ASSESSMENT     Ember tolerated today's session fairly well. Session focused primarily on giving patient home exercise program centered on LE strength training. The patient verbalizes/demonstrates understanding of home exercise program. The patient reports significant anxiety with balance exercises and reports she is unable to perform static standing on foam pad with eyes closed. Multiple rest breaks due to fatigue when on SCIFIT seated elliptical.The patient will benefit from physical therapy intervention to address remaining functional mobility deficit.       Ember Is progressing well towards her goals.   Pt prognosis is Good.     Pt will continue to benefit from skilled outpatient physical therapy to address the deficits listed in the problem list box on initial evaluation, provide pt/family education and to maximize pt's level of independence in the home and community environment.     Pt's spiritual, cultural and educational needs considered and pt agreeable to plan of care and goals.     Anticipated barriers to physical therapy: co-morbidities     Goals:   Short Term Goals: 4 weeks   1. Patient to be (I) with established home exercise program.  2. Patient to improve bilateral hip flexion by 1/3  MMT to improve balance and functional mobility. Ongoing  3. Patient to improve bilateral knee flexion strength by 1/3 MMT to improve balance and functional mobility. Ongoing  4. Patient to improve TUG time to 30 seconds to decrease with of falls when ambulating in the community.Ongoing  5. Patient to improve 5 time sit to stand time to 20 seconds for improved functional mobility and strength. Ongoing  6. Patient to improve SSWS to 0.66  m/sec for improved safety with ambulation. Ongoing  7. Patient to improve MCTSIB condition 4 to 7 seconds for  improved static balance with vision eliminated. Ongoing     Long Term Goals: 8 weeks   1. Patient to be (I) with advanced home exercise program.Ongoing  2. Patient to improve bilateral hip flexion by 2/3 MMT to improve balance and functional mobility. Ongoing  3. Patient to improve bilateral knee flexion strength by 2/3 MMT to improve balance and functional mobility. Ongoing  4. Patient to improve TUG time to 22 seconds to decrease with of falls when ambulating in the community.Ongoing  5. Patient to improve 5 time sit to stand time to 14 seconds for improved functional mobility and strength. Ongoing  6. Patient to improve SSWS to 0.71  m/sec for improved safety with ambulation. Ongoing  7. Patient to improve MCTSIB condition 4 to 14 seconds for  improved static balance with vision eliminated. Ongoing        PLAN     Continue to progress strength, endurance and balance       Shantal Colby, PT

## 2023-06-06 ENCOUNTER — CLINICAL SUPPORT (OUTPATIENT)
Dept: REHABILITATION | Facility: HOSPITAL | Age: 53
End: 2023-06-06
Payer: MEDICAID

## 2023-06-06 DIAGNOSIS — Z74.09 IMPAIRED FUNCTIONAL MOBILITY, BALANCE, GAIT, AND ENDURANCE: Primary | ICD-10-CM

## 2023-06-06 PROCEDURE — 97110 THERAPEUTIC EXERCISES: CPT | Mod: PO

## 2023-06-06 NOTE — PATIENT INSTRUCTIONS
Home exercise program:   1 . sit to stands: 3 sets of 10 reps   2. seated marches: 3 x 10 reps  3. seated hip abduction against theraband: 3 x 10 reps   4. laying on back hip raises: 2 x 10 reps

## 2023-06-07 ENCOUNTER — OFFICE VISIT (OUTPATIENT)
Dept: FAMILY MEDICINE | Facility: HOSPITAL | Age: 53
End: 2023-06-07
Payer: MEDICAID

## 2023-06-07 ENCOUNTER — TELEPHONE (OUTPATIENT)
Dept: FAMILY MEDICINE | Facility: HOSPITAL | Age: 53
End: 2023-06-07

## 2023-06-07 VITALS
HEART RATE: 95 BPM | HEIGHT: 66 IN | WEIGHT: 157.88 LBS | SYSTOLIC BLOOD PRESSURE: 123 MMHG | DIASTOLIC BLOOD PRESSURE: 78 MMHG | BODY MASS INDEX: 25.37 KG/M2

## 2023-06-07 DIAGNOSIS — R74.01 TRANSAMINITIS: ICD-10-CM

## 2023-06-07 DIAGNOSIS — G47.9 SLEEP DIFFICULTIES: ICD-10-CM

## 2023-06-07 DIAGNOSIS — Z72.0 TOBACCO ABUSE: ICD-10-CM

## 2023-06-07 DIAGNOSIS — I10 PRIMARY HYPERTENSION: ICD-10-CM

## 2023-06-07 DIAGNOSIS — E87.6 HYPOKALEMIA: ICD-10-CM

## 2023-06-07 DIAGNOSIS — G62.1 ALCOHOL-INDUCED POLYNEUROPATHY: Primary | ICD-10-CM

## 2023-06-07 DIAGNOSIS — Z12.2 ENCOUNTER FOR SCREENING FOR LUNG CANCER: ICD-10-CM

## 2023-06-07 PROCEDURE — 99214 OFFICE O/P EST MOD 30 MIN: CPT | Performed by: STUDENT IN AN ORGANIZED HEALTH CARE EDUCATION/TRAINING PROGRAM

## 2023-06-07 RX ORDER — GABAPENTIN 400 MG/1
800 CAPSULE ORAL 3 TIMES DAILY
Qty: 180 CAPSULE | Refills: 11 | Status: SHIPPED | OUTPATIENT
Start: 2023-06-07 | End: 2023-07-05 | Stop reason: ALTCHOICE

## 2023-06-07 RX ORDER — TRAZODONE HYDROCHLORIDE 100 MG/1
100 TABLET ORAL NIGHTLY
Qty: 30 TABLET | Refills: 11 | Status: ON HOLD | OUTPATIENT
Start: 2023-06-07 | End: 2023-06-19 | Stop reason: SDUPTHER

## 2023-06-07 RX ORDER — DICLOFENAC SODIUM 10 MG/G
2 GEL TOPICAL 4 TIMES DAILY
Qty: 200 G | Refills: 5 | Status: SHIPPED | OUTPATIENT
Start: 2023-06-07 | End: 2023-07-27

## 2023-06-07 NOTE — PROGRESS NOTES
I assume primary medical responsibility for this patient. I have reviewed the history, physical, and assessment & treatment plan with the resident and agree that the care is reasonable and necessary. This service has been performed by a resident without the presence of a teaching physician under the primary care exception. If necessary, an addendum of additional findings or evaluation beyond the resident documentation will be noted below.        Valente Marino Jr., DO    \[facility]\"" Family Medicine

## 2023-06-07 NOTE — TELEPHONE ENCOUNTER
Called and spoke with patient regarding lab results showing hyponatremia 122. Instructed patient to report to the ED for monitored correction of levels.     Patient negative for confusion, seizures, obtundation, respiratory depression.     Hyponatremia   Multifactorial with potentially caused by pharmacology (gabapentin, trazodone), but most likely induced by ETOH. Patient has cut back alcohol consumption but still consuming beer.    Recommend repeat labs, serum osmolality and urine studies    Answered all questions, patient voiced understanding.     Haleigh Zacarias MD, [facility] Family Medicine PGY-3  06/07/2023

## 2023-06-07 NOTE — PROGRESS NOTES
Subjective:      Patient ID: Ember Rivera is a 53 y.o. female.    Chief Complaint: Follow-up (Essential hypertension f/u)    HPI 54 yo female for follow up    #ER follow up  5/9/23 for hypokalemia and hypoglycemia s/p clinic visit for abdominal distension with hepatomegaly and polyneuropathy  5/15/23 for lower extremity weakness, now in physical therapy for week 1 of 8 weeks. Therapist requesting 16 weeks.   Adherent gabapentin, not sufficient to stop pain  Feeling better    #Sleeping difficulty   Adherent trazodone, still having trouble    #HTN  Adherent lisinopril 30    #Tobacco use  Half pack per day, plans to continue cutting back on her own   Greater than 20-30 pack years     #Alcohol use  3 beers a day, occasional shot     Pap due, patient to schedule unsure on last  LDCT never done, due   Recommend Shingles and pneumococcal vaccinations    Past Medical History:   Diagnosis Date    GERD (gastroesophageal reflux disease)      Review of Systems     10 point review of systems was conducted and only the pertinent positives and pertinent negatives are noted above in the HPI section.    Objective:     Vitals:    06/07/23 0932   BP: 123/78   Pulse: 95     Physical Exam  Vitals reviewed.   Constitutional:       General: She is not in acute distress.     Appearance: Normal appearance. She is not toxic-appearing.      Comments: Ambulating with rolling walker   Eyes:      Conjunctiva/sclera: Conjunctivae normal.      Pupils: Pupils are equal, round, and reactive to light.   Cardiovascular:      Rate and Rhythm: Normal rate.   Pulmonary:      Effort: Pulmonary effort is normal.   Abdominal:      General: There is distension.   Musculoskeletal:      Right lower leg: No edema.      Left lower leg: No edema.   Skin:     General: Skin is warm.      Findings: Bruising (bottom of right foot, improving) present.   Neurological:      General: No focal deficit present.      Mental Status: She is alert and oriented to person,  place, and time.   Psychiatric:         Mood and Affect: Mood normal.         Behavior: Behavior normal.     Assessment:      1. Alcohol-induced polyneuropathy    2. Primary hypertension    3. Tobacco abuse    4. Encounter for screening for lung cancer    5. Sleep difficulties    6. Transaminitis    7. Hypokalemia      Plan:     Alcohol-induced polyneuropathy  Counseled alcohol cessation   Continue supplementation as prescribed   Continue physical therapy as ordered, recommended 16 weeks   -     diclofenac sodium (VOLTAREN) 1 % Gel; Apply 2 g topically 4 (four) times daily.  Dispense: 200 g; Refill: 5  -     gabapentin (NEURONTIN) 400 MG capsule; Take 2 capsules (800 mg total) by mouth 3 (three) times daily.  Dispense: 180 capsule; Refill: 11  -     Ambulatory referral/consult to Physical/Occupational Therapy; Future; Expected date: 06/14/2023    Primary hypertension   Stable. Continue current management    Tobacco abuse  Counseled smoking cessation   -     CT Chest Lung Screening Low Dose; Future; Expected date: 06/07/2023    Encounter for screening for lung cancer  -     CT Chest Lung Screening Low Dose; Future; Expected date: 06/07/2023    Sleep difficulties  -     traZODone (DESYREL) 100 MG tablet; Take 1 tablet (100 mg total) by mouth every evening.  Dispense: 30 tablet; Refill: 11    Transaminitis  -     CBC Auto Differential; Future; Expected date: 06/07/2023  -     Comprehensive Metabolic Panel; Future; Expected date: 06/07/2023    Hypokalemia  -     CBC Auto Differential; Future; Expected date: 06/07/2023  -     Comprehensive Metabolic Panel; Future; Expected date: 06/07/2023      Follow up in about 3 months (around 9/7/2023), or if symptoms worsen or fail to improve, for follow up.    Haleigh Zacarias MD, [facility] Family Medicine PGY-3  06/07/2023

## 2023-06-09 ENCOUNTER — TELEPHONE (OUTPATIENT)
Dept: FAMILY MEDICINE | Facility: HOSPITAL | Age: 53
End: 2023-06-09
Payer: MEDICAID

## 2023-06-12 ENCOUNTER — TELEPHONE (OUTPATIENT)
Dept: HEPATOLOGY | Facility: CLINIC | Age: 53
End: 2023-06-12
Payer: MEDICAID

## 2023-06-13 ENCOUNTER — TELEPHONE (OUTPATIENT)
Dept: ADMINISTRATIVE | Facility: OTHER | Age: 53
End: 2023-06-13
Payer: MEDICAID

## 2023-06-13 ENCOUNTER — CLINICAL SUPPORT (OUTPATIENT)
Dept: REHABILITATION | Facility: HOSPITAL | Age: 53
End: 2023-06-13
Payer: MEDICAID

## 2023-06-13 DIAGNOSIS — Z74.09 IMPAIRED FUNCTIONAL MOBILITY, BALANCE, GAIT, AND ENDURANCE: Primary | ICD-10-CM

## 2023-06-13 PROCEDURE — 97110 THERAPEUTIC EXERCISES: CPT | Mod: PO

## 2023-06-13 NOTE — TELEPHONE ENCOUNTER
Referral to hepatology for fatty liver, hepatomegaly    Scheduling attempt # 1    Please call pt to schedule appt with JAQUI  Encourage video visits for new patient appts with APPs

## 2023-06-13 NOTE — PROGRESS NOTES
[facility]   Physical Therapy Treatment Note     Name: Ember Rivera  Clinic Number: [id number]    Therapy Diagnosis:   Encounter Diagnosis   Name Primary?    Impaired functional mobility, balance, gait, and endurance Yes         Physician: Kelsey John MD    Visit Date: 6/13/2023    Physician Orders: PT Eval and Treat   Medical Diagnosis from Referral: Weakness of both lower extremities [R29.898], Peripheral polyneuropathy [G62.9]  Evaluation Date: 5/24/2023  Authorization Period Expiration: 08/28/2023  Plan of Care Expiration: 8/24/2023  Progress Note Due: 7/12/2023  Visit # / Visits authorized: 2/ 8 (+eval)   FOTO: 1/3     Precautions: Standard      Time In: 1145  Time Out: 1230  Total Appointment Time (timed & untimed codes): 45 minutes (medicaid, charge TE)     SUBJECTIVE     Pt reports: that she has been in a lot of pain recently    She will be given home exercise program at follow up session due to objective measures lasting duration of session.   Response to previous treatment: good  Functional change: ongoing     Pain: 9/10  Location: Bilateral feet      OBJECTIVE     Objective Measures updated at progress report unless specified.     Treatment     Ember received the treatments listed below:      therapeutic exercises to develop strength, endurance, ROM, flexibility, posture, and core stabilization for 30 minutes including:    10 minutes on SCIFIT seated elliptical at level 2.0 for CV endurance and LE strength     5 reps step up to 6 inch step with one UE support     3 x 10 reps sit to stands from blue bench    2 x 30 seconds prone hamstring stretch    neuromuscular re-education activities to improve: Balance, Coordination, Kinesthetic, Sense, Proprioception, and Posture for 15 minutes. The following activities were included:    2 x 30 seconds static standing on foam pad with CGA     2 x 30 seconds RLE on gound and LLE on 4 inch step, CGA   2 x 30 seconds LLE on gound and RLE  on 4 inch step, CGA     30 seconds attempted static standing on ground with eyes closed, CGA         therapeutic activities to improve functional performance for 0  minutes, including:      gait training to improve functional mobility and safety for 0  minutes, including:            Patient Education and Home Exercises     Home Exercises Provided and Patient Education Provided     Education provided:   - POC, purpose of PT, discharge planning    Written Home Exercises Provided:home exercise program to be given at following session     ASSESSMENT     Ember tolerated today's session well. The patient did well despite reports of being in significant pain today. Two seated rest breaks required throughout the session. The patient continued to demonstrate anxious/self limiting behavior with balance activities, especially with vision eliminated. The patient will benefit from physical therapy intervention to address remaining functional mobility deficit.       Ember Is progressing well towards her goals.   Pt prognosis is Good.     Pt will continue to benefit from skilled outpatient physical therapy to address the deficits listed in the problem list box on initial evaluation, provide pt/family education and to maximize pt's level of independence in the home and community environment.     Pt's spiritual, cultural and educational needs considered and pt agreeable to plan of care and goals.     Anticipated barriers to physical therapy: co-morbidities     Goals:   Short Term Goals: 4 weeks   1. Patient to be (I) with established home exercise program.  2. Patient to improve bilateral hip flexion by 1/3 MMT to improve balance and functional mobility. Ongoing  3. Patient to improve bilateral knee flexion strength by 1/3 MMT to improve balance and functional mobility. Ongoing  4. Patient to improve TUG time to 30 seconds to decrease with of falls when ambulating in the community.Ongoing  5. Patient to improve 5 time sit to stand  time to 20 seconds for improved functional mobility and strength. Ongoing  6. Patient to improve SSWS to 0.66  m/sec for improved safety with ambulation. Ongoing  7. Patient to improve MCTSIB condition 4 to 7 seconds for  improved static balance with vision eliminated. Ongoing     Long Term Goals: 8 weeks   1. Patient to be (I) with advanced home exercise program.Ongoing  2. Patient to improve bilateral hip flexion by 2/3 MMT to improve balance and functional mobility. Ongoing  3. Patient to improve bilateral knee flexion strength by 2/3 MMT to improve balance and functional mobility. Ongoing  4. Patient to improve TUG time to 22 seconds to decrease with of falls when ambulating in the community.Ongoing  5. Patient to improve 5 time sit to stand time to 14 seconds for improved functional mobility and strength. Ongoing  6. Patient to improve SSWS to 0.71  m/sec for improved safety with ambulation. Ongoing  7. Patient to improve MCTSIB condition 4 to 14 seconds for  improved static balance with vision eliminated. Ongoing        PLAN     Continue to progress strength, endurance and balance       Shantal Colby, PT

## 2023-06-15 ENCOUNTER — CLINICAL SUPPORT (OUTPATIENT)
Dept: REHABILITATION | Facility: HOSPITAL | Age: 53
End: 2023-06-15
Payer: MEDICAID

## 2023-06-15 DIAGNOSIS — G62.1 ALCOHOL-INDUCED POLYNEUROPATHY: ICD-10-CM

## 2023-06-15 DIAGNOSIS — Z74.09 IMPAIRED FUNCTIONAL MOBILITY, BALANCE, GAIT, AND ENDURANCE: Primary | ICD-10-CM

## 2023-06-15 PROCEDURE — 97110 THERAPEUTIC EXERCISES: CPT

## 2023-06-15 NOTE — PROGRESS NOTES
"[facility]   Physical Therapy Treatment Note     Name: Ember Rivera  Clinic Number: [id number]    Therapy Diagnosis:   Encounter Diagnoses   Name Primary?    Alcohol-induced polyneuropathy     Impaired functional mobility, balance, gait, and endurance Yes       Physician: Kelsey John MD    Visit Date: 6/15/2023    Physician Orders: PT Eval and Treat   Medical Diagnosis from Referral: Weakness of both lower extremities [R29.898], Peripheral polyneuropathy [G62.9]  Evaluation Date: 5/24/2023  Authorization Period Expiration: 08/28/2023  Plan of Care Expiration: 8/24/2023  Progress Note Due: 7/12/2023  Visit # / Visits authorized: 4/ 8 (+eval)   FOTO: 1/3     Precautions: Standard      Time In: 1020  Time Out: 1045  Total Appointment Time (timed & untimed codes): 25 minutes (medicaid, charge TE)     SUBJECTIVE     Pt reports: "I'm not doing good at all today. I just feel really weak. It's the same thing I've been coming here for, my legs. I feel dizzy and lightheaded."    She will be given home exercise program at follow up session due to objective measures lasting duration of session.   Response to previous treatment: good  Functional change: ongoing     Pain: 9/10  Location: Bilateral feet      OBJECTIVE     Objective Measures updated at progress report unless specified.     Vitals at start of session; Blood pressure: 89/62 mmHg, Heart rate: 101 bpm    Treatment     Ember received the treatments listed below:      therapeutic exercises to develop strength, endurance, ROM, flexibility, posture, and core stabilization for 25 minutes including:    Gait training lobby to gym x~120 feet with rolling walker  CGA from PT and with decreased gait speed  Sit to supine CGA   Ankle pumps 2 x15  SAQ's 2 x10  Bridges 3 x5    Vitals check; BP: 76/54 mmHg, HR: 99 bpm    neuromuscular re-education activities to improve: Balance, Coordination, Kinesthetic, Sense, Proprioception, and Posture for 00 " minutes. The following activities were included:    therapeutic activities to improve functional performance for 0  minutes, including:      gait training to improve functional mobility and safety for 0  minutes, including:    Patient Education and Home Exercises     Home Exercises Provided and Patient Education Provided     Education provided:   - POC, purpose of PT, discharge planning    Written Home Exercises Provided:home exercise program to be given at following session     ASSESSMENT     Ember put forth great effort during today's session however did not tolerate well on today. Presented with complaints of feeling weak, lightheaded, and dizzy especially upon standing. Vitals checked and listed above. PT assisted patient into supine and performed BLE therex to assist with muscle pump and assess vitals response; upon return to sitting, vitals showed continued decline. PT assisted patient to lobby and recommended that patient and caregiver get further medical work up via urgent care or emergency room to assess patient's reports of symptoms which began last night.     Ember Is progressing well towards her goals.   Pt prognosis is Good.     Pt will continue to benefit from skilled outpatient physical therapy to address the deficits listed in the problem list box on initial evaluation, provide pt/family education and to maximize pt's level of independence in the home and community environment.     Pt's spiritual, cultural and educational needs considered and pt agreeable to plan of care and goals.     Anticipated barriers to physical therapy: co-morbidities     Goals:   Short Term Goals: 4 weeks   1. Patient to be (I) with established home exercise program.  2. Patient to improve bilateral hip flexion by 1/3 MMT to improve balance and functional mobility. Ongoing  3. Patient to improve bilateral knee flexion strength by 1/3 MMT to improve balance and functional mobility. Ongoing  4. Patient to improve TUG time to 30  seconds to decrease with of falls when ambulating in the community.Ongoing  5. Patient to improve 5 time sit to stand time to 20 seconds for improved functional mobility and strength. Ongoing  6. Patient to improve SSWS to 0.66  m/sec for improved safety with ambulation. Ongoing  7. Patient to improve MCTSIB condition 4 to 7 seconds for  improved static balance with vision eliminated. Ongoing     Long Term Goals: 8 weeks   1. Patient to be (I) with advanced home exercise program.Ongoing  2. Patient to improve bilateral hip flexion by 2/3 MMT to improve balance and functional mobility. Ongoing  3. Patient to improve bilateral knee flexion strength by 2/3 MMT to improve balance and functional mobility. Ongoing  4. Patient to improve TUG time to 22 seconds to decrease with of falls when ambulating in the community.Ongoing  5. Patient to improve 5 time sit to stand time to 14 seconds for improved functional mobility and strength. Ongoing  6. Patient to improve SSWS to 0.71  m/sec for improved safety with ambulation. Ongoing  7. Patient to improve MCTSIB condition 4 to 14 seconds for  improved static balance with vision eliminated. Ongoing        PLAN     Continue to progress strength, endurance and balance     Mirna Bautista, PT

## 2023-06-16 NOTE — TELEPHONE ENCOUNTER
Referral to hepatology for fatty liver, hepatomegaly     Scheduling attempt # 3  Called the patient to schedule a hepatology consult from referral.  No answer, left voicemail with call back # [phone]. Letter mailed out.

## 2023-06-17 ENCOUNTER — HOSPITAL ENCOUNTER (INPATIENT)
Facility: HOSPITAL | Age: 53
LOS: 2 days | Discharge: HOME | DRG: 641 | End: 2023-06-19
Attending: EMERGENCY MEDICINE | Admitting: FAMILY MEDICINE
Payer: MEDICAID

## 2023-06-17 DIAGNOSIS — E87.1 HYPONATREMIA: Primary | ICD-10-CM

## 2023-06-17 DIAGNOSIS — R07.9 CHEST PAIN: ICD-10-CM

## 2023-06-17 DIAGNOSIS — R53.1 WEAKNESS: ICD-10-CM

## 2023-06-17 DIAGNOSIS — G47.9 SLEEP DIFFICULTIES: ICD-10-CM

## 2023-06-17 DIAGNOSIS — R55 NEAR SYNCOPE: ICD-10-CM

## 2023-06-17 DIAGNOSIS — I10 ESSENTIAL HYPERTENSION: ICD-10-CM

## 2023-06-17 DIAGNOSIS — N17.9 AKI (ACUTE KIDNEY INJURY): ICD-10-CM

## 2023-06-17 PROBLEM — D64.9 NORMOCYTIC ANEMIA: Status: ACTIVE | Noted: 2023-06-17

## 2023-06-17 LAB
ALBUMIN SERPL BCP-MCNC: 3.7 G/DL (ref 3.5–5.2)
ALP SERPL-CCNC: 124 U/L (ref 55–135)
ALT SERPL W/O P-5'-P-CCNC: 40 U/L (ref 10–44)
AMPHET+METHAMPHET UR QL: NEGATIVE
ANION GAP SERPL CALC-SCNC: 14 MMOL/L (ref 8–16)
ANION GAP SERPL CALC-SCNC: 14 MMOL/L (ref 8–16)
AST SERPL-CCNC: 101 U/L (ref 10–40)
BACTERIA #/AREA URNS HPF: ABNORMAL /HPF
BARBITURATES UR QL SCN>200 NG/ML: NEGATIVE
BASOPHILS # BLD AUTO: 0.03 K/UL (ref 0–0.2)
BASOPHILS NFR BLD: 0.3 % (ref 0–1.9)
BENZODIAZ UR QL SCN>200 NG/ML: NEGATIVE
BILIRUB SERPL-MCNC: 0.3 MG/DL (ref 0.1–1)
BILIRUB UR QL STRIP: NEGATIVE
BUN SERPL-MCNC: 10 MG/DL (ref 6–20)
BUN SERPL-MCNC: 10 MG/DL (ref 6–20)
BZE UR QL SCN: NEGATIVE
CALCIUM SERPL-MCNC: 8.7 MG/DL (ref 8.7–10.5)
CALCIUM SERPL-MCNC: 9 MG/DL (ref 8.7–10.5)
CANNABINOIDS UR QL SCN: NEGATIVE
CHLORIDE SERPL-SCNC: 82 MMOL/L (ref 95–110)
CHLORIDE SERPL-SCNC: 85 MMOL/L (ref 95–110)
CK SERPL-CCNC: 36 U/L (ref 20–180)
CLARITY UR: ABNORMAL
CO2 SERPL-SCNC: 22 MMOL/L (ref 23–29)
CO2 SERPL-SCNC: 24 MMOL/L (ref 23–29)
COLOR UR: ABNORMAL
CREAT SERPL-MCNC: 1.1 MG/DL (ref 0.5–1.4)
CREAT SERPL-MCNC: 1.4 MG/DL (ref 0.5–1.4)
CREAT UR-MCNC: 32 MG/DL (ref 15–325)
DIFFERENTIAL METHOD: ABNORMAL
EOSINOPHIL # BLD AUTO: 0 K/UL (ref 0–0.5)
EOSINOPHIL NFR BLD: 0.3 % (ref 0–8)
ERYTHROCYTE [DISTWIDTH] IN BLOOD BY AUTOMATED COUNT: 11.9 % (ref 11.5–14.5)
EST. GFR  (NO RACE VARIABLE): 45 ML/MIN/1.73 M^2
EST. GFR  (NO RACE VARIABLE): >60 ML/MIN/1.73 M^2
ETHANOL SERPL-MCNC: 146 MG/DL
GLUCOSE SERPL-MCNC: 89 MG/DL (ref 70–110)
GLUCOSE SERPL-MCNC: 91 MG/DL (ref 70–110)
GLUCOSE UR QL STRIP: NEGATIVE
HCT VFR BLD AUTO: 31.1 % (ref 37–48.5)
HGB BLD-MCNC: 11.2 G/DL (ref 12–16)
HGB UR QL STRIP: ABNORMAL
HYALINE CASTS #/AREA URNS LPF: 0 /LPF
IMM GRANULOCYTES # BLD AUTO: 0.03 K/UL (ref 0–0.04)
IMM GRANULOCYTES NFR BLD AUTO: 0.3 % (ref 0–0.5)
KETONES UR QL STRIP: NEGATIVE
LEUKOCYTE ESTERASE UR QL STRIP: ABNORMAL
LYMPHOCYTES # BLD AUTO: 2.2 K/UL (ref 1–4.8)
LYMPHOCYTES NFR BLD: 21.4 % (ref 18–48)
MAGNESIUM SERPL-MCNC: 1.8 MG/DL (ref 1.6–2.6)
MCH RBC QN AUTO: 33.6 PG (ref 27–31)
MCHC RBC AUTO-ENTMCNC: 36 G/DL (ref 32–36)
MCV RBC AUTO: 93 FL (ref 82–98)
METHADONE UR QL SCN>300 NG/ML: NEGATIVE
MICROSCOPIC COMMENT: ABNORMAL
MONOCYTES # BLD AUTO: 1.3 K/UL (ref 0.3–1)
MONOCYTES NFR BLD: 12.7 % (ref 4–15)
NEUTROPHILS # BLD AUTO: 6.7 K/UL (ref 1.8–7.7)
NEUTROPHILS NFR BLD: 65 % (ref 38–73)
NITRITE UR QL STRIP: NEGATIVE
NRBC BLD-RTO: 0 /100 WBC
OPIATES UR QL SCN: NEGATIVE
PCP UR QL SCN>25 NG/ML: NEGATIVE
PH UR STRIP: 6 [PH] (ref 5–8)
PLATELET # BLD AUTO: 240 K/UL (ref 150–450)
PMV BLD AUTO: 8.8 FL (ref 9.2–12.9)
POCT GLUCOSE: 94 MG/DL (ref 70–110)
POTASSIUM SERPL-SCNC: 3.6 MMOL/L (ref 3.5–5.1)
POTASSIUM SERPL-SCNC: 3.6 MMOL/L (ref 3.5–5.1)
PROT SERPL-MCNC: 6.5 G/DL (ref 6–8.4)
PROT UR QL STRIP: ABNORMAL
RBC # BLD AUTO: 3.33 M/UL (ref 4–5.4)
RBC #/AREA URNS HPF: 46 /HPF (ref 0–4)
SODIUM SERPL-SCNC: 118 MMOL/L (ref 136–145)
SODIUM SERPL-SCNC: 123 MMOL/L (ref 136–145)
SP GR UR STRIP: 1 (ref 1–1.03)
SQUAMOUS #/AREA URNS HPF: 12 /HPF
TOXICOLOGY INFORMATION: NORMAL
TROPONIN I SERPL DL<=0.01 NG/ML-MCNC: 0.01 NG/ML (ref 0–0.03)
URN SPEC COLLECT METH UR: ABNORMAL
UROBILINOGEN UR STRIP-ACNC: NEGATIVE EU/DL
WBC # BLD AUTO: 10.33 K/UL (ref 3.9–12.7)
WBC #/AREA URNS HPF: 10 /HPF (ref 0–5)
YEAST URNS QL MICRO: ABNORMAL

## 2023-06-17 PROCEDURE — G0378 HOSPITAL OBSERVATION PER HR: HCPCS

## 2023-06-17 PROCEDURE — 63600175 PHARM REV CODE 636 W HCPCS: Performed by: STUDENT IN AN ORGANIZED HEALTH CARE EDUCATION/TRAINING PROGRAM

## 2023-06-17 PROCEDURE — 99292 CRITICAL CARE ADDL 30 MIN: CPT | Mod: 25

## 2023-06-17 PROCEDURE — 82962 GLUCOSE BLOOD TEST: CPT

## 2023-06-17 PROCEDURE — 80307 DRUG TEST PRSMV CHEM ANLYZR: CPT | Performed by: EMERGENCY MEDICINE

## 2023-06-17 PROCEDURE — 11000001 HC ACUTE MED/SURG PRIVATE ROOM

## 2023-06-17 PROCEDURE — 25000003 PHARM REV CODE 250: Performed by: EMERGENCY MEDICINE

## 2023-06-17 PROCEDURE — 93010 EKG 12-LEAD: ICD-10-PCS | Mod: ,,, | Performed by: INTERNAL MEDICINE

## 2023-06-17 PROCEDURE — 93005 ELECTROCARDIOGRAM TRACING: CPT

## 2023-06-17 PROCEDURE — 99291 CRITICAL CARE FIRST HOUR: CPT

## 2023-06-17 PROCEDURE — 82550 ASSAY OF CK (CPK): CPT | Performed by: EMERGENCY MEDICINE

## 2023-06-17 PROCEDURE — 83735 ASSAY OF MAGNESIUM: CPT | Performed by: EMERGENCY MEDICINE

## 2023-06-17 PROCEDURE — 96360 HYDRATION IV INFUSION INIT: CPT

## 2023-06-17 PROCEDURE — 80053 COMPREHEN METABOLIC PANEL: CPT | Performed by: EMERGENCY MEDICINE

## 2023-06-17 PROCEDURE — 80048 BASIC METABOLIC PNL TOTAL CA: CPT | Mod: XB | Performed by: STUDENT IN AN ORGANIZED HEALTH CARE EDUCATION/TRAINING PROGRAM

## 2023-06-17 PROCEDURE — 25000003 PHARM REV CODE 250: Performed by: STUDENT IN AN ORGANIZED HEALTH CARE EDUCATION/TRAINING PROGRAM

## 2023-06-17 PROCEDURE — 82077 ASSAY SPEC XCP UR&BREATH IA: CPT | Performed by: EMERGENCY MEDICINE

## 2023-06-17 PROCEDURE — 81000 URINALYSIS NONAUTO W/SCOPE: CPT | Mod: 59 | Performed by: EMERGENCY MEDICINE

## 2023-06-17 PROCEDURE — 85025 COMPLETE CBC W/AUTO DIFF WBC: CPT | Performed by: EMERGENCY MEDICINE

## 2023-06-17 PROCEDURE — 84484 ASSAY OF TROPONIN QUANT: CPT | Performed by: EMERGENCY MEDICINE

## 2023-06-17 PROCEDURE — 93010 ELECTROCARDIOGRAM REPORT: CPT | Mod: ,,, | Performed by: INTERNAL MEDICINE

## 2023-06-17 RX ORDER — SODIUM CHLORIDE 450 MG/100ML
INJECTION, SOLUTION INTRAVENOUS CONTINUOUS
Status: DISCONTINUED | OUTPATIENT
Start: 2023-06-17 | End: 2023-06-18

## 2023-06-17 RX ORDER — FOLIC ACID 1 MG/1
1 TABLET ORAL
Status: COMPLETED | OUTPATIENT
Start: 2023-06-17 | End: 2023-06-17

## 2023-06-17 RX ORDER — GLUCAGON 1 MG
1 KIT INJECTION
Status: DISCONTINUED | OUTPATIENT
Start: 2023-06-17 | End: 2023-06-19 | Stop reason: HOSPADM

## 2023-06-17 RX ORDER — SODIUM CHLORIDE 0.9 % (FLUSH) 0.9 %
10 SYRINGE (ML) INJECTION EVERY 12 HOURS PRN
Status: DISCONTINUED | OUTPATIENT
Start: 2023-06-17 | End: 2023-06-19 | Stop reason: HOSPADM

## 2023-06-17 RX ORDER — LANOLIN ALCOHOL/MO/W.PET/CERES
400 CREAM (GRAM) TOPICAL DAILY
Status: DISCONTINUED | OUTPATIENT
Start: 2023-06-18 | End: 2023-06-19 | Stop reason: HOSPADM

## 2023-06-17 RX ORDER — ONDANSETRON 2 MG/ML
4 INJECTION INTRAMUSCULAR; INTRAVENOUS EVERY 8 HOURS PRN
Status: DISCONTINUED | OUTPATIENT
Start: 2023-06-17 | End: 2023-06-19 | Stop reason: HOSPADM

## 2023-06-17 RX ORDER — NALOXONE HCL 0.4 MG/ML
0.02 VIAL (ML) INJECTION
Status: DISCONTINUED | OUTPATIENT
Start: 2023-06-17 | End: 2023-06-19 | Stop reason: HOSPADM

## 2023-06-17 RX ORDER — GABAPENTIN 400 MG/1
800 CAPSULE ORAL 3 TIMES DAILY
Status: DISCONTINUED | OUTPATIENT
Start: 2023-06-17 | End: 2023-06-19 | Stop reason: HOSPADM

## 2023-06-17 RX ORDER — DEXTROSE 40 %
30 GEL (GRAM) ORAL
Status: DISCONTINUED | OUTPATIENT
Start: 2023-06-17 | End: 2023-06-19 | Stop reason: HOSPADM

## 2023-06-17 RX ORDER — DEXTROSE 40 %
15 GEL (GRAM) ORAL
Status: DISCONTINUED | OUTPATIENT
Start: 2023-06-17 | End: 2023-06-19 | Stop reason: HOSPADM

## 2023-06-17 RX ORDER — FOLIC ACID 1 MG/1
1 TABLET ORAL DAILY
Status: DISCONTINUED | OUTPATIENT
Start: 2023-06-18 | End: 2023-06-19 | Stop reason: HOSPADM

## 2023-06-17 RX ORDER — THIAMINE HCL 100 MG
100 TABLET ORAL
Status: COMPLETED | OUTPATIENT
Start: 2023-06-17 | End: 2023-06-17

## 2023-06-17 RX ORDER — HYDROCODONE BITARTRATE AND ACETAMINOPHEN 5; 325 MG/1; MG/1
1 TABLET ORAL EVERY 6 HOURS PRN
Status: DISCONTINUED | OUTPATIENT
Start: 2023-06-17 | End: 2023-06-19 | Stop reason: HOSPADM

## 2023-06-17 RX ORDER — MORPHINE SULFATE 4 MG/ML
4 INJECTION, SOLUTION INTRAMUSCULAR; INTRAVENOUS EVERY 4 HOURS PRN
Status: DISCONTINUED | OUTPATIENT
Start: 2023-06-17 | End: 2023-06-19 | Stop reason: HOSPADM

## 2023-06-17 RX ORDER — TALC
6 POWDER (GRAM) TOPICAL NIGHTLY PRN
Status: DISCONTINUED | OUTPATIENT
Start: 2023-06-17 | End: 2023-06-19 | Stop reason: HOSPADM

## 2023-06-17 RX ORDER — TRAZODONE HYDROCHLORIDE 100 MG/1
100 TABLET ORAL NIGHTLY
Status: DISCONTINUED | OUTPATIENT
Start: 2023-06-17 | End: 2023-06-19 | Stop reason: HOSPADM

## 2023-06-17 RX ORDER — THIAMINE HCL 100 MG
500 TABLET ORAL DAILY
Status: DISCONTINUED | OUTPATIENT
Start: 2023-06-18 | End: 2023-06-19 | Stop reason: HOSPADM

## 2023-06-17 RX ORDER — ACETAMINOPHEN 325 MG/1
650 TABLET ORAL EVERY 4 HOURS PRN
Status: DISCONTINUED | OUTPATIENT
Start: 2023-06-17 | End: 2023-06-19 | Stop reason: HOSPADM

## 2023-06-17 RX ADMIN — HYDROCODONE BITARTRATE AND ACETAMINOPHEN 1 TABLET: 5; 325 TABLET ORAL at 08:06

## 2023-06-17 RX ADMIN — MORPHINE SULFATE 4 MG: 4 INJECTION INTRAVENOUS at 09:06

## 2023-06-17 RX ADMIN — TRAZODONE HYDROCHLORIDE 100 MG: 100 TABLET ORAL at 08:06

## 2023-06-17 RX ADMIN — Medication 6 MG: at 08:06

## 2023-06-17 RX ADMIN — GABAPENTIN 800 MG: 400 CAPSULE ORAL at 08:06

## 2023-06-17 RX ADMIN — SODIUM CHLORIDE: 0.45 INJECTION, SOLUTION INTRAVENOUS at 08:06

## 2023-06-17 RX ADMIN — FOLIC ACID 1 MG: 1 TABLET ORAL at 05:06

## 2023-06-17 RX ADMIN — SODIUM CHLORIDE 1000 ML: 9 INJECTION, SOLUTION INTRAVENOUS at 05:06

## 2023-06-17 RX ADMIN — Medication 100 MG: at 05:06

## 2023-06-17 RX ADMIN — THERA TABS 1 TABLET: TAB at 05:06

## 2023-06-17 NOTE — ED NOTES
Pt presents to ED for dizziness and blurred vision associated with low BP taken at home, states has not BP meds today.     Review of patient's allergies indicates:  No Known Allergies    APPEARANCE: Alert, oriented x 4, and in no acute distress.  NEURO: +dizziness, blurred vision, HA, 5/5 strength major flexors/extensors bilaterally. Sensory intact to light touch bilaterally. [place] coma scale: eyes open spontaneously-4, oriented & converses-5, obeys commands-6. No neurological abnormalities. Denies photophobia.  CARDIAC: Normal rate and rhythm through apical/radial pulse, S1 and S2 noted, denies CP.   RESPIRATORY:Normal rate and effort, breath sounds clear bilaterally throughout chest anterior and posterior. Respirations are equal and unlabored, no obvious signs of distress. No SOB reported.  PERIPHERAL VASCULAR: +1/weak dorsalis pedis pulses BLE, Normal cap refill <3sec. No edema. Warm to touch. No discoloration to extremities.   GASTRO: Abdomen taut, old sutures scars noted; bowel sounds normal and active in all 4 quadrants, no tenderness, no abdominal distention. Denies NVD.  MUSC: +generalized weakness pta, No bony tenderness or soft tissue tenderness. No obvious deformity.  SKIN: Skin is warm and dry, normal skin turgor, mucous membranes moist.   GENITOURINARY: Voids spontaneously, denies itching, burning, hematuria.    Patient changed into hospital gown with assistance. Side rails x 2, bed low and locked position, call light in reach and instructed how to use. Pt educated on fall risk and verbalized understanding. Cardiac monitor, pulse ox, and automatic BP cuff applied; alarms set and audible.
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
## 2023-06-18 LAB
ANION GAP SERPL CALC-SCNC: 10 MMOL/L (ref 8–16)
ANION GAP SERPL CALC-SCNC: 10 MMOL/L (ref 8–16)
ANION GAP SERPL CALC-SCNC: 15 MMOL/L (ref 8–16)
ANION GAP SERPL CALC-SCNC: 7 MMOL/L (ref 8–16)
ANION GAP SERPL CALC-SCNC: 8 MMOL/L (ref 8–16)
ANION GAP SERPL CALC-SCNC: 9 MMOL/L (ref 8–16)
BASOPHILS # BLD AUTO: 0.04 K/UL (ref 0–0.2)
BASOPHILS NFR BLD: 0.6 % (ref 0–1.9)
BUN SERPL-MCNC: 11 MG/DL (ref 6–20)
BUN SERPL-MCNC: 12 MG/DL (ref 6–20)
BUN SERPL-MCNC: 12 MG/DL (ref 6–20)
CALCIUM SERPL-MCNC: 8.5 MG/DL (ref 8.7–10.5)
CALCIUM SERPL-MCNC: 8.5 MG/DL (ref 8.7–10.5)
CALCIUM SERPL-MCNC: 8.7 MG/DL (ref 8.7–10.5)
CALCIUM SERPL-MCNC: 8.9 MG/DL (ref 8.7–10.5)
CALCIUM SERPL-MCNC: 9 MG/DL (ref 8.7–10.5)
CALCIUM SERPL-MCNC: 9.3 MG/DL (ref 8.7–10.5)
CHLORIDE SERPL-SCNC: 87 MMOL/L (ref 95–110)
CHLORIDE SERPL-SCNC: 89 MMOL/L (ref 95–110)
CHLORIDE SERPL-SCNC: 89 MMOL/L (ref 95–110)
CHLORIDE SERPL-SCNC: 90 MMOL/L (ref 95–110)
CHLORIDE SERPL-SCNC: 92 MMOL/L (ref 95–110)
CHLORIDE SERPL-SCNC: 93 MMOL/L (ref 95–110)
CO2 SERPL-SCNC: 22 MMOL/L (ref 23–29)
CO2 SERPL-SCNC: 23 MMOL/L (ref 23–29)
CO2 SERPL-SCNC: 25 MMOL/L (ref 23–29)
CO2 SERPL-SCNC: 27 MMOL/L (ref 23–29)
CO2 SERPL-SCNC: 28 MMOL/L (ref 23–29)
CO2 SERPL-SCNC: 30 MMOL/L (ref 23–29)
CREAT SERPL-MCNC: 0.6 MG/DL (ref 0.5–1.4)
CREAT SERPL-MCNC: 0.6 MG/DL (ref 0.5–1.4)
CREAT SERPL-MCNC: 0.7 MG/DL (ref 0.5–1.4)
CREAT SERPL-MCNC: 0.8 MG/DL (ref 0.5–1.4)
CREAT SERPL-MCNC: 0.8 MG/DL (ref 0.5–1.4)
CREAT SERPL-MCNC: 0.9 MG/DL (ref 0.5–1.4)
DIFFERENTIAL METHOD: ABNORMAL
EOSINOPHIL # BLD AUTO: 0.1 K/UL (ref 0–0.5)
EOSINOPHIL NFR BLD: 1.2 % (ref 0–8)
ERYTHROCYTE [DISTWIDTH] IN BLOOD BY AUTOMATED COUNT: 11.9 % (ref 11.5–14.5)
EST. GFR  (NO RACE VARIABLE): >60 ML/MIN/1.73 M^2
GLUCOSE SERPL-MCNC: 100 MG/DL (ref 70–110)
GLUCOSE SERPL-MCNC: 102 MG/DL (ref 70–110)
GLUCOSE SERPL-MCNC: 102 MG/DL (ref 70–110)
GLUCOSE SERPL-MCNC: 106 MG/DL (ref 70–110)
GLUCOSE SERPL-MCNC: 89 MG/DL (ref 70–110)
GLUCOSE SERPL-MCNC: 96 MG/DL (ref 70–110)
HCT VFR BLD AUTO: 29.7 % (ref 37–48.5)
HGB BLD-MCNC: 10.6 G/DL (ref 12–16)
IMM GRANULOCYTES # BLD AUTO: 0.02 K/UL (ref 0–0.04)
IMM GRANULOCYTES NFR BLD AUTO: 0.3 % (ref 0–0.5)
LYMPHOCYTES # BLD AUTO: 2.1 K/UL (ref 1–4.8)
LYMPHOCYTES NFR BLD: 31.4 % (ref 18–48)
MCH RBC QN AUTO: 33.5 PG (ref 27–31)
MCHC RBC AUTO-ENTMCNC: 35.7 G/DL (ref 32–36)
MCV RBC AUTO: 94 FL (ref 82–98)
MONOCYTES # BLD AUTO: 0.6 K/UL (ref 0.3–1)
MONOCYTES NFR BLD: 9.5 % (ref 4–15)
NEUTROPHILS # BLD AUTO: 3.8 K/UL (ref 1.8–7.7)
NEUTROPHILS NFR BLD: 57 % (ref 38–73)
NRBC BLD-RTO: 0 /100 WBC
PLATELET # BLD AUTO: 180 K/UL (ref 150–450)
PMV BLD AUTO: 8.9 FL (ref 9.2–12.9)
POTASSIUM SERPL-SCNC: 3.6 MMOL/L (ref 3.5–5.1)
POTASSIUM SERPL-SCNC: 3.8 MMOL/L (ref 3.5–5.1)
POTASSIUM SERPL-SCNC: 3.9 MMOL/L (ref 3.5–5.1)
POTASSIUM SERPL-SCNC: 4 MMOL/L (ref 3.5–5.1)
POTASSIUM SERPL-SCNC: 4.2 MMOL/L (ref 3.5–5.1)
POTASSIUM SERPL-SCNC: 4.3 MMOL/L (ref 3.5–5.1)
RBC # BLD AUTO: 3.16 M/UL (ref 4–5.4)
SODIUM SERPL-SCNC: 122 MMOL/L (ref 136–145)
SODIUM SERPL-SCNC: 123 MMOL/L (ref 136–145)
SODIUM SERPL-SCNC: 124 MMOL/L (ref 136–145)
SODIUM SERPL-SCNC: 127 MMOL/L (ref 136–145)
SODIUM SERPL-SCNC: 129 MMOL/L (ref 136–145)
SODIUM SERPL-SCNC: 129 MMOL/L (ref 136–145)
T4 FREE SERPL-MCNC: 1 NG/DL (ref 0.71–1.51)
WBC # BLD AUTO: 6.62 K/UL (ref 3.9–12.7)

## 2023-06-18 PROCEDURE — 84439 ASSAY OF FREE THYROXINE: CPT | Performed by: STUDENT IN AN ORGANIZED HEALTH CARE EDUCATION/TRAINING PROGRAM

## 2023-06-18 PROCEDURE — 85025 COMPLETE CBC W/AUTO DIFF WBC: CPT | Performed by: STUDENT IN AN ORGANIZED HEALTH CARE EDUCATION/TRAINING PROGRAM

## 2023-06-18 PROCEDURE — 63600175 PHARM REV CODE 636 W HCPCS: Performed by: STUDENT IN AN ORGANIZED HEALTH CARE EDUCATION/TRAINING PROGRAM

## 2023-06-18 PROCEDURE — 97165 OT EVAL LOW COMPLEX 30 MIN: CPT

## 2023-06-18 PROCEDURE — 80048 BASIC METABOLIC PNL TOTAL CA: CPT | Performed by: STUDENT IN AN ORGANIZED HEALTH CARE EDUCATION/TRAINING PROGRAM

## 2023-06-18 PROCEDURE — 80048 BASIC METABOLIC PNL TOTAL CA: CPT | Mod: 91 | Performed by: STUDENT IN AN ORGANIZED HEALTH CARE EDUCATION/TRAINING PROGRAM

## 2023-06-18 PROCEDURE — 97162 PT EVAL MOD COMPLEX 30 MIN: CPT

## 2023-06-18 PROCEDURE — 25000003 PHARM REV CODE 250: Performed by: NURSE PRACTITIONER

## 2023-06-18 PROCEDURE — 36415 COLL VENOUS BLD VENIPUNCTURE: CPT | Performed by: STUDENT IN AN ORGANIZED HEALTH CARE EDUCATION/TRAINING PROGRAM

## 2023-06-18 PROCEDURE — 11000001 HC ACUTE MED/SURG PRIVATE ROOM

## 2023-06-18 PROCEDURE — 25000003 PHARM REV CODE 250: Performed by: STUDENT IN AN ORGANIZED HEALTH CARE EDUCATION/TRAINING PROGRAM

## 2023-06-18 PROCEDURE — 97530 THERAPEUTIC ACTIVITIES: CPT

## 2023-06-18 RX ORDER — DEXTROSE MONOHYDRATE 50 MG/ML
INJECTION, SOLUTION INTRAVENOUS CONTINUOUS
Status: ACTIVE | OUTPATIENT
Start: 2023-06-18 | End: 2023-06-19

## 2023-06-18 RX ORDER — CHLORDIAZEPOXIDE HYDROCHLORIDE 5 MG/1
5 CAPSULE, GELATIN COATED ORAL 4 TIMES DAILY PRN
Status: DISCONTINUED | OUTPATIENT
Start: 2023-06-18 | End: 2023-06-19 | Stop reason: HOSPADM

## 2023-06-18 RX ADMIN — FOLIC ACID 1 MG: 1 TABLET ORAL at 09:06

## 2023-06-18 RX ADMIN — Medication 400 MG: at 09:06

## 2023-06-18 RX ADMIN — MORPHINE SULFATE 4 MG: 4 INJECTION INTRAVENOUS at 10:06

## 2023-06-18 RX ADMIN — GABAPENTIN 800 MG: 400 CAPSULE ORAL at 08:06

## 2023-06-18 RX ADMIN — HYDROCODONE BITARTRATE AND ACETAMINOPHEN 1 TABLET: 5; 325 TABLET ORAL at 05:06

## 2023-06-18 RX ADMIN — SODIUM CHLORIDE: 0.45 INJECTION, SOLUTION INTRAVENOUS at 09:06

## 2023-06-18 RX ADMIN — Medication 500 MG: at 09:06

## 2023-06-18 RX ADMIN — DEXTROSE MONOHYDRATE: 50 INJECTION, SOLUTION INTRAVENOUS at 04:06

## 2023-06-18 RX ADMIN — GABAPENTIN 800 MG: 400 CAPSULE ORAL at 09:06

## 2023-06-18 RX ADMIN — SODIUM CHLORIDE: 0.45 INJECTION, SOLUTION INTRAVENOUS at 03:06

## 2023-06-18 RX ADMIN — GABAPENTIN 800 MG: 400 CAPSULE ORAL at 04:06

## 2023-06-18 RX ADMIN — HYDROCODONE BITARTRATE AND ACETAMINOPHEN 1 TABLET: 5; 325 TABLET ORAL at 07:06

## 2023-06-18 RX ADMIN — MORPHINE SULFATE 4 MG: 4 INJECTION INTRAVENOUS at 08:06

## 2023-06-18 RX ADMIN — MORPHINE SULFATE 4 MG: 4 INJECTION INTRAVENOUS at 04:06

## 2023-06-18 RX ADMIN — TRAZODONE HYDROCHLORIDE 100 MG: 100 TABLET ORAL at 08:06

## 2023-06-18 RX ADMIN — THERA TABS 1 TABLET: TAB at 09:06

## 2023-06-18 NOTE — ASSESSMENT & PLAN NOTE
Patient reports significant alcohol use  Significantly elevated alcohol serum level presentation, the patient able to communicate well.    Plan:  CIWA protocol, plan to report elevated CIWA greater then 8 to provider  May consider Librium if patient's starts withdrawing  IV Ativan 1mg  for CIWA greater than 8  IV Ativan 2 mg for CIWA greater then 15

## 2023-06-18 NOTE — ASSESSMENT & PLAN NOTE
Hyponatremic to 118 presentation  Likely hyponatremia due to poor intake and HCTZ use  S/p normal saline bolus in the ED, with next BNP showing 123 sodium    Plan:  Q.6 BMP  Start 1/2 normal saline at 150 cc.  Attempt to have no greater than 8 units change in sodium over 24 hour.  Discontinue HCTZ

## 2023-06-18 NOTE — HPI
Ember Rivera is a 53 yr old with PMH of Guillain-[place], hypertension, GERD who presents with generalized weakness and low blood pressure.    Patient states that yesterday she began feeling weaker than usual, and she took her blood pressure reading multiple times that showed low readings.  She reports associated blurry vision.  She says that as she attempts to stand she feels very lightheaded as if she would pass out.  She denies any recent falls.  She states that her appetite has been poor over the last couple of days, with her only eating a few bites during her 2 meals a day.  She does report recent changes in her gabapentin and trazodone.  She states that she takes 3-4 shots of fireball a day and 3 or 4 beers a day.  She has never been in withdrawals, including her last hospitalization.  She decided to present to the ED due to the low blood pressures.    In the ED, triage vitals were significant for blood pressures of 70/43 in the right arm and 64/32 and her left arm.  CBC was significant for normocytic anemia.  CMP was significant for hyponatremia to 118.  CPK and troponin were negative.  Alcohol serum was 146.      Patient was very coherent throughout the entire examination despite alcohol serum level 146.  Drug U tox was unremarkable.  UA was significant for WBCs and RBCs.  Chest x-ray was negative for acute disease.    Patient was given a fluid bolus with improvement blood pressures.    Medicine was consulted for admission for patient's severe hyponatremia.

## 2023-06-18 NOTE — ASSESSMENT & PLAN NOTE
Elevated AST:  101  ALT: 40    Likely due to excessive alcohol use    Plan:  May continue trend CMPs  May consider ultrasound

## 2023-06-18 NOTE — ASSESSMENT & PLAN NOTE
Hypotensive on presentation  May resume BP medications in a.m.      Plan:  Holding lisinopril 30 mg daily, since her BP is still borderline  Stop hydrochlorothiazide, since can be associated with hyponatremia

## 2023-06-18 NOTE — ASSESSMENT & PLAN NOTE
Still reporting neuropathy  Still reporting significant pain    Plan:  Continue gabapentin dose  Use IV morphine for severe pain
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
## 2023-06-18 NOTE — PLAN OF CARE
Pt would benefit from cont OT services in order to maximize functional independence. Recommending high intensity therapy upon d/c. Pt agreeable to EOB ax this date, deferring mobility OOB 2/2 pain. Pt performing bed mobility with SBA. Pt reports 10/10 pain in B feet. BP EOB taken at 128/75. Will progress as able.     Problem: Occupational Therapy  Goal: Occupational Therapy Goal  Description: Goals to be met by: 7/18/2023     Patient will increase functional independence with ADLs by performing:    UE Dressing with Modified Wheeler.  LE Dressing with Modified Wheeler.  Grooming while seated with Modified Wheeler.  Toileting from toilet with Modified Wheeler for hygiene and clothing management.   Step transfer with Modified Wheeler & appropriate AD.  Toilet transfer to toilet with Modified Wheeler & appropriate AD.    Outcome: Ongoing, Progressing

## 2023-06-18 NOTE — ASSESSMENT & PLAN NOTE
Likely in part due to Guillain-[place] and poor oral intake    Plan:  Continue PT/OT  Encourage good intake

## 2023-06-18 NOTE — NURSING
Assumed care of patient from Emergency Department, RN, patient is AAOX$, room air, vitals WNL, c/o 6/10 pain in bilateral LE, PRN meds given, 1/2 NS running at 150 ml/hr, perineum cleaned, purewick placed, CIWA Q12 = 1, all safety precautions are in place, call bell and tray table are within reach of the patient and will continue to monitor.

## 2023-06-18 NOTE — ASSESSMENT & PLAN NOTE
Hypotensive on presentation  May resume BP medications in a.m.      Plan:  Continue with lisinopril 30 mg daily  Hold hydrochlorothiazide, since can be associated with hyponatremia

## 2023-06-18 NOTE — ASSESSMENT & PLAN NOTE
Hyponatremic to 118 presentation  Likely hyponatremia due to poor intake and HCTZ use  S/p normal saline bolus in the ED, with next BNP showing 123 sodium    Plan:  Q.4 BMP  Start 1/2 normal saline at 150 cc.  Attempt to have no greater than 8 units change in sodium over 24 hour.  Discontinue HCTZ
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
## 2023-06-18 NOTE — PLAN OF CARE
Problem: Physical Therapy  Goal: Physical Therapy Goal  Description: Goals to be met by: 23     Patient will increase functional independence with mobility by performin. Supine <> sit with Modified [place]  2. Sit to stand transfer with Minimal Assistance  3. Bed to chair transfer with Minimal Assistance using Rolling Walker  4. Gait  x 20 feet with Minimal Assistance using Rolling Walker.   5. Lower extremity exercise program 2 x 10 reps  with supervision    Outcome: Ongoing, Progressing   Limited PT evaluation was completed.  Pt c/o 10/10  B foot  pain allowing assessment of bed mobility/ sitting at EOB only. Pt required SBA with supine<>sit,  sat at EOB with G static sit balance and F+ dynamic sit balance.  Pt deferred transfer/gait assessment due to pain. Pt will benefit from increased PT training to maximize functional mobility level. Recommending high intensity therapy upon d/c.

## 2023-06-18 NOTE — SUBJECTIVE & OBJECTIVE
Interval History:  Sodium has remained stable ~ 123. Will spread out lab draws to q 6 h. Not displaying signs of alcohol withdrawal. CIWA and prn benzo added.     Review of Systems   Constitutional:  Negative for chills.   HENT:  Negative for drooling and postnasal drip.    Eyes:  Negative for pain and redness.   Respiratory:  Negative for choking and chest tightness.    Cardiovascular:  Negative for palpitations and leg swelling.   Gastrointestinal:  Negative for constipation and diarrhea.   Endocrine: Negative for polyphagia and polyuria.   Genitourinary:  Negative for enuresis and flank pain.   Musculoskeletal:  Negative for myalgias and neck pain.   Skin:  Negative for pallor and rash.   Allergic/Immunologic: Negative for immunocompromised state.   Neurological:  Positive for weakness.        Use a wheelchair since Guillain-[place] diagnosis.  Reports weakness   Psychiatric/Behavioral:  Negative for hallucinations. The patient is not hyperactive.    Objective:     Vital Signs (Most Recent):  Temp: 97.9 °F (36.6 °C) (06/18/23 0845)  Pulse: 78 (06/18/23 0845)  Resp: 18 (06/18/23 1009)  BP: 128/75 (06/18/23 0955)  SpO2: 97 % (06/18/23 0845) Vital Signs (24h Range):  Temp:  [97.9 °F (36.6 °C)-98.4 °F (36.9 °C)] 97.9 °F (36.6 °C)  Pulse:  [76-99] 78  Resp:  [14-20] 18  SpO2:  [97 %-100 %] 97 %  BP: ()/(43-94) 128/75     Weight: 70.2 kg (154 lb 12.2 oz)  Body mass index is 24.98 kg/m².    Intake/Output Summary (Last 24 hours) at 6/18/2023 1146  Last data filed at 6/18/2023 0400  Gross per 24 hour   Intake 360 ml   Output 100 ml   Net 260 ml         Physical Exam  Vitals reviewed.   Constitutional:       General: She is not in acute distress.     Appearance: Normal appearance. She is not ill-appearing.   HENT:      Head: Normocephalic and atraumatic.      Right Ear: There is no impacted cerumen.      Nose: No congestion or rhinorrhea.      Mouth/Throat:      Pharynx: No oropharyngeal exudate or posterior  oropharyngeal erythema.   Eyes:      General:         Right eye: No discharge.         Left eye: No discharge.   Cardiovascular:      Rate and Rhythm: Normal rate.      Heart sounds:     No gallop.   Pulmonary:      Breath sounds: No wheezing or rales.   Abdominal:      General: There is no distension.      Tenderness: There is no abdominal tenderness.   Musculoskeletal:         General: No swelling or deformity.      Cervical back: No rigidity.   Lymphadenopathy:      Cervical: No cervical adenopathy.   Skin:     Coloration: Skin is not jaundiced.      Findings: No bruising.   Neurological:      General: No focal deficit present.      Mental Status: She is alert.      Cranial Nerves: No cranial nerve deficit.      Motor: No weakness.   Psychiatric:         Mood and Affect: Mood normal.         Behavior: Behavior normal.           Significant Labs: All pertinent labs within the past 24 hours have been reviewed.  Bilirubin:   Recent Labs   Lab 05/29/23  1539 06/07/23  1011 06/17/23  1741   BILITOT 0.4 0.7 0.3     CBC:   Recent Labs   Lab 06/17/23  1741 06/18/23  0342   WBC 10.33 6.62   HGB 11.2* 10.6*   HCT 31.1* 29.7*    180     CMP:   Recent Labs   Lab 06/17/23  1741 06/17/23  1919 06/18/23  0001 06/18/23  0342 06/18/23  0747   *   < > 124* 122* 123*   K 3.6   < > 3.8 3.6 4.2   CL 82*   < > 87* 89* 89*   CO2 22*   < > 22* 23 25   GLU 91   < > 100 96 89   BUN 10   < > 11 12 11   CREATININE 1.4   < > 0.9 0.8 0.7   CALCIUM 9.0   < > 8.7 8.5* 8.5*   PROT 6.5  --   --   --   --    ALBUMIN 3.7  --   --   --   --    BILITOT 0.3  --   --   --   --    ALKPHOS 124  --   --   --   --    *  --   --   --   --    ALT 40  --   --   --   --    ANIONGAP 14   < > 15 10 9    < > = values in this interval not displayed.     Magnesium:   Recent Labs   Lab 06/17/23  1741   MG 1.8     TSH:   Recent Labs   Lab 05/15/23  2057   TSH 5.957*       Significant Imaging: I have reviewed all pertinent imaging results/findings  within the past 24 hours.  Imaging Results              X-Ray Chest AP Portable (Final result)  Result time 06/17/23 18:12:00      Final result by Lam Corona DO (06/17/23 18:12:00)                   Impression:      No acute abnormality.      Electronically signed by: Lam Corona  Date:    06/17/2023  Time:    18:12               Narrative:    EXAMINATION:  XR CHEST AP PORTABLE    CLINICAL HISTORY:  Weakness;    TECHNIQUE:  Single frontal view of the chest was performed.    COMPARISON:  05/28/2021.    FINDINGS:  The lungs are well expanded and clear. No focal opacities are seen. The pleural spaces are clear.    The cardiac silhouette is unremarkable.    The visualized osseous structures are unremarkable.

## 2023-06-18 NOTE — ASSESSMENT & PLAN NOTE
Elevated AST:  101  ALT: 40    Likely due to excessive alcohol use    Plan:  May continue trend CMPs  May consider ultrasound. But not having any pain

## 2023-06-18 NOTE — PLAN OF CARE
Problem: Adult Inpatient Plan of Care  Goal: Plan of Care Review  Outcome: Ongoing, Progressing  Goal: Patient-Specific Goal (Individualized)  Outcome: Ongoing, Progressing  Goal: Absence of Hospital-Acquired Illness or Injury  Outcome: Ongoing, Progressing  Goal: Optimal Comfort and Wellbeing  Outcome: Ongoing, Progressing  Goal: Readiness for Transition of Care  Outcome: Ongoing, Progressing     Problem: Hypertension Comorbidity  Goal: Blood Pressure in Desired Range  Outcome: Ongoing, Progressing     Problem: Electrolyte Imbalance  Goal: Electrolyte Balance  Outcome: Ongoing, Progressing     Problem: Fatigue  Goal: Improved Activity Tolerance  Outcome: Ongoing, Progressing     Problem: Alcohol Withdrawal  Goal: Alcohol Withdrawal Symptom Control  Outcome: Ongoing, Progressing     Problem: Acute Neurologic Deterioration (Alcohol Withdrawal)  Goal: Optimal Neurologic Function  Outcome: Ongoing, Progressing     Problem: Substance Misuse (Alcohol Withdrawal)  Goal: Readiness for Change Identified  Outcome: Ongoing, Progressing     Problem: Fall Injury Risk  Goal: Absence of Fall and Fall-Related Injury  Outcome: Ongoing, Progressing     Problem: Pain Acute  Goal: Acceptable Pain Control and Functional Ability  Outcome: Ongoing, Progressing     Problem: Skin Injury Risk Increased  Goal: Skin Health and Integrity  Outcome: Ongoing, Progressing

## 2023-06-18 NOTE — ASSESSMENT & PLAN NOTE
Hemoglobin:  11.2  MCV:  93  Likely anemia of chronic disease    Plan:  Transfuse hemoglobin for goal greater than 7

## 2023-06-18 NOTE — PLAN OF CARE
Problem: Adult Inpatient Plan of Care  Goal: Plan of Care Review  Outcome: Ongoing, Progressing     VIRTUAL NURSE:  Cued into patient's room.  Permission received per patient to turn camera to view patient.  Introduced as VN for night shift that will be working with floor nurse and nursing assistant.  Educated patient on VN's role in patient care and  [facility] model.  Plan of care reviewed with patient.  Education per flowsheet.   Informed patient that staff will round on them every 2 hours but to use call light for any other needs they may have; informed of fall risk and fall precautions.  Patient verbalized understanding.  Call light within reach; bed siderails up x3.  Opportunity given for questions and questions answered.  Admission assessment questions answered.  Patient denies complaints or any needs at this time. Instructed to call for assistance.  Will cont to monitor and intervene as needed.    Labs, notes, orders, and careplan reviewed.       06/17/23 2039   Patient Request   Patient Requested c/o bilateral leg and foot pain 10/10- received pain medication 5 minutes ago   Admission   Initial VN Admission Questions Complete   Communication Issues? None   Shift   Virtual Nurse - Rounding Complete   Pain Management Interventions pain management plan reviewed with patient/caregiver   Virtual Nurse - Patient Verbalized Approval Of Camera Use;VN Rounding   Type of Frequent Check   Type Patient Rounds   Safety/Activity   Patient Rounds bed in low position;placement of personal items at bedside;bed wheels locked;call light in patient/parent reach;visualized patient;clutter free environment maintained   Safety Promotion/Fall Prevention assistive device/personal item within reach;diversional activities provided;Fall Risk reviewed with patient/family;high risk medications identified;medications reviewed;side rails raised x 3;supervised activity;instructed to call staff for mobility   Safety Precautions emergency  equipment at bedside   Positioning   Body Position neutral body alignment;neutral head position   Head of Bed (HOB) Positioning HOB at 60 degrees   Pain/Comfort/Sleep   Preferred Pain Scale number (Numeric Rating Pain Scale)   Comfort/Acceptable Pain Level 7   Pain Body Location - Side Bilateral   Pain Body Location - Orientation lower   Pain Body Location extremity   Pain Rating (0-10): Rest 10   Sleep/Rest/Relaxation no problem identified;awake
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
## 2023-06-18 NOTE — ASSESSMENT & PLAN NOTE
Likely in part due to Guillain-[place] and poor oral intake    Plan:  Continue PT/OT  S/p 1 L fluid bolus  Encourage good intake

## 2023-06-18 NOTE — PROGRESS NOTES
[facility] Medicine  Progress Note    Patient Name: Ember Rivera  MRN: [id number]  Patient Class: OP- Observation   Admission Date: 6/17/2023  Length of Stay: 0 days  Attending Physician: Maggy Sprague*  Primary Care Provider: Haleigh Zacarias MD        Subjective:     Principal Problem:Hyponatremia    HPI:  Ember Rivera is a 53 yr old with PMH of Guillain-[place], hypertension, GERD who presents with generalized weakness and low blood pressure.    Patient states that yesterday she began feeling weaker than usual, and she took her blood pressure reading multiple times that showed low readings.  She reports associated blurry vision.  She says that as she attempts to stand she feels very lightheaded as if she would pass out.  She denies any recent falls.  She states that her appetite has been poor over the last couple of days, with her only eating a few bites during her 2 meals a day.  She does report recent changes in her gabapentin and trazodone.  She states that she takes 3-4 shots of fireball a day and 3 or 4 beers a day.  She has never been in withdrawals, including her last hospitalization.  She decided to present to the ED due to the low blood pressures.    In the ED, triage vitals were significant for blood pressures of 70/43 in the right arm and 64/32 and her left arm.  CBC was significant for normocytic anemia.  CMP was significant for hyponatremia to 118.  CPK and troponin were negative.  Alcohol serum was 146.      Patient was very coherent throughout the entire examination despite alcohol serum level 146.  Drug U tox was unremarkable.  UA was significant for WBCs and RBCs.  Chest x-ray was negative for acute disease.    Patient was given a fluid bolus with improvement blood pressures.    Medicine was consulted for admission for patient's severe hyponatremia.      Overview/Hospital Course:  No notes on file    Interval History:  Sodium has remained stable ~ 123. Will spread out  lab draws to q 6 h. Not displaying signs of alcohol withdrawal. CIWA and prn benzo added.     Review of Systems   Constitutional:  Negative for chills.   HENT:  Negative for drooling and postnasal drip.    Eyes:  Negative for pain and redness.   Respiratory:  Negative for choking and chest tightness.    Cardiovascular:  Negative for palpitations and leg swelling.   Gastrointestinal:  Negative for constipation and diarrhea.   Endocrine: Negative for polyphagia and polyuria.   Genitourinary:  Negative for enuresis and flank pain.   Musculoskeletal:  Negative for myalgias and neck pain.   Skin:  Negative for pallor and rash.   Allergic/Immunologic: Negative for immunocompromised state.   Neurological:  Positive for weakness.        Use a wheelchair since Guillain-[place] diagnosis.  Reports weakness   Psychiatric/Behavioral:  Negative for hallucinations. The patient is not hyperactive.    Objective:     Vital Signs (Most Recent):  Temp: 97.9 °F (36.6 °C) (06/18/23 0845)  Pulse: 78 (06/18/23 0845)  Resp: 18 (06/18/23 1009)  BP: 128/75 (06/18/23 0955)  SpO2: 97 % (06/18/23 0845) Vital Signs (24h Range):  Temp:  [97.9 °F (36.6 °C)-98.4 °F (36.9 °C)] 97.9 °F (36.6 °C)  Pulse:  [76-99] 78  Resp:  [14-20] 18  SpO2:  [97 %-100 %] 97 %  BP: ()/(43-94) 128/75     Weight: 70.2 kg (154 lb 12.2 oz)  Body mass index is 24.98 kg/m².    Intake/Output Summary (Last 24 hours) at 6/18/2023 1146  Last data filed at 6/18/2023 0400  Gross per 24 hour   Intake 360 ml   Output 100 ml   Net 260 ml         Physical Exam  Vitals reviewed.   Constitutional:       General: She is not in acute distress.     Appearance: Normal appearance. She is not ill-appearing.   HENT:      Head: Normocephalic and atraumatic.      Right Ear: There is no impacted cerumen.      Nose: No congestion or rhinorrhea.      Mouth/Throat:      Pharynx: No oropharyngeal exudate or posterior oropharyngeal erythema.   Eyes:      General:         Right eye: No discharge.          Left eye: No discharge.   Cardiovascular:      Rate and Rhythm: Normal rate.      Heart sounds:     No gallop.   Pulmonary:      Breath sounds: No wheezing or rales.   Abdominal:      General: There is no distension.      Tenderness: There is no abdominal tenderness.   Musculoskeletal:         General: No swelling or deformity.      Cervical back: No rigidity.   Lymphadenopathy:      Cervical: No cervical adenopathy.   Skin:     Coloration: Skin is not jaundiced.      Findings: No bruising.   Neurological:      General: No focal deficit present.      Mental Status: She is alert.      Cranial Nerves: No cranial nerve deficit.      Motor: No weakness.   Psychiatric:         Mood and Affect: Mood normal.         Behavior: Behavior normal.           Significant Labs: All pertinent labs within the past 24 hours have been reviewed.  Bilirubin:   Recent Labs   Lab 05/29/23  1539 06/07/23  1011 06/17/23  1741   BILITOT 0.4 0.7 0.3     CBC:   Recent Labs   Lab 06/17/23  1741 06/18/23  0342   WBC 10.33 6.62   HGB 11.2* 10.6*   HCT 31.1* 29.7*    180     CMP:   Recent Labs   Lab 06/17/23  1741 06/17/23  1919 06/18/23  0001 06/18/23  0342 06/18/23  0747   *   < > 124* 122* 123*   K 3.6   < > 3.8 3.6 4.2   CL 82*   < > 87* 89* 89*   CO2 22*   < > 22* 23 25   GLU 91   < > 100 96 89   BUN 10   < > 11 12 11   CREATININE 1.4   < > 0.9 0.8 0.7   CALCIUM 9.0   < > 8.7 8.5* 8.5*   PROT 6.5  --   --   --   --    ALBUMIN 3.7  --   --   --   --    BILITOT 0.3  --   --   --   --    ALKPHOS 124  --   --   --   --    *  --   --   --   --    ALT 40  --   --   --   --    ANIONGAP 14   < > 15 10 9    < > = values in this interval not displayed.     Magnesium:   Recent Labs   Lab 06/17/23  1741   MG 1.8     TSH:   Recent Labs   Lab 05/15/23  2057   TSH 5.957*       Significant Imaging: I have reviewed all pertinent imaging results/findings within the past 24 hours.  Imaging Results              X-Ray Chest AP Portable  (Final result)  Result time 06/17/23 18:12:00      Final result by Lam Corona DO (06/17/23 18:12:00)                   Impression:      No acute abnormality.      Electronically signed by: Lam Corona  Date:    06/17/2023  Time:    18:12               Narrative:    EXAMINATION:  XR CHEST AP PORTABLE    CLINICAL HISTORY:  Weakness;    TECHNIQUE:  Single frontal view of the chest was performed.    COMPARISON:  05/28/2021.    FINDINGS:  The lungs are well expanded and clear. No focal opacities are seen. The pleural spaces are clear.    The cardiac silhouette is unremarkable.    The visualized osseous structures are unremarkable.                                         Assessment/Plan:      * Hyponatremia  Hyponatremic to 118 presentation  Likely hyponatremia due to poor intake and HCTZ use  S/p normal saline bolus in the ED, with next BNP showing 123 sodium    Plan:  Q.6 BMP  Start 1/2 normal saline at 150 cc.  Attempt to have no greater than 8 units change in sodium over 24 hour.  Discontinue HCTZ      Normocytic anemia  Hemoglobin:  11.2  MCV:  93  Likely anemia of chronic disease    Plan:  Transfuse hemoglobin for goal greater than 7      Impaired functional mobility, balance, gait, and endurance  Patient recently diagnosed with Guillain-[place]    Plan:  Consult PT/OT      Peripheral polyneuropathy  Still reporting neuropathy  Still reporting significant pain    Plan:  Continue gabapentin dose  Use IV morphine for severe pain    Alcohol-induced polyneuropathy  Patient with neuropathy likely due to alcohol use    Plan:  Encouraged against alcohol use  Continue with gabapentin  Continue with thiamine, folate      Primary hypertension  Hypotensive on presentation  May resume BP medications in a.m.      Plan:  Holding lisinopril 30 mg daily, since her BP is still borderline  Stop hydrochlorothiazide, since can be associated with hyponatremia    Weakness of both lower extremities  Likely in part due to  Guillain-[place] and poor oral intake    Plan:  Continue PT/OT  Encourage good intake      EtOH dependence  Patient reports significant alcohol use  Significantly elevated alcohol serum level presentation, the patient able to communicate well.    Plan:  CIWA protocol, plan to report elevated CIWA greater then 8 to provider  May consider Librium if patient's starts withdrawing  IV Ativan 1mg  for CIWA greater than 8  IV Ativan 2 mg for CIWA greater then 15      Transaminitis  Elevated AST:  101  ALT: 40    Likely due to excessive alcohol use    Plan:  May continue trend CMPs  May consider ultrasound. But not having any pain          VTE Risk Mitigation (From admission, onward)         Ordered     IP VTE LOW RISK PATIENT  Once         06/17/23 1851     Place sequential compression device  Until discontinued         06/17/23 1851              Discharge Planning   YUN:      Code Status: Full Code   Is the patient medically ready for discharge?:     Reason for patient still in hospital (select all that apply): Patient trending condition and Treatment         Kendra Espana NP  Department of Hospital Medicine   [facility]

## 2023-06-18 NOTE — ASSESSMENT & PLAN NOTE
Patient reports significant alcohol use  Significantly elevated alcohol serum level presentation, the patient able to communicate well.    Plan:  CIWA protocol, plan to report elevated CIWA greater then 8 to provider  May consider Valium if patient's starts withdrawing  IV Ativan 1mg  for CIWA greater than 8  IV Ativan 2 mg for CIWA greater then 15

## 2023-06-18 NOTE — ASSESSMENT & PLAN NOTE
Patient with neuropathy likely due to alcohol use    Plan:  Encouraged against alcohol use  Continue with gabapentin  Continue with thiamine, folate

## 2023-06-19 ENCOUNTER — CLINICAL SUPPORT (OUTPATIENT)
Dept: SMOKING CESSATION | Facility: CLINIC | Age: 53
End: 2023-06-19

## 2023-06-19 VITALS
HEIGHT: 66 IN | RESPIRATION RATE: 18 BRPM | WEIGHT: 155.19 LBS | OXYGEN SATURATION: 97 % | DIASTOLIC BLOOD PRESSURE: 78 MMHG | TEMPERATURE: 99 F | HEART RATE: 75 BPM | BODY MASS INDEX: 24.94 KG/M2 | SYSTOLIC BLOOD PRESSURE: 141 MMHG

## 2023-06-19 DIAGNOSIS — F17.210 CIGARETTE SMOKER: Primary | ICD-10-CM

## 2023-06-19 LAB
ANION GAP SERPL CALC-SCNC: 11 MMOL/L (ref 8–16)
ANION GAP SERPL CALC-SCNC: 9 MMOL/L (ref 8–16)
BASOPHILS # BLD AUTO: 0.04 K/UL (ref 0–0.2)
BASOPHILS NFR BLD: 0.8 % (ref 0–1.9)
BUN SERPL-MCNC: 11 MG/DL (ref 6–20)
BUN SERPL-MCNC: 11 MG/DL (ref 6–20)
CALCIUM SERPL-MCNC: 9 MG/DL (ref 8.7–10.5)
CALCIUM SERPL-MCNC: 9.1 MG/DL (ref 8.7–10.5)
CHLORIDE SERPL-SCNC: 92 MMOL/L (ref 95–110)
CHLORIDE SERPL-SCNC: 93 MMOL/L (ref 95–110)
CO2 SERPL-SCNC: 27 MMOL/L (ref 23–29)
CO2 SERPL-SCNC: 27 MMOL/L (ref 23–29)
CREAT SERPL-MCNC: 0.7 MG/DL (ref 0.5–1.4)
CREAT SERPL-MCNC: 0.7 MG/DL (ref 0.5–1.4)
DIFFERENTIAL METHOD: ABNORMAL
EOSINOPHIL # BLD AUTO: 0 K/UL (ref 0–0.5)
EOSINOPHIL NFR BLD: 0.8 % (ref 0–8)
ERYTHROCYTE [DISTWIDTH] IN BLOOD BY AUTOMATED COUNT: 12.1 % (ref 11.5–14.5)
EST. GFR  (NO RACE VARIABLE): >60 ML/MIN/1.73 M^2
EST. GFR  (NO RACE VARIABLE): >60 ML/MIN/1.73 M^2
GLUCOSE SERPL-MCNC: 103 MG/DL (ref 70–110)
GLUCOSE SERPL-MCNC: 95 MG/DL (ref 70–110)
HCT VFR BLD AUTO: 30.5 % (ref 37–48.5)
HGB BLD-MCNC: 10.5 G/DL (ref 12–16)
IMM GRANULOCYTES # BLD AUTO: 0.01 K/UL (ref 0–0.04)
IMM GRANULOCYTES NFR BLD AUTO: 0.2 % (ref 0–0.5)
LYMPHOCYTES # BLD AUTO: 1.5 K/UL (ref 1–4.8)
LYMPHOCYTES NFR BLD: 31.4 % (ref 18–48)
MCH RBC QN AUTO: 33.3 PG (ref 27–31)
MCHC RBC AUTO-ENTMCNC: 34.4 G/DL (ref 32–36)
MCV RBC AUTO: 97 FL (ref 82–98)
MONOCYTES # BLD AUTO: 0.5 K/UL (ref 0.3–1)
MONOCYTES NFR BLD: 10.3 % (ref 4–15)
NEUTROPHILS # BLD AUTO: 2.8 K/UL (ref 1.8–7.7)
NEUTROPHILS NFR BLD: 56.5 % (ref 38–73)
NRBC BLD-RTO: 0 /100 WBC
PLATELET # BLD AUTO: 187 K/UL (ref 150–450)
PMV BLD AUTO: 8.9 FL (ref 9.2–12.9)
POTASSIUM SERPL-SCNC: 4.2 MMOL/L (ref 3.5–5.1)
POTASSIUM SERPL-SCNC: 4.3 MMOL/L (ref 3.5–5.1)
RBC # BLD AUTO: 3.15 M/UL (ref 4–5.4)
SODIUM SERPL-SCNC: 129 MMOL/L (ref 136–145)
SODIUM SERPL-SCNC: 130 MMOL/L (ref 136–145)
WBC # BLD AUTO: 4.87 K/UL (ref 3.9–12.7)

## 2023-06-19 PROCEDURE — 63600175 PHARM REV CODE 636 W HCPCS: Performed by: STUDENT IN AN ORGANIZED HEALTH CARE EDUCATION/TRAINING PROGRAM

## 2023-06-19 PROCEDURE — 25000003 PHARM REV CODE 250: Performed by: STUDENT IN AN ORGANIZED HEALTH CARE EDUCATION/TRAINING PROGRAM

## 2023-06-19 PROCEDURE — 80048 BASIC METABOLIC PNL TOTAL CA: CPT | Performed by: STUDENT IN AN ORGANIZED HEALTH CARE EDUCATION/TRAINING PROGRAM

## 2023-06-19 PROCEDURE — 99406 BEHAV CHNG SMOKING 3-10 MIN: CPT | Mod: S$GLB,,,

## 2023-06-19 PROCEDURE — 85025 COMPLETE CBC W/AUTO DIFF WBC: CPT | Performed by: STUDENT IN AN ORGANIZED HEALTH CARE EDUCATION/TRAINING PROGRAM

## 2023-06-19 PROCEDURE — 99406 PT REFUSED TOBACCO CESSATION: ICD-10-PCS | Mod: S$GLB,,,

## 2023-06-19 PROCEDURE — 36415 COLL VENOUS BLD VENIPUNCTURE: CPT | Performed by: STUDENT IN AN ORGANIZED HEALTH CARE EDUCATION/TRAINING PROGRAM

## 2023-06-19 RX ORDER — FOLIC ACID 1 MG/1
1 TABLET ORAL DAILY
Qty: 60 TABLET | Refills: 2 | Status: SHIPPED | OUTPATIENT
Start: 2023-06-19 | End: 2024-01-31 | Stop reason: SDUPTHER

## 2023-06-19 RX ORDER — LANOLIN ALCOHOL/MO/W.PET/CERES
1000 CREAM (GRAM) TOPICAL DAILY
Qty: 60 TABLET | Refills: 2 | Status: SHIPPED | OUTPATIENT
Start: 2023-06-19 | End: 2023-12-19

## 2023-06-19 RX ORDER — LISINOPRIL 10 MG/1
10 TABLET ORAL DAILY
Qty: 90 TABLET | Refills: 3 | Status: SHIPPED | OUTPATIENT
Start: 2023-06-19 | End: 2023-09-14

## 2023-06-19 RX ORDER — TRAZODONE HYDROCHLORIDE 100 MG/1
100 TABLET ORAL NIGHTLY
Qty: 30 TABLET | Refills: 11 | Status: SHIPPED | OUTPATIENT
Start: 2023-06-19 | End: 2024-02-21

## 2023-06-19 RX ADMIN — GABAPENTIN 800 MG: 400 CAPSULE ORAL at 08:06

## 2023-06-19 RX ADMIN — THERA TABS 1 TABLET: TAB at 08:06

## 2023-06-19 RX ADMIN — FOLIC ACID 1 MG: 1 TABLET ORAL at 08:06

## 2023-06-19 RX ADMIN — ACETAMINOPHEN 650 MG: 325 TABLET ORAL at 10:06

## 2023-06-19 RX ADMIN — MORPHINE SULFATE 4 MG: 4 INJECTION INTRAVENOUS at 12:06

## 2023-06-19 RX ADMIN — MORPHINE SULFATE 4 MG: 4 INJECTION INTRAVENOUS at 04:06

## 2023-06-19 RX ADMIN — Medication 400 MG: at 08:06

## 2023-06-19 RX ADMIN — Medication 500 MG: at 08:06

## 2023-06-19 RX ADMIN — HYDROCODONE BITARTRATE AND ACETAMINOPHEN 1 TABLET: 5; 325 TABLET ORAL at 07:06

## 2023-06-19 NOTE — ASSESSMENT & PLAN NOTE
Patient reports significant alcohol use  Significantly elevated alcohol serum level presentation, the patient able to communicate well.  No evidence of withdrawel  Cessation counseling given.

## 2023-06-19 NOTE — DISCHARGE SUMMARY
[facility] Medicine  Discharge Summary      Patient Name: Ember Rivera  MRN: [id number]  NAVIN: [id number]  Patient Class: IP- Inpatient  Admission Date: 6/17/2023  Hospital Length of Stay: 1 days  Discharge Date and Time:  06/19/2023 10:07 AM  Attending Physician: Maggy Sprague*   Discharging Provider: Kendra Espana NP  Primary Care Provider: Haleigh Zacarias MD    Primary Care Team: Networked reference to record PCT     HPI:   Ebmer Rivera is a 53 yr old with PMH of Guillain-[place], hypertension, GERD who presents with generalized weakness and low blood pressure.    Patient states that yesterday she began feeling weaker than usual, and she took her blood pressure reading multiple times that showed low readings.  She reports associated blurry vision.  She says that as she attempts to stand she feels very lightheaded as if she would pass out.  She denies any recent falls.  She states that her appetite has been poor over the last couple of days, with her only eating a few bites during her 2 meals a day.  She does report recent changes in her gabapentin and trazodone.  She states that she takes 3-4 shots of fireball a day and 3 or 4 beers a day.  She has never been in withdrawals, including her last hospitalization.  She decided to present to the ED due to the low blood pressures.    In the ED, triage vitals were significant for blood pressures of 70/43 in the right arm and 64/32 and her left arm.  CBC was significant for normocytic anemia.  CMP was significant for hyponatremia to 118.  CPK and troponin were negative.  Alcohol serum was 146.      Patient was very coherent throughout the entire examination despite alcohol serum level 146.  Drug U tox was unremarkable.  UA was significant for WBCs and RBCs.  Chest x-ray was negative for acute disease.    Patient was given a fluid bolus with improvement blood pressures.    Medicine was consulted for admission for patient's severe  hyponatremia.          * No surgery found *      Hospital Course:   Her sodium corrected to 129-130 with IV Fluids.  She is now normotensive.  Home Blood Pressure Medications adjusted.  She is discharge to home.        Goals of Care Treatment Preferences:  Code Status: Full Code      Consults:   Consults (From admission, onward)        Status Ordering Provider     IP consult to case management  Once        Provider:  (Not yet assigned)    Acknowledged INNOCENT-ITDAVID, RASHAUN N.          Neuro  Alcohol-induced polyneuropathy  Peripheral polyneuropathy  Continue with gabapentin        Cardiac/Vascular  Primary hypertension  Hypotensive on presentation  Her SBP is 112-138 on no BP medications  Stop home HCTZ  Decrease home lisinopril from 30 mg to 10 mg  Close outpatient follow up    Renal/  * Hyponatremia  Hyponatremic to 118 presentation after 2 days stabilized at 120-130  Discontinue  HCTZ  Outpatient follow up          Oncology  Normocytic anemia  Hemoglobin:  11.2  MCV:  93  Likely anemia of chronic disease      GI  Transaminitis  Elevated AST:  101  ALT: 40  Likely due to excessive alcohol use.   Outpatient following up            Orthopedic  Weakness of both lower extremities  Likely in part due to Guillain-[place] and poor oral intake  Continue outpatient PT/OT  Outpatient follow up as scheduled      Other  Impaired functional mobility, balance, gait, and endurance  Patient recently diagnosed with Guillain-[place]  PT/OT      EtOH dependence  Patient reports significant alcohol use  Significantly elevated alcohol serum level presentation, the patient able to communicate well.  No evidence of withdrawel  Cessation counseling given.          Final Active Diagnoses:    Diagnosis Date Noted POA    PRINCIPAL PROBLEM:  Hyponatremia [E87.1] 06/17/2023 Yes     Chronic    Normocytic anemia [D64.9] 06/17/2023 Yes    Impaired functional mobility, balance, gait, and endurance [Z74.09] 05/30/2023 Yes    Peripheral  polyneuropathy [G62.9] 05/24/2023 Yes    Alcohol-induced polyneuropathy [G62.1] 05/23/2023 Yes    Weakness of both lower extremities [R29.898] 05/16/2023 Yes    Transaminitis [R74.01] 05/16/2023 Yes    Primary hypertension [I10] 05/16/2023 Yes    EtOH dependence [F10.20] 05/16/2023 Yes      Problems Resolved During this Admission:       Discharged Condition: stable    Disposition: Home or Self Care    Follow Up:   Follow-up Information     Haleigh Zacarias MD Follow up in 1 week(s).    Specialty: Family Medicine  Contact information:  [address]  [facility] [address]  [phone]                       Patient Instructions:      Diet Adult Regular     Notify your health care provider if you experience any of the following:  difficulty breathing or increased cough     Notify your health care provider if you experience any of the following:  increased confusion or weakness     Activity as tolerated     Physical Exam  Vitals reviewed.   Constitutional:       General: She is not in acute distress.     Appearance: Normal appearance. She is not ill-appearing.   HENT:      Head: Normocephalic and atraumatic.      Right Ear: There is no impacted cerumen.      Nose: No congestion or rhinorrhea.      Mouth/Throat:      Pharynx: No oropharyngeal exudate or posterior oropharyngeal erythema.   Eyes:      General:         Right eye: No discharge.         Left eye: No discharge.   Cardiovascular:      Rate and Rhythm: Normal rate.      Heart sounds:     No gallop.   Pulmonary:      Breath sounds: No wheezing or rales.   Abdominal:      General: There is no distension.      Tenderness: There is no abdominal tenderness.   Musculoskeletal:         General: No swelling or deformity.      Cervical back: No rigidity.   Lymphadenopathy:      Cervical: No cervical adenopathy.   Skin:     Coloration: Skin is not jaundiced.      Findings: No bruising.   Neurological:      General: No focal deficit present.      Mental  Status: She is alert.      Cranial Nerves: No cranial nerve deficit.      Motor: No weakness.   Psychiatric:         Mood and Affect: Mood normal.         Behavior: Behavior normal.     Significant Diagnostic Studies: Labs:   CMP   Recent Labs   Lab 06/17/23  1741 06/17/23  1919 06/18/23  2019 06/18/23  2359 06/19/23  0425   *   < > 129* 130* 129*   K 3.6   < > 4.3 4.3 4.2   CL 82*   < > 92* 92* 93*   CO2 22*   < > 30* 27 27   GLU 91   < > 106 103 95   BUN 10   < > 11 11 11   CREATININE 1.4   < > 0.8 0.7 0.7   CALCIUM 9.0   < > 9.3 9.1 9.0   PROT 6.5  --   --   --   --    ALBUMIN 3.7  --   --   --   --    BILITOT 0.3  --   --   --   --    ALKPHOS 124  --   --   --   --    *  --   --   --   --    ALT 40  --   --   --   --    ANIONGAP 14   < > 7* 11 9    < > = values in this interval not displayed.   , CBC   Recent Labs   Lab 06/17/23  1741 06/18/23  0342 06/19/23  0425   WBC 10.33 6.62 4.87   HGB 11.2* 10.6* 10.5*   HCT 31.1* 29.7* 30.5*    180 187    and All labs within the past 24 hours have been reviewed    Pending Diagnostic Studies:     None         Imaging Results          X-Ray Chest AP Portable (Final result)  Result time 06/17/23 18:12:00    Final result by Lam Corona DO (06/17/23 18:12:00)                 Impression:      No acute abnormality.      Electronically signed by: Lam Corona  Date:    06/17/2023  Time:    18:12             Narrative:    EXAMINATION:  XR CHEST AP PORTABLE    CLINICAL HISTORY:  Weakness;    TECHNIQUE:  Single frontal view of the chest was performed.    COMPARISON:  05/28/2021.    FINDINGS:  The lungs are well expanded and clear. No focal opacities are seen. The pleural spaces are clear.    The cardiac silhouette is unremarkable.    The visualized osseous structures are unremarkable.                                Medications:  Reconciled Home Medications:      Medication List      CHANGE how you take these medications    lisinopriL 10 MG tablet  Take 1  tablet (10 mg total) by mouth once daily.  What changed:   · medication strength  · how much to take        CONTINUE taking these medications    cyanocobalamin 1000 MCG tablet  Commonly known as: VITAMIN B-12  Take 1 tablet (1,000 mcg total) by mouth once daily.     diclofenac sodium 1 % Gel  Commonly known as: VOLTAREN  Apply 2 g topically 4 (four) times daily.     folic acid 1 MG tablet  Commonly known as: FOLVITE  Take 1 tablet (1 mg total) by mouth once daily.     gabapentin 400 MG capsule  Commonly known as: NEURONTIN  Take 2 capsules (800 mg total) by mouth 3 (three) times daily.     magnesium oxide 400 mg (241.3 mg magnesium) tablet  Commonly known as: MAG-OX  Take 400 mg by mouth once daily.     THERA-M 9 mg iron-400 mcg Tab tablet  Generic drug: multivit-iron-FA-calcium-mins  Take 1 tablet by mouth once daily.     traZODone 100 MG tablet  Commonly known as: DESYREL  Take 1 tablet (100 mg total) by mouth every evening.     vitamin B-1 500 MG tablet  Take 1 tablet (500 mg total) by mouth once daily.        STOP taking these medications    hydroCHLOROthiazide 25 MG tablet  Commonly known as: HYDRODIURIL     potassium chloride 10 MEQ Tbsr  Commonly known as: KLOR-CON            Indwelling Lines/Drains at time of discharge:   Lines/Drains/Airways     None                 Time spent on the discharge of patient: 45 minutes         Kendra Espana NP  Department of Hospital Medicine  [facility]

## 2023-06-19 NOTE — PROGRESS NOTES
[facility]                    Pharmacy       Discharge Medication Education    Patient ACCEPTED medication education. Pharmacy has provided education on the name, indication, and possible side effects of the medication(s) prescribed, using teach-back method.     The following medications have also been discussed, during this admission.        Medication List        CHANGE how you take these medications      lisinopriL 10 MG tablet  Take 1 tablet (10 mg total) by mouth once daily.  What changed:   medication strength  how much to take            CONTINUE taking these medications      cyanocobalamin 1000 MCG tablet  Commonly known as: VITAMIN B-12  Take 1 tablet (1,000 mcg total) by mouth once daily.     diclofenac sodium 1 % Gel  Commonly known as: VOLTAREN  Apply 2 g topically 4 (four) times daily.     folic acid 1 MG tablet  Commonly known as: FOLVITE  Take 1 tablet (1 mg total) by mouth once daily.     gabapentin 400 MG capsule  Commonly known as: NEURONTIN  Take 2 capsules (800 mg total) by mouth 3 (three) times daily.     magnesium oxide 400 mg (241.3 mg magnesium) tablet  Commonly known as: MAG-OX     THERA-M 9 mg iron-400 mcg Tab tablet  Generic drug: multivit-iron-FA-calcium-mins  Take 1 tablet by mouth once daily.     traZODone 100 MG tablet  Commonly known as: DESYREL  Take 1 tablet (100 mg total) by mouth every evening.     vitamin B-1 500 MG tablet  Take 1 tablet (500 mg total) by mouth once daily.            STOP taking these medications      hydroCHLOROthiazide 25 MG tablet  Commonly known as: HYDRODIURIL     potassium chloride 10 MEQ Tbsr  Commonly known as: KLOR-CON               Where to Get Your Medications        These medications were sent to [facility]  [address], [place] [address]      Hours: Mon-Fri, 8a-5:30p Phone: [phone]   cyanocobalamin 1000 MCG tablet  folic acid 1 MG tablet  lisinopriL 10 MG tablet  traZODone 100 MG tablet           Thank you  Shefali Thomas, PharmD  [phone]

## 2023-06-19 NOTE — ASSESSMENT & PLAN NOTE
Elevated AST:  101  ALT: 40  Likely due to excessive alcohol use.   Outpatient following up

## 2023-06-19 NOTE — ASSESSMENT & PLAN NOTE
Hypotensive on presentation  Her SBP is 112-138 on no BP medications  Stop home HCTZ  Decrease home lisinopril from 30 mg to 10 mg  Close outpatient follow up

## 2023-06-19 NOTE — ASSESSMENT & PLAN NOTE
Likely in part due to Guillain-[place] and poor oral intake  Continue outpatient PT/OT  Outpatient follow up as scheduled

## 2023-06-19 NOTE — PT/OT/SLP PROGRESS
Occupational Therapy      Patient Name:  Ember Rivera   MRN:  [id number]    Patient not seen today secondary to Other (Comment) (Pt declining therapy stating she is discharged & awaiting her ride home).    6/19/2023

## 2023-06-19 NOTE — PLAN OF CARE
AVS and educational attachments shared with patient via [employer]. Discussed thoroughly. Patient verbalized clear understanding using teach back method. Notified bedside nurse of completion of education. At present no distress noted. Patient to be discharged via w/c with escort service and family with all of patient's belonings. Will cont to be available to patient and family and intervene prn.

## 2023-06-19 NOTE — ASSESSMENT & PLAN NOTE
Hyponatremic to 118 presentation after 2 days stabilized at 120-130  Discontinue  HCTZ  Outpatient follow up

## 2023-06-19 NOTE — HOSPITAL COURSE
Her sodium corrected to 129-130 with IV Fluids.  She is now normotensive.  Home Blood Pressure Medications adjusted.  She is discharge to home.

## 2023-06-19 NOTE — PLAN OF CARE
CM met with pt - lives with  Des  [phone] and two teens   Pt's Godmother will pick her up at d/c   pt has a RW, SC and WC   - no hh prior to admit      Future Appointments   Date Time Provider Department Center   6/20/2023 11:15 AM Shantal Andries, PT [facility]   6/23/2023 11:00 AM Shantal Andries, PT [facility]   6/27/2023  8:45 AM Shantal Andries, PT [facility]   6/30/2023 11:00 AM Shantal Andries, PT [facility]   7/5/2023  8:00 AM Manjit Wynn PA-C [facility]   8/4/2023  7:30 AM Xiomara Tong MD [facility]     dx:  Weakness        06/19/23 1112   Discharge Assessment   Assessment Type Discharge Planning Assessment   Confirmed/corrected address, phone number and insurance Yes   Confirmed Demographics Correct on Facesheet   Source of Information patient;health record   Communicated YUN with patient/caregiver Yes   Reason For Admission weakness   People in Home spouse   Do you expect to return to your current living situation? Yes   Do you have help at home or someone to help you manage your care at home? Yes   Who are your caregiver(s) and their phone number(s)?  Des Parker  [phone]   Prior to hospitilization cognitive status: Alert/Oriented   Current cognitive status: Alert/Oriented   Walking or Climbing Stairs ambulation difficulty, requires equipment   Dressing/Bathing bathing difficulty, requires equipment   Equipment Currently Used at Home walker, rolling;shower chair;wheelchair   Patient currently being followed by outpatient case management? No   Do you currently have service(s) that help you manage your care at home? No   Do you take prescription medications? Yes  ([facility] [address])   Do you have any problems affording any of your prescribed medications? No   Is the patient taking medications as prescribed? yes   Who is going to help you get home at discharge? Pt's Godmother   How do you get to  doctors appointments? car, drives self   Are you on dialysis? No   Do you take coumadin? No   Discharge Plan A Home;Home with family   DME Needed Upon Discharge  none   Discharge Plan discussed with: Patient   Transition of Care Barriers None   Financial Resource Strain   How hard is it for you to pay for the very basics like food, housing, medical care, and heating? Not very   Housing Stability   In the last 12 months, was there a time when you were not able to pay the mortgage or rent on time? N   In the last 12 months, was there a time when you did not have a steady place to sleep or slept in a shelter (including now)? N   Transportation Needs   In the past 12 months, has lack of transportation kept you from medical appointments or from getting medications? no   In the past 12 months, has lack of transportation kept you from meetings, work, or from getting things needed for daily living? No   Food Insecurity   Within the past 12 months, you worried that your food would run out before you got the money to buy more. Never true   Within the past 12 months, the food you bought just didn't last and you didn't have money to get more. Never true   Stress   Do you feel stress - tense, restless, nervous, or anxious, or unable to sleep at night because your mind is troubled all the time - these days? Not at all   Social Connections   In a typical week, how many times do you talk on the phone with family, friends, or neighbors? More than 3   How often do you get together with friends or relatives? More than 3   How often do you attend Restoration or Alevism services? Never   Do you belong to any clubs or organizations such as Restoration groups, unions, fraternal or athletic groups, or school groups? No   How often do you attend meetings of the clubs or organizations you belong to? Never   Are you , , , , never , or living with a partner?    OTHER   Name(s) of People in Home   Des and 2 teens

## 2023-06-19 NOTE — PLAN OF CARE
Pt for d/c to home today - family will provide transportation to home   Pt is on a wait list for a sooner f/u apt with [facility]       Future Appointments   Date Time Provider Department Center   6/20/2023 11:15 AM Shantal Colby, PT [facility]   6/23/2023 11:00 AM Shantal Colby, PT [facility]   6/27/2023  8:45 AM Shantal Colby, PT [facility]   6/30/2023 11:00 AM Shantal oClby, PT [facility]   7/5/2023  8:00 AM [facility]   8/4/2023  7:30 AM Xiomara Tong MD [facility]     no dme, no hh ordered.     Discharging nurse to review all d/c meds/instructions         06/19/23 1117   Final Note   Assessment Type Final Discharge Note   Anticipated Discharge Disposition Home   What phone number can be called within the next 1-3 days to see how you are doing after discharge? [phone]   Hospital Resources/Appts/Education Provided Appointments scheduled and added to AVS   Post-Acute Status   Discharge Delays None known at this time

## 2023-06-19 NOTE — PT/OT/SLP PROGRESS
Physical Therapy      Patient Name:  Ember Rivera   MRN:  [id number]    Patient not seen today secondary to Patient unwilling to participate (Patient is waiting for her ride to go home; she has a neurologist appt this friday to have nerve conduction studies; she wants answers so that she can have the nerve pain treated in her legs so that she can participate with therapy.).   6/19/2023

## 2023-06-19 NOTE — PLAN OF CARE
VN note: VN completed AVS and attachments and notified bedside nurse, Gabrielle BEAVERS Will cont to be available and intervene prn.

## 2023-06-20 ENCOUNTER — PATIENT OUTREACH (OUTPATIENT)
Dept: ADMINISTRATIVE | Facility: CLINIC | Age: 53
End: 2023-06-20
Payer: MEDICAID

## 2023-06-20 NOTE — PROGRESS NOTES
C3 nurse spoke with Ember Rivera  for a TCC post hospital discharge follow up call. The patient has a scheduled HOSFU appointment with Manjit Wynn on 7/5/23 @ 0800.

## 2023-06-27 ENCOUNTER — TELEPHONE (OUTPATIENT)
Dept: FAMILY MEDICINE | Facility: HOSPITAL | Age: 53
End: 2023-06-27
Payer: MEDICAID

## 2023-06-27 NOTE — TELEPHONE ENCOUNTER
----- Message from Clare Osuna sent at 6/27/2023  9:23 AM CDT -----  Regarding: referral  Pt called to see if the Dr can send in a referral for her a neurologist .. call back # [phone]

## 2023-07-05 ENCOUNTER — OFFICE VISIT (OUTPATIENT)
Dept: FAMILY MEDICINE | Facility: HOSPITAL | Age: 53
End: 2023-07-05
Payer: MEDICAID

## 2023-07-05 VITALS
BODY MASS INDEX: 24.59 KG/M2 | SYSTOLIC BLOOD PRESSURE: 129 MMHG | HEART RATE: 95 BPM | WEIGHT: 153 LBS | HEIGHT: 66 IN | DIASTOLIC BLOOD PRESSURE: 87 MMHG

## 2023-07-05 DIAGNOSIS — F32.A ANXIETY AND DEPRESSION: ICD-10-CM

## 2023-07-05 DIAGNOSIS — F10.90 ALCOHOL USE DISORDER: ICD-10-CM

## 2023-07-05 DIAGNOSIS — F41.9 ANXIETY AND DEPRESSION: ICD-10-CM

## 2023-07-05 DIAGNOSIS — G62.1 ALCOHOL-INDUCED POLYNEUROPATHY: Primary | ICD-10-CM

## 2023-07-05 DIAGNOSIS — E61.1 IRON DEFICIENCY: ICD-10-CM

## 2023-07-05 PROCEDURE — 99214 OFFICE O/P EST MOD 30 MIN: CPT | Performed by: STUDENT IN AN ORGANIZED HEALTH CARE EDUCATION/TRAINING PROGRAM

## 2023-07-05 RX ORDER — ESCITALOPRAM OXALATE 10 MG/1
10 TABLET ORAL DAILY
Qty: 90 TABLET | Refills: 1 | Status: SHIPPED | OUTPATIENT
Start: 2023-07-05 | End: 2024-01-31

## 2023-07-05 RX ORDER — PREGABALIN 100 MG/1
100 CAPSULE ORAL 2 TIMES DAILY
Qty: 180 CAPSULE | Refills: 1 | Status: SHIPPED | OUTPATIENT
Start: 2023-07-05 | End: 2023-08-04 | Stop reason: SDUPTHER

## 2023-07-05 RX ORDER — IRON,CARBONYL 45 MG
45 TABLET ORAL DAILY
Qty: 90 EACH | Refills: 2 | Status: SHIPPED | OUTPATIENT
Start: 2023-07-05 | End: 2023-09-14

## 2023-07-05 RX ORDER — NALTREXONE HYDROCHLORIDE 50 MG/1
50 TABLET, FILM COATED ORAL DAILY
Qty: 30 TABLET | Refills: 0 | Status: SHIPPED | OUTPATIENT
Start: 2023-07-05 | End: 2023-07-10

## 2023-07-05 NOTE — PROGRESS NOTES
Clinic Note  [facility]    Subjective:      Ember Rivera is a 53 y.o. female with PMHx alcohol use disorder, alcohol-induced neuropathy. Recent hospitalizations for concern for ascending neuropathy (CSF reassuring against demyelinating process), hyponatremia - recommendation for outpatient nerve conduction studies.    Today in clinic, patient complaining of severe LE neuropathy and weakness, hand tingling as well. Patient currently taking 400 mg gabapentin TID (was recommended 600 TID while inpatient) - still drinking 4-5 drinks per day. Patient at this time states she is amenable to naltrexone therapy, antidepressant.    Review of Systems   Constitutional:  Negative for chills and fever.   HENT:  Negative for congestion and sore throat.    Respiratory:  Negative for cough.    Cardiovascular:  Negative for chest pain and palpitations.   Gastrointestinal:  Negative for abdominal pain, diarrhea, nausea and vomiting.   Genitourinary:  Negative for dysuria.   Musculoskeletal:  Negative for joint pain and myalgias.   Neurological:  Positive for tingling, sensory change and weakness. Negative for dizziness and headaches.   Psychiatric/Behavioral:  Negative for depression. The patient is not nervous/anxious.         Objective:      Vitals:    07/05/23 0840   BP: 129/87   Pulse: 95     Body mass index is 24.69 kg/m².      Physical Exam  Constitutional:       Appearance: Normal appearance. She is well-developed.   Cardiovascular:      Rate and Rhythm: Normal rate and regular rhythm.      Pulses: Normal pulses.   Pulmonary:      Effort: Pulmonary effort is normal.      Breath sounds: Normal breath sounds.   Abdominal:      General: Abdomen is flat. Bowel sounds are normal.      Palpations: Abdomen is soft.   Musculoskeletal:         General: Normal range of motion.      Cervical back: Normal range of motion.   Skin:     General: Skin is warm and dry.      Capillary Refill: Capillary refill takes less than 2  seconds.      Coloration: Skin is not pale.   Neurological:      General: No focal deficit present.      Mental Status: She is alert and oriented to person, place, and time.      Sensory: Sensory deficit present.      Motor: Weakness present.   Psychiatric:         Mood and Affect: Mood normal.         Behavior: Behavior normal.          Assessment/Plan:      Chart review with reassuring CSF studies (patient concerned about GBS). Patient understands neuropathy improvement depends on alcohol cessation - at this time patient is not entirely committed to this, but states she will try naltrexone. Patient not maxxed on gabapentin but in light of what appears to be minimal benefit will try switching to gabapentin - believe addition of SSRI/SNRI could also be beneficial for patient's coping with pain. Not interested in therapy at this time. Patient with neurology follow scheduled next month. EMG ordered.      1. Alcohol-induced polyneuropathy    - naltrexone (DEPADE) 50 mg tablet; Take 50 mg by mouth once daily.  Dispense: 30 tablet; Refill: 0  - pregabalin (LYRICA) 100 MG capsule; Take 1 capsule (100 mg total) by mouth 2 (two) times daily.  Dispense: 180 capsule; Refill: 1  - EMG W/ ULTRASOUND AND NERVE CONDUCTION TEST 4 Extremities; Future    2. Alcohol use disorder    - naltrexone (DEPADE) 50 mg tablet; Take 50 mg by mouth once daily.  Dispense: 30 tablet; Refill: 0    3. Anxiety and depression    - EScitalopram oxalate (LEXAPRO) 10 MG tablet; Take 1 tablet (10 mg total) by mouth once daily.  Dispense: 90 tablet; Refill: 1    4. Iron deficiency    - iron, carbonyl (FEOSOL) 45 mg Tab; Take 45 mg by mouth once daily.  Dispense: 90 each; Refill: 2      Patient discussed with attending physician, Dr. Magnolia Neil MD  [facility] PGY-3  07/05/2023      The following information is provided to all patients.  This information is to help you find resources for any of the problems found today that may be  affecting your health:                Living healthy guide: www.[facility].[place].[facility]       Understanding Diabetes: www.diabetes.org       Eating healthy: www.cdc.gov/healthyweight      CDC home safety checklist: www.cdc.gov/steadi/patient.html      Agency on Aging: www.goea.[place].[facility]       Alcoholics anonymous (AA): www.aa.org      Physical Activity: www.rashad.nih.gov/mi4uzll       Tobacco use: www.quitwithusla.org

## 2023-07-06 NOTE — PROGRESS NOTES
I assume primary medical responsibility for this patient. I have reviewed the history, physical, and assessement & treatment plan with the resident and agree that the care is reasonable and necessary. This service has been performed by a resident without the presence of a teaching physician under the primary care exception. If necessary, an addendum of additional findings or evaluation beyond the resident documentation will be noted below.     Prachi Merida MD

## 2023-07-10 RX ORDER — NALTREXONE HYDROCHLORIDE 50 MG/1
50 TABLET, FILM COATED ORAL DAILY
Qty: 90 TABLET | Refills: 0 | Status: ON HOLD | OUTPATIENT
Start: 2023-07-10 | End: 2023-07-28 | Stop reason: SDUPTHER

## 2023-07-12 ENCOUNTER — DOCUMENTATION ONLY (OUTPATIENT)
Dept: REHABILITATION | Facility: HOSPITAL | Age: 53
End: 2023-07-12
Payer: MEDICAID

## 2023-07-12 NOTE — PROGRESS NOTES
OUTPATIENT PHYSICAL THERAPY DISCHARGE SUMMARY     Name: Ember Rivera  [facility] Number: [id number]    Diagnosis: No diagnosis found.  Physician: No ref. provider found  Treatment Orders: PT Eval and Treat  Past Medical History:   Diagnosis Date    GERD (gastroesophageal reflux disease)     Guillain-Stanfield syndrome     Hypertension        Initial visit: 5/30/2023  Date of Last visit: 6/13/2023  Date of Discharge Note:  7/12/2023  Total Visits Received: 4  Missed Visits: 4  ASSESSMENT   Status Towards Goals Met:   The patient did not make progress to goals due to no reassessment being performed. After 3 neuro PT sessions the patient was admitted to the hospital and seen by therapy services in the hospital. The patient will need a new referral and evaluation to continue with outpatient therapy.       Discharge reason : Hospital admission    PLAN   This patient is discharged from Outpatient Physical Therapy Services.     Shantal Colby, PT  07/12/2023

## 2023-07-27 ENCOUNTER — OFFICE VISIT (OUTPATIENT)
Dept: FAMILY MEDICINE | Facility: HOSPITAL | Age: 53
DRG: 641 | End: 2023-07-27
Payer: MEDICAID

## 2023-07-27 ENCOUNTER — HOSPITAL ENCOUNTER (INPATIENT)
Facility: HOSPITAL | Age: 53
LOS: 1 days | Discharge: HOME | DRG: 641 | End: 2023-07-28
Attending: EMERGENCY MEDICINE | Admitting: STUDENT IN AN ORGANIZED HEALTH CARE EDUCATION/TRAINING PROGRAM
Payer: MEDICAID

## 2023-07-27 VITALS
HEART RATE: 92 BPM | DIASTOLIC BLOOD PRESSURE: 50 MMHG | HEIGHT: 66 IN | WEIGHT: 158.5 LBS | BODY MASS INDEX: 25.47 KG/M2 | SYSTOLIC BLOOD PRESSURE: 85 MMHG

## 2023-07-27 DIAGNOSIS — F10.90 ALCOHOL USE DISORDER: ICD-10-CM

## 2023-07-27 DIAGNOSIS — E87.6 HYPOKALEMIA: ICD-10-CM

## 2023-07-27 DIAGNOSIS — K76.0 HEPATIC STEATOSIS: ICD-10-CM

## 2023-07-27 DIAGNOSIS — E87.29 HIGH ANION GAP METABOLIC ACIDOSIS: Primary | ICD-10-CM

## 2023-07-27 DIAGNOSIS — E87.6 HYPOKALEMIA: Primary | ICD-10-CM

## 2023-07-27 DIAGNOSIS — D64.9 NORMOCYTIC ANEMIA: ICD-10-CM

## 2023-07-27 DIAGNOSIS — G62.1 ALCOHOL-INDUCED POLYNEUROPATHY: Primary | ICD-10-CM

## 2023-07-27 DIAGNOSIS — I95.9 HYPOTENSION, UNSPECIFIED HYPOTENSION TYPE: ICD-10-CM

## 2023-07-27 DIAGNOSIS — G62.1 ALCOHOL-INDUCED POLYNEUROPATHY: ICD-10-CM

## 2023-07-27 PROBLEM — E87.20 ACIDOSIS: Status: ACTIVE | Noted: 2023-07-27

## 2023-07-27 LAB
ALLENS TEST: ABNORMAL
AMPHET+METHAMPHET UR QL: NEGATIVE
ANION GAP SERPL CALC-SCNC: 20 MMOL/L (ref 8–16)
B-HCG UR QL: NEGATIVE
BACTERIA #/AREA URNS HPF: ABNORMAL /HPF
BARBITURATES UR QL SCN>200 NG/ML: NEGATIVE
BENZODIAZ UR QL SCN>200 NG/ML: NEGATIVE
BILIRUB UR QL STRIP: NEGATIVE
BUN SERPL-MCNC: 5 MG/DL (ref 6–20)
BZE UR QL SCN: NEGATIVE
CALCIUM SERPL-MCNC: 8.3 MG/DL (ref 8.7–10.5)
CANNABINOIDS UR QL SCN: NEGATIVE
CHLORIDE SERPL-SCNC: 93 MMOL/L (ref 95–110)
CLARITY UR: ABNORMAL
CO2 SERPL-SCNC: 16 MMOL/L (ref 23–29)
COLOR UR: YELLOW
CREAT SERPL-MCNC: 0.7 MG/DL (ref 0.5–1.4)
CREAT UR-MCNC: 66.7 MG/DL (ref 15–325)
CTP QC/QA: YES
CTP QC/QA: YES
DELSYS: ABNORMAL
EST. GFR  (NO RACE VARIABLE): >60 ML/MIN/1.73 M^2
ETHANOL SERPL-MCNC: 109 MG/DL
GLUCOSE SERPL-MCNC: 121 MG/DL (ref 70–110)
GLUCOSE UR QL STRIP: NEGATIVE
HCO3 UR-SCNC: 22.2 MMOL/L (ref 24–28)
HGB UR QL STRIP: NEGATIVE
HYALINE CASTS #/AREA URNS LPF: 14 /LPF
KETONES UR QL STRIP: NEGATIVE
LACTATE SERPL-SCNC: 4.7 MMOL/L (ref 0.5–2.2)
LEUKOCYTE ESTERASE UR QL STRIP: ABNORMAL
MAGNESIUM SERPL-MCNC: 1.5 MG/DL (ref 1.6–2.6)
METHADONE UR QL SCN>300 NG/ML: NEGATIVE
MICROSCOPIC COMMENT: ABNORMAL
MODE: ABNORMAL
NITRITE UR QL STRIP: NEGATIVE
OPIATES UR QL SCN: NEGATIVE
PCO2 BLDA: 36.5 MMHG (ref 35–45)
PCP UR QL SCN>25 NG/ML: NEGATIVE
PH SMN: 7.39 [PH] (ref 7.35–7.45)
PH UR STRIP: 5 [PH] (ref 5–8)
PO2 BLDA: 47 MMHG (ref 40–60)
POC BE: -3 MMOL/L
POC SATURATED O2: 82 % (ref 95–100)
POC TCO2: 23 MMOL/L (ref 24–29)
POTASSIUM SERPL-SCNC: 2.4 MMOL/L (ref 3.5–5.1)
PROT UR QL STRIP: NEGATIVE
RBC #/AREA URNS HPF: 5 /HPF (ref 0–4)
SAMPLE: ABNORMAL
SARS-COV-2 RDRP RESP QL NAA+PROBE: NEGATIVE
SITE: ABNORMAL
SODIUM SERPL-SCNC: 129 MMOL/L (ref 136–145)
SP GR UR STRIP: 1 (ref 1–1.03)
SP02: 98
SQUAMOUS #/AREA URNS HPF: 6 /HPF
TOXICOLOGY INFORMATION: NORMAL
URN SPEC COLLECT METH UR: ABNORMAL
UROBILINOGEN UR STRIP-ACNC: NEGATIVE EU/DL
WBC #/AREA URNS HPF: 6 /HPF (ref 0–5)

## 2023-07-27 PROCEDURE — 25000003 PHARM REV CODE 250: Performed by: EMERGENCY MEDICINE

## 2023-07-27 PROCEDURE — 81000 URINALYSIS NONAUTO W/SCOPE: CPT | Mod: 59 | Performed by: NURSE PRACTITIONER

## 2023-07-27 PROCEDURE — 82803 BLOOD GASES ANY COMBINATION: CPT

## 2023-07-27 PROCEDURE — 80048 BASIC METABOLIC PNL TOTAL CA: CPT | Mod: XB

## 2023-07-27 PROCEDURE — 83605 ASSAY OF LACTIC ACID: CPT

## 2023-07-27 PROCEDURE — G0378 HOSPITAL OBSERVATION PER HR: HCPCS

## 2023-07-27 PROCEDURE — 82728 ASSAY OF FERRITIN: CPT

## 2023-07-27 PROCEDURE — 81025 URINE PREGNANCY TEST: CPT | Performed by: NURSE PRACTITIONER

## 2023-07-27 PROCEDURE — 83735 ASSAY OF MAGNESIUM: CPT | Performed by: NURSE PRACTITIONER

## 2023-07-27 PROCEDURE — 84466 ASSAY OF TRANSFERRIN: CPT

## 2023-07-27 PROCEDURE — 11000001 HC ACUTE MED/SURG PRIVATE ROOM

## 2023-07-27 PROCEDURE — 93010 EKG 12-LEAD: ICD-10-PCS | Mod: ,,, | Performed by: INTERNAL MEDICINE

## 2023-07-27 PROCEDURE — 87635 SARS-COV-2 COVID-19 AMP PRB: CPT | Performed by: EMERGENCY MEDICINE

## 2023-07-27 PROCEDURE — 63600175 PHARM REV CODE 636 W HCPCS: Performed by: EMERGENCY MEDICINE

## 2023-07-27 PROCEDURE — 80307 DRUG TEST PRSMV CHEM ANLYZR: CPT

## 2023-07-27 PROCEDURE — 63600175 PHARM REV CODE 636 W HCPCS

## 2023-07-27 PROCEDURE — 93005 ELECTROCARDIOGRAM TRACING: CPT

## 2023-07-27 PROCEDURE — 99900035 HC TECH TIME PER 15 MIN (STAT)

## 2023-07-27 PROCEDURE — 25000003 PHARM REV CODE 250

## 2023-07-27 PROCEDURE — 93010 ELECTROCARDIOGRAM REPORT: CPT | Mod: ,,, | Performed by: INTERNAL MEDICINE

## 2023-07-27 PROCEDURE — 99213 OFFICE O/P EST LOW 20 MIN: CPT | Mod: 25 | Performed by: STUDENT IN AN ORGANIZED HEALTH CARE EDUCATION/TRAINING PROGRAM

## 2023-07-27 PROCEDURE — 96361 HYDRATE IV INFUSION ADD-ON: CPT

## 2023-07-27 PROCEDURE — 99285 EMERGENCY DEPT VISIT HI MDM: CPT | Mod: 25,27

## 2023-07-27 PROCEDURE — 82077 ASSAY SPEC XCP UR&BREATH IA: CPT

## 2023-07-27 PROCEDURE — 96374 THER/PROPH/DIAG INJ IV PUSH: CPT

## 2023-07-27 RX ORDER — IBUPROFEN 200 MG
16 TABLET ORAL
Status: DISCONTINUED | OUTPATIENT
Start: 2023-07-27 | End: 2023-07-29 | Stop reason: HOSPADM

## 2023-07-27 RX ORDER — TALC
9 POWDER (GRAM) TOPICAL NIGHTLY PRN
Status: DISCONTINUED | OUTPATIENT
Start: 2023-07-27 | End: 2023-07-29 | Stop reason: HOSPADM

## 2023-07-27 RX ORDER — LIDOCAINE 50 MG/G
1 PATCH TOPICAL DAILY PRN
Status: DISCONTINUED | OUTPATIENT
Start: 2023-07-27 | End: 2023-07-29 | Stop reason: HOSPADM

## 2023-07-27 RX ORDER — IBUPROFEN 200 MG
24 TABLET ORAL
Status: DISCONTINUED | OUTPATIENT
Start: 2023-07-27 | End: 2023-07-29 | Stop reason: HOSPADM

## 2023-07-27 RX ORDER — TRAZODONE HYDROCHLORIDE 100 MG/1
100 TABLET ORAL NIGHTLY
Status: DISCONTINUED | OUTPATIENT
Start: 2023-07-27 | End: 2023-07-29 | Stop reason: HOSPADM

## 2023-07-27 RX ORDER — IBUPROFEN 600 MG/1
600 TABLET ORAL EVERY 6 HOURS PRN
Status: DISCONTINUED | OUTPATIENT
Start: 2023-07-27 | End: 2023-07-29 | Stop reason: HOSPADM

## 2023-07-27 RX ORDER — POTASSIUM CHLORIDE 750 MG/1
50 TABLET, EXTENDED RELEASE ORAL ONCE
Status: COMPLETED | OUTPATIENT
Start: 2023-07-27 | End: 2023-07-27

## 2023-07-27 RX ORDER — SODIUM CHLORIDE, SODIUM LACTATE, POTASSIUM CHLORIDE, CALCIUM CHLORIDE 600; 310; 30; 20 MG/100ML; MG/100ML; MG/100ML; MG/100ML
INJECTION, SOLUTION INTRAVENOUS CONTINUOUS
Status: ACTIVE | OUTPATIENT
Start: 2023-07-27 | End: 2023-07-28

## 2023-07-27 RX ORDER — MAGNESIUM SULFATE HEPTAHYDRATE 40 MG/ML
2 INJECTION, SOLUTION INTRAVENOUS ONCE
Status: COMPLETED | OUTPATIENT
Start: 2023-07-27 | End: 2023-07-27

## 2023-07-27 RX ORDER — PREGABALIN 50 MG/1
100 CAPSULE ORAL 2 TIMES DAILY
Status: DISCONTINUED | OUTPATIENT
Start: 2023-07-27 | End: 2023-07-29 | Stop reason: HOSPADM

## 2023-07-27 RX ORDER — LANOLIN ALCOHOL/MO/W.PET/CERES
1000 CREAM (GRAM) TOPICAL DAILY
Status: DISCONTINUED | OUTPATIENT
Start: 2023-07-28 | End: 2023-07-29 | Stop reason: HOSPADM

## 2023-07-27 RX ORDER — GLUCAGON 1 MG
1 KIT INJECTION
Status: DISCONTINUED | OUTPATIENT
Start: 2023-07-27 | End: 2023-07-29 | Stop reason: HOSPADM

## 2023-07-27 RX ORDER — LANOLIN ALCOHOL/MO/W.PET/CERES
1 CREAM (GRAM) TOPICAL DAILY
Status: DISCONTINUED | OUTPATIENT
Start: 2023-07-27 | End: 2023-07-27

## 2023-07-27 RX ORDER — LORAZEPAM 2 MG/ML
2 INJECTION INTRAMUSCULAR EVERY 10 MIN PRN
Status: DISCONTINUED | OUTPATIENT
Start: 2023-07-27 | End: 2023-07-29 | Stop reason: HOSPADM

## 2023-07-27 RX ORDER — FOLIC ACID 1 MG/1
1 TABLET ORAL DAILY
Status: DISCONTINUED | OUTPATIENT
Start: 2023-07-28 | End: 2023-07-29 | Stop reason: HOSPADM

## 2023-07-27 RX ORDER — ESCITALOPRAM OXALATE 10 MG/1
10 TABLET ORAL DAILY
Status: DISCONTINUED | OUTPATIENT
Start: 2023-07-28 | End: 2023-07-29 | Stop reason: HOSPADM

## 2023-07-27 RX ORDER — ONDANSETRON 2 MG/ML
4 INJECTION INTRAMUSCULAR; INTRAVENOUS EVERY 8 HOURS PRN
Status: DISCONTINUED | OUTPATIENT
Start: 2023-07-27 | End: 2023-07-29 | Stop reason: HOSPADM

## 2023-07-27 RX ORDER — ACETAMINOPHEN 325 MG/1
650 TABLET ORAL EVERY 8 HOURS PRN
Status: DISCONTINUED | OUTPATIENT
Start: 2023-07-27 | End: 2023-07-29 | Stop reason: HOSPADM

## 2023-07-27 RX ORDER — SODIUM CHLORIDE 0.9 % (FLUSH) 0.9 %
5 SYRINGE (ML) INJECTION
Status: DISCONTINUED | OUTPATIENT
Start: 2023-07-27 | End: 2023-07-29 | Stop reason: HOSPADM

## 2023-07-27 RX ORDER — NALTREXONE HYDROCHLORIDE 50 MG/1
50 TABLET, FILM COATED ORAL DAILY
Status: DISCONTINUED | OUTPATIENT
Start: 2023-07-28 | End: 2023-07-28

## 2023-07-27 RX ORDER — AMOXICILLIN 250 MG
1 CAPSULE ORAL 2 TIMES DAILY PRN
Status: DISCONTINUED | OUTPATIENT
Start: 2023-07-27 | End: 2023-07-29 | Stop reason: HOSPADM

## 2023-07-27 RX ORDER — THIAMINE HCL 100 MG
500 TABLET ORAL DAILY
Status: DISCONTINUED | OUTPATIENT
Start: 2023-07-27 | End: 2023-07-29 | Stop reason: HOSPADM

## 2023-07-27 RX ADMIN — PREGABALIN 100 MG: 50 CAPSULE ORAL at 10:07

## 2023-07-27 RX ADMIN — SODIUM CHLORIDE, POTASSIUM CHLORIDE, SODIUM LACTATE AND CALCIUM CHLORIDE: 600; 310; 30; 20 INJECTION, SOLUTION INTRAVENOUS at 10:07

## 2023-07-27 RX ADMIN — SODIUM CHLORIDE, POTASSIUM CHLORIDE, SODIUM LACTATE AND CALCIUM CHLORIDE 1000 ML: 600; 310; 30; 20 INJECTION, SOLUTION INTRAVENOUS at 05:07

## 2023-07-27 RX ADMIN — POTASSIUM CHLORIDE 50 MEQ: 750 TABLET, EXTENDED RELEASE ORAL at 05:07

## 2023-07-27 RX ADMIN — Medication 500 MG: at 08:07

## 2023-07-27 RX ADMIN — TRAZODONE HYDROCHLORIDE 100 MG: 100 TABLET ORAL at 10:07

## 2023-07-27 RX ADMIN — MAGNESIUM SULFATE 2 G: 2 INJECTION INTRAVENOUS at 08:07
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
## 2023-07-27 NOTE — HPI
54 y/o F with PMHx alcohol use disorder, alcohol-induced neuropathy, hepatic steatosis, chronic paresthesias to hands and feet.  Presents after notification about routine lab results with K+ 2.7, Anion Gap 21 and weakness for 2-3 days with no other symptoms including fevers, cough, n/v, diarrhea. Patient's last drink was yesterday. She typically has 8 drinks per day. She has never had withdrawal symptoms. She smokes 1/4 pack per day, denies other substances.    In Emergency, 86/56, 81 HR, 98.2F, SpO2 97% on RA. Labs were significant for Na+ 130, K+ 2.7, CO2 15, AST//74, Anion Gap 21, CBC wnl, INR 1.1, COVID-, no imaging was done. She was treated with supplemental K+ and was admitted to [facility] for evaluation of acidosis and hypokalemia.

## 2023-07-27 NOTE — PROGRESS NOTES
"Clinic Note  [facility]    Subjective:      Ember Rivera is a 53 y.o. female with PMHx alcohol use disorder, alcohol-induced neuropathy, hepatic steatosis. Patient here for neuropathy and alcohol use follow up.     Neuropathy - concern initially for GBS due to extensiveness of LE disease with reassuring workup. Patient nonetheless remains dependent on walker for ambulation. Gabapentin switched at last appointment to lyrica, which patient says is "fabulous" - now on 100 mg BID. Has EMG coming up and neurology appointment next week.    Alcohol use - picked up naltrexone prescribed to her at last visit. Initially did not tolerate full 50 mg dose, but has since titrated herself up to 25 mg and notices she is not drinking as much (but still drinking).    Chronic anemia - normocytic, patient on iron. Asymptomatic.    Review of Systems   Constitutional:  Negative for chills and fever.   HENT:  Negative for congestion and sore throat.    Respiratory:  Negative for cough.    Cardiovascular:  Negative for chest pain and palpitations.   Gastrointestinal:  Negative for abdominal pain, diarrhea, nausea and vomiting.   Genitourinary:  Negative for dysuria.   Musculoskeletal:  Negative for joint pain and myalgias.   Neurological:  Positive for tingling, sensory change and weakness. Negative for dizziness and headaches.   Psychiatric/Behavioral:  Negative for depression. The patient is not nervous/anxious.         Objective:      Vitals:    07/27/23 1006   BP: (!) 85/50   Pulse: 92     Body mass index is 25.58 kg/m².      Physical Exam  Constitutional:       Appearance: Normal appearance. She is well-developed.      Comments: Patient ambulates with walker   Cardiovascular:      Rate and Rhythm: Normal rate and regular rhythm.      Pulses: Normal pulses.   Pulmonary:      Effort: Pulmonary effort is normal.      Breath sounds: Normal breath sounds.   Abdominal:      General: Abdomen is flat. Bowel sounds are normal.      " Palpations: Abdomen is soft.   Musculoskeletal:         General: Normal range of motion.      Cervical back: Normal range of motion.   Skin:     General: Skin is warm and dry.      Capillary Refill: Capillary refill takes less than 2 seconds.      Coloration: Skin is not pale.   Neurological:      General: No focal deficit present.      Mental Status: She is alert and oriented to person, place, and time.      Sensory: Sensory deficit present.      Motor: Weakness present.      Coordination: Coordination abnormal.      Gait: Gait abnormal.   Psychiatric:         Mood and Affect: Mood normal.         Behavior: Behavior normal.          Assessment/Plan:      Patient doing well since starting naltrexone therapy, switch from gabapentin to lyrica. Follow up CMP and also will get PT/INR for liver function. Will get repeat CBC to assess response to iron for anemia as well as B12/folate labs. Return to clinic for follow up in 6 weeks.    1. Alcohol-induced polyneuropathy    - Comprehensive Metabolic Panel; Future    2. Alcohol use disorder    - Comprehensive Metabolic Panel; Future  - Protime-INR; Future    3. Hepatic steatosis    - Comprehensive Metabolic Panel; Future  - Protime-INR; Future    4. Normocytic anemia    - Vitamin B12; Future  - Folate; Future  - CBC Auto Differential; Future  - METHYLMALONIC ACID, SERUM; Future        Patient discussed with attending physician, Dr. Magnolia Neil MD  [facility] PGY-3  07/27/2023      The following information is provided to all patients.  This information is to help you find resources for any of the problems found today that may be affecting your health:                Living healthy guide: www.[facility].[place].gov       Understanding Diabetes: www.diabetes.org       Eating healthy: www.cdc.gov/healthyweight      CDC home safety checklist: www.cdc.gov/steadi/patient.html      Agency on Aging: www.goea.[place].gov       Alcoholics anonymous (AA): www.aa.org       Physical Activity: www.rashad.nih.gov/ef0dtmi       Tobacco use: www.quitwithusla.org
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
## 2023-07-27 NOTE — ED NOTES
Pt presents to ED for hypotension, was sent for evaluation by PCP. Pt started Lisinopril 1yr ago at 30mg, was cut down to 10mg; lost 40lbs within the passed year. Pt endorses lightheaded and dizziness x 3d.

## 2023-07-27 NOTE — ASSESSMENT & PLAN NOTE
Acidosis, AG 21  Possible alcohol ketoacidosis or iron toxicity (Hgb currently normal)    PLAN:  VBG  Iron studies  Hold Iron

## 2023-07-27 NOTE — PROGRESS NOTES
[facility]           Pharmacy        Current Drug Shortage     Due to national backorder and [facility] is critically low on inventory of Dextrose 50% (D50) Syringes and Vials, pharmacy has automatically switched from D50% to D10% IVPB at the equivalent dose until resolution of the shortage per P&T approved protocol.               Alissa Bonner, PharmD  [phone]

## 2023-07-27 NOTE — PROGRESS NOTES
Called patient regarding her critical potassium of 2.7 discovered on CMP follow up for naltrexone therapy for her alcohol use disorder. Patient asymptomatic, stated in appointment that since starting on lyrica, feeling much better than when on gabapentin. Incidentally noted to be hypotensive in clinic 85/50, however asymptomatic, which patient continued to affirm on our phone call just now. Patient states she will present to ED.    Young Neil MD  [facility] PGY-3

## 2023-07-27 NOTE — ASSESSMENT & PLAN NOTE
K+ on admit 2.7 from outside lab  Asymptomatic  Repleated w/50mEq in ER  No significant EKG changes    PLAN:  BMP at 2030 - replete PRN  EKG if arrhythmia suspected

## 2023-07-27 NOTE — PHARMACY MED REC
"  [facility]           Pharmacy  Admission Medication History     The home medication history was taken by Lila Sharpe.      Medication history obtained from Medications listed below were obtained from: Patient/family    Based on information gathered for medication list, you may go to "Admission" then "Reconcile Home Medications" tabs to review and/or act upon those items.     The home medication list has been updated by the Pharmacy department.   Please read ALL comments highlighted in yellow.   Please address this information as you see fit.    Feel free to contact us if you have any questions or require assistance.    The medications listed below were removed from the home medication list.  Please reorder if appropriate:    Patient reports NOT TAKING the following medication(s):  Voltaren gel  Mag ox 400mg      No current facility-administered medications on file prior to encounter.     Current Outpatient Medications on File Prior to Encounter   Medication Sig Dispense Refill    cyanocobalamin (VITAMIN B-12) 1000 MCG tablet Take 1 tablet (1,000 mcg total) by mouth once daily. 60 tablet 2    EScitalopram oxalate (LEXAPRO) 10 MG tablet Take 1 tablet (10 mg total) by mouth once daily. 90 tablet 1    folic acid (FOLVITE) 1 MG tablet Take 1 tablet (1 mg total) by mouth once daily. 60 tablet 2    iron, carbonyl (FEOSOL) 45 mg Tab Take 45 mg by mouth once daily. 90 each 2    multivitamin Tab Take 1 tablet by mouth once daily. 60 tablet 2    naltrexone (DEPADE) 50 mg tablet Take 50 mg by mouth once daily. 90 tablet 0    pregabalin (LYRICA) 100 MG capsule Take 1 capsule (100 mg total) by mouth 2 (two) times daily. 180 capsule 1    thiamine HCl (VITAMIN B-1) 500 MG tablet Take 1 tablet (500 mg total) by mouth once daily. 60 tablet 2    traZODone (DESYREL) 100 MG tablet Take 1 tablet (100 mg total) by mouth every evening. 30 tablet 11    lisinopriL 10 MG tablet Take 1 tablet (10 mg total) by mouth " once daily. (Patient not taking: Reported on 7/27/2023) 90 tablet 3       Please address this information as you see fit.  Feel free to contact us if you have any questions or require assistance.    Lila Sharpe  [phone]                .

## 2023-07-28 ENCOUNTER — CLINICAL SUPPORT (OUTPATIENT)
Dept: SMOKING CESSATION | Facility: CLINIC | Age: 53
End: 2023-07-28

## 2023-07-28 VITALS
DIASTOLIC BLOOD PRESSURE: 87 MMHG | TEMPERATURE: 97 F | BODY MASS INDEX: 26.4 KG/M2 | RESPIRATION RATE: 18 BRPM | SYSTOLIC BLOOD PRESSURE: 179 MMHG | HEART RATE: 71 BPM | HEIGHT: 66 IN | WEIGHT: 164.25 LBS | OXYGEN SATURATION: 96 %

## 2023-07-28 DIAGNOSIS — F17.210 CIGARETTE SMOKER: Primary | ICD-10-CM

## 2023-07-28 PROBLEM — E87.29 HIGH ANION GAP METABOLIC ACIDOSIS: Status: ACTIVE | Noted: 2023-07-27

## 2023-07-28 LAB
ALBUMIN SERPL BCP-MCNC: 2.9 G/DL (ref 3.5–5.2)
ALP SERPL-CCNC: 121 U/L (ref 55–135)
ALT SERPL W/O P-5'-P-CCNC: 66 U/L (ref 10–44)
ANION GAP SERPL CALC-SCNC: 14 MMOL/L (ref 8–16)
AST SERPL-CCNC: 158 U/L (ref 10–40)
BASOPHILS # BLD AUTO: 0.03 K/UL (ref 0–0.2)
BASOPHILS NFR BLD: 0.4 % (ref 0–1.9)
BILIRUB SERPL-MCNC: 0.5 MG/DL (ref 0.1–1)
BUN SERPL-MCNC: 5 MG/DL (ref 6–20)
CALCIUM SERPL-MCNC: 8.3 MG/DL (ref 8.7–10.5)
CHLORIDE SERPL-SCNC: 97 MMOL/L (ref 95–110)
CO2 SERPL-SCNC: 20 MMOL/L (ref 23–29)
CREAT SERPL-MCNC: 0.7 MG/DL (ref 0.5–1.4)
DIFFERENTIAL METHOD: ABNORMAL
EOSINOPHIL # BLD AUTO: 0.1 K/UL (ref 0–0.5)
EOSINOPHIL NFR BLD: 1.1 % (ref 0–8)
ERYTHROCYTE [DISTWIDTH] IN BLOOD BY AUTOMATED COUNT: 11.9 % (ref 11.5–14.5)
EST. GFR  (NO RACE VARIABLE): >60 ML/MIN/1.73 M^2
ESTIMATED AVG GLUCOSE: 94 MG/DL (ref 68–131)
FERRITIN SERPL-MCNC: 239 NG/ML (ref 20–300)
GLUCOSE SERPL-MCNC: 97 MG/DL (ref 70–110)
HBA1C MFR BLD: 4.9 % (ref 4–5.6)
HCT VFR BLD AUTO: 32.5 % (ref 37–48.5)
HGB BLD-MCNC: 11.8 G/DL (ref 12–16)
IMM GRANULOCYTES # BLD AUTO: 0.03 K/UL (ref 0–0.04)
IMM GRANULOCYTES NFR BLD AUTO: 0.4 % (ref 0–0.5)
IRON SERPL-MCNC: 101 UG/DL (ref 30–160)
LACTATE SERPL-SCNC: 3 MMOL/L (ref 0.5–2.2)
LACTATE SERPL-SCNC: 3.6 MMOL/L (ref 0.5–2.2)
LACTATE SERPL-SCNC: 5 MMOL/L (ref 0.5–2.2)
LACTATE SERPL-SCNC: 6.8 MMOL/L (ref 0.5–2.2)
LYMPHOCYTES # BLD AUTO: 1.4 K/UL (ref 1–4.8)
LYMPHOCYTES NFR BLD: 19 % (ref 18–48)
MAGNESIUM SERPL-MCNC: 1.9 MG/DL (ref 1.6–2.6)
MCH RBC QN AUTO: 32.9 PG (ref 27–31)
MCHC RBC AUTO-ENTMCNC: 36.3 G/DL (ref 32–36)
MCV RBC AUTO: 91 FL (ref 82–98)
MONOCYTES # BLD AUTO: 0.7 K/UL (ref 0.3–1)
MONOCYTES NFR BLD: 9.3 % (ref 4–15)
NEUTROPHILS # BLD AUTO: 5.1 K/UL (ref 1.8–7.7)
NEUTROPHILS NFR BLD: 69.8 % (ref 38–73)
NRBC BLD-RTO: 0 /100 WBC
PHOSPHATE SERPL-MCNC: 3.7 MG/DL (ref 2.7–4.5)
PLATELET # BLD AUTO: 187 K/UL (ref 150–450)
PMV BLD AUTO: 9.1 FL (ref 9.2–12.9)
POTASSIUM SERPL-SCNC: 3.5 MMOL/L (ref 3.5–5.1)
PROT SERPL-MCNC: 5.7 G/DL (ref 6–8.4)
RBC # BLD AUTO: 3.59 M/UL (ref 4–5.4)
SATURATED IRON: 34 % (ref 20–50)
SODIUM SERPL-SCNC: 131 MMOL/L (ref 136–145)
TOTAL IRON BINDING CAPACITY: 300 UG/DL (ref 250–450)
TRANSFERRIN SERPL-MCNC: 203 MG/DL (ref 200–375)
TSH SERPL DL<=0.005 MIU/L-ACNC: 1.85 UIU/ML (ref 0.4–4)
WBC # BLD AUTO: 7.33 K/UL (ref 3.9–12.7)

## 2023-07-28 PROCEDURE — 36415 COLL VENOUS BLD VENIPUNCTURE: CPT

## 2023-07-28 PROCEDURE — 25000003 PHARM REV CODE 250

## 2023-07-28 PROCEDURE — 83735 ASSAY OF MAGNESIUM: CPT

## 2023-07-28 PROCEDURE — 63600175 PHARM REV CODE 636 W HCPCS

## 2023-07-28 PROCEDURE — 11000001 HC ACUTE MED/SURG PRIVATE ROOM

## 2023-07-28 PROCEDURE — 99406 PT REFUSED TOBACCO CESSATION: ICD-10-PCS | Mod: S$GLB,,,

## 2023-07-28 PROCEDURE — 83605 ASSAY OF LACTIC ACID: CPT | Mod: 91 | Performed by: STUDENT IN AN ORGANIZED HEALTH CARE EDUCATION/TRAINING PROGRAM

## 2023-07-28 PROCEDURE — 99406 BEHAV CHNG SMOKING 3-10 MIN: CPT | Mod: S$GLB,,,

## 2023-07-28 PROCEDURE — 25000003 PHARM REV CODE 250: Performed by: STUDENT IN AN ORGANIZED HEALTH CARE EDUCATION/TRAINING PROGRAM

## 2023-07-28 PROCEDURE — 84443 ASSAY THYROID STIM HORMONE: CPT

## 2023-07-28 PROCEDURE — 80053 COMPREHEN METABOLIC PANEL: CPT

## 2023-07-28 PROCEDURE — 83605 ASSAY OF LACTIC ACID: CPT | Mod: 91

## 2023-07-28 PROCEDURE — 83605 ASSAY OF LACTIC ACID: CPT

## 2023-07-28 PROCEDURE — 85025 COMPLETE CBC W/AUTO DIFF WBC: CPT

## 2023-07-28 PROCEDURE — 83036 HEMOGLOBIN GLYCOSYLATED A1C: CPT

## 2023-07-28 PROCEDURE — 36415 COLL VENOUS BLD VENIPUNCTURE: CPT | Performed by: STUDENT IN AN ORGANIZED HEALTH CARE EDUCATION/TRAINING PROGRAM

## 2023-07-28 PROCEDURE — 63600175 PHARM REV CODE 636 W HCPCS: Performed by: STUDENT IN AN ORGANIZED HEALTH CARE EDUCATION/TRAINING PROGRAM

## 2023-07-28 PROCEDURE — 84100 ASSAY OF PHOSPHORUS: CPT

## 2023-07-28 RX ORDER — POTASSIUM CHLORIDE 7.45 MG/ML
10 INJECTION INTRAVENOUS
Status: DISPENSED | OUTPATIENT
Start: 2023-07-28 | End: 2023-07-28

## 2023-07-28 RX ORDER — SODIUM CHLORIDE, SODIUM LACTATE, POTASSIUM CHLORIDE, CALCIUM CHLORIDE 600; 310; 30; 20 MG/100ML; MG/100ML; MG/100ML; MG/100ML
INJECTION, SOLUTION INTRAVENOUS CONTINUOUS
Status: ACTIVE | OUTPATIENT
Start: 2023-07-28 | End: 2023-07-28

## 2023-07-28 RX ORDER — NALTREXONE HYDROCHLORIDE 50 MG/1
25 TABLET, FILM COATED ORAL DAILY
Status: DISCONTINUED | OUTPATIENT
Start: 2023-07-29 | End: 2023-07-29 | Stop reason: HOSPADM

## 2023-07-28 RX ORDER — POTASSIUM CHLORIDE 20 MEQ/1
40 TABLET, EXTENDED RELEASE ORAL ONCE
Status: COMPLETED | OUTPATIENT
Start: 2023-07-28 | End: 2023-07-28

## 2023-07-28 RX ORDER — NALTREXONE HYDROCHLORIDE 50 MG/1
25 TABLET, FILM COATED ORAL DAILY
Qty: 90 TABLET | Refills: 0 | Status: SHIPPED | OUTPATIENT
Start: 2023-07-28 | End: 2023-09-14

## 2023-07-28 RX ADMIN — POTASSIUM CHLORIDE 10 MEQ: 7.45 INJECTION INTRAVENOUS at 06:07

## 2023-07-28 RX ADMIN — CYANOCOBALAMIN TAB 1000 MCG 1000 MCG: 1000 TAB at 09:07

## 2023-07-28 RX ADMIN — SODIUM CHLORIDE, POTASSIUM CHLORIDE, SODIUM LACTATE AND CALCIUM CHLORIDE: 600; 310; 30; 20 INJECTION, SOLUTION INTRAVENOUS at 05:07

## 2023-07-28 RX ADMIN — POTASSIUM CHLORIDE 10 MEQ: 7.45 INJECTION INTRAVENOUS at 01:07

## 2023-07-28 RX ADMIN — FOLIC ACID: 5 INJECTION, SOLUTION INTRAMUSCULAR; INTRAVENOUS; SUBCUTANEOUS at 12:07

## 2023-07-28 RX ADMIN — THERA TABS 1 TABLET: TAB at 09:07

## 2023-07-28 RX ADMIN — ESCITALOPRAM OXALATE 10 MG: 10 TABLET ORAL at 09:07

## 2023-07-28 RX ADMIN — FOLIC ACID 1 MG: 1 TABLET ORAL at 09:07

## 2023-07-28 RX ADMIN — POTASSIUM CHLORIDE 10 MEQ: 7.45 INJECTION INTRAVENOUS at 04:07

## 2023-07-28 RX ADMIN — Medication 500 MG: at 09:07

## 2023-07-28 RX ADMIN — PREGABALIN 100 MG: 50 CAPSULE ORAL at 09:07

## 2023-07-28 RX ADMIN — POTASSIUM CHLORIDE 40 MEQ: 1500 TABLET, EXTENDED RELEASE ORAL at 12:07

## 2023-07-28 RX ADMIN — SODIUM CHLORIDE, POTASSIUM CHLORIDE, SODIUM LACTATE AND CALCIUM CHLORIDE: 600; 310; 30; 20 INJECTION, SOLUTION INTRAVENOUS at 10:07

## 2023-07-28 RX ADMIN — NALTREXONE HYDROCHLORIDE 50 MG: 50 TABLET, FILM COATED ORAL at 09:07

## 2023-07-28 RX ADMIN — POTASSIUM CHLORIDE 10 MEQ: 7.45 INJECTION INTRAVENOUS at 03:07

## 2023-07-28 NOTE — PLAN OF CARE
SW met with pt at bedside to complete assessment. Pt is AxO x3  and able to verbally answer assessment questions.  Pt confirmed demographic information. Pt reports living at home with her spouse, children, and fur babies(dogs). Pt reports feeling safe and supported at home. Pt joked often having too much support. Pt reports while at home being mainly independent but need assistance getting in and out of the tub. Pt has support of spouse. Pt further reports having support of DME to include shower chair, grab bars, and rolling walker. Pt reports no home health and have completed outpatient therapy already. Pt reports no dialysis. Pt does not drive. Pt family to transport home. SW updated whiteboard with [facility] name and contact information. SW confirmed pt understanding of Observation unit and expected discharge plan. SW will continue to follow pt throughout care and assist with any discharge needs.         07/28/23 1009   Discharge Assessment   Assessment Type Discharge Planning Assessment   Confirmed/corrected address, phone number and insurance Yes   Confirmed Demographics Correct on Facesheet   Source of Information patient   When was your last doctors appointment? 07/27/23   Does patient/caregiver understand observation status Yes  (Pt now upgraded to inpatient.)   Communicated YUN with patient/caregiver Yes   Reason For Admission Acidosis   People in Home child(melvina), dependent;spouse   Do you expect to return to your current living situation? Yes   Do you have help at home or someone to help you manage your care at home? Yes   Prior to hospitilization cognitive status: No Deficits;Alert/Oriented   Current cognitive status: Alert/Oriented;No Deficits   Dressing/Bathing bathing difficulty, requires equipment;bathing difficulty, assistance 1 person   Do you have any problems with: Needs other help   Equipment Currently Used at Home shower chair;grab bar;walker, rolling   Readmission within 30 days? No   Patient  currently being followed by outpatient case management? No   Do you currently have service(s) that help you manage your care at home? No   Do you take prescription medications? Yes   Do you have prescription coverage? Yes   Do you have any problems affording any of your prescribed medications? No   Is the patient taking medications as prescribed? yes   Who is going to help you get home at discharge? Family   How do you get to doctors appointments? family or friend will provide   Are you on dialysis? No   Do you take coumadin? No   Discharge Plan A Home with family   DME Needed Upon Discharge  none   Discharge Plan discussed with: Patient   Transition of Care Barriers None       Future Appointments   Date Time Provider Department Center   8/4/2023  7:30 AM Xiomara Tong MD [facility]   8/23/2023 10:00 AM Young Neil MD [facility]  [phone]

## 2023-07-28 NOTE — ASSESSMENT & PLAN NOTE
Acidosis, AG 21  Possible alcohol ketoacidosis or iron toxicity (Hgb currently normal)  RESOLVED      PLAN:

## 2023-07-28 NOTE — PLAN OF CARE
"   07/28/23 1354   Post-Acute Status   Discharge Delays (!) Procedure Scheduling (IR, OR, Labs, Echo, Cath, Echo, EEG)  (Anticipated DC today. Pending Rpt Lactic Acid for 2 pm draw. If stable per attending team, DC today.)      1521 pm - Lactic came back at 3.6. No DC today per attending team.       Recent Labs     07/28/23  1407   LACTATE 3.6*     Future Appointments   Date Time Provider Department Center   8/4/2023  7:30 AM Xiomara Tong MD [facility]O [facility]   8/23/2023 10:00 AM [facility]     BP (!) 149/78 (BP Location: Left arm, Patient Position: Lying)   Pulse 70   Temp 97.7 °F (36.5 °C) (Oral)   Resp 18   Ht 5' 6" (1.676 m)   Wt 74.5 kg (164 lb 3.9 oz)   LMP  (LMP Unknown) Comment: every 3 months  SpO2 97%   BMI 26.51 kg/m²      cyanocobalamin  1,000 mcg Oral Daily    EScitalopram oxalate  10 mg Oral Daily    folic acid  1 mg Oral Daily    multivitamin  1 tablet Oral Daily    naltrexone  50 mg Oral Daily    pregabalin  100 mg Oral BID    vitamin B-1  500 mg Oral Daily    traZODone  100 mg Oral QHS       "

## 2023-07-28 NOTE — ASSESSMENT & PLAN NOTE
Patient has hyponatremia which is uncontrolled,We will aim to correct the sodium by 4-6mEq in 24 hours. We will monitor sodium Daily. 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 We will treat the hyponatremia with Fluid restriction of:  1.5 liter per day. The patient's sodium results have been reviewed and are listed below.  Recent Labs   Lab 07/27/23  1044   *       PLAN:  Fluid restrict 1500cc  Monitor

## 2023-07-28 NOTE — ASSESSMENT & PLAN NOTE
Last drink yesterday (8 drinks per day)  No hx of withdrawals  Recently started naltrexone... 50mg presumed dose (patient will update)    PLAN:  Continue Naltrexone 50mg   CIWA q4hr  2mg ativan for seizures

## 2023-07-28 NOTE — PROGRESS NOTES
Smoking cessation education note: Pt started smoking age 17, quit in 2012, then recently relapsed. She states that she smokes 0.5 tp 1 pk/day cigarettes. Pt denies nicotine withdrawal symptoms, and she declines referral to Ambulatory Smoking Cessation clinic.  Handout provided.
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
## 2023-07-28 NOTE — ASSESSMENT & PLAN NOTE
K+ on admit 2.7 from outside lab  Asymptomatic  Repleated w/50mEq in ER  No significant EKG changes  RESOLVED    PLAN:  EKG if arrhythmia suspected

## 2023-07-28 NOTE — HOSPITAL COURSE
Pt was admitted from [facility] outpatient clinic following high anion gap metabolic acidosis, lo bp and electrolyte abnormalities.  She endorsed BLE weakness;  pt responded well to Potassium repletion.  Her anion gap swiftly closed and lactic acid downtrended from 6.8 to 3.0 with low dose LR. Pt's acidosis was likely due to Type B Lactic Acidosis caused by chronic alcohol consumption.  Pt's home naltrexone 25mg was continued with q4h CIWA checks, no symptoms of withdrawal. With resolution of AGMA, pt was determined to be fit for discharge. She was counseled on alcohol consumption as it relates to her pertinent sequelae (alcohol-induced neuropathy). She was scheduled for close outpatient follow up with Family Medicine in 1 week in addition to her previously scheduled upcoming Neurology visit. She should also be considered for RU US to evaluate for alcoholic hepatitis or other pathologies. She should rehydrate with OTC electrolyte solutions and eat K+ rich foods.    Pt was agreeable to discharge planning and expressed understanding.  All questions were answered and concern precautions were discussed. She was discharged in stable condition.

## 2023-07-28 NOTE — SUBJECTIVE & OBJECTIVE
Interval History:   NAEON. VSSAF RA.    Review of Systems   Constitutional:  Positive for fatigue.   Respiratory:  Negative for cough and shortness of breath.    Cardiovascular:  Negative for chest pain, palpitations and leg swelling.   Gastrointestinal:  Negative for abdominal pain.   Genitourinary:  Negative for dysuria.   Musculoskeletal:  Negative for arthralgias and myalgias.   Neurological:  Positive for weakness and numbness. Negative for dizziness and headaches.   Objective:     Vital Signs (Most Recent):  Temp: 97.7 °F (36.5 °C) (07/28/23 1145)  Pulse: 70 (07/28/23 1148)  Resp: 18 (07/28/23 1145)  BP: (!) 149/78 (07/28/23 1145)  SpO2: 97 % (07/28/23 1145) Vital Signs (24h Range):  Temp:  [96.4 °F (35.8 °C)-98.2 °F (36.8 °C)] 97.7 °F (36.5 °C)  Pulse:  [64-83] 70  Resp:  [16-22] 18  SpO2:  [96 %-98 %] 97 %  BP: ()/(56-84) 149/78     Weight: 74.5 kg (164 lb 3.9 oz)  Body mass index is 26.51 kg/m².  No intake or output data in the 24 hours ending 07/28/23 1201      Physical Exam  Constitutional:       Appearance: Normal appearance.   Eyes:      Extraocular Movements: Extraocular movements intact.      Pupils: Pupils are equal, round, and reactive to light.   Cardiovascular:      Rate and Rhythm: Normal rate and regular rhythm.      Pulses: Normal pulses.      Heart sounds: Normal heart sounds.   Pulmonary:      Effort: Pulmonary effort is normal.      Breath sounds: Normal breath sounds. No wheezing.   Abdominal:      General: Abdomen is flat.      Palpations: Abdomen is soft.      Tenderness: There is no abdominal tenderness.   Musculoskeletal:      Cervical back: Normal range of motion and neck supple.      Right lower leg: No edema.      Left lower leg: No edema.   Neurological:      Mental Status: She is alert and oriented to person, place, and time.      Cranial Nerves: No cranial nerve deficit.      Sensory: Sensory deficit present.      Motor: No weakness.   Psychiatric:         Mood and Affect:  Mood normal.         Behavior: Behavior normal.         Thought Content: Thought content normal.         Judgment: Judgment normal.           Significant Labs: All pertinent labs within the past 24 hours have been reviewed.  CBC:   Recent Labs   Lab 07/27/23  1044 07/28/23  0312   WBC 9.90 7.33   HGB 13.2 11.8*   HCT 37.1 32.5*    187     CMP:   Recent Labs   Lab 07/27/23  1044 07/27/23  2049 07/28/23 0312   * 129* 131*   K 2.7* 2.4* 3.5   CL 94* 93* 97   CO2 15* 16* 20*    121* 97   BUN 6 5* 5*   CREATININE 0.8 0.7 0.7   CALCIUM 8.6* 8.3* 8.3*   PROT 6.7  --  5.7*   ALBUMIN 3.3*  --  2.9*   BILITOT 0.4  --  0.5   ALKPHOS 126  --  121   *  --  158*   ALT 74*  --  66*   ANIONGAP 21* 20* 14       Significant Imaging: I have reviewed all pertinent imaging results/findings within the past 24 hours.

## 2023-07-28 NOTE — ASSESSMENT & PLAN NOTE
Patient has hyponatremia which is uncontrolled,We will aim to correct the sodium by 4-6mEq in 24 hours. We will monitor sodium Daily. 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 We will treat the hyponatremia with Fluid restriction of:  1.5 liter per day. The patient's sodium results have been reviewed and are listed below. Likely due to potomania secondary to alcohol consumption.  Recent Labs   Lab 07/28/23  0312   *       PLAN:  Fluid restrict 1500cc  Monitor
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
## 2023-07-28 NOTE — PROGRESS NOTES
[facility] Medicine  Progress Note    Patient Name: Ember Rivera  MRN: [id number]  Patient Class: IP- Inpatient   Admission Date: 7/27/2023  Length of Stay: 0 days  Attending Physician: Valente Marino DO  Primary Care Provider: Young Neil MD        Subjective:     Principal Problem:High anion gap metabolic acidosis        HPI:  54 y/o F with PMHx alcohol use disorder, alcohol-induced neuropathy, hepatic steatosis, chronic paresthesias to hands and feet.  Presents after notification about routine lab results with K+ 2.7, Anion Gap 21 and weakness for 2-3 days with no other symptoms including fevers, cough, n/v, diarrhea. Patient's last drink was yesterday. She typically has 8 drinks per day. She has never had withdrawal symptoms. She smokes 1/4 pack per day, denies other substances.    In Emergency, 86/56, 81 HR, 98.2F, SpO2 97% on RA. Labs were significant for Na+ 130, K+ 2.7, CO2 15, AST//74, Anion Gap 21, CBC wnl, INR 1.1, COVID-, no imaging was done. She was treated with supplemental K+ and was admitted to [facility] for evaluation of acidosis and hypokalemia.      Overview/Hospital Course:  Pt was admitted from [facility] outpatient clinic following electrolyte abnormalities.  She endorsed BLE weakness;  pt responded well to Potassium repletion.  Her anion gap swiftly closed and lactic acid downtrended with low dose LR. Pt's acidosis was likely due to Type B Lactic Acidosis caused by chronic alcohol consumption.  Pt's home naltrexone 50mg was continued with q4h CIWA checks.  With resolution of AGMA, pt was determined to be fit for discharge. She was counseled on alcohol consumption as it relates to her pertinent sequelae (alcohol-induced neuropathy). She was scheduled for close outpatient follow up with Family Medicine in 1 week in addition to her previously scheduled upcoming Neurology visit.      Pt was agreeable to discharge planning and expressed  understanding.  All questions were answered and concern precautions were discussed.       Interval History:   NAEON. VSSAF RA.    Review of Systems   Constitutional:  Positive for fatigue.   Respiratory:  Negative for cough and shortness of breath.    Cardiovascular:  Negative for chest pain, palpitations and leg swelling.   Gastrointestinal:  Negative for abdominal pain.   Genitourinary:  Negative for dysuria.   Musculoskeletal:  Negative for arthralgias and myalgias.   Neurological:  Positive for weakness and numbness. Negative for dizziness and headaches.   Objective:     Vital Signs (Most Recent):  Temp: 97.7 °F (36.5 °C) (07/28/23 1145)  Pulse: 70 (07/28/23 1148)  Resp: 18 (07/28/23 1145)  BP: (!) 149/78 (07/28/23 1145)  SpO2: 97 % (07/28/23 1145) Vital Signs (24h Range):  Temp:  [96.4 °F (35.8 °C)-98.2 °F (36.8 °C)] 97.7 °F (36.5 °C)  Pulse:  [64-83] 70  Resp:  [16-22] 18  SpO2:  [96 %-98 %] 97 %  BP: ()/(56-84) 149/78     Weight: 74.5 kg (164 lb 3.9 oz)  Body mass index is 26.51 kg/m².  No intake or output data in the 24 hours ending 07/28/23 1201      Physical Exam  Constitutional:       Appearance: Normal appearance.   Eyes:      Extraocular Movements: Extraocular movements intact.      Pupils: Pupils are equal, round, and reactive to light.   Cardiovascular:      Rate and Rhythm: Normal rate and regular rhythm.      Pulses: Normal pulses.      Heart sounds: Normal heart sounds.   Pulmonary:      Effort: Pulmonary effort is normal.      Breath sounds: Normal breath sounds. No wheezing.   Abdominal:      General: Abdomen is flat.      Palpations: Abdomen is soft.      Tenderness: There is no abdominal tenderness.   Musculoskeletal:      Cervical back: Normal range of motion and neck supple.      Right lower leg: No edema.      Left lower leg: No edema.   Neurological:      Mental Status: She is alert and oriented to person, place, and time.      Cranial Nerves: No cranial nerve deficit.      Sensory:  Sensory deficit present.      Motor: No weakness.   Psychiatric:         Mood and Affect: Mood normal.         Behavior: Behavior normal.         Thought Content: Thought content normal.         Judgment: Judgment normal.           Significant Labs: All pertinent labs within the past 24 hours have been reviewed.  CBC:   Recent Labs   Lab 07/27/23  1044 07/28/23 0312   WBC 9.90 7.33   HGB 13.2 11.8*   HCT 37.1 32.5*    187     CMP:   Recent Labs   Lab 07/27/23  1044 07/27/23  2049 07/28/23 0312   * 129* 131*   K 2.7* 2.4* 3.5   CL 94* 93* 97   CO2 15* 16* 20*    121* 97   BUN 6 5* 5*   CREATININE 0.8 0.7 0.7   CALCIUM 8.6* 8.3* 8.3*   PROT 6.7  --  5.7*   ALBUMIN 3.3*  --  2.9*   BILITOT 0.4  --  0.5   ALKPHOS 126  --  121   *  --  158*   ALT 74*  --  66*   ANIONGAP 21* 20* 14       Significant Imaging: I have reviewed all pertinent imaging results/findings within the past 24 hours.      Assessment/Plan:      * High anion gap metabolic acidosis  Acidosis, AG 21  Possible alcohol ketoacidosis or iron toxicity (Hgb currently normal)  RESOLVED      PLAN:      Hypokalemia  K+ on admit 2.7 from outside lab  Asymptomatic  Repleated w/50mEq in ER  No significant EKG changes  RESOLVED    PLAN:  EKG if arrhythmia suspected         EtOH dependence  Last drink yesterday (8 drinks per day)  No hx of withdrawals  Recently started naltrexone... 50mg presumed dose (patient will update)    PLAN:  Continue Naltrexone 50mg   CIWA q4hr  2mg ativan for seizures      Hyponatremia  Patient has hyponatremia which is uncontrolled,We will aim to correct the sodium by 4-6mEq in 24 hours. We will monitor sodium Daily. 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 We will treat the hyponatremia with Fluid restriction of:  1.5 liter per day. The patient's sodium results have been reviewed and are listed below. Likely due to potomania secondary to alcohol consumption.  Recent Labs   Lab 07/28/23 0312   *        PLAN:  Fluid restrict 1500cc  Monitor      VTE Risk Mitigation (From admission, onward)         Ordered     IP VTE LOW RISK PATIENT  Once         07/27/23 1837     Place sequential compression device  Until discontinued         07/27/23 1837                Discharge Planning   YNU:      Code Status: Full Code   Is the patient medically ready for discharge?:     Reason for patient still in hospital (select all that apply): Patient trending condition  Discharge Plan A: Home with family                  Charles Goldsmith MD  Department of [facility] Medicine   [facility]

## 2023-07-28 NOTE — NURSING
"RAPID RESPONSE NURSE PROACTIVE ROUNDING NOTE       Time of Visit: 0800    Admit Date: 2023  LOS: 0  Code Status: Full Code   Date of Visit: 2023  : 1970  Age: 53 y.o.  Sex: female  Race: White  Bed: K430/K430 A:   MRN: [id number]  Was the patient discharged from an ICU this admission? No   Was the patient discharged from a PACU within last 24 hours? No   Did the patient receive conscious sedation/general anesthesia in last 24 hours? No   Was the patient in the ED within the past 24 hours? Yes   Was the patient on NIPPV within the past 24 hours? No   Attending Physician: Valente Marino DO  Primary Service: Family Medicine   Time spent at the bedside: < 15 min    SITUATION    Notified by previous RRN during handoff  Reason for alert: elevated lactic, hypotension    Diagnosis: Acidosis   has a past medical history of GERD (gastroesophageal reflux disease), Guillain-[place] syndrome, and Hypertension.    Last Vitals:  Temp: 96.9 °F (36.1 °C) (741)  Pulse: 64 (741)  Resp: 18 (741)  BP: 158/84 (741)  SpO2: 98 % (741)    24 Hour Vitals Range:  Temp:  [96.4 °F (35.8 °C)-98.2 °F (36.8 °C)]   Pulse:  [64-92]   Resp:  [16-22]   BP: ()/(50-84)   SpO2:  [96 %-98 %]     ASSESSMENT/INTERVENTIONS    Follow up on patient. Patient in bed resting. Per patient, "feels better, not as weak". Repeat lactic 5, on antibiotics and receiving continuous IVF. Denies fever, chill or SOB. Chart and labs reviewed. Will monitor.       FOLLOW UP    Call back the Rapid Response NurseAmalia RN at [phone] for additional questions or concerns.           "

## 2023-07-28 NOTE — PLAN OF CARE
07/27/23 2303   Admission   Initial VN Admission Questions Complete   Communication Issues? None   Shift   Pain Management Interventions quiet environment facilitated;relaxation techniques promoted   Virtual Nurse - Patient Verbalized Approval Of VN Rounding;Camera Use   Type of Frequent Check   Type Telemetry Monitoring;Patient Rounds   Safety/Activity   Patient Rounds bed in low position;bed wheels locked;call light in patient/parent reach;clutter free environment maintained;ID band on;visualized patient;placement of personal items at bedside   Safety Promotion/Fall Prevention assistive device/personal item within reach;bed alarm set;nonskid shoes/socks when out of bed;room near unit station;instructed to call staff for mobility;side rails raised x 2;medications reviewed;Fall Risk reviewed with patient/family   Safety Precautions emergency equipment at bedside   Activity Management Ambulated in room - L4   Activity Assistance Provided assistance, stand-by   Positioning   Body Position position changed independently   Head of Bed (HOB) Positioning HOB elevated   Positioning/Transfer Devices pillows;in use    VN cued into room to complete admit assessment. VIP model introduced; VN working alongside bedside treatment team.  Plan of care reviewed with patient. Patient informed of fall risk, fall precautions, call light within reach, side rails x2 elevated. Patient notified to ask staff for assistance. Patient verbalized complete understanding. Time allowed for questions. Will continue to monitor and intervene as needed.

## 2023-07-28 NOTE — NURSING
RAPID RESPONSE NURSE PROACTIVE ROUNDING NOTE     Chart review     Admit Date: 2023  LOS: 0  Code Status: Full Code   Date of Visit: 2023  : 1970  Age: 53 y.o.  Sex: female  Race: White  Bed: K430/K430 A:   MRN: [id number]  Was the patient discharged from an ICU this admission? No   Was the patient discharged from a PACU within last 24 hours? No   Did the patient receive conscious sedation/general anesthesia in last 24 hours? No   Was the patient in the ED within the past 24 hours? Yes   Was the patient on NIPPV within the past 24 hours? No   Attending Physician: Valente Marino DO  Primary Service: Family Medicine       SITUATION    Notified by charge RN during rounding  Reason for alert: lactic acid >6    Diagnosis: Acidosis   has a past medical history of GERD (gastroesophageal reflux disease), Guillain-[place] syndrome, and Hypertension.    Last Vitals:  Temp: 96.4 °F (35.8 °C) ( 050)  Pulse: 69 (501)  Resp: 16 ( 050)  BP: 143/78 (501)  SpO2: 96 % (501)    24 Hour Vitals Range:  Temp:  [96.4 °F (35.8 °C)-98.2 °F (36.8 °C)]   Pulse:  [66-92]   Resp:  [16-22]   BP: ()/(50-78)   SpO2:  [96 %-98 %]     Clinical Issues:  lactic acid >6    ASSESSMENT/INTERVENTIONS    Notified by charge Yaron SNYDER of lactic acid 6.8. upon chart review, no repeat lactic acid ordered. Spoke w/ Dr. Vale MD for orders. Stated that he would speak with day team in regards to lactic acid results, treatment, and repeat.      RECOMMENDATIONS  - repeat lactic acid now    Discussed plan of care with charge Yaron SNYDER     PROVIDER ESCALATION    Physician escalation: Yes    Orders received and case discussed with Dr. Collazo .    Disposition:Remain in room 430    FOLLOW UP    Call back the Rapid Response NurseMaria Teresa at [phone] for additional questions or concerns.

## 2023-07-28 NOTE — H&P
[facility] Emergency [facility] Medicine  History & Physical    Patient Name: Ember Rivera  MRN: [id number]  Patient Class: OP- Observation  Admission Date: 7/27/2023  Attending Physician: Valente Marino DO   Primary Care Provider: Renato Francis MD         Patient information was obtained from patient and ER records.     Subjective:     Principal Problem:Acidosis    Chief Complaint:   Chief Complaint   Patient presents with    Abnormal labs     Had labs drawn today and was called by her doctor and told to go the the ED for evaluation of low potassium and hypotension. Generalized weakness for several days. Denies CP and SOB.        HPI: 52 y/o F with PMHx alcohol use disorder, alcohol-induced neuropathy, hepatic steatosis, chronic paresthesias to hands and feet.  Presents after notification about routine lab results with K+ 2.7, Anion Gap 21 and weakness for 2-3 days with no other symptoms including fevers, cough, n/v, diarrhea. Patient's last drink was yesterday. She typically has 8 drinks per day. She has never had withdrawal symptoms. She smokes 1/4 pack per day, denies other substances.    In Emergency, 86/56, 81 HR, 98.2F, SpO2 97% on RA. Labs were significant for Na+ 130, K+ 2.7, CO2 15, AST//74, Anion Gap 21, CBC wnl, INR 1.1, COVID-, no imaging was done. She was treated with supplemental K+ and was admitted to [facility] for evaluation of acidosis and hypokalemia.      Review of Systems   Constitutional:  Positive for malaise/fatigue. Negative for fever.   Respiratory:  Negative for cough and shortness of breath.    Cardiovascular:  Negative for chest pain and palpitations.   Gastrointestinal:  Negative for abdominal pain, nausea and vomiting.   Genitourinary:  Negative for dysuria.   Musculoskeletal:  Negative for joint pain and myalgias.   Neurological:  Positive for weakness. Negative for dizziness and headaches.      Physical Exam  Constitutional:       Appearance: She is obese.   HENT:       Head: Normocephalic and atraumatic.      Mouth/Throat:      Mouth: Mucous membranes are moist.   Eyes:      Extraocular Movements: Extraocular movements intact.      Pupils: Pupils are equal, round, and reactive to light.   Cardiovascular:      Rate and Rhythm: Normal rate and regular rhythm.      Pulses: Normal pulses.      Heart sounds: Normal heart sounds.   Pulmonary:      Effort: Pulmonary effort is normal.      Breath sounds: Normal breath sounds.   Abdominal:      General: Abdomen is flat.      Palpations: Abdomen is soft.      Tenderness: There is no abdominal tenderness.   Musculoskeletal:      Cervical back: Normal range of motion and neck supple.      Right lower leg: No edema.      Left lower leg: No edema.   Neurological:      Mental Status: She is alert and oriented to person, place, and time.      Cranial Nerves: No cranial nerve deficit.      Sensory: Sensory deficit present.      Motor: No weakness.   Psychiatric:         Mood and Affect: Mood normal.         Behavior: Behavior normal.         Thought Content: Thought content normal.         Judgment: Judgment normal.        Assessment/Plan:     * Acidosis  Acidosis, AG 21  Possible alcohol ketoacidosis or iron toxicity (Hgb currently normal)    PLAN:  VBG  Iron studies  Hold Iron      Hypokalemia  K+ on admit 2.7 from outside lab  Asymptomatic  Repleated w/50mEq in ER  No significant EKG changes    PLAN:  BMP at 2030 - replete PRN  EKG if arrhythmia suspected         EtOH dependence  Last drink yesterday (8 drinks per day)  No hx of withdrawals  Recently started naltrexone... 50mg presumed dose (patient will update)    PLAN:  Continue Naltrexone 50mg   CIWA q4hr  2mg ativan for seizures      Hyponatremia  Patient has hyponatremia which is uncontrolled,We will aim to correct the sodium by 4-6mEq in 24 hours. We will monitor sodium Daily. 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 We will treat the hyponatremia with Fluid restriction of:   1.5 liter per day. The patient's sodium results have been reviewed and are listed below.  Recent Labs   Lab 07/27/23  1044   *       PLAN:  Fluid restrict 1500cc  Monitor      VTE Risk Mitigation (From admission, onward)           Ordered     IP VTE LOW RISK PATIENT  Once         07/27/23 1837     Place sequential compression device  Until discontinued         07/27/23 1837                           On 07/27/2023, patient should be placed in hospital observation services under my care in collaboration with 40.    Charles Goldsmith MD  Department of Hospital Medicine  [facility] - Emergency Dept

## 2023-07-29 NOTE — DISCHARGE SUMMARY
[facility] Medicine  Discharge Summary      Patient Name: Ember Rivera  MRN: [id number]  NAVIN: [id number]  Patient Class: IP- Inpatient  Admission Date: 7/27/2023  Hospital Length of Stay: 0 days  Discharge Date and Time:  07/28/2023 7:39 PM  Attending Physician: Valente Marino DO   Discharging Provider: Charles Goldsmith MD  Primary Care Provider: Young Neil MD    Primary Care Team: Networked reference to record PCT     HPI:   52 y/o F with PMHx alcohol use disorder, alcohol-induced neuropathy, hepatic steatosis, chronic paresthesias to hands and feet.  Presents after notification about routine lab results with K+ 2.7, Anion Gap 21 and weakness for 2-3 days with no other symptoms including fevers, cough, n/v, diarrhea. Patient's last drink was yesterday. She typically has 8 drinks per day. She has never had withdrawal symptoms. She smokes 1/4 pack per day, denies other substances.    In Emergency, 86/56, 81 HR, 98.2F, SpO2 97% on RA. Labs were significant for Na+ 130, K+ 2.7, CO2 15, AST//74, Anion Gap 21, CBC wnl, INR 1.1, COVID-, no imaging was done. She was treated with supplemental K+ and was admitted to [facility] for evaluation of acidosis and hypokalemia.      * No surgery found *      Hospital Course:   Pt was admitted from [facility] outpatient clinic following high anion gap metabolic acidosis, lo bp and electrolyte abnormalities.  She endorsed BLE weakness;  pt responded well to Potassium repletion.  Her anion gap swiftly closed and lactic acid downtrended from 6.8 to 3.0 with low dose LR. Pt's acidosis was likely due to Type B Lactic Acidosis caused by chronic alcohol consumption.  Pt's home naltrexone 25mg was continued with q4h CIWA checks, no symptoms of withdrawal. With resolution of AGMA, pt was determined to be fit for discharge. She was counseled on alcohol consumption as it relates to her pertinent sequelae (alcohol-induced neuropathy). She  was scheduled for close outpatient follow up with Family Medicine in 1 week in addition to her previously scheduled upcoming Neurology visit. She should also be considered for RUQ US to evaluate for alcoholic hepatitis or other pathologies. She should rehydrate with OTC electrolyte solutions and eat K+ rich foods.    Pt was agreeable to discharge planning and expressed understanding.  All questions were answered and concern precautions were discussed. She was discharged in stable condition.       Goals of Care Treatment Preferences:  Code Status: Full Code      Consults:     No new Assessment & Plan notes have been filed under this hospital service since the last note was generated.  Service: Hospital Medicine    Final Active Diagnoses:    Diagnosis Date Noted POA    PRINCIPAL PROBLEM:  High anion gap metabolic acidosis [E87.29] 07/27/2023 Unknown    Hypokalemia [E87.6] 05/16/2023 Yes    EtOH dependence [F10.20] 05/16/2023 Yes    Hyponatremia [E87.1] 06/17/2023 Yes     Chronic      Problems Resolved During this Admission:       Discharged Condition: stable    Disposition: Home or Self Care    Follow Up:    Patient Instructions:      Diet Adult Regular     Notify your health care provider if you experience any of the following:  increased confusion or weakness     Notify your health care provider if you experience any of the following:  persistent dizziness, light-headedness, or visual disturbances     Notify your health care provider if you experience any of the following:  worsening rash     Notify your health care provider if you experience any of the following:  severe persistent headache     Notify your health care provider if you experience any of the following:  difficulty breathing or increased cough     Notify your health care provider if you experience any of the following:  redness, tenderness, or signs of infection (pain, swelling, redness, odor or green/yellow discharge around incision site)      Notify your health care provider if you experience any of the following:  severe uncontrolled pain     Notify your health care provider if you experience any of the following:  persistent nausea and vomiting or diarrhea     Notify your health care provider if you experience any of the following:  temperature >100.4     Activity as tolerated       Significant Diagnostic Studies: Labs: lactate    Pending Diagnostic Studies:     None         Medications:  Reconciled Home Medications:      Medication List      CHANGE how you take these medications    naltrexone 50 mg tablet  Commonly known as: DEPADE  Take 25 mg by mouth once daily.  What changed: how much to take        CONTINUE taking these medications    cyanocobalamin 1000 MCG tablet  Commonly known as: VITAMIN B-12  Take 1 tablet (1,000 mcg total) by mouth once daily.     EScitalopram oxalate 10 MG tablet  Commonly known as: LEXAPRO  Take 1 tablet (10 mg total) by mouth once daily.     FeosoL 45 mg Tab  Generic drug: iron, carbonyl  Take 45 mg by mouth once daily.     folic acid 1 MG tablet  Commonly known as: FOLVITE  Take 1 tablet (1 mg total) by mouth once daily.     pregabalin 100 MG capsule  Commonly known as: LYRICA  Take 1 capsule (100 mg total) by mouth 2 (two) times daily.     THERA-M 9 mg iron-400 mcg Tab tablet  Generic drug: multivit-iron-FA-calcium-mins  Take 1 tablet by mouth once daily.     traZODone 100 MG tablet  Commonly known as: DESYREL  Take 1 tablet (100 mg total) by mouth every evening.     vitamin B-1 500 MG tablet  Take 1 tablet (500 mg total) by mouth once daily.        ASK your doctor about these medications    lisinopriL 10 MG tablet  Take 1 tablet (10 mg total) by mouth once daily.            Indwelling Lines/Drains at time of discharge:   Lines/Drains/Airways     None                 Time spent on the discharge of patient: 40 minutes         Charles Goldsmith MD  Department of Hospital Medicine  [facility]

## 2023-08-02 ENCOUNTER — PATIENT OUTREACH (OUTPATIENT)
Dept: ADMINISTRATIVE | Facility: CLINIC | Age: 53
End: 2023-08-02
Payer: MEDICAID

## 2023-08-02 NOTE — PROGRESS NOTES
C3 nurse spoke with Ember Rivera for a TCC post hospital discharge follow up call. The patient has a scheduled [facility] appointment with Xiomara Tong MD (neuro) 8/4/23 @ 7:30am (new patient apt), and Young Neil MD (PCP) 8/23/23 @ 10:00am.

## 2023-08-04 ENCOUNTER — OFFICE VISIT (OUTPATIENT)
Dept: NEUROLOGY | Facility: CLINIC | Age: 53
End: 2023-08-04
Payer: MEDICAID

## 2023-08-04 VITALS
SYSTOLIC BLOOD PRESSURE: 146 MMHG | DIASTOLIC BLOOD PRESSURE: 82 MMHG | HEART RATE: 85 BPM | BODY MASS INDEX: 26.51 KG/M2 | HEIGHT: 66 IN

## 2023-08-04 DIAGNOSIS — R53.1 WEAKNESS: ICD-10-CM

## 2023-08-04 DIAGNOSIS — G62.1 ALCOHOL-INDUCED POLYNEUROPATHY: Primary | ICD-10-CM

## 2023-08-04 PROCEDURE — 3079F DIAST BP 80-89 MM HG: CPT | Mod: CPTII,,, | Performed by: STUDENT IN AN ORGANIZED HEALTH CARE EDUCATION/TRAINING PROGRAM

## 2023-08-04 PROCEDURE — 3044F HG A1C LEVEL LT 7.0%: CPT | Mod: CPTII,,, | Performed by: STUDENT IN AN ORGANIZED HEALTH CARE EDUCATION/TRAINING PROGRAM

## 2023-08-04 PROCEDURE — 3008F BODY MASS INDEX DOCD: CPT | Mod: CPTII,,, | Performed by: STUDENT IN AN ORGANIZED HEALTH CARE EDUCATION/TRAINING PROGRAM

## 2023-08-04 PROCEDURE — 99215 PR OFFICE/OUTPT VISIT, EST, LEVL V, 40-54 MIN: ICD-10-PCS | Mod: S$PBB,,, | Performed by: STUDENT IN AN ORGANIZED HEALTH CARE EDUCATION/TRAINING PROGRAM

## 2023-08-04 PROCEDURE — 99215 OFFICE O/P EST HI 40 MIN: CPT | Mod: S$PBB,,, | Performed by: STUDENT IN AN ORGANIZED HEALTH CARE EDUCATION/TRAINING PROGRAM

## 2023-08-04 PROCEDURE — 1111F PR DISCHARGE MEDS RECONCILED W/ CURRENT OUTPATIENT MED LIST: ICD-10-PCS | Mod: CPTII,,, | Performed by: STUDENT IN AN ORGANIZED HEALTH CARE EDUCATION/TRAINING PROGRAM

## 2023-08-04 PROCEDURE — 4010F ACE/ARB THERAPY RXD/TAKEN: CPT | Mod: CPTII,,, | Performed by: STUDENT IN AN ORGANIZED HEALTH CARE EDUCATION/TRAINING PROGRAM

## 2023-08-04 PROCEDURE — 99999 PR PBB SHADOW E&M-EST. PATIENT-LVL III: CPT | Mod: PBBFAC,,, | Performed by: STUDENT IN AN ORGANIZED HEALTH CARE EDUCATION/TRAINING PROGRAM

## 2023-08-04 PROCEDURE — 3077F PR MOST RECENT SYSTOLIC BLOOD PRESSURE >= 140 MM HG: ICD-10-PCS | Mod: CPTII,,, | Performed by: STUDENT IN AN ORGANIZED HEALTH CARE EDUCATION/TRAINING PROGRAM

## 2023-08-04 PROCEDURE — 3077F SYST BP >= 140 MM HG: CPT | Mod: CPTII,,, | Performed by: STUDENT IN AN ORGANIZED HEALTH CARE EDUCATION/TRAINING PROGRAM

## 2023-08-04 PROCEDURE — 1159F PR MEDICATION LIST DOCUMENTED IN MEDICAL RECORD: ICD-10-PCS | Mod: CPTII,,, | Performed by: STUDENT IN AN ORGANIZED HEALTH CARE EDUCATION/TRAINING PROGRAM

## 2023-08-04 PROCEDURE — 3044F PR MOST RECENT HEMOGLOBIN A1C LEVEL <7.0%: ICD-10-PCS | Mod: CPTII,,, | Performed by: STUDENT IN AN ORGANIZED HEALTH CARE EDUCATION/TRAINING PROGRAM

## 2023-08-04 PROCEDURE — 4010F PR ACE/ARB THEARPY RXD/TAKEN: ICD-10-PCS | Mod: CPTII,,, | Performed by: STUDENT IN AN ORGANIZED HEALTH CARE EDUCATION/TRAINING PROGRAM

## 2023-08-04 PROCEDURE — 99213 OFFICE O/P EST LOW 20 MIN: CPT | Mod: PBBFAC | Performed by: STUDENT IN AN ORGANIZED HEALTH CARE EDUCATION/TRAINING PROGRAM

## 2023-08-04 PROCEDURE — 99999 PR PBB SHADOW E&M-EST. PATIENT-LVL III: ICD-10-PCS | Mod: PBBFAC,,, | Performed by: STUDENT IN AN ORGANIZED HEALTH CARE EDUCATION/TRAINING PROGRAM

## 2023-08-04 PROCEDURE — 1159F MED LIST DOCD IN RCRD: CPT | Mod: CPTII,,, | Performed by: STUDENT IN AN ORGANIZED HEALTH CARE EDUCATION/TRAINING PROGRAM

## 2023-08-04 PROCEDURE — 3008F PR BODY MASS INDEX (BMI) DOCUMENTED: ICD-10-PCS | Mod: CPTII,,, | Performed by: STUDENT IN AN ORGANIZED HEALTH CARE EDUCATION/TRAINING PROGRAM

## 2023-08-04 PROCEDURE — 3079F PR MOST RECENT DIASTOLIC BLOOD PRESSURE 80-89 MM HG: ICD-10-PCS | Mod: CPTII,,, | Performed by: STUDENT IN AN ORGANIZED HEALTH CARE EDUCATION/TRAINING PROGRAM

## 2023-08-04 PROCEDURE — 1111F DSCHRG MED/CURRENT MED MERGE: CPT | Mod: CPTII,,, | Performed by: STUDENT IN AN ORGANIZED HEALTH CARE EDUCATION/TRAINING PROGRAM

## 2023-08-04 RX ORDER — PREGABALIN 100 MG/1
100 CAPSULE ORAL 2 TIMES DAILY
Qty: 60 CAPSULE | Refills: 5 | Status: ON HOLD | OUTPATIENT
Start: 2023-08-04 | End: 2023-12-21

## 2023-08-10 ENCOUNTER — CLINICAL SUPPORT (OUTPATIENT)
Dept: REHABILITATION | Facility: HOSPITAL | Age: 53
End: 2023-08-10
Payer: MEDICAID

## 2023-08-10 DIAGNOSIS — Z91.81 RISK FOR FALLS: ICD-10-CM

## 2023-08-10 DIAGNOSIS — R26.89 IMBALANCE: ICD-10-CM

## 2023-08-10 DIAGNOSIS — R29.898 WEAKNESS OF BOTH LEGS: ICD-10-CM

## 2023-08-10 DIAGNOSIS — G62.1 ALCOHOL-INDUCED POLYNEUROPATHY: ICD-10-CM

## 2023-08-10 DIAGNOSIS — R53.1 WEAKNESS: ICD-10-CM

## 2023-08-10 PROCEDURE — 97162 PT EVAL MOD COMPLEX 30 MIN: CPT | Mod: PN
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
## 2023-08-14 ENCOUNTER — CLINICAL SUPPORT (OUTPATIENT)
Dept: REHABILITATION | Facility: HOSPITAL | Age: 53
End: 2023-08-14
Payer: MEDICAID

## 2023-08-14 DIAGNOSIS — Z91.81 RISK FOR FALLS: ICD-10-CM

## 2023-08-14 DIAGNOSIS — R26.89 IMBALANCE: ICD-10-CM

## 2023-08-14 DIAGNOSIS — R29.898 WEAKNESS OF BOTH LEGS: Primary | ICD-10-CM

## 2023-08-14 PROCEDURE — 97110 THERAPEUTIC EXERCISES: CPT | Mod: PN

## 2023-08-14 NOTE — PROGRESS NOTES
[facility]   Physical Therapy Treatment Note      Name: Ember Rivera  Clinic Number: [id number]    Therapy Diagnosis:   Encounter Diagnoses   Name Primary?    Weakness of both legs Yes    Imbalance     Risk for falls      Physician: Xiomara Tong MD    Visit Date: 8/14/2023    Physician Orders: PT Eval and Treat   Medical Diagnosis from Referral: Weakness [R53.1], Alcohol-induced polyneuropathy [G62.1]  Evaluation Date: 8/10/2023  Authorization Period Expiration: 8/3/2024  Plan of Care Expiration: 10/6/2023  Progress Note Due: 9/10/2023  Visit # / Visits authorized: 1/ 20  FOTO: 2/ 3     Precautions: Standard and Fall     Time In: 9:31 AM  Time Out: 10:20 AM  Total Billable Time: 49 minutes (3 TE)    PTA Visit #: 0/5     Subjective     Pt reports: no new complaints and denies falls.  She  will be  compliant with home exercise program.  Response to previous treatment: eval on prior session  Functional change: see goals    Pain: 6/10  Location: bilateral low legs from knees to feet with neuropathic pain reported     Objective      Objective Measures updated at progress report unless specified.     Treatment     Ember received the treatments listed below:      therapeutic exercises to develop strength, endurance, ROM, posture, and core stabilization for 36 minutes including:  -- Straight leg raises   2 x 8 bilaterally  -- side lying clamshells  2 x 8 bilaterally  -- side lying     2 x 5 bilaterally  -- hookyling bridges   2 x 8 bilaterally  -- hookying hip add squeezes  5'' holds x 90 secs bilaterally  -- heel raises    2 x 10 bilaterally   -- seated LAQ    2 x 20 Bilaterally   -- stationary bike   7 minutes Level 1.0-2.5     neuromuscular re-education activities to improve: Balance, Coordination, Kinesthetic, Sense, and Posture for 5 minutes. The following activities were included:    -- standing balance    Feet apart on blue ovals 3 x 30'' contact guard assist   -- standing toe taps     2 x 20'' 4'' step no UE support contact guard assist  --   therapeutic activities to improve functional performance for 8  minutes, including:  -- regressive sit to stand  3 x 5 with 2 inch elevation 18, 20, 22 inches   -- 4 minute walk    With rollator 400 feet     Patient Education and Home Exercises       Education provided:   - need to perform HEP daily to improve general strength  - use of rollator for fall prevention during current status    Written Home Exercises Provided: Patient instructed to cont prior HEP. Exercises were reviewed and Ember was able to demonstrate them prior to the end of the session.  Ember demonstrated good  understanding of the education provided. See EMR under Patient Instructions for exercises provided during therapy sessions    Assessment     Ember tolerated full 49 minute session with reports of B LE at the end and she required at least 4 seated rests during session.  She is motivated to walk safely without her AD and she performed standing balance on foam and toe taps well without UE support with cues to initiate core and UEs for counter balance as needed. She would benefit from additional PT to progress toward improved level of functional mobility.     Ember Is progressing well towards her goals.   Pt prognosis is Good.     Pt will continue to benefit from skilled outpatient physical therapy to address the deficits listed in the problem list box on initial evaluation, provide pt/family education and to maximize pt's level of independence in the home and community environment.     Pt's spiritual, cultural and educational needs considered and pt agreeable to plan of care and goals.     Anticipated barriers to physical therapy:  neuropathy, challenging     Goals:  Short Term Goals: 4 weeks   Patient will be compliant with HEP in order to maximize PT benefits  (PROGRESSING, NOT MET)  Patient will complete TUG in </= 15 seconds with least restrictive assistive device in order to  reduce risk for falls and improve safety with functional mobility (PROGRESSING, NOT MET)  Patient will complete >/=30 seconds of static stance under normal base of support without need for upper extremity support in order to improve balance during standing ADLs (PROGRESSING, NOT MET)  Patient will score </= 22 seconds on Five Time Sit to  order to improve BLE endurance and muscular power for transfers  (PROGRESSING, NOT MET)     Long Term Goals: 8 weeks   Patient will score >/= 58% on FOTO limitation survey in order to improve self-perception of functional mobility deficits (PROGRESSING, NOT MET)  Patient will improve bilateral lower extremity MMT grades by >/=1/2 grade in order to improve strength for ADL completion (PROGRESSING, NOT MET)  Patient will complete TUG in </= 12 seconds with least restrictive assistive device in order to reduce risk for falls and improve safety with functional mobility (PROGRESSING, NOT MET)  Patient will complete >/=60 seconds of static stance under normal base of support without need for upper extremity support in order to improve balance during standing ADLs (PROGRESSING, NOT MET)  Patient will score </= 17 seconds on Five Time Sit to  order to improve BLE endurance and muscular power for transfers  (PROGRESSING, NOT MET)  Patient will begin some form of home/community fitness in order to sustain progress gained in PT (PROGRESSING, NOT MET)    Plan     Progress mat exercises as tolerated.  Progress low to moderate standing balance and tolerance as tolerated.  End with walk with AD or bike for endurance training    Christie Man, PT

## 2023-08-21 ENCOUNTER — TELEPHONE (OUTPATIENT)
Dept: HEPATOLOGY | Facility: CLINIC | Age: 53
End: 2023-08-21
Payer: MEDICAID

## 2023-08-21 ENCOUNTER — PATIENT MESSAGE (OUTPATIENT)
Dept: HEPATOLOGY | Facility: CLINIC | Age: 53
End: 2023-08-21
Payer: MEDICAID

## 2023-08-21 NOTE — TELEPHONE ENCOUNTER
Patient called to reschedule virtual visit.  Provider schedule change. No answer, an voicemail was left with call back .        Nostril Rim Text: The closure involved the nostril rim.

## 2023-08-22 ENCOUNTER — TELEPHONE (OUTPATIENT)
Dept: HEPATOLOGY | Facility: CLINIC | Age: 53
End: 2023-08-22
Payer: MEDICAID

## 2023-08-22 NOTE — TELEPHONE ENCOUNTER
Patient called to reschedule appointment.  An appointment have been scheduled for Thursday, September 28, 2023 at 2:30PM (Virtually).     A copy of the appointment have been mailed out.

## 2023-08-25 ENCOUNTER — CLINICAL SUPPORT (OUTPATIENT)
Dept: REHABILITATION | Facility: HOSPITAL | Age: 53
End: 2023-08-25
Payer: MEDICAID

## 2023-08-25 DIAGNOSIS — R29.898 WEAKNESS OF BOTH LEGS: Primary | ICD-10-CM

## 2023-08-25 DIAGNOSIS — Z91.81 RISK FOR FALLS: ICD-10-CM

## 2023-08-25 DIAGNOSIS — R26.89 IMBALANCE: ICD-10-CM

## 2023-08-25 PROCEDURE — 97110 THERAPEUTIC EXERCISES: CPT | Mod: PN

## 2023-08-25 NOTE — PROGRESS NOTES
"[facility]   Physical Therapy Treatment Note      Name: Ember Rivera  Clinic Number: [id number]    Therapy Diagnosis:   Encounter Diagnoses   Name Primary?    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 No injury noted and she was able to recover independently. Otherwise no new complaints.  She  was  compliant with home exercise program.  Response to previous treatment: no adverse response  Functional change: fall at home since last seen in PT    Pain: 0/10  Location: bilateral low legs from knees to feet with neuropathic pain reported     Objective      Objective Measures updated at progress report unless specified.     Treatment     Ember received the treatments listed below:      therapeutic exercises to develop strength, endurance, ROM, posture, and core stabilization for 20 minutes including:    -- Standing heel raises with cues to avoid knee hyperextension and bilateral upper extremity support 2 x 10  -- Seated LAQ2 x 60" each leg with brief holds and 2# ankle weights   -- Hooklying bridges 2 x 10  -- Quad set + straight leg raises 2 x 8 bilaterally    NOT TODAY  -- side lying clamshells  2 x 8 bilaterally  -- side lying     2 x 5 bilaterally  -- hookying hip add squeezes  5'' holds x 90 secs bilaterally  -- heel raises    2 x 10 " "bilaterally   -- stationary bike   7 minutes Level 1.0-2.5     neuromuscular re-education activities to improve: Balance, Coordination, Kinesthetic, Sense, and Posture for 10 minutes. The following activities were included:    -- modified single leg stance; using 4" step 2 x 30" each leg SBA  -- static balance on airex; normal base of support; cues to avoid knee hyperextension 3 x 30"  -- patient education on safety with balance intervention/grounding strategies     NOT TODAY  -- standing balance    Feet apart on blue ovals 3 x 30'' contact guard assist   -- standing toe taps    2 x 20'' 4'' step no UE support contact guard assist    therapeutic activities to improve functional performance for 13 minutes, including:    -- reciprocal step ups to 4" step with verbal cues for quad control/avoid knee hyperextension x8B; bilateral upper extremity support  -- sidestepping against yellow theraband (around ankles) x 4 lengths x 10 feet each; bilateral upper extremity support  -- sit to stand from elevated mat; verbal/tactile cuing to bring hips to and not past neutral position 2x5    Patient Education and Home Exercises       Education provided:   - need to perform HEP daily to improve general strength  - use of rollator for fall prevention during current status    Written Home Exercises Provided: Patient instructed to cont prior HEP. Exercises were reviewed and Ember was able to demonstrate them prior to the end of the session.  Ember demonstrated good  understanding of the education provided. See EMR under Patient Instructions for exercises provided during therapy sessions    Assessment     Ember tolerated session without adverse response despite recent fall. Portion of today's session dedicated to safety education with HEP performance. Frequent compensatory bilateral knee hyperextension to maintain postural control in standing. Patient benefits from verbal cuing to keep knees "soft" and engage quadriceps appropriately. " Only mild quad lag noted toward end of straight leg raise sets and moderate-high levels of fatigue achieved with all quad setting activity. She would benefit from continued PT services to achieve functional goals.    Ember Is progressing well towards her goals.   Pt prognosis is Good.     Pt will continue to benefit from skilled outpatient physical therapy to address the deficits listed in the problem list box on initial evaluation, provide pt/family education and to maximize pt's level of independence in the home and community environment.     Pt's spiritual, cultural and educational needs considered and pt agreeable to plan of care and goals.     Anticipated barriers to physical therapy:  neuropathy, challenging     Goals:  Short Term Goals: 4 weeks   Patient will be compliant with HEP in order to maximize PT benefits  (MET)  Patient will complete TUG in </= 15 seconds with least restrictive assistive device in order to reduce risk for falls and improve safety with functional mobility (PROGRESSING, NOT MET)  Patient will complete >/=30 seconds of static stance under normal base of support without need for upper extremity support in order to improve balance during standing ADLs (PROGRESSING, NOT MET)  Patient will score </= 22 seconds on Five Time Sit to  order to improve BLE endurance and muscular power for transfers  (PROGRESSING, NOT MET)     Long Term Goals: 8 weeks   Patient will score >/= 58% on FOTO limitation survey in order to improve self-perception of functional mobility deficits (PROGRESSING, NOT MET)  Patient will improve bilateral lower extremity MMT grades by >/=1/2 grade in order to improve strength for ADL completion (PROGRESSING, NOT MET)  Patient will complete TUG in </= 12 seconds with least restrictive assistive device in order to reduce risk for falls and improve safety with functional mobility (PROGRESSING, NOT MET)  Patient will complete >/=60 seconds of static stance under normal  base of support without need for upper extremity support in order to improve balance during standing ADLs (PROGRESSING, NOT MET)  Patient will score </= 17 seconds on Five Time Sit to  order to improve BLE endurance and muscular power for transfers  (PROGRESSING, NOT MET)  Patient will begin some form of home/community fitness in order to sustain progress gained in PT (PROGRESSING, NOT MET)    Plan     Progress mat exercises as tolerated.  Progress low to moderate standing balance and tolerance as tolerated.  End with walk with AD or bike for endurance training    JM GEE, PT

## 2023-08-28 ENCOUNTER — HOSPITAL ENCOUNTER (OUTPATIENT)
Dept: RADIOLOGY | Facility: HOSPITAL | Age: 53
Discharge: HOME | End: 2023-08-28
Attending: STUDENT IN AN ORGANIZED HEALTH CARE EDUCATION/TRAINING PROGRAM
Payer: MEDICAID

## 2023-08-28 DIAGNOSIS — R10.84 GENERALIZED ABDOMINAL PAIN: ICD-10-CM

## 2023-08-28 PROCEDURE — 76700 US EXAM ABDOM COMPLETE: CPT | Mod: TC

## 2023-08-28 PROCEDURE — 76700 US ABDOMEN COMPLETE: ICD-10-PCS | Mod: 26,,, | Performed by: RADIOLOGY

## 2023-08-28 PROCEDURE — 76700 US EXAM ABDOM COMPLETE: CPT | Mod: 26,,, | Performed by: RADIOLOGY

## 2023-08-30 ENCOUNTER — CLINICAL SUPPORT (OUTPATIENT)
Dept: REHABILITATION | Facility: HOSPITAL | Age: 53
End: 2023-08-30
Payer: MEDICAID

## 2023-08-30 DIAGNOSIS — R26.89 IMBALANCE: ICD-10-CM

## 2023-08-30 DIAGNOSIS — R29.898 WEAKNESS OF BOTH LEGS: Primary | ICD-10-CM

## 2023-08-30 DIAGNOSIS — Z91.81 RISK FOR FALLS: ICD-10-CM

## 2023-08-30 PROCEDURE — 97110 THERAPEUTIC EXERCISES: CPT | Mod: PN

## 2023-08-30 NOTE — PROGRESS NOTES
"[facility]   Physical Therapy Treatment Note      Name: Ember Rivera  Clinic Number: [id number]    Therapy Diagnosis:   Encounter Diagnoses   Name Primary?    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  She  was  compliant with home exercise program.  Response to previous treatment: no adverse response  Functional change: fall at home since last seen in PT    Pain: 0/10  Location: bilateral low legs from knees to feet with neuropathic pain reported     Objective      Objective Measures updated at progress report unless specified.     Treatment     Ember received the treatments listed below:      therapeutic exercises to develop strength, endurance, ROM, posture, and core stabilization for 25 minutes including:  -- stepper 4 extremities  X 8 minutes Level 2.5 hills   -- Standing heel raises with cues to avoid knee hyperextension and bilateral upper extremity support 2 x 10  -- Seated LAQ2 x 60" each leg with brief holds and 2# ankle weights   -- standing hip abduction 2 x 10 B  -- standing hip abduction 2 x 10 B    NOT TODAY  -- Hooklying bridges 2 x 10  -- Quad set + straight leg raises 2 x 8 bilaterally    neuromuscular re-education activities to improve: Balance, Coordination, Kinesthetic, Sense, and Posture for 3 minutes. The following activities " "were included:  -- modified single leg stance; using 4" step 2 x 30" each leg SBA      NOT TODAY  -- standing balance    Feet apart on blue ovals 3 x 30'' contact guard assist   -- standing toe taps    2 x 20'' 4'' step no UE support contact guard assist  -- static balance on airex; normal base of support; cues to avoid knee hyperextension 3 x 30"    therapeutic activities to improve functional performance for 12 minutes, including:    -- side stepping over low george (2)          2 x 30'' (once with B UE, once with 1 UE)  -- side stepping over how george (1)       2 x 30'' (UE support as needed)   -- sit to stand from elevated mat; verbal/tactile cuing to bring hips to and not past neutral position 2x5 with rolling walker in front  -- patient education on safety with transfer with rolling walker   Patient Education and Home Exercises       Education provided:   - need to perform HEP daily to improve general strength  - use of rollator for fall prevention during current status    Written Home Exercises Provided: Patient instructed to cont prior HEP. Exercises were reviewed and Ember was able to demonstrate them prior to the end of the session.  Ember demonstrated good  understanding of the education provided. See EMR under Patient Instructions for exercises provided during therapy sessions    Assessment     Ember tolerated session without adverse response, moderate levels of fatigue noted. Frequent compensatory bilateral knee hyperextension to maintain postural control in standing. Patient benefits from verbal cuing to keep knees "soft" and engage quadriceps appropriately. She required less UE support today during step overs with hurdles with gradual progression. She would benefit from continued PT services to achieve functional goals.    Ember Is progressing well towards her goals.   Pt prognosis is Good.     Pt will continue to benefit from skilled outpatient physical therapy to address the deficits listed in the " problem list box on initial evaluation, provide pt/family education and to maximize pt's level of independence in the home and community environment.     Pt's spiritual, cultural and educational needs considered and pt agreeable to plan of care and goals.     Anticipated barriers to physical therapy:  neuropathy, challenging     Goals:  Short Term Goals: 4 weeks   Patient will be compliant with HEP in order to maximize PT benefits  (MET)  Patient will complete TUG in </= 15 seconds with least restrictive assistive device in order to reduce risk for falls and improve safety with functional mobility (PROGRESSING, NOT MET)  Patient will complete >/=30 seconds of static stance under normal base of support without need for upper extremity support in order to improve balance during standing ADLs (PROGRESSING, NOT MET)  Patient will score </= 22 seconds on Five Time Sit to  order to improve BLE endurance and muscular power for transfers  (PROGRESSING, NOT MET)     Long Term Goals: 8 weeks   Patient will score >/= 58% on FOTO limitation survey in order to improve self-perception of functional mobility deficits (PROGRESSING, NOT MET)  Patient will improve bilateral lower extremity MMT grades by >/=1/2 grade in order to improve strength for ADL completion (PROGRESSING, NOT MET)  Patient will complete TUG in </= 12 seconds with least restrictive assistive device in order to reduce risk for falls and improve safety with functional mobility (PROGRESSING, NOT MET)  Patient will complete >/=60 seconds of static stance under normal base of support without need for upper extremity support in order to improve balance during standing ADLs (PROGRESSING, NOT MET)  Patient will score </= 17 seconds on Five Time Sit to  order to improve BLE endurance and muscular power for transfers  (PROGRESSING, NOT MET)  Patient will begin some form of home/community fitness in order to sustain progress gained in PT (PROGRESSING, NOT  MET)    Plan     Progress mat/seated/standing exercises as tolerated (focus on quads).  Progress low to moderate standing balance with gradual reduction of UE support  as tolerated.  End with walk with AD or bike for endurance training    Christie Man, PT

## 2023-09-06 ENCOUNTER — CLINICAL SUPPORT (OUTPATIENT)
Dept: REHABILITATION | Facility: HOSPITAL | Age: 53
End: 2023-09-06
Payer: MEDICAID

## 2023-09-06 DIAGNOSIS — R26.89 IMBALANCE: ICD-10-CM

## 2023-09-06 DIAGNOSIS — R29.898 WEAKNESS OF BOTH LEGS: Primary | ICD-10-CM

## 2023-09-06 DIAGNOSIS — Z91.81 RISK FOR FALLS: ICD-10-CM

## 2023-09-06 PROCEDURE — 97110 THERAPEUTIC EXERCISES: CPT | Mod: PN

## 2023-09-06 NOTE — PROGRESS NOTES
"[facility]   Physical Therapy Treatment Note      Name: Ember Rivera  Clinic Number: [id number]    Therapy Diagnosis:   Encounter Diagnoses   Name Primary?    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    She  was  compliant with home exercise program.  Response to previous treatment: no adverse response  Functional change: fall at home since last seen in PT    Pain: 0/10  Location: bilateral low legs from knees to feet with neuropathic pain reported     Objective      Objective Measures updated at progress report unless specified.     Treatment     Ember received the treatments listed below:      therapeutic exercises to develop strength, endurance, ROM, posture, and core stabilization for 20 minutes including:  -- stepper 4 extremities  X 8 minutes Level 3.0 sprints   -- standing hip abduction 2 x 10 B #1   -- standing hip abduction 2 x 10 B #1  -- mini-squats   X 10 with B UE support     NOT TODAY  -- Hooklying bridges 2 x 10  -- Quad set + straight leg raises 2 x 8 bilaterally  -- stepper 4 extremities  X 8 minutes Level 2.5 hills   -- Standing heel raises with cues to avoid knee hyperextension and bilateral upper extremity support 2 x 10  -- Seated LAQ2 x 60" each leg with brief holds and 2# ankle weights " "    neuromuscular re-education activities to improve: Balance, Coordination, Kinesthetic, Sense, and Posture for 12 minutes. The following activities were included:  -- modified single leg stance; using 6" step x 30" each leg SBA       X 20 chest press  -- forward stepping over low george (5)          3 laps in // bars with 1 UE support (mixture of reciprocal and non-reciprocal)  -- side stepping over how george (5)      3 laps in // bars with 1 UE support as needed (mixture of reciprocal and non-reciprocal)  NOT TODAY  -- standing balance    Feet apart on blue ovals 3 x 30'' contact guard assist   -- standing toe taps    2 x 20'' 4'' step no UE support contact guard assist  -- static balance on airex; normal base of support; cues to avoid knee hyperextension 3 x 30"    therapeutic activities to improve functional performance for 00 minutes, including:    -- Gait training with SPC in right hand X 10 minutes total consisting of:  -160 feet with Catherine with need for verbal cues for 3 point pattern with SPC and cues for left UE swing for counter balance   -320 feet with Catherine with a few moments of unsteadiness but no true LOB  -- patient education need to use rolling walker for now  Patient Education and Home Exercises       Education provided:   - need to perform HEP daily to improve general strength  - use of rollator for fall prevention during current status    Written Home Exercises Provided: Patient instructed to cont prior HEP. Exercises were reviewed and Ember was able to demonstrate them prior to the end of the session.  Ember demonstrated good  understanding of the education provided. See EMR under Patient Instructions for exercises provided during therapy sessions    Assessment     Ember did well with the trial of gait training with SPC.  She will likely be able to wean to a SPC in the coming weeks in the home and indoors.  She was fatigued today but was agreeable to session and training.  She would benefit from " more quad stability and focus in addition to general balance. She would benefit from continued PT services to achieve functional goals.    Ember Is progressing well towards her goals.   Pt prognosis is Good.     Pt will continue to benefit from skilled outpatient physical therapy to address the deficits listed in the problem list box on initial evaluation, provide pt/family education and to maximize pt's level of independence in the home and community environment.     Pt's spiritual, cultural and educational needs considered and pt agreeable to plan of care and goals.     Anticipated barriers to physical therapy:  neuropathy, challenging     Goals:  Short Term Goals: 4 weeks   Patient will be compliant with HEP in order to maximize PT benefits  (MET)  Patient will complete TUG in </= 15 seconds with least restrictive assistive device in order to reduce risk for falls and improve safety with functional mobility (PROGRESSING, NOT MET)  Patient will complete >/=30 seconds of static stance under normal base of support without need for upper extremity support in order to improve balance during standing ADLs (PROGRESSING, NOT MET)  Patient will score </= 22 seconds on Five Time Sit to  order to improve BLE endurance and muscular power for transfers  (PROGRESSING, NOT MET)     Long Term Goals: 8 weeks   Patient will score >/= 58% on FOTO limitation survey in order to improve self-perception of functional mobility deficits (PROGRESSING, NOT MET)  Patient will improve bilateral lower extremity MMT grades by >/=1/2 grade in order to improve strength for ADL completion (PROGRESSING, NOT MET)  Patient will complete TUG in </= 12 seconds with least restrictive assistive device in order to reduce risk for falls and improve safety with functional mobility (PROGRESSING, NOT MET)  Patient will complete >/=60 seconds of static stance under normal base of support without need for upper extremity support in order to improve  balance during standing ADLs (PROGRESSING, NOT MET)  Patient will score </= 17 seconds on Five Time Sit to  order to improve BLE endurance and muscular power for transfers  (PROGRESSING, NOT MET)  Patient will begin some form of home/community fitness in order to sustain progress gained in PT (PROGRESSING, NOT MET)    Plan     Continue gait training with SPC from gym (try left hand next trial with Catherine).  Wean from rolling walker when safe. Progress mat/seated/standing exercises as tolerated (focus on quads).  Progress     Christie Man, PT

## 2023-09-14 ENCOUNTER — TELEPHONE (OUTPATIENT)
Dept: FAMILY MEDICINE | Facility: HOSPITAL | Age: 53
End: 2023-09-14
Payer: MEDICAID

## 2023-09-14 ENCOUNTER — OFFICE VISIT (OUTPATIENT)
Dept: FAMILY MEDICINE | Facility: HOSPITAL | Age: 53
DRG: 641 | End: 2023-09-14
Payer: MEDICAID

## 2023-09-14 VITALS
WEIGHT: 164.88 LBS | SYSTOLIC BLOOD PRESSURE: 126 MMHG | HEART RATE: 91 BPM | DIASTOLIC BLOOD PRESSURE: 60 MMHG | HEIGHT: 66 IN | BODY MASS INDEX: 26.5 KG/M2

## 2023-09-14 DIAGNOSIS — E87.29 HIGH ANION GAP METABOLIC ACIDOSIS: ICD-10-CM

## 2023-09-14 DIAGNOSIS — F10.90 ALCOHOL USE DISORDER: Primary | ICD-10-CM

## 2023-09-14 DIAGNOSIS — E87.6 HYPOKALEMIA: ICD-10-CM

## 2023-09-14 DIAGNOSIS — E87.6 HYPOKALEMIA: Primary | ICD-10-CM

## 2023-09-14 DIAGNOSIS — I10 ESSENTIAL HYPERTENSION: ICD-10-CM

## 2023-09-14 DIAGNOSIS — G62.1 ALCOHOL-INDUCED POLYNEUROPATHY: ICD-10-CM

## 2023-09-14 PROCEDURE — 99213 OFFICE O/P EST LOW 20 MIN: CPT | Performed by: STUDENT IN AN ORGANIZED HEALTH CARE EDUCATION/TRAINING PROGRAM

## 2023-09-14 RX ORDER — SPIRONOLACTONE 25 MG/1
25 TABLET ORAL DAILY
Qty: 60 TABLET | Refills: 0 | Status: SHIPPED | OUTPATIENT
Start: 2023-09-14 | End: 2023-12-19

## 2023-09-14 RX ORDER — TOPIRAMATE 25 MG/1
25 TABLET ORAL DAILY
Qty: 60 TABLET | Refills: 0 | Status: SHIPPED | OUTPATIENT
Start: 2023-09-14 | End: 2023-10-31 | Stop reason: SDUPTHER

## 2023-09-14 NOTE — PROGRESS NOTES
Clinic Note  [facility]    Subjective:      Ember Rivera is a 53 y.o. female alcohol use disorder, alcohol-induced neuropathy, hepatic steatosis as well as recurrent hypokalemia. Patient here for neuropathy and alcohol use follow up. Overall patient is feeling well.    Alcohol - Can't tolerate (GI) even baby dose (12.5) of naltrexone when she drinks and she does not find it is reducing cravings. Amenable to medication that won't make her sick when she drinks.    Neuropathy - Continuing to work with PT, says she's making progress, improving ambulation.    Hypo-K- Has not been taking K since last appt in July when she had to be admitted for low K. Says she's been eating more complete foods. States from now she's willing to take K supplement but not if pill is too big.      Review of Systems   Constitutional:  Negative for chills and fever.   HENT:  Negative for congestion and sore throat.    Respiratory:  Negative for cough.    Cardiovascular:  Negative for chest pain and palpitations.   Gastrointestinal:  Negative for abdominal pain, diarrhea, nausea and vomiting.   Genitourinary:  Negative for dysuria.   Musculoskeletal:  Negative for joint pain and myalgias.   Neurological:  Negative for dizziness, tingling, weakness and headaches.   Psychiatric/Behavioral:  Negative for depression. The patient is not nervous/anxious.           Objective:      Vitals:    09/14/23 1440   BP: 126/60   Pulse: 91     Body mass index is 26.62 kg/m².      Physical Exam  Vitals reviewed: Ambulates with assistance of walker.   Constitutional:       Appearance: Normal appearance. She is well-developed.   Cardiovascular:      Rate and Rhythm: Normal rate and regular rhythm.      Pulses: Normal pulses.   Pulmonary:      Effort: Pulmonary effort is normal.      Breath sounds: Normal breath sounds.   Abdominal:      General: Abdomen is flat. Bowel sounds are normal.      Palpations: Abdomen is soft.   Musculoskeletal:          General: Normal range of motion.      Cervical back: Normal range of motion.   Skin:     General: Skin is warm and dry.      Capillary Refill: Capillary refill takes less than 2 seconds.      Coloration: Skin is not pale.   Neurological:      General: No focal deficit present.      Mental Status: She is alert and oriented to person, place, and time.   Psychiatric:         Mood and Affect: Mood normal.         Behavior: Behavior normal.            Assessment/Plan:      Patient overall feeling and appearing well, needs to be on K supplements. For alcohol will try starting on low-dose topamax. Follow up in 1 month for repeat labs and alcohol follow up.      1. Alcohol use disorder    - topiramate (TOPAMAX) 25 MG tablet; Take 1 tablet (25 mg total) by mouth once daily.  Dispense: 60 tablet; Refill: 0  - potassium bicarbonate (K-LYTE) disintegrating tablet; Take 1 tablet (20 mEq total) by mouth 2 (two) times a day.  Dispense: 90 tablet; Refill: 0  - Comprehensive Metabolic Panel; Future  - CBC Auto Differential; Future    2. Alcohol-induced polyneuropathy    - topiramate (TOPAMAX) 25 MG tablet; Take 1 tablet (25 mg total) by mouth once daily.  Dispense: 60 tablet; Refill: 0  - Comprehensive Metabolic Panel; Future  - Vitamin B12; Future  - Folate; Future    3. Hypokalemia    - Comprehensive Metabolic Panel; Future  - spironolactone (ALDACTONE) 25 MG tablet; Take 1 tablet (25 mg total) by mouth once daily.  Dispense: 60 tablet; Refill: 0    4. Essential hypertension    - Comprehensive Metabolic Panel; Future  - spironolactone (ALDACTONE) 25 MG tablet; Take 1 tablet (25 mg total) by mouth once daily.  Dispense: 60 tablet; Refill: 0        Patient discussed with attending physician, Dr. Dilma Neil MD  [facility] PGY-3  09/14/2023      The following information is provided to all patients.  This information is to help you find resources for any of the problems found today that may be affecting your  health:                Living healthy guide: www.[facility].[place].[facility]       Understanding Diabetes: www.diabetes.org       Eating healthy: www.cdc.gov/healthyweight      CDC home safety checklist: www.cdc.gov/steadi/patient.html      Agency on Aging: www.goea.[place].[facility]       Alcoholics anonymous (AA): www.aa.org      Physical Activity: www.rashad.nih.gov/at2lqkw       Tobacco use: www.quitwithusla.org

## 2023-09-14 NOTE — TELEPHONE ENCOUNTER
Called patient regarding K of 2.4 and anion gap of 27. Instructed patient to present to ED ASAP for correction Patient verbalized understanding, intends to present to ED ASAP.    Young Neil MD  [facility] PGY-3

## 2023-09-15 ENCOUNTER — HOSPITAL ENCOUNTER (INPATIENT)
Facility: HOSPITAL | Age: 53
LOS: 1 days | Discharge: HOME | DRG: 641 | End: 2023-09-16
Attending: EMERGENCY MEDICINE | Admitting: EMERGENCY MEDICINE
Payer: MEDICAID

## 2023-09-15 ENCOUNTER — TELEPHONE (OUTPATIENT)
Dept: REHABILITATION | Facility: HOSPITAL | Age: 53
End: 2023-09-15
Payer: MEDICAID

## 2023-09-15 DIAGNOSIS — E87.6 HYPOKALEMIA: ICD-10-CM

## 2023-09-15 DIAGNOSIS — E88.89 ALCOHOLIC KETOSIS: Primary | ICD-10-CM

## 2023-09-15 PROBLEM — E87.1 HYPONATREMIA: Chronic | Status: RESOLVED | Noted: 2023-06-17 | Resolved: 2023-09-15

## 2023-09-15 LAB
ALBUMIN SERPL BCP-MCNC: 3.2 G/DL (ref 3.5–5.2)
ALP SERPL-CCNC: 193 U/L (ref 55–135)
ALT SERPL W/O P-5'-P-CCNC: 40 U/L (ref 10–44)
ANION GAP SERPL CALC-SCNC: 24 MMOL/L (ref 8–16)
AST SERPL-CCNC: 156 U/L (ref 10–40)
BASOPHILS # BLD AUTO: 0.02 K/UL (ref 0–0.2)
BASOPHILS NFR BLD: 0.3 % (ref 0–1.9)
BILIRUB SERPL-MCNC: 0.9 MG/DL (ref 0.1–1)
BILIRUB UR QL STRIP: NEGATIVE
BUN SERPL-MCNC: 2 MG/DL (ref 6–20)
CALCIUM SERPL-MCNC: 8.5 MG/DL (ref 8.7–10.5)
CHLORIDE SERPL-SCNC: 96 MMOL/L (ref 95–110)
CLARITY UR: ABNORMAL
CO2 SERPL-SCNC: 22 MMOL/L (ref 23–29)
COLOR UR: YELLOW
CREAT SERPL-MCNC: 0.6 MG/DL (ref 0.5–1.4)
DIFFERENTIAL METHOD: ABNORMAL
EOSINOPHIL # BLD AUTO: 0 K/UL (ref 0–0.5)
EOSINOPHIL NFR BLD: 0.3 % (ref 0–8)
ERYTHROCYTE [DISTWIDTH] IN BLOOD BY AUTOMATED COUNT: 12.2 % (ref 11.5–14.5)
EST. GFR  (NO RACE VARIABLE): >60 ML/MIN/1.73 M^2
FIO2: 21 %
GLUCOSE SERPL-MCNC: 112 MG/DL (ref 70–110)
GLUCOSE UR QL STRIP: NEGATIVE
HCT VFR BLD AUTO: 38.6 % (ref 37–48.5)
HGB BLD-MCNC: 13.1 G/DL (ref 12–16)
HGB UR QL STRIP: NEGATIVE
IMM GRANULOCYTES # BLD AUTO: 0.03 K/UL (ref 0–0.04)
IMM GRANULOCYTES NFR BLD AUTO: 0.4 % (ref 0–0.5)
KETONES UR QL STRIP: NEGATIVE
LACTATE SERPL-SCNC: 10.1 MMOL/L (ref 0.5–2.2)
LEUKOCYTE ESTERASE UR QL STRIP: NEGATIVE
LYMPHOCYTES # BLD AUTO: 1.2 K/UL (ref 1–4.8)
LYMPHOCYTES NFR BLD: 15.8 % (ref 18–48)
MAGNESIUM SERPL-MCNC: 1.5 MG/DL (ref 1.6–2.6)
MCH RBC QN AUTO: 33.5 PG (ref 27–31)
MCHC RBC AUTO-ENTMCNC: 33.9 G/DL (ref 32–36)
MCV RBC AUTO: 99 FL (ref 82–98)
MONOCYTES # BLD AUTO: 0.9 K/UL (ref 0.3–1)
MONOCYTES NFR BLD: 12.7 % (ref 4–15)
NEUTROPHILS # BLD AUTO: 5.2 K/UL (ref 1.8–7.7)
NEUTROPHILS NFR BLD: 70.5 % (ref 38–73)
NITRITE UR QL STRIP: NEGATIVE
NRBC BLD-RTO: 0 /100 WBC
PCO2 BLDA: 41.9 MMHG (ref 35–45)
PH SMN: 7.42 [PH] (ref 7.35–7.45)
PH UR STRIP: 6 [PH] (ref 5–8)
PLATELET # BLD AUTO: 224 K/UL (ref 150–450)
PMV BLD AUTO: 9.4 FL (ref 9.2–12.9)
PO2 BLDA: 38.8 MMHG (ref 40–60)
POC BASE DEFICIT: 2.2 MMOL/L (ref -2–2)
POC HCO3: 27 MMOL/L (ref 24–28)
POC PERFORMED BY: ABNORMAL
POC SATURATED O2: 70.3 % (ref 95–100)
POCT GLUCOSE: 95 MG/DL (ref 70–110)
POTASSIUM SERPL-SCNC: 2.4 MMOL/L (ref 3.5–5.1)
PROT SERPL-MCNC: 6.5 G/DL (ref 6–8.4)
PROT UR QL STRIP: ABNORMAL
RBC # BLD AUTO: 3.91 M/UL (ref 4–5.4)
SODIUM SERPL-SCNC: 142 MMOL/L (ref 136–145)
SP GR UR STRIP: 1.01 (ref 1–1.03)
SPECIMEN SOURCE: ABNORMAL
URN SPEC COLLECT METH UR: ABNORMAL
UROBILINOGEN UR STRIP-ACNC: NEGATIVE EU/DL
WBC # BLD AUTO: 7.4 K/UL (ref 3.9–12.7)

## 2023-09-15 PROCEDURE — 85025 COMPLETE CBC W/AUTO DIFF WBC: CPT | Performed by: EMERGENCY MEDICINE

## 2023-09-15 PROCEDURE — 93005 ELECTROCARDIOGRAM TRACING: CPT

## 2023-09-15 PROCEDURE — 25000003 PHARM REV CODE 250

## 2023-09-15 PROCEDURE — 96365 THER/PROPH/DIAG IV INF INIT: CPT

## 2023-09-15 PROCEDURE — 93010 ELECTROCARDIOGRAM REPORT: CPT | Mod: ,,, | Performed by: INTERNAL MEDICINE

## 2023-09-15 PROCEDURE — 81003 URINALYSIS AUTO W/O SCOPE: CPT | Performed by: EMERGENCY MEDICINE

## 2023-09-15 PROCEDURE — 99285 EMERGENCY DEPT VISIT HI MDM: CPT | Mod: 25

## 2023-09-15 PROCEDURE — 99900035 HC TECH TIME PER 15 MIN (STAT)

## 2023-09-15 PROCEDURE — 82803 BLOOD GASES ANY COMBINATION: CPT

## 2023-09-15 PROCEDURE — 63600175 PHARM REV CODE 636 W HCPCS: Performed by: EMERGENCY MEDICINE

## 2023-09-15 PROCEDURE — 93010 EKG 12-LEAD: ICD-10-PCS | Mod: ,,, | Performed by: INTERNAL MEDICINE

## 2023-09-15 PROCEDURE — 80053 COMPREHEN METABOLIC PANEL: CPT | Performed by: EMERGENCY MEDICINE

## 2023-09-15 PROCEDURE — 83735 ASSAY OF MAGNESIUM: CPT | Performed by: EMERGENCY MEDICINE

## 2023-09-15 PROCEDURE — 63600175 PHARM REV CODE 636 W HCPCS

## 2023-09-15 PROCEDURE — 11000001 HC ACUTE MED/SURG PRIVATE ROOM

## 2023-09-15 PROCEDURE — 83605 ASSAY OF LACTIC ACID: CPT | Performed by: EMERGENCY MEDICINE

## 2023-09-15 RX ORDER — POTASSIUM CHLORIDE 7.45 MG/ML
20 INJECTION INTRAVENOUS
Status: DISCONTINUED | OUTPATIENT
Start: 2023-09-15 | End: 2023-09-15

## 2023-09-15 RX ORDER — PREGABALIN 50 MG/1
100 CAPSULE ORAL 2 TIMES DAILY
Status: DISCONTINUED | OUTPATIENT
Start: 2023-09-15 | End: 2023-09-16 | Stop reason: HOSPADM

## 2023-09-15 RX ORDER — IBUPROFEN 600 MG/1
600 TABLET ORAL EVERY 6 HOURS PRN
Status: DISCONTINUED | OUTPATIENT
Start: 2023-09-15 | End: 2023-09-16 | Stop reason: HOSPADM

## 2023-09-15 RX ORDER — TRAZODONE HYDROCHLORIDE 100 MG/1
100 TABLET ORAL NIGHTLY
Status: DISCONTINUED | OUTPATIENT
Start: 2023-09-15 | End: 2023-09-16 | Stop reason: HOSPADM

## 2023-09-15 RX ORDER — LORAZEPAM 2 MG/ML
2 INJECTION INTRAMUSCULAR EVERY 10 MIN PRN
Status: DISCONTINUED | OUTPATIENT
Start: 2023-09-15 | End: 2023-09-15

## 2023-09-15 RX ORDER — ONDANSETRON 2 MG/ML
4 INJECTION INTRAMUSCULAR; INTRAVENOUS EVERY 8 HOURS PRN
Status: DISCONTINUED | OUTPATIENT
Start: 2023-09-15 | End: 2023-09-16 | Stop reason: HOSPADM

## 2023-09-15 RX ORDER — ASPIRIN 81 MG
1 TABLET, DELAYED RELEASE (ENTERIC COATED) ORAL DAILY
COMMUNITY
End: 2023-12-19

## 2023-09-15 RX ORDER — MAGNESIUM SULFATE HEPTAHYDRATE 40 MG/ML
2 INJECTION, SOLUTION INTRAVENOUS ONCE
Status: COMPLETED | OUTPATIENT
Start: 2023-09-15 | End: 2023-09-15

## 2023-09-15 RX ORDER — LIDOCAINE 50 MG/G
1 PATCH TOPICAL DAILY PRN
Status: DISCONTINUED | OUTPATIENT
Start: 2023-09-15 | End: 2023-09-16 | Stop reason: HOSPADM

## 2023-09-15 RX ORDER — POTASSIUM CHLORIDE 7.45 MG/ML
10 INJECTION INTRAVENOUS
Status: COMPLETED | OUTPATIENT
Start: 2023-09-15 | End: 2023-09-15

## 2023-09-15 RX ORDER — THIAMINE HCL 100 MG
500 TABLET ORAL DAILY
Status: DISCONTINUED | OUTPATIENT
Start: 2023-09-15 | End: 2023-09-15

## 2023-09-15 RX ORDER — IBUPROFEN 200 MG
16 TABLET ORAL
Status: DISCONTINUED | OUTPATIENT
Start: 2023-09-15 | End: 2023-09-16 | Stop reason: HOSPADM

## 2023-09-15 RX ORDER — LORAZEPAM 2 MG/ML
2 INJECTION INTRAMUSCULAR EVERY 10 MIN PRN
Status: ACTIVE | OUTPATIENT
Start: 2023-09-15 | End: 2023-09-15

## 2023-09-15 RX ORDER — TOPIRAMATE 25 MG/1
25 TABLET ORAL DAILY
Status: DISCONTINUED | OUTPATIENT
Start: 2023-09-15 | End: 2023-09-16 | Stop reason: HOSPADM

## 2023-09-15 RX ORDER — LANOLIN ALCOHOL/MO/W.PET/CERES
1000 CREAM (GRAM) TOPICAL DAILY
Status: DISCONTINUED | OUTPATIENT
Start: 2023-09-15 | End: 2023-09-16 | Stop reason: HOSPADM

## 2023-09-15 RX ORDER — TALC
9 POWDER (GRAM) TOPICAL NIGHTLY PRN
Status: DISCONTINUED | OUTPATIENT
Start: 2023-09-15 | End: 2023-09-16 | Stop reason: HOSPADM

## 2023-09-15 RX ORDER — FOLIC ACID 1 MG/1
1 TABLET ORAL DAILY
Status: DISCONTINUED | OUTPATIENT
Start: 2023-09-15 | End: 2023-09-16 | Stop reason: HOSPADM

## 2023-09-15 RX ORDER — ESCITALOPRAM OXALATE 10 MG/1
10 TABLET ORAL DAILY
Status: DISCONTINUED | OUTPATIENT
Start: 2023-09-15 | End: 2023-09-16 | Stop reason: HOSPADM

## 2023-09-15 RX ORDER — SODIUM CHLORIDE, SODIUM LACTATE, POTASSIUM CHLORIDE, CALCIUM CHLORIDE 600; 310; 30; 20 MG/100ML; MG/100ML; MG/100ML; MG/100ML
INJECTION, SOLUTION INTRAVENOUS CONTINUOUS
Status: ACTIVE | OUTPATIENT
Start: 2023-09-15 | End: 2023-09-16

## 2023-09-15 RX ORDER — THIAMINE HCL 100 MG
100 TABLET ORAL DAILY
Status: DISCONTINUED | OUTPATIENT
Start: 2023-09-16 | End: 2023-09-16 | Stop reason: HOSPADM

## 2023-09-15 RX ORDER — GLUCAGON 1 MG
1 KIT INJECTION
Status: DISCONTINUED | OUTPATIENT
Start: 2023-09-15 | End: 2023-09-16 | Stop reason: HOSPADM

## 2023-09-15 RX ORDER — SODIUM CHLORIDE 0.9 % (FLUSH) 0.9 %
5 SYRINGE (ML) INJECTION
Status: DISCONTINUED | OUTPATIENT
Start: 2023-09-15 | End: 2023-09-16 | Stop reason: HOSPADM

## 2023-09-15 RX ORDER — AMOXICILLIN 250 MG
1 CAPSULE ORAL 2 TIMES DAILY PRN
Status: DISCONTINUED | OUTPATIENT
Start: 2023-09-15 | End: 2023-09-16 | Stop reason: HOSPADM

## 2023-09-15 RX ORDER — MAG HYDROX/ALUMINUM HYD/SIMETH 200-200-20
30 SUSPENSION, ORAL (FINAL DOSE FORM) ORAL 4 TIMES DAILY PRN
Status: DISCONTINUED | OUTPATIENT
Start: 2023-09-15 | End: 2023-09-16 | Stop reason: HOSPADM

## 2023-09-15 RX ORDER — IBUPROFEN 200 MG
24 TABLET ORAL
Status: DISCONTINUED | OUTPATIENT
Start: 2023-09-15 | End: 2023-09-16 | Stop reason: HOSPADM

## 2023-09-15 RX ADMIN — TOPIRAMATE 25 MG: 25 TABLET, FILM COATED ORAL at 04:09

## 2023-09-15 RX ADMIN — MAGNESIUM SULFATE HEPTAHYDRATE 2 G: 40 INJECTION, SOLUTION INTRAVENOUS at 04:09

## 2023-09-15 RX ADMIN — POTASSIUM CHLORIDE 10 MEQ: 7.46 INJECTION, SOLUTION INTRAVENOUS at 01:09

## 2023-09-15 RX ADMIN — FOLIC ACID 1 MG: 1 TABLET ORAL at 04:09

## 2023-09-15 RX ADMIN — POTASSIUM BICARBONATE 40 MEQ: 391 TABLET, EFFERVESCENT ORAL at 02:09

## 2023-09-15 RX ADMIN — CYANOCOBALAMIN TAB 1000 MCG 1000 MCG: 1000 TAB at 04:09

## 2023-09-15 RX ADMIN — FOLIC ACID: 5 INJECTION, SOLUTION INTRAMUSCULAR; INTRAVENOUS; SUBCUTANEOUS at 04:09

## 2023-09-15 RX ADMIN — SODIUM CHLORIDE, POTASSIUM CHLORIDE, SODIUM LACTATE AND CALCIUM CHLORIDE 1000 ML: 600; 310; 30; 20 INJECTION, SOLUTION INTRAVENOUS at 01:09

## 2023-09-15 RX ADMIN — ESCITALOPRAM OXALATE 10 MG: 10 TABLET ORAL at 04:09

## 2023-09-15 RX ADMIN — PREGABALIN 100 MG: 50 CAPSULE ORAL at 09:09

## 2023-09-15 RX ADMIN — POTASSIUM CHLORIDE 10 MEQ: 7.46 INJECTION, SOLUTION INTRAVENOUS at 02:09

## 2023-09-15 RX ADMIN — TRAZODONE HYDROCHLORIDE 100 MG: 100 TABLET ORAL at 09:09

## 2023-09-15 RX ADMIN — THERA TABS 1 TABLET: TAB at 04:09

## 2023-09-15 RX ADMIN — POTASSIUM BICARBONATE 40 MEQ: 391 TABLET, EFFERVESCENT ORAL at 09:09

## 2023-09-15 NOTE — NURSING
Patient arrived to unit from ER via stretcher.   Patient in room 458.  Transferred into bed with minimal assist.   Telephone report given .  Charge nurse advised of patient arrival.   VS currently stable.   Tele monitor applied.   Patient oriented to room, unit, and call bell.   Bed in lowest position, call light in reach.  Encouraged to notify of all needs.   Will continue to monitor.

## 2023-09-15 NOTE — PLAN OF CARE
09/15/23 1659   Admission   Initial VN Admission Questions Complete   Communication Issues? None   Shift   Virtual Nurse - Patient Verbalized Approval Of Camera Use   Safety/Activity   Patient Rounds visualized patient;placement of personal items at bedside;ID band on;clutter free environment maintained;call light in patient/parent reach;bed wheels locked;bed in low position
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
## 2023-09-15 NOTE — ASSESSMENT & PLAN NOTE
Endorses 8 drinks per day. Similar admit in Jul '23.   Attempted to quit with naltrexone, did not tolerate GI s/e... now trying topiramate  Last drink 9/14 PM    PLAN:  Continue home topiramate 25mg qd   Q4 CIWAs w/Ativan PRN for seizures, CIWA >8

## 2023-09-15 NOTE — HPI
54 y/o F with PMHx alcohol use disorder, alcohol-induced neuropathy, hepatic steatosis, chronic paresthesias to hands and feet.  Presents after notification about routine lab results with K+ 2.4, Anion Gap 27 and fatigue for 2-3 days with no other symptoms including fevers, cough, n/v, diarrhea. Patient's last drink was yesterday evening, . She typically has 8 drinks per day, 4 beers, 4 shots. She has never had withdrawal symptoms. She smokes 1/4 pack per day, denies other substances including homemade alcohols, industrial chemicals. She recently tried naltrexone, is not able to tolerate (GI) even baby dose (12.5) of naltrexone when she drinks and she does not find it is reducing cravings. Amenable to medication that won't make her sick when she drinks. Her neuropathy (pedal) also improving, working with PT, says she's making progress, improving ambulation.     In Emergency, 136/75, 94 HR, 98.5F, SpO2 95% on RA. Labs were significant for Na+ 142, K+ 2.4, Mg++ 1.5, CO2 22, AST//40, Anion Gap 24, CBC wnl, B9, B12 wnl, Lactate 10.1, VB.41/41.9/38.8/27, no imaging was done. EKG showed nsr, flattened Ts, QTc 473. She was treated with supplemental K+, Mg++, B1 and was admitted to [facility] for evaluation of hypokalemia, lactic acidosis.
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
## 2023-09-15 NOTE — ED NOTES
Pt transported to floor by this nurse with cardiac monitoring. AOX4 upon transport to the floor. Messaged team via secure chat for an order for tele monitoring ( Charles Rodriguez).
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
## 2023-09-15 NOTE — PHARMACY MED REC
"Admission Medication History     The home medication history was taken by Bushra Tabares CPhT.    Medication history obtained from, Patient Verified    You may go to "Admission" then "Reconcile Home Medications" tabs to review and/or act upon these items.     The home medication list has been updated by the Pharmacy department.   Please read ALL comments highlighted in yellow.   Please address this information as you see fit.    Feel free to contact us if you have any questions or require assistance.      The medications listed below were removed from the home medication list.  Please reorder if appropriate:  Patient reports no longer taking the following medication(s):  Thiamine 25 mg        Bushra Tabares CPhT.  Ext [phone]               .          "

## 2023-09-16 VITALS
BODY MASS INDEX: 25.72 KG/M2 | RESPIRATION RATE: 18 BRPM | DIASTOLIC BLOOD PRESSURE: 88 MMHG | SYSTOLIC BLOOD PRESSURE: 177 MMHG | HEIGHT: 66 IN | TEMPERATURE: 99 F | WEIGHT: 160.06 LBS | HEART RATE: 74 BPM | OXYGEN SATURATION: 97 %

## 2023-09-16 LAB
ALBUMIN SERPL BCP-MCNC: 2.8 G/DL (ref 3.5–5.2)
ALP SERPL-CCNC: 170 U/L (ref 55–135)
ALT SERPL W/O P-5'-P-CCNC: 39 U/L (ref 10–44)
AMPHET+METHAMPHET UR QL: NEGATIVE
ANION GAP SERPL CALC-SCNC: 14 MMOL/L (ref 8–16)
AST SERPL-CCNC: 148 U/L (ref 10–40)
BARBITURATES UR QL SCN>200 NG/ML: NEGATIVE
BASOPHILS # BLD AUTO: 0.03 K/UL (ref 0–0.2)
BASOPHILS NFR BLD: 0.6 % (ref 0–1.9)
BENZODIAZ UR QL SCN>200 NG/ML: NEGATIVE
BILIRUB SERPL-MCNC: 1 MG/DL (ref 0.1–1)
BUN SERPL-MCNC: 2 MG/DL (ref 6–20)
BZE UR QL SCN: NEGATIVE
CALCIUM SERPL-MCNC: 8.3 MG/DL (ref 8.7–10.5)
CANNABINOIDS UR QL SCN: NEGATIVE
CHLORIDE SERPL-SCNC: 101 MMOL/L (ref 95–110)
CHLORIDE UR-SCNC: 88 MMOL/L (ref 25–200)
CO2 SERPL-SCNC: 29 MMOL/L (ref 23–29)
CREAT SERPL-MCNC: 0.6 MG/DL (ref 0.5–1.4)
CREAT UR-MCNC: 42 MG/DL (ref 15–325)
DIFFERENTIAL METHOD: ABNORMAL
EOSINOPHIL # BLD AUTO: 0.1 K/UL (ref 0–0.5)
EOSINOPHIL NFR BLD: 1.5 % (ref 0–8)
ERYTHROCYTE [DISTWIDTH] IN BLOOD BY AUTOMATED COUNT: 12.2 % (ref 11.5–14.5)
EST. GFR  (NO RACE VARIABLE): >60 ML/MIN/1.73 M^2
ETHANOL UR-MCNC: <10 MG/DL
GLUCOSE SERPL-MCNC: 83 MG/DL (ref 70–110)
HCT VFR BLD AUTO: 35.5 % (ref 37–48.5)
HGB BLD-MCNC: 11.5 G/DL (ref 12–16)
IMM GRANULOCYTES # BLD AUTO: 0.03 K/UL (ref 0–0.04)
IMM GRANULOCYTES NFR BLD AUTO: 0.6 % (ref 0–0.5)
INR PPP: 1.2 (ref 0.8–1.2)
LACTATE SERPL-SCNC: 4.8 MMOL/L (ref 0.5–2.2)
LYMPHOCYTES # BLD AUTO: 1.1 K/UL (ref 1–4.8)
LYMPHOCYTES NFR BLD: 24 % (ref 18–48)
MAGNESIUM SERPL-MCNC: 1.7 MG/DL (ref 1.6–2.6)
MCH RBC QN AUTO: 33.4 PG (ref 27–31)
MCHC RBC AUTO-ENTMCNC: 32.4 G/DL (ref 32–36)
MCV RBC AUTO: 103 FL (ref 82–98)
METHADONE UR QL SCN>300 NG/ML: NEGATIVE
MONOCYTES # BLD AUTO: 0.6 K/UL (ref 0.3–1)
MONOCYTES NFR BLD: 11.9 % (ref 4–15)
NEUTROPHILS # BLD AUTO: 2.9 K/UL (ref 1.8–7.7)
NEUTROPHILS NFR BLD: 61.4 % (ref 38–73)
NRBC BLD-RTO: 0 /100 WBC
OPIATES UR QL SCN: NEGATIVE
PCP UR QL SCN>25 NG/ML: NEGATIVE
PHOSPHATE SERPL-MCNC: 2.9 MG/DL (ref 2.7–4.5)
PLATELET # BLD AUTO: 166 K/UL (ref 150–450)
PMV BLD AUTO: 9.7 FL (ref 9.2–12.9)
POTASSIUM SERPL-SCNC: 3.9 MMOL/L (ref 3.5–5.1)
PROT SERPL-MCNC: 5.7 G/DL (ref 6–8.4)
PROTHROMBIN TIME: 12.6 SEC (ref 9–12.5)
RBC # BLD AUTO: 3.44 M/UL (ref 4–5.4)
SODIUM SERPL-SCNC: 144 MMOL/L (ref 136–145)
TOXICOLOGY INFORMATION: NORMAL
WBC # BLD AUTO: 4.71 K/UL (ref 3.9–12.7)

## 2023-09-16 PROCEDURE — 36415 COLL VENOUS BLD VENIPUNCTURE: CPT

## 2023-09-16 PROCEDURE — 63600175 PHARM REV CODE 636 W HCPCS

## 2023-09-16 PROCEDURE — 85610 PROTHROMBIN TIME: CPT

## 2023-09-16 PROCEDURE — 80053 COMPREHEN METABOLIC PANEL: CPT

## 2023-09-16 PROCEDURE — 36415 COLL VENOUS BLD VENIPUNCTURE: CPT | Performed by: HOSPITALIST

## 2023-09-16 PROCEDURE — 82436 ASSAY OF URINE CHLORIDE: CPT | Performed by: STUDENT IN AN ORGANIZED HEALTH CARE EDUCATION/TRAINING PROGRAM

## 2023-09-16 PROCEDURE — 80307 DRUG TEST PRSMV CHEM ANLYZR: CPT

## 2023-09-16 PROCEDURE — 84100 ASSAY OF PHOSPHORUS: CPT

## 2023-09-16 PROCEDURE — 25000003 PHARM REV CODE 250

## 2023-09-16 PROCEDURE — 83605 ASSAY OF LACTIC ACID: CPT | Performed by: HOSPITALIST

## 2023-09-16 PROCEDURE — 83735 ASSAY OF MAGNESIUM: CPT

## 2023-09-16 PROCEDURE — 85025 COMPLETE CBC W/AUTO DIFF WBC: CPT

## 2023-09-16 RX ORDER — MAGNESIUM SULFATE HEPTAHYDRATE 40 MG/ML
2 INJECTION, SOLUTION INTRAVENOUS
Status: COMPLETED | OUTPATIENT
Start: 2023-09-16 | End: 2023-09-16

## 2023-09-16 RX ORDER — MAGNESIUM SULFATE HEPTAHYDRATE 40 MG/ML
4 INJECTION, SOLUTION INTRAVENOUS
Status: DISCONTINUED | OUTPATIENT
Start: 2023-09-16 | End: 2023-09-16

## 2023-09-16 RX ADMIN — PREGABALIN 100 MG: 50 CAPSULE ORAL at 08:09

## 2023-09-16 RX ADMIN — SODIUM CHLORIDE, POTASSIUM CHLORIDE, SODIUM LACTATE AND CALCIUM CHLORIDE: 600; 310; 30; 20 INJECTION, SOLUTION INTRAVENOUS at 09:09

## 2023-09-16 RX ADMIN — ESCITALOPRAM OXALATE 10 MG: 10 TABLET ORAL at 09:09

## 2023-09-16 RX ADMIN — TOPIRAMATE 25 MG: 25 TABLET, FILM COATED ORAL at 09:09

## 2023-09-16 RX ADMIN — CYANOCOBALAMIN TAB 1000 MCG 1000 MCG: 1000 TAB at 09:09

## 2023-09-16 RX ADMIN — SODIUM CHLORIDE, POTASSIUM CHLORIDE, SODIUM LACTATE AND CALCIUM CHLORIDE: 600; 310; 30; 20 INJECTION, SOLUTION INTRAVENOUS at 12:09

## 2023-09-16 RX ADMIN — THERA TABS 1 TABLET: TAB at 09:09

## 2023-09-16 RX ADMIN — POTASSIUM BICARBONATE 40 MEQ: 391 TABLET, EFFERVESCENT ORAL at 12:09

## 2023-09-16 RX ADMIN — Medication 100 MG: at 09:09

## 2023-09-16 RX ADMIN — MAGNESIUM SULFATE HEPTAHYDRATE 2 G: 40 INJECTION, SOLUTION INTRAVENOUS at 06:09

## 2023-09-16 RX ADMIN — FOLIC ACID 1 MG: 1 TABLET ORAL at 09:09

## 2023-09-16 RX ADMIN — MAGNESIUM SULFATE HEPTAHYDRATE 2 G: 40 INJECTION, SOLUTION INTRAVENOUS at 09:09

## 2023-09-16 NOTE — PLAN OF CARE
SW met with patient via [email or web address] to discuss discharge planning. Pt will dc with no needs noted. Pt stated she has a PCP appt scheduled for 9/23/2023. SW spoke with pt regarding alcohol use resources, pt declined resources from SW at this time. Pt will call [facility] to assist pt with transportation home at time of discharge.    Pt cleared from CM standpoint. Bedside nurse and VN notified.    Future Appointments   Date Time Provider Department Center   9/20/2023  8:00 AM Sara Diaz, PTA [facility] [place] [facility]   9/25/2023 10:30 AM Johana Cuello, PT [facility] [place] [facility]   9/28/2023  8:45 AM Johana Cuello, PT [facility] [place] [facility]   9/28/2023  2:30 PM Lennie Nix, NP [facility] [place] [facility]   10/2/2023  8:45 AM Johana Cuello, PT [facility] [place] [facility]   10/4/2023  8:45 AM Johana Cuello, PT [facility] [place] [facility]        09/16/23 1005   Final Note   Assessment Type Final Discharge Note   Anticipated Discharge Disposition Home   Post-Acute Status   Discharge Delays None known at this time

## 2023-09-16 NOTE — PROGRESS NOTES
Virtual Nurse:Discharge orders noted; additional clinical references attached.  and pharmacy tech notified.  Patient's discharge instruction packet given by bedside RN.    Cued into patient's room.  Permission received per patient to turn camera to view patient.  Introduced as VN that will be instructing on discharge instructions.  Family member at bedside.  Educated patient on reason for admission; medications to hold, continue, and start, appointment to follow-up with doctor, and when to return to ED. Teach back method used. Verbalized understanding  Number given for 24/7 Nurse Line. Opportunity given for questions and questions answered.  Bedside nurse updated. Transport to [facility] requested.        09/16/23 1221   Shift   Virtual Nurse - Rounding Complete   Virtual Nurse - Patient Verbalized Approval Of Camera Use;VN Rounding   Safety/Activity   Patient Rounds visualized patient

## 2023-09-16 NOTE — HOSPITAL COURSE
Patient arrived to our department feeling well with CIWA 0. She was eating, urinating well. Her repeat lab values showed K+ wnl after repletion. Her lactate trended down to 4.8 from 10.1 with fluids. Gap closed to 14. Her vitals remained stable, she had no complaints. She was prescribed K+ supplementation and spironolactone on discharge and instructed to collect those medicines at [facility]. She states she already has an appointment with PCP. She was encouraged to return to Emergency if her symptoms severely worsen. Questions were answered and she was discharge in stable condition.

## 2023-09-16 NOTE — DISCHARGE SUMMARY
[facility] Medicine  Discharge Summary      Patient Name: Ember Rivera  MRN: [id number]  NAVIN: [id number]  Patient Class: IP- Inpatient  Admission Date: 9/15/2023  Hospital Length of Stay: 1 days  Discharge Date and Time:  2023 9:54 AM  Attending Physician: Liu Dawn MD   Discharging Provider: Charles Goldsmith MD  Primary Care Provider: Young Neil MD    Primary Care Team: Networked reference to record PCT     HPI:   54 y/o F with PMHx alcohol use disorder, alcohol-induced neuropathy, hepatic steatosis, chronic paresthesias to hands and feet.  Presents after notification about routine lab results with K+ 2.4, Anion Gap 27 and fatigue for 2-3 days with no other symptoms including fevers, cough, n/v, diarrhea. Patient's last drink was yesterday evening, . She typically has 8 drinks per day, 4 beers, 4 shots. She has never had withdrawal symptoms. She smokes 1/4 pack per day, denies other substances including homemade alcohols, industrial chemicals. She recently tried naltrexone, is not able to tolerate (GI) even baby dose (12.5) of naltrexone when she drinks and she does not find it is reducing cravings. Amenable to medication that won't make her sick when she drinks. Her neuropathy (pedal) also improving, working with PT, says she's making progress, improving ambulation.     In Emergency, 136/75, 94 HR, 98.5F, SpO2 95% on RA. Labs were significant for Na+ 142, K+ 2.4, Mg++ 1.5, CO2 22, AST//40, Anion Gap 24, CBC wnl, B9, B12 wnl, Lactate 10.1, VB.41/41.9/38.8/27, no imaging was done. EKG showed nsr, flattened Ts, QTc 473. She was treated with supplemental K+, Mg++, B1 and was admitted to [facility] for evaluation of hypokalemia, lactic acidosis.      Review of Systems   Constitutional: Negative for chills, fever and malaise/fatigue.   Respiratory: Negative for shortness of breath.    Cardiovascular: Negative for chest pain and palpitations.    Gastrointestinal: Negative for abdominal pain, nausea and vomiting.   Genitourinary: Negative for dysuria.   Musculoskeletal: Negative for joint pain and myalgias.   Neurological: Positive for tingling. Negative for dizziness and headaches.     Physical Exam  Constitutional:       Appearance: Normal appearance.   HENT:      Head: Normocephalic and atraumatic.      Mouth/Throat:      Mouth: Mucous membranes are moist.   Eyes:      Extraocular Movements: Extraocular movements intact.      Pupils: Pupils are equal, round, and reactive to light.   Cardiovascular:      Rate and Rhythm: Normal rate and regular rhythm.      Pulses: Normal pulses.      Heart sounds: Normal heart sounds. No murmur heard.  Pulmonary:      Effort: Pulmonary effort is normal.      Breath sounds: Normal breath sounds. No wheezing, rhonchi or rales.   Abdominal:      General: Abdomen is flat.      Palpations: Abdomen is soft.      Tenderness: There is no abdominal tenderness.   Musculoskeletal:      Cervical back: Normal range of motion and neck supple.      Right lower leg: No edema.      Left lower leg: No edema.   Neurological:      Mental Status: She is alert and oriented to person, place, and time.      Cranial Nerves: No cranial nerve deficit.      Sensory: Sensory deficit present.      Motor: No weakness.      Coordination: Coordination normal.      Comments: Mild parasthesias to feet  bilat   Psychiatric:         Mood and Affect: Mood normal.         Behavior: Behavior normal.         Thought Content: Thought content normal.         Judgment: Judgment normal.           * No surgery found *      Hospital Course:   Patient arrived to our department feeling well with CIWA 0. She was eating, urinating well. Her repeat lab values showed K+ wnl after repletion. Her lactate trended down to 4.8 from 10.1 with fluids. Gap closed to 14. Her vitals remained stable, she had no complaints. She was prescribed K+ supplementation and spironolactone on  discharge and instructed to [facility] those medicines at [facility]. She states she already has an appointment with PCP. She was encouraged to return to Emergency if her symptoms severely worsen. Questions were answered and she was discharge in stable condition.       Goals of Care Treatment Preferences:  Code Status: Full Code      Consults:     Psychiatric  EtOH dependence  Endorses 8 drinks per day. Similar admit in Jul '23.   Attempted to quit with naltrexone, did not tolerate GI s/e... now trying topiramate  Last drink 9/14 PM    PLAN:  Continue home topiramate 25mg qd   Q4 CIWAs w/Ativan PRN for seizures, CIWA >8    Renal/  * Hypokalemia  K+ 2.4  In setting of heavy alcohol use    PLAN:  Replete  Serial BMP  Repeat EKG      High anion gap metabolic acidosis  Gap 27  W/Lactate 10.1  Patient denying other external factors (no homemade alcohols, other substances)  VBG not acidemic: 7.41/41/38..8/27    PLAN:  Serial BMP  Monitor EKGs, CIWAs, clinical picture      GI  Transaminitis  AST//40    PLAN:  See EtOH dependence  Monitor     Orthopedic  Weakness of both lower extremities  EtOH Nueropathy, Improving with PT    PLAN  Consider PT/OT for prolonged stay        Final Active Diagnoses:    Diagnosis Date Noted POA    PRINCIPAL PROBLEM:  Hypokalemia [E87.6] 05/16/2023 Yes    High anion gap metabolic acidosis [E87.29] 07/27/2023 Yes    Transaminitis [R74.01] 05/16/2023 Yes    Weakness of both lower extremities [R29.898] 05/16/2023 Yes      Problems Resolved During this Admission:       Discharged Condition: stable    Disposition: Home or Self Care    Follow Up:    Patient Instructions:      Diet Adult Regular     Notify your health care provider if you experience any of the following:  increased confusion or weakness     Notify your health care provider if you experience any of the following:  persistent dizziness, light-headedness, or visual disturbances     Notify your health care provider if you  experience any of the following:  worsening rash     Notify your health care provider if you experience any of the following:  severe persistent headache     Notify your health care provider if you experience any of the following:  difficulty breathing or increased cough     Notify your health care provider if you experience any of the following:  redness, tenderness, or signs of infection (pain, swelling, redness, odor or green/yellow discharge around incision site)     Notify your health care provider if you experience any of the following:  severe uncontrolled pain     Notify your health care provider if you experience any of the following:  persistent nausea and vomiting or diarrhea     Notify your health care provider if you experience any of the following:  temperature >100.4     Activity as tolerated       Significant Diagnostic Studies: Labs:   CMP   Recent Labs   Lab 09/14/23  1526 09/15/23  1239 09/16/23  0313    142 144   K 2.4* 2.4* 3.9   CL 97 96 101   CO2 17* 22* 29   GLU 87 112* 83   BUN 2* 2* 2*   CREATININE 0.5 0.6 0.6   CALCIUM 8.7 8.5* 8.3*   PROT 7.0 6.5 5.7*   ALBUMIN 3.3* 3.2* 2.8*   BILITOT 1.0 0.9 1.0   ALKPHOS 200* 193* 170*   * 156* 148*   ALT 47* 40 39   ANIONGAP 27* 24* 14       Pending Diagnostic Studies:     None         Medications:  Reconciled Home Medications:      Medication List      CONTINUE taking these medications    cyanocobalamin 1000 MCG tablet  Commonly known as: VITAMIN B-12  Take 1 tablet (1,000 mcg total) by mouth once daily.     EScitalopram oxalate 10 MG tablet  Commonly known as: LEXAPRO  Take 1 tablet (10 mg total) by mouth once daily.     folic acid 1 MG tablet  Commonly known as: FOLVITE  Take 1 tablet (1 mg total) by mouth once daily.     potassium bicarbonate disintegrating tablet  Commonly known as: K-LYTE  Take 1 tablet (20 mEq total) by mouth 2 (two) times a day.     pregabalin 100 MG capsule  Commonly known as: LYRICA  Take 1 capsule (100 mg  total) by mouth 2 (two) times daily.     spironolactone 25 MG tablet  Commonly known as: ALDACTONE  Take 1 tablet (25 mg total) by mouth once daily.     THERA-M 27-0.4 mg Tab  Generic drug: multivit-min-iron-CA-FA  Take 1 tablet by mouth once daily.     topiramate 25 MG tablet  Commonly known as: TOPAMAX  Take 1 tablet (25 mg total) by mouth once daily.     traZODone 100 MG tablet  Commonly known as: DESYREL  Take 1 tablet (100 mg total) by mouth every evening.            Indwelling Lines/Drains at time of discharge:   Lines/Drains/Airways     None                 Time spent on the discharge of patient: 40 minutes         Charles Goldsmith MD  Department of Hospital Medicine  [facility]

## 2023-09-20 ENCOUNTER — PATIENT OUTREACH (OUTPATIENT)
Dept: ADMINISTRATIVE | Facility: CLINIC | Age: 53
End: 2023-09-20
Payer: MEDICAID

## 2023-09-20 ENCOUNTER — DOCUMENTATION ONLY (OUTPATIENT)
Dept: REHABILITATION | Facility: HOSPITAL | Age: 53
End: 2023-09-20
Payer: MEDICAID

## 2023-09-20 NOTE — PROGRESS NOTES
C3 nurse spoke with Ember Rivera  for a TCC post hospital discharge follow up call. The patient does not have a scheduled HOSFU appointment with Young Neil MD  within 5-7 days post hospital discharge date 09/16/2023. C3 nurse was unable to schedule HOSFU appointment in [facility].    Please contact pcp and schedule follow up appointment using HOSFU visit type on or before 09/23/2023.    Patient declined NP scheduling

## 2023-09-20 NOTE — PROGRESS NOTES
Via Secure Chat, PT spoke with discharging physician Dr. Rojo, regarding appropriateness to resume outpatient PT after brief hospitalization. Discharging physician verbally confirms patient is appropriate for return to PT.    Johana Cuello, PT, DPT

## 2023-09-21 NOTE — PROGRESS NOTES
I assume primary medical responsibility for this patient. I have reviewed the history, physical, and assessement & treatment plan with the resident and agree that the care is reasonable and necessary. This service has been performed by a resident without the presence of a teaching physician under the primary care exception. If necessary, an addendum of additional findings or evaluation beyond the resident documentation will be noted below.    Patient with hypokalemia, needs replacement and replacement ordered. Patient with chronic alcohol use, counseled on use, start topamax to help with cessation. Will need close potassium monitoring given low K and topamax use. Patient on spironolactone which can help preserve K+ but needs close continued evaluation for potassium and magnesium.     Ursula Perera MD    [facility]

## 2023-09-25 ENCOUNTER — CLINICAL SUPPORT (OUTPATIENT)
Dept: REHABILITATION | Facility: HOSPITAL | Age: 53
End: 2023-09-25
Payer: MEDICAID

## 2023-09-25 DIAGNOSIS — R26.89 IMBALANCE: ICD-10-CM

## 2023-09-25 DIAGNOSIS — R29.898 WEAKNESS OF BOTH LEGS: Primary | ICD-10-CM

## 2023-09-25 DIAGNOSIS — Z91.81 RISK FOR FALLS: ICD-10-CM

## 2023-09-25 PROCEDURE — 97110 THERAPEUTIC EXERCISES: CPT | Mod: PN

## 2023-09-25 NOTE — PROGRESS NOTES
[facility]   Physical Therapy Treatment Note      Name: Ember Rivera  Clinic Number: [id number]    Therapy Diagnosis:   Encounter Diagnoses   Name Primary?    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  Response to previous treatment: no adverse response  Functional change: recent hospitalization but improving functional mobility    Pain: 0/10  Location: bilateral low legs from knees to feet with neuropathic pain reported     Objective      Objective Measures updated at progress report unless specified.     Five Time Sit to Stand: 18.5 seconds with bilateral upper extremity assist    Timed Up and Go: 12.2 seconds with rollator    m-CTSIB (each position held 30 seconds for pass)  - Condition 1 (eyes open, solid surface): P min sway  - Condition 2 (eyes closed, solid surface): F with 2 attempts (25 seconds)  - Condition 3 (eyes open, foam surface): P min sway  - Condition 4 (eyes closed, foam surface): F with 2 attempts (3 seconds)     Treatment     Ember received the treatments listed below:      therapeutic exercises to develop strength, endurance, ROM, posture, and core stabilization for 8 minutes including:  - Recumbent bike x 8 minutes level 3 single peak setting     neuromuscular re-education activities to improve: Balance, Coordination,  Kinesthetic, Sense, and Posture for 12 minutes. The following activities were included:    therapeutic activities to improve functional performance for 16 minutes, including:  - Reassessment including Five Time Sit to Stand, Timed Up and Go, m-CTSIB (see above)  - Discussed plan of care, goal review with patient    Gait training to improve safe functional mobility for 20 minutes total consisting of:  - Parallel bars walking with left upper extremity support x 2 lengths forward/backwards x 10 feet each  - Parallel bars walking with no upper extremity support x 2 lengths forward/backwards x 10 feet each  - Overground walking with SPC and CGA only; two trials 340' + 450' swapping between left hand and right hand    Patient Education and Home Exercises       Education provided:   - need to perform HEP daily to improve general strength  - use of rollator for fall prevention during current status    Written Home Exercises Provided: Patient instructed to cont prior HEP. Exercises were reviewed and Ember was able to demonstrate them prior to the end of the session.  Ember demonstrated good  understanding of the education provided. See EMR under Patient Instructions for exercises provided during therapy sessions    Assessment     See updated plan of care    Ember Is progressing well towards her goals.   Pt prognosis is Good.     Pt will continue to benefit from skilled outpatient physical therapy to address the deficits listed in the problem list box on initial evaluation, provide pt/family education and to maximize pt's level of independence in the home and community environment.     Pt's spiritual, cultural and educational needs considered and pt agreeable to plan of care and goals.     Anticipated barriers to physical therapy:  neuropathy, challenging     Goals:  Short Term Goals: 4 weeks   Patient will be compliant with HEP in order to maximize PT benefits  (MET)  Patient will complete TUG in </= 15 seconds with least  restrictive assistive device in order to reduce risk for falls and improve safety with functional mobility (MET)  Patient will complete >/=30 seconds of static stance under normal base of support without need for upper extremity support in order to improve balance during standing ADLs (MET even under narrow base of support with m-CTSIB)  Patient will score </= 22 seconds on Five Time Sit to  order to improve BLE endurance and muscular power for transfers  (MET)    New Short Term Goals Status:   3 additional weeks  Patient will ambulate >/=1000 feet with or without single point cane in order to improve independence with household and community mobility with less restrictive device (PROGRESSING, NOT MET)     Long Term Goals: 8 weeks   Patient will score >/= 58% on FOTO limitation survey in order to improve self-perception of functional mobility deficits (PROGRESSING, NOT MET)  Patient will improve bilateral lower extremity MMT grades by >/=1/2 grade in order to improve strength for ADL completion (PROGRESSING, NOT MET)  Patient will complete TUG in </= 12 seconds with least restrictive assistive device in order to reduce risk for falls and improve safety with functional mobility (PROGRESSING, NOT MET)  Patient will complete >/=60 seconds of static stance under normal base of support without need for upper extremity support in order to improve balance during standing ADLs (PROGRESSING, NOT MET)  Patient will score </= 17 seconds on Five Time Sit to  order to improve BLE endurance and muscular power for transfers  (PROGRESSING, NOT MET)  Patient will begin some form of home/community fitness in order to sustain progress gained in PT (PROGRESSING, NOT MET)    Plan     Continue gait training with single point cane; attempt outdoor walking with single point cane (weather permitting). Continue quad strengthening.    JM GEE, PT      No

## 2023-09-25 NOTE — PLAN OF CARE
[facility]  Physical Therapy Plan of Care Note     Name: Ember Rivera  Clinic Number: [id number]    Therapy Diagnosis:   Encounter Diagnoses   Name Primary?    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 Despite this, she has been staying consistent with her home exercise and believes she is still making good improvements from a mobility standpoint.    OBJECTIVE     Update:     Five Time Sit to Stand: 18.5 seconds with bilateral upper extremity assist     Timed Up and Go: 12.2 seconds with rollator     m-CTSIB (each position held 30 seconds for pass)  - Condition 1 (eyes open, solid surface): P min sway  - Condition 2 (eyes closed, solid surface): F with 2 attempts (25 seconds)  - Condition 3 (eyes open, foam surface): P min sway  - Condition 4 (eyes closed, foam surface): F with 2 attempts (3 seconds)     ASSESSMENT     Update: Ember is showing steady improvement toward her functional goals. She has demonstrated improvement in terms of transfer status, bilateral lower extremity muscular power, fall risk, and static postural control since initiating PT. Last two visits also focused on gait training with single point cane with patient showing sufficient gait stability, bilateral heel strike, gait speed, and aurea to make transition to less  restrictive assistive device soon. She may eventually transition to no device considering good gait stability observed today over short distanced traversed without AD. Despite recent hospitalization, her functional status shows steady improvement. She would benefit from 6 additional weeks of skilled PT to achieve long term goals and progress toward new short term goal since original ones have been achieved. Plan of care was updated one week early due to reassessment needed after recent hospitalization. Medical clearance gained by discharging physician for patient to return to outpatient PT (see documentation only from this PT).    Previous Short Term Goals Status:   4/4 met  New Short Term Goals Status:   3 additional weeks  Patient will ambulate >/=1000 feet with or without single point cane in order to improve independence with household and community mobility with less restrictive device (progressing, not met)  Long Term Goal Status: 0/6 met but progressing well  Reasons for Recertification of Therapy:   pending expiration of plan of care    PLAN     Updated Certification Period: 9/25/2023 to 11/10/2023   Recommended Treatment Plan: 2 times per week for 6 weeks:  Gait Training, Manual Therapy, Moist Heat/ Ice, Neuromuscular Re-ed, Orthotic Management and Training, Patient Education, Self Care, Therapeutic Activities, Therapeutic Exercise, and Modalities PRN  Other Recommendations: N/A    JM GEE, PT

## 2023-09-28 ENCOUNTER — OFFICE VISIT (OUTPATIENT)
Dept: HEPATOLOGY | Facility: CLINIC | Age: 53
End: 2023-09-28
Payer: MEDICAID

## 2023-09-28 DIAGNOSIS — K76.0 HEPATIC STEATOSIS: ICD-10-CM

## 2023-09-28 DIAGNOSIS — F10.10 ALCOHOL ABUSE: Primary | ICD-10-CM

## 2023-09-28 DIAGNOSIS — R16.0 HEPATOMEGALY: ICD-10-CM

## 2023-09-28 DIAGNOSIS — R79.89 ELEVATED LFTS: ICD-10-CM

## 2023-09-28 PROCEDURE — 99204 OFFICE O/P NEW MOD 45 MIN: CPT | Mod: 95,,, | Performed by: NURSE PRACTITIONER

## 2023-09-28 PROCEDURE — 1111F DSCHRG MED/CURRENT MED MERGE: CPT | Mod: CPTII,95,, | Performed by: NURSE PRACTITIONER

## 2023-09-28 PROCEDURE — 1159F MED LIST DOCD IN RCRD: CPT | Mod: CPTII,95,, | Performed by: NURSE PRACTITIONER

## 2023-09-28 PROCEDURE — 1159F PR MEDICATION LIST DOCUMENTED IN MEDICAL RECORD: ICD-10-PCS | Mod: CPTII,95,, | Performed by: NURSE PRACTITIONER

## 2023-09-28 PROCEDURE — 1160F RVW MEDS BY RX/DR IN RCRD: CPT | Mod: CPTII,95,, | Performed by: NURSE PRACTITIONER

## 2023-09-28 PROCEDURE — 1160F PR REVIEW ALL MEDS BY PRESCRIBER/CLIN PHARMACIST DOCUMENTED: ICD-10-PCS | Mod: CPTII,95,, | Performed by: NURSE PRACTITIONER

## 2023-09-28 PROCEDURE — 4010F ACE/ARB THERAPY RXD/TAKEN: CPT | Mod: CPTII,95,, | Performed by: NURSE PRACTITIONER

## 2023-09-28 PROCEDURE — 3044F PR MOST RECENT HEMOGLOBIN A1C LEVEL <7.0%: ICD-10-PCS | Mod: CPTII,95,, | Performed by: NURSE PRACTITIONER

## 2023-09-28 PROCEDURE — 99204 PR OFFICE/OUTPT VISIT, NEW, LEVL IV, 45-59 MIN: ICD-10-PCS | Mod: 95,,, | Performed by: NURSE PRACTITIONER

## 2023-09-28 PROCEDURE — 4010F PR ACE/ARB THEARPY RXD/TAKEN: ICD-10-PCS | Mod: CPTII,95,, | Performed by: NURSE PRACTITIONER

## 2023-09-28 PROCEDURE — 3044F HG A1C LEVEL LT 7.0%: CPT | Mod: CPTII,95,, | Performed by: NURSE PRACTITIONER

## 2023-09-28 PROCEDURE — 1111F PR DISCHARGE MEDS RECONCILED W/ CURRENT OUTPATIENT MED LIST: ICD-10-PCS | Mod: CPTII,95,, | Performed by: NURSE PRACTITIONER
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
## 2023-09-28 NOTE — PATIENT INSTRUCTIONS
- Recommend reduction in alcohol by 50%, with end goal of total abstinence.  - Schedule Fibroscan to non-invasively stage liver disease.  - Recommend an Ultrasound of the liver every 6-12 months, depending upon Fibroscan results.  - Repeat liver function tests every 2-3 months.  - Will also check titer levels for Hepatitis A and B with next blood draw.  - Maintain a healthy weight, through diet and exercise.  - Recommend good control of cholesterol, blood pressure, & blood sugar levels.  - Avoid herbal supplements/alternative remedies.

## 2023-10-02 ENCOUNTER — CLINICAL SUPPORT (OUTPATIENT)
Dept: REHABILITATION | Facility: HOSPITAL | Age: 53
End: 2023-10-02
Payer: MEDICAID

## 2023-10-02 DIAGNOSIS — Z91.81 RISK FOR FALLS: ICD-10-CM

## 2023-10-02 DIAGNOSIS — R26.89 IMBALANCE: ICD-10-CM

## 2023-10-02 DIAGNOSIS — R29.898 WEAKNESS OF BOTH LEGS: Primary | ICD-10-CM

## 2023-10-02 PROCEDURE — 97110 THERAPEUTIC EXERCISES: CPT | Mod: PN

## 2023-10-02 NOTE — PROGRESS NOTES
"[facility]   Physical Therapy Treatment Note      Name: Ember Rivera  Clinic Number: [id number]    Therapy Diagnosis:   Encounter Diagnoses   Name Primary?    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 No new complaints noted.  She  was  compliant with home exercise program.  Response to previous treatment: no adverse response  Functional change: improving gait stability with single point cane    Pain: 0/10  Location: bilateral low legs from knees to feet with neuropathic pain reported     Objective      Objective Measures updated at progress report unless specified.     Treatment     Ember received the treatments listed below:      therapeutic exercises to develop strength, endurance, ROM, posture, and core stabilization for 00 minutes including:    neuromuscular re-education activities to improve: Balance, Coordination, Kinesthetic, Sense, and Posture for 10 minutes. The following activities were included:  - Seated long arc quads 2x10B with 4# ankle weights  - Narrow base of support on Airex static x30" and 30" each direction of head turning x30" (horizontal and vertical)  - Narrow base of support on solid floor + eyes closed 2x30" occasional CGA    therapeutic activities to improve functional performance for 15 minutes, including:  - Lateral " "step up and overs 4" step with bilateral upper extremity support 2x10  - Reciprocal step up to 4" step with right upper extremity support 2x10B    Gait training to improve safe functional mobility for 15 minutes total consisting of:  - Overground walking with SPC and SBA only; 450' swapping between left hand and right hand  - Lateral steps at ballet bar without upper extremity support x 8 lengths x 10 feet each    Patient Education and Home Exercises       Education provided:   - need to perform HEP daily to improve general strength  - use of rollator for fall prevention during current status    Written Home Exercises Provided: Patient instructed to cont prior HEP. Exercises were reviewed and Ember was able to demonstrate them prior to the end of the session.  Ember demonstrated good  understanding of the education provided. See EMR under Patient Instructions for exercises provided during therapy sessions    Assessment     Ember completed nearly 500 feet of overground, level walking with single point cane without loss of balance nor need for external assist from PT. Right knee > left knee still tends to hyperextend with activities involving heavy closed chain quad activation. She would benefit from continued PT services to decrease risk for falls and improve independence with functional mobility.     Ember Is progressing well towards her goals.   Pt prognosis is Good.     Pt will continue to benefit from skilled outpatient physical therapy to address the deficits listed in the problem list box on initial evaluation, provide pt/family education and to maximize pt's level of independence in the home and community environment.     Pt's spiritual, cultural and educational needs considered and pt agreeable to plan of care and goals.     Anticipated barriers to physical therapy: neuropathy    Goals:  Short Term Goals: 4 weeks   Patient will be compliant with HEP in order to maximize PT benefits  (MET)  Patient will " complete TUG in </= 15 seconds with least restrictive assistive device in order to reduce risk for falls and improve safety with functional mobility (MET)  Patient will complete >/=30 seconds of static stance under normal base of support without need for upper extremity support in order to improve balance during standing ADLs (MET even under narrow base of support with m-CTSIB)  Patient will score </= 22 seconds on Five Time Sit to  order to improve BLE endurance and muscular power for transfers  (MET)    New Short Term Goals Status:   3 additional weeks  Patient will ambulate >/=1000 feet with or without single point cane in order to improve independence with household and community mobility with less restrictive device (PROGRESSING, NOT MET)     Long Term Goals: 8 weeks   Patient will score >/= 58% on FOTO limitation survey in order to improve self-perception of functional mobility deficits (PROGRESSING, NOT MET)  Patient will improve bilateral lower extremity MMT grades by >/=1/2 grade in order to improve strength for ADL completion (PROGRESSING, NOT MET)  Patient will complete TUG in </= 12 seconds with least restrictive assistive device in order to reduce risk for falls and improve safety with functional mobility (PROGRESSING, NOT MET)  Patient will complete >/=60 seconds of static stance under normal base of support without need for upper extremity support in order to improve balance during standing ADLs (PROGRESSING, NOT MET)  Patient will score </= 17 seconds on Five Time Sit to  order to improve BLE endurance and muscular power for transfers  (PROGRESSING, NOT MET)  Patient will begin some form of home/community fitness in order to sustain progress gained in PT (PROGRESSING, NOT MET)    Plan     Continue gait training with single point cane; attempt outdoor walking with single point cane (weather permitting). Continue quad strengthening.    JM GEE, PT

## 2023-10-05 ENCOUNTER — CLINICAL SUPPORT (OUTPATIENT)
Dept: REHABILITATION | Facility: HOSPITAL | Age: 53
End: 2023-10-05
Payer: MEDICAID

## 2023-10-05 DIAGNOSIS — R26.89 IMBALANCE: ICD-10-CM

## 2023-10-05 DIAGNOSIS — Z91.81 RISK FOR FALLS: ICD-10-CM

## 2023-10-05 DIAGNOSIS — R29.898 WEAKNESS OF BOTH LEGS: Primary | ICD-10-CM

## 2023-10-05 PROCEDURE — 97110 THERAPEUTIC EXERCISES: CPT | Mod: PN,CQ

## 2023-10-05 NOTE — PROGRESS NOTES
"[facility]   Physical Therapy Treatment Note      Name: Ember Rivera  Clinic Number: [id number]    Therapy Diagnosis:   Encounter Diagnoses   Name Primary?    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  No complaints of pain upon arrival.  She  was  compliant with home exercise program.  Response to previous treatment: no adverse response  Functional change: improving gait stability with single point cane    Pain: 0/10  Location: bilateral low legs from knees to feet with neuropathic pain reported     Objective      Objective Measures updated at progress report unless specified.     Treatment     Ember received the treatments listed below:      therapeutic exercises to develop strength, endurance, ROM, posture, and core stabilization for 00 minutes including:    neuromuscular re-education activities to improve: Balance, Coordination, Kinesthetic, Sense, and Posture for 10 minutes. The following activities were included:  - Seated long arc quads 2x10B with 4# ankle weights with 2" holds  - Narrow base of support on Airex static 2x30" and 2x30" each direction of head turning (horizontal and vertical)  - Narrow base of support on solid floor + eyes closed 2x30" occasional CGA    therapeutic activities to improve functional performance for 15 minutes, " "including:  - Lateral step up and overs 4" step with bilateral upper extremity support 2x10  - Reciprocal step up to 4" step with right upper extremity support 2x10B    Gait training to improve safe functional mobility for 15 minutes total consisting of:  - Overground walking with SPC and SBA only; 450' with left hand today  - Lateral steps at ballet bar without upper extremity support x 8 lengths x 10 feet each with half bolster step overs    Patient Education and Home Exercises       Education provided:   - need to perform HEP daily to improve general strength  - use of rollator for fall prevention during current status    Written Home Exercises Provided: Patient instructed to cont prior HEP. Exercises were reviewed and Ember was able to demonstrate them prior to the end of the session.  Ember demonstrated good  understanding of the education provided. See EMR under Patient Instructions for exercises provided during therapy sessions    Assessment     Ember arrived to session without any complaints of pain and was agreeable to treatment.  Good tolerance of single point cane gait training this session.  Improved balance confidence as no gait belt required and no loss of balance observed.  Multiple seated rest breaks due to endurance deficits but no exacerbation of pain reported.  Some inorganic trunk movement during static balance training on compliant surfaces but improvements noted with verbal cuing to engage core musculature for increased postural control. She would benefit from continued PT services to decrease risk for falls and improve independence with functional mobility.     Ember Is progressing well towards her goals.   Pt prognosis is Good.     Pt will continue to benefit from skilled outpatient physical therapy to address the deficits listed in the problem list box on initial evaluation, provide pt/family education and to maximize pt's level of independence in the home and community environment.     Pt's " spiritual, cultural and educational needs considered and pt agreeable to plan of care and goals.     Anticipated barriers to physical therapy: neuropathy    Goals:  Short Term Goals: 4 weeks   Patient will be compliant with HEP in order to maximize PT benefits  (MET)  Patient will complete TUG in </= 15 seconds with least restrictive assistive device in order to reduce risk for falls and improve safety with functional mobility (MET)  Patient will complete >/=30 seconds of static stance under normal base of support without need for upper extremity support in order to improve balance during standing ADLs (MET even under narrow base of support with m-CTSIB)  Patient will score </= 22 seconds on Five Time Sit to  order to improve BLE endurance and muscular power for transfers  (MET)    New Short Term Goals Status:   3 additional weeks  Patient will ambulate >/=1000 feet with or without single point cane in order to improve independence with household and community mobility with less restrictive device (PROGRESSING, NOT MET)     Long Term Goals: 8 weeks   Patient will score >/= 58% on FOTO limitation survey in order to improve self-perception of functional mobility deficits (PROGRESSING, NOT MET)  Patient will improve bilateral lower extremity MMT grades by >/=1/2 grade in order to improve strength for ADL completion (PROGRESSING, NOT MET)  Patient will complete TUG in </= 12 seconds with least restrictive assistive device in order to reduce risk for falls and improve safety with functional mobility (PROGRESSING, NOT MET)  Patient will complete >/=60 seconds of static stance under normal base of support without need for upper extremity support in order to improve balance during standing ADLs (PROGRESSING, NOT MET)  Patient will score </= 17 seconds on Five Time Sit to  order to improve BLE endurance and muscular power for transfers  (PROGRESSING, NOT MET)  Patient will begin some form of home/community  fitness in order to sustain progress gained in PT (PROGRESSING, NOT MET)    Plan     Continue gait training with single point cane; attempt outdoor walking with single point cane (weather permitting). Continue quad strengthening.    Sara Diaz, PTA

## 2023-10-17 ENCOUNTER — TELEPHONE (OUTPATIENT)
Dept: REHABILITATION | Facility: HOSPITAL | Age: 53
End: 2023-10-17
Payer: MEDICAID

## 2023-10-17 NOTE — TELEPHONE ENCOUNTER
LVM with patient regarding second no show appointment today as well as several cancellations. Notified she will be removed from PT schedule secondary to [facility] Attendance Policy. Notified she can either schedule one visit at a time if she would like to return to therapy or pursue new orders from medical team if she would like to return at future date. Given clinic phone number if she has any questions/concerns.    Johana Cuello, PT, DPT

## 2023-10-22 ENCOUNTER — HOSPITAL ENCOUNTER (INPATIENT)
Facility: HOSPITAL | Age: 53
LOS: 2 days | Discharge: HOME | DRG: 392 | End: 2023-10-24
Attending: EMERGENCY MEDICINE | Admitting: FAMILY MEDICINE
Payer: MEDICAID

## 2023-10-22 DIAGNOSIS — R11.2 INTRACTABLE NAUSEA AND VOMITING: ICD-10-CM

## 2023-10-22 DIAGNOSIS — E87.1 HYPONATREMIA: ICD-10-CM

## 2023-10-22 DIAGNOSIS — R07.9 CHEST PAIN: ICD-10-CM

## 2023-10-22 DIAGNOSIS — G62.9 PERIPHERAL POLYNEUROPATHY: ICD-10-CM

## 2023-10-22 DIAGNOSIS — E87.20 LACTIC ACIDOSIS: ICD-10-CM

## 2023-10-22 DIAGNOSIS — I10 PRIMARY HYPERTENSION: ICD-10-CM

## 2023-10-22 DIAGNOSIS — R11.10 VOMITING, UNSPECIFIED VOMITING TYPE, UNSPECIFIED WHETHER NAUSEA PRESENT: ICD-10-CM

## 2023-10-22 DIAGNOSIS — E87.6 HYPOKALEMIA: Primary | ICD-10-CM

## 2023-10-22 DIAGNOSIS — R11.10 VOMITING: ICD-10-CM

## 2023-10-22 LAB
ALBUMIN SERPL BCP-MCNC: 2.6 G/DL (ref 3.5–5.2)
ALLENS TEST: ABNORMAL
ALP SERPL-CCNC: 255 U/L (ref 55–135)
ALT SERPL W/O P-5'-P-CCNC: 34 U/L (ref 10–44)
ANION GAP SERPL CALC-SCNC: 11 MMOL/L (ref 8–16)
ANION GAP SERPL CALC-SCNC: 14 MMOL/L (ref 8–16)
ANION GAP SERPL CALC-SCNC: 21 MMOL/L (ref 8–16)
AST SERPL-CCNC: 177 U/L (ref 10–40)
BASOPHILS # BLD AUTO: 0.02 K/UL (ref 0–0.2)
BASOPHILS NFR BLD: 0.1 % (ref 0–1.9)
BILIRUB SERPL-MCNC: 2.4 MG/DL (ref 0.1–1)
BUN SERPL-MCNC: 10 MG/DL (ref 6–20)
BUN SERPL-MCNC: 11 MG/DL (ref 6–20)
BUN SERPL-MCNC: 12 MG/DL (ref 6–20)
CALCIUM SERPL-MCNC: 8.1 MG/DL (ref 8.7–10.5)
CALCIUM SERPL-MCNC: 8.3 MG/DL (ref 8.7–10.5)
CALCIUM SERPL-MCNC: 9.1 MG/DL (ref 8.7–10.5)
CHLORIDE SERPL-SCNC: 86 MMOL/L (ref 95–110)
CHLORIDE SERPL-SCNC: 89 MMOL/L (ref 95–110)
CHLORIDE SERPL-SCNC: 91 MMOL/L (ref 95–110)
CO2 SERPL-SCNC: 25 MMOL/L (ref 23–29)
CO2 SERPL-SCNC: 30 MMOL/L (ref 23–29)
CO2 SERPL-SCNC: 32 MMOL/L (ref 23–29)
CREAT SERPL-MCNC: 0.7 MG/DL (ref 0.5–1.4)
CREAT SERPL-MCNC: 0.7 MG/DL (ref 0.5–1.4)
CREAT SERPL-MCNC: 0.9 MG/DL (ref 0.5–1.4)
CTP QC/QA: YES
DIFFERENTIAL METHOD: ABNORMAL
EOSINOPHIL # BLD AUTO: 0 K/UL (ref 0–0.5)
EOSINOPHIL NFR BLD: 0.1 % (ref 0–8)
ERYTHROCYTE [DISTWIDTH] IN BLOOD BY AUTOMATED COUNT: 11.8 % (ref 11.5–14.5)
EST. GFR  (NO RACE VARIABLE): >60 ML/MIN/1.73 M^2
ETHANOL SERPL-MCNC: <10 MG/DL
FIO2: 21 %
GLUCOSE SERPL-MCNC: 113 MG/DL (ref 70–110)
GLUCOSE SERPL-MCNC: 95 MG/DL (ref 70–110)
GLUCOSE SERPL-MCNC: 96 MG/DL (ref 70–110)
HCT VFR BLD AUTO: 36 % (ref 37–48.5)
HGB BLD-MCNC: 12.5 G/DL (ref 12–16)
IMM GRANULOCYTES # BLD AUTO: 0.2 K/UL (ref 0–0.04)
IMM GRANULOCYTES NFR BLD AUTO: 1.4 % (ref 0–0.5)
INFLUENZA A, MOLECULAR: NEGATIVE
INFLUENZA B, MOLECULAR: NEGATIVE
LACTATE SERPL-SCNC: 1.6 MMOL/L (ref 0.5–2.2)
LACTATE SERPL-SCNC: 4.7 MMOL/L (ref 0.5–2.2)
LIPASE SERPL-CCNC: 48 U/L (ref 4–60)
LYMPHOCYTES # BLD AUTO: 1.4 K/UL (ref 1–4.8)
LYMPHOCYTES NFR BLD: 9.8 % (ref 18–48)
MAGNESIUM SERPL-MCNC: 1.7 MG/DL (ref 1.6–2.6)
MCH RBC QN AUTO: 32.4 PG (ref 27–31)
MCHC RBC AUTO-ENTMCNC: 34.7 G/DL (ref 32–36)
MCV RBC AUTO: 93 FL (ref 82–98)
MONOCYTES # BLD AUTO: 1 K/UL (ref 0.3–1)
MONOCYTES NFR BLD: 7 % (ref 4–15)
NEUTROPHILS # BLD AUTO: 12 K/UL (ref 1.8–7.7)
NEUTROPHILS NFR BLD: 81.6 % (ref 38–73)
NRBC BLD-RTO: 0 /100 WBC
PCO2 BLDA: 40 MMHG (ref 35–45)
PH SMN: 7.51 [PH] (ref 7.35–7.45)
PLATELET # BLD AUTO: 208 K/UL (ref 150–450)
PMV BLD AUTO: 10.3 FL (ref 9.2–12.9)
PO2 BLDA: 41.6 MMHG (ref 40–60)
POC BASE DEFICIT: 8.1 MMOL/L (ref -2–2)
POC HCO3: 31.8 MMOL/L (ref 24–28)
POC PERFORMED BY: ABNORMAL
POC SATURATED O2: 79.9 % (ref 95–100)
POTASSIUM SERPL-SCNC: 2.3 MMOL/L (ref 3.5–5.1)
POTASSIUM SERPL-SCNC: 2.9 MMOL/L (ref 3.5–5.1)
POTASSIUM SERPL-SCNC: 2.9 MMOL/L (ref 3.5–5.1)
PROT SERPL-MCNC: 7.6 G/DL (ref 6–8.4)
RBC # BLD AUTO: 3.86 M/UL (ref 4–5.4)
SARS-COV-2 RDRP RESP QL NAA+PROBE: NEGATIVE
SODIUM SERPL-SCNC: 132 MMOL/L (ref 136–145)
SODIUM SERPL-SCNC: 133 MMOL/L (ref 136–145)
SODIUM SERPL-SCNC: 134 MMOL/L (ref 136–145)
SPECIMEN SOURCE: ABNORMAL
SPECIMEN SOURCE: NORMAL
TROPONIN I SERPL DL<=0.01 NG/ML-MCNC: 0.01 NG/ML (ref 0–0.03)
WBC # BLD AUTO: 14.76 K/UL (ref 3.9–12.7)

## 2023-10-22 PROCEDURE — 96361 HYDRATE IV INFUSION ADD-ON: CPT

## 2023-10-22 PROCEDURE — 25000003 PHARM REV CODE 250

## 2023-10-22 PROCEDURE — 25500020 PHARM REV CODE 255: Performed by: EMERGENCY MEDICINE

## 2023-10-22 PROCEDURE — 63600175 PHARM REV CODE 636 W HCPCS

## 2023-10-22 PROCEDURE — 85025 COMPLETE CBC W/AUTO DIFF WBC: CPT | Performed by: EMERGENCY MEDICINE

## 2023-10-22 PROCEDURE — 93010 EKG 12-LEAD: ICD-10-PCS | Mod: ,,, | Performed by: INTERNAL MEDICINE

## 2023-10-22 PROCEDURE — 96374 THER/PROPH/DIAG INJ IV PUSH: CPT

## 2023-10-22 PROCEDURE — 82077 ASSAY SPEC XCP UR&BREATH IA: CPT | Performed by: FAMILY MEDICINE

## 2023-10-22 PROCEDURE — 63600175 PHARM REV CODE 636 W HCPCS: Performed by: EMERGENCY MEDICINE

## 2023-10-22 PROCEDURE — 25000003 PHARM REV CODE 250: Performed by: EMERGENCY MEDICINE

## 2023-10-22 PROCEDURE — 99285 EMERGENCY DEPT VISIT HI MDM: CPT | Mod: 25

## 2023-10-22 PROCEDURE — 11000001 HC ACUTE MED/SURG PRIVATE ROOM

## 2023-10-22 PROCEDURE — 36415 COLL VENOUS BLD VENIPUNCTURE: CPT

## 2023-10-22 PROCEDURE — 93005 ELECTROCARDIOGRAM TRACING: CPT

## 2023-10-22 PROCEDURE — G0378 HOSPITAL OBSERVATION PER HR: HCPCS

## 2023-10-22 PROCEDURE — 80048 BASIC METABOLIC PNL TOTAL CA: CPT | Mod: XB

## 2023-10-22 PROCEDURE — 80053 COMPREHEN METABOLIC PANEL: CPT | Performed by: EMERGENCY MEDICINE

## 2023-10-22 PROCEDURE — 83605 ASSAY OF LACTIC ACID: CPT | Mod: 91

## 2023-10-22 PROCEDURE — 80048 BASIC METABOLIC PNL TOTAL CA: CPT | Mod: 91,XB

## 2023-10-22 PROCEDURE — 93010 ELECTROCARDIOGRAM REPORT: CPT | Mod: ,,, | Performed by: INTERNAL MEDICINE

## 2023-10-22 PROCEDURE — 82803 BLOOD GASES ANY COMBINATION: CPT

## 2023-10-22 PROCEDURE — 87635 SARS-COV-2 COVID-19 AMP PRB: CPT | Performed by: FAMILY MEDICINE

## 2023-10-22 PROCEDURE — 87502 INFLUENZA DNA AMP PROBE: CPT

## 2023-10-22 PROCEDURE — 83690 ASSAY OF LIPASE: CPT | Performed by: EMERGENCY MEDICINE

## 2023-10-22 PROCEDURE — 84484 ASSAY OF TROPONIN QUANT: CPT | Performed by: EMERGENCY MEDICINE

## 2023-10-22 PROCEDURE — 83605 ASSAY OF LACTIC ACID: CPT | Performed by: EMERGENCY MEDICINE

## 2023-10-22 PROCEDURE — 96376 TX/PRO/DX INJ SAME DRUG ADON: CPT

## 2023-10-22 PROCEDURE — 96375 TX/PRO/DX INJ NEW DRUG ADDON: CPT

## 2023-10-22 PROCEDURE — 83735 ASSAY OF MAGNESIUM: CPT | Performed by: EMERGENCY MEDICINE

## 2023-10-22 RX ORDER — SODIUM CHLORIDE 0.9 % (FLUSH) 0.9 %
5 SYRINGE (ML) INJECTION
Status: DISCONTINUED | OUTPATIENT
Start: 2023-10-22 | End: 2023-10-24 | Stop reason: HOSPADM

## 2023-10-22 RX ORDER — TALC
9 POWDER (GRAM) TOPICAL NIGHTLY PRN
Status: DISCONTINUED | OUTPATIENT
Start: 2023-10-22 | End: 2023-10-24 | Stop reason: HOSPADM

## 2023-10-22 RX ORDER — ONDANSETRON 2 MG/ML
4 INJECTION INTRAMUSCULAR; INTRAVENOUS
Status: COMPLETED | OUTPATIENT
Start: 2023-10-22 | End: 2023-10-22

## 2023-10-22 RX ORDER — MAGNESIUM SULFATE HEPTAHYDRATE 40 MG/ML
2 INJECTION, SOLUTION INTRAVENOUS ONCE
Status: COMPLETED | OUTPATIENT
Start: 2023-10-22 | End: 2023-10-22

## 2023-10-22 RX ORDER — FOLIC ACID 5 MG/ML
1 INJECTION, SOLUTION INTRAMUSCULAR; INTRAVENOUS; SUBCUTANEOUS DAILY
Status: DISCONTINUED | OUTPATIENT
Start: 2023-10-22 | End: 2023-10-23

## 2023-10-22 RX ORDER — IBUPROFEN 200 MG
24 TABLET ORAL
Status: DISCONTINUED | OUTPATIENT
Start: 2023-10-22 | End: 2023-10-24 | Stop reason: HOSPADM

## 2023-10-22 RX ORDER — MAGNESIUM SULFATE HEPTAHYDRATE 40 MG/ML
2 INJECTION, SOLUTION INTRAVENOUS ONCE
Status: DISCONTINUED | OUTPATIENT
Start: 2023-10-22 | End: 2023-10-22

## 2023-10-22 RX ORDER — ACETAMINOPHEN 325 MG/1
650 TABLET ORAL EVERY 4 HOURS PRN
Status: DISCONTINUED | OUTPATIENT
Start: 2023-10-22 | End: 2023-10-24 | Stop reason: HOSPADM

## 2023-10-22 RX ORDER — MORPHINE SULFATE 4 MG/ML
4 INJECTION, SOLUTION INTRAMUSCULAR; INTRAVENOUS
Status: COMPLETED | OUTPATIENT
Start: 2023-10-22 | End: 2023-10-22

## 2023-10-22 RX ORDER — ACETAMINOPHEN 325 MG/1
650 TABLET ORAL EVERY 8 HOURS PRN
Status: DISCONTINUED | OUTPATIENT
Start: 2023-10-22 | End: 2023-10-24 | Stop reason: HOSPADM

## 2023-10-22 RX ORDER — AMOXICILLIN 250 MG
1 CAPSULE ORAL 2 TIMES DAILY
Status: DISCONTINUED | OUTPATIENT
Start: 2023-10-22 | End: 2023-10-24 | Stop reason: HOSPADM

## 2023-10-22 RX ORDER — SCOLOPAMINE TRANSDERMAL SYSTEM 1 MG/1
1 PATCH, EXTENDED RELEASE TRANSDERMAL
Status: DISCONTINUED | OUTPATIENT
Start: 2023-10-22 | End: 2023-10-24 | Stop reason: HOSPADM

## 2023-10-22 RX ORDER — POTASSIUM CHLORIDE 7.45 MG/ML
10 INJECTION INTRAVENOUS
Status: COMPLETED | OUTPATIENT
Start: 2023-10-22 | End: 2023-10-22

## 2023-10-22 RX ORDER — GLUCAGON 1 MG
1 KIT INJECTION
Status: DISCONTINUED | OUTPATIENT
Start: 2023-10-22 | End: 2023-10-24 | Stop reason: HOSPADM

## 2023-10-22 RX ORDER — NALOXONE HCL 0.4 MG/ML
0.02 VIAL (ML) INJECTION
Status: DISCONTINUED | OUTPATIENT
Start: 2023-10-22 | End: 2023-10-24 | Stop reason: HOSPADM

## 2023-10-22 RX ORDER — ONDANSETRON 2 MG/ML
4 INJECTION INTRAMUSCULAR; INTRAVENOUS EVERY 8 HOURS PRN
Status: DISCONTINUED | OUTPATIENT
Start: 2023-10-22 | End: 2023-10-24 | Stop reason: HOSPADM

## 2023-10-22 RX ORDER — SODIUM CHLORIDE, SODIUM LACTATE, POTASSIUM CHLORIDE, CALCIUM CHLORIDE 600; 310; 30; 20 MG/100ML; MG/100ML; MG/100ML; MG/100ML
INJECTION, SOLUTION INTRAVENOUS CONTINUOUS
Status: ACTIVE | OUTPATIENT
Start: 2023-10-22 | End: 2023-10-23

## 2023-10-22 RX ORDER — ENOXAPARIN SODIUM 100 MG/ML
40 INJECTION SUBCUTANEOUS EVERY 24 HOURS
Status: DISCONTINUED | OUTPATIENT
Start: 2023-10-22 | End: 2023-10-24 | Stop reason: HOSPADM

## 2023-10-22 RX ORDER — IBUPROFEN 200 MG
16 TABLET ORAL
Status: DISCONTINUED | OUTPATIENT
Start: 2023-10-22 | End: 2023-10-24 | Stop reason: HOSPADM

## 2023-10-22 RX ADMIN — IOHEXOL 75 ML: 350 INJECTION, SOLUTION INTRAVENOUS at 01:10

## 2023-10-22 RX ADMIN — POTASSIUM CHLORIDE 10 MEQ: 7.46 INJECTION, SOLUTION INTRAVENOUS at 01:10

## 2023-10-22 RX ADMIN — POTASSIUM BICARBONATE 50 MEQ: 978 TABLET, EFFERVESCENT ORAL at 06:10

## 2023-10-22 RX ADMIN — FOLIC ACID 1 MG: 5 INJECTION, SOLUTION INTRAMUSCULAR; INTRAVENOUS; SUBCUTANEOUS at 06:10

## 2023-10-22 RX ADMIN — MAGNESIUM SULFATE HEPTAHYDRATE 2 G: 40 INJECTION, SOLUTION INTRAVENOUS at 01:10

## 2023-10-22 RX ADMIN — SODIUM CHLORIDE, POTASSIUM CHLORIDE, SODIUM LACTATE AND CALCIUM CHLORIDE: 600; 310; 30; 20 INJECTION, SOLUTION INTRAVENOUS at 08:10

## 2023-10-22 RX ADMIN — MORPHINE SULFATE 4 MG: 4 INJECTION INTRAVENOUS at 02:10

## 2023-10-22 RX ADMIN — ONDANSETRON 4 MG: 2 INJECTION INTRAMUSCULAR; INTRAVENOUS at 08:10

## 2023-10-22 RX ADMIN — ONDANSETRON 4 MG: 2 INJECTION INTRAMUSCULAR; INTRAVENOUS at 11:10

## 2023-10-22 RX ADMIN — SCOPALAMINE 1 PATCH: 1 PATCH, EXTENDED RELEASE TRANSDERMAL at 04:10

## 2023-10-22 RX ADMIN — POTASSIUM BICARBONATE 50 MEQ: 978 TABLET, EFFERVESCENT ORAL at 01:10

## 2023-10-22 RX ADMIN — POTASSIUM BICARBONATE 50 MEQ: 978 TABLET, EFFERVESCENT ORAL at 08:10

## 2023-10-22 RX ADMIN — ONDANSETRON 4 MG: 2 INJECTION INTRAMUSCULAR; INTRAVENOUS at 02:10

## 2023-10-22 RX ADMIN — THIAMINE HYDROCHLORIDE 100 MG: 100 INJECTION, SOLUTION INTRAMUSCULAR; INTRAVENOUS at 04:10

## 2023-10-22 RX ADMIN — MORPHINE SULFATE 4 MG: 4 INJECTION INTRAVENOUS at 11:10

## 2023-10-22 RX ADMIN — SODIUM CHLORIDE, POTASSIUM CHLORIDE, SODIUM LACTATE AND CALCIUM CHLORIDE 1000 ML: 600; 310; 30; 20 INJECTION, SOLUTION INTRAVENOUS at 11:10
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
## 2023-10-22 NOTE — ASSESSMENT & PLAN NOTE
Endorses 4 beers and 4 shots a day  Abstinent for two days  No hx of acute withdrawal, seizures, intubation in previous admissions  Takes topiramate 25 mg daily for AUD without effect, max dose can be up to 300 mg/ day  Patient would like discuss inpatient detox programs with [facility], states she is not interested in starting at this time but possible in future    Plan:  BING q4hr, notify MD > 8  Continue topiramate, will increase to 50 mg daily. This can be increased 25-50 mg weekly outpatient   Folate, thiamine daily

## 2023-10-22 NOTE — ASSESSMENT & PLAN NOTE
Present for 1 week with decreased PO tolerance  Denies sick contacts, unclear trigger. Possible viral illness  Denies bloody emesis, endorses loose stools, weakness and dizziness with standing  Improved with morphine and zofran in ED  Given 1 L LR    Plan:  - Nausea control w/ zofran PRN, will place scopolamine patch. Can consider phenergan, compazine if necessary but will hold for new given the decreased seizure threshold associated with this medications  - Will continue maintenance fluids overnight with  cc/hr  - Patient requesting full diet, will trial. Will order bland diet  - COVID, influenza screen ordered

## 2023-10-22 NOTE — ASSESSMENT & PLAN NOTE
Patient with recurrent hx of hypokalemia requiring admission  2.3 on presentation today, Qtc 474  Given potassium bicarb 50 mEq, Kcl 10 mEq in ED    Plan:  Continue to replete  Cardiac telemetry  Repeat BMP at 1800 today

## 2023-10-22 NOTE — PHARMACY MED REC
"  [facility]           Pharmacy  Admission Medication History     The home medication history was taken by Lila Sharpe.      Medication history obtained from Medications listed below were obtained from: Patient/family    Based on information gathered for medication list, you may go to "Admission" then "Reconcile Home Medications" tabs to review and/or act upon those items.     The home medication list has been updated by the Pharmacy department.   Please read ALL comments highlighted in yellow.   Please address this information as you see fit.    Feel free to contact us if you have any questions or require assistance.    The medications listed below were removed from the home medication list.  Please reorder if appropriate:    Patient reports NOT TAKING the following medication(s):  Lisinopril         No current facility-administered medications on file prior to encounter.     Current Outpatient Medications on File Prior to Encounter   Medication Sig Dispense Refill    cyanocobalamin (VITAMIN B-12) 1000 MCG tablet Take 1 tablet (1,000 mcg total) by mouth once daily. 60 tablet 2    EScitalopram oxalate (LEXAPRO) 10 MG tablet Take 1 tablet (10 mg total) by mouth once daily. 90 tablet 1    folic acid (FOLVITE) 1 MG tablet Take 1 tablet (1 mg total) by mouth once daily. 60 tablet 2    multivit-min-iron-CA-FA (THERA-M) 27-0.4 mg Tab Take 1 tablet by mouth once daily.      potassium bicarbonate (K-LYTE) disintegrating tablet Take 1 tablet (20 mEq total) by mouth 2 (two) times a day. 90 tablet 0    pregabalin (LYRICA) 100 MG capsule Take 1 capsule (100 mg total) by mouth 2 (two) times daily. 60 capsule 5    topiramate (TOPAMAX) 25 MG tablet Take 1 tablet (25 mg total) by mouth once daily. 60 tablet 0    traZODone (DESYREL) 100 MG tablet Take 1 tablet (100 mg total) by mouth every evening. 30 tablet 11    spironolactone (ALDACTONE) 25 MG tablet Take 1 tablet (25 mg total) by mouth once daily. 60 " tablet 0       Please address this information as you see fit.  Feel free to contact us if you have any questions or require assistance.    Lila Sharpe  [phone]                .

## 2023-10-22 NOTE — HPI
54 yo female with PMH of alcohol use disorder, hypokalemia, hepatic steatosis, peripheral neuropathy, HTN presented to ED after 1 week of nausea and non bloody emesis. Reports near constant nausea and emesis with poor PO intake at that time. Endorses subjective fevers, loose stool, diffuse abdominal pain, dizziness with standing, general malaise. Endorses 4 beers and 4 shots per day however no ethanol for two days. Her continued poor PO intake prompted visit to ED.     In the ED, she was tachycardic otherwise VSS on RA. Labs noted WBC 15, lactate 4.7, Na 132, K 2.3, Cl 86, Mg 1.7, bilirubin 2.4, Alk phos 255, AST 1787, ALT 34, AG 21, lipase and trop negative. CTAP noted stable hepatomegaly and hepatic steatosis. 1 liter LR, morphine 4 mg IV x 2, zofran 4 mg IV x 2, potassium bicarb 50 mEq, Kcl 10 mg IV, mag sulfate 2 g x 2 given. Patient admitted to [facility] for management of intractable nausea, vomiting, hypokalemia.     Of note, patient on topiramate to reduce alcohol cravings that she reports compliance. Does not feel she gets much benefit. Failed naltrexone due to GI side effects. She has never had issues with withdrawals during previous admissions. Denies hx of seizures, ICU admission, or intubation due to alcohol withdrawal.
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
## 2023-10-23 ENCOUNTER — CLINICAL SUPPORT (OUTPATIENT)
Dept: SMOKING CESSATION | Facility: CLINIC | Age: 53
End: 2023-10-23

## 2023-10-23 DIAGNOSIS — F17.210 CIGARETTE SMOKER: Primary | ICD-10-CM

## 2023-10-23 LAB
ALBUMIN SERPL BCP-MCNC: 2.1 G/DL (ref 3.5–5.2)
ALP SERPL-CCNC: 202 U/L (ref 55–135)
ALT SERPL W/O P-5'-P-CCNC: 27 U/L (ref 10–44)
ANION GAP SERPL CALC-SCNC: 10 MMOL/L (ref 8–16)
ANION GAP SERPL CALC-SCNC: 11 MMOL/L (ref 8–16)
AST SERPL-CCNC: 163 U/L (ref 10–40)
BACTERIA #/AREA URNS HPF: ABNORMAL /HPF
BASOPHILS # BLD AUTO: 0.01 K/UL (ref 0–0.2)
BASOPHILS NFR BLD: 0.1 % (ref 0–1.9)
BILIRUB SERPL-MCNC: 2.4 MG/DL (ref 0.1–1)
BILIRUB UR QL STRIP: ABNORMAL
BUN SERPL-MCNC: 8 MG/DL (ref 6–20)
BUN SERPL-MCNC: 8 MG/DL (ref 6–20)
CALCIUM SERPL-MCNC: 8 MG/DL (ref 8.7–10.5)
CALCIUM SERPL-MCNC: 8.6 MG/DL (ref 8.7–10.5)
CHLORIDE SERPL-SCNC: 93 MMOL/L (ref 95–110)
CHLORIDE SERPL-SCNC: 93 MMOL/L (ref 95–110)
CLARITY UR: ABNORMAL
CO2 SERPL-SCNC: 28 MMOL/L (ref 23–29)
CO2 SERPL-SCNC: 33 MMOL/L (ref 23–29)
COLOR UR: YELLOW
CREAT SERPL-MCNC: 0.6 MG/DL (ref 0.5–1.4)
CREAT SERPL-MCNC: 0.7 MG/DL (ref 0.5–1.4)
DIFFERENTIAL METHOD: ABNORMAL
EOSINOPHIL # BLD AUTO: 0.1 K/UL (ref 0–0.5)
EOSINOPHIL NFR BLD: 0.6 % (ref 0–8)
ERYTHROCYTE [DISTWIDTH] IN BLOOD BY AUTOMATED COUNT: 12 % (ref 11.5–14.5)
EST. GFR  (NO RACE VARIABLE): >60 ML/MIN/1.73 M^2
EST. GFR  (NO RACE VARIABLE): >60 ML/MIN/1.73 M^2
GLUCOSE SERPL-MCNC: 93 MG/DL (ref 70–110)
GLUCOSE SERPL-MCNC: 94 MG/DL (ref 70–110)
GLUCOSE UR QL STRIP: NEGATIVE
HCT VFR BLD AUTO: 30.3 % (ref 37–48.5)
HGB BLD-MCNC: 10 G/DL (ref 12–16)
HGB UR QL STRIP: NEGATIVE
IMM GRANULOCYTES # BLD AUTO: 0.11 K/UL (ref 0–0.04)
IMM GRANULOCYTES NFR BLD AUTO: 1.3 % (ref 0–0.5)
KETONES UR QL STRIP: NEGATIVE
LEUKOCYTE ESTERASE UR QL STRIP: ABNORMAL
LYMPHOCYTES # BLD AUTO: 1.2 K/UL (ref 1–4.8)
LYMPHOCYTES NFR BLD: 14 % (ref 18–48)
MAGNESIUM SERPL-MCNC: 1.7 MG/DL (ref 1.6–2.6)
MCH RBC QN AUTO: 32.3 PG (ref 27–31)
MCHC RBC AUTO-ENTMCNC: 33 G/DL (ref 32–36)
MCV RBC AUTO: 98 FL (ref 82–98)
MICROSCOPIC COMMENT: ABNORMAL
MONOCYTES # BLD AUTO: 0.7 K/UL (ref 0.3–1)
MONOCYTES NFR BLD: 8.3 % (ref 4–15)
NEUTROPHILS # BLD AUTO: 6.6 K/UL (ref 1.8–7.7)
NEUTROPHILS NFR BLD: 75.7 % (ref 38–73)
NITRITE UR QL STRIP: NEGATIVE
NON-SQ EPI CELLS #/AREA URNS HPF: 1 /HPF
NRBC BLD-RTO: 0 /100 WBC
PH UR STRIP: 8 [PH] (ref 5–8)
PHOSPHATE SERPL-MCNC: 1.5 MG/DL (ref 2.7–4.5)
PLATELET # BLD AUTO: 168 K/UL (ref 150–450)
PMV BLD AUTO: 10.1 FL (ref 9.2–12.9)
POTASSIUM SERPL-SCNC: 3.3 MMOL/L (ref 3.5–5.1)
POTASSIUM SERPL-SCNC: 3.5 MMOL/L (ref 3.5–5.1)
PROT SERPL-MCNC: 6 G/DL (ref 6–8.4)
PROT UR QL STRIP: ABNORMAL
RBC # BLD AUTO: 3.1 M/UL (ref 4–5.4)
RBC #/AREA URNS HPF: 5 /HPF (ref 0–4)
SODIUM SERPL-SCNC: 132 MMOL/L (ref 136–145)
SODIUM SERPL-SCNC: 136 MMOL/L (ref 136–145)
SP GR UR STRIP: 1.01 (ref 1–1.03)
SQUAMOUS #/AREA URNS HPF: 3 /HPF
URN SPEC COLLECT METH UR: ABNORMAL
UROBILINOGEN UR STRIP-ACNC: >=8 EU/DL
WBC # BLD AUTO: 8.67 K/UL (ref 3.9–12.7)
WBC #/AREA URNS HPF: >100 /HPF (ref 0–5)
WBC CLUMPS URNS QL MICRO: ABNORMAL

## 2023-10-23 PROCEDURE — 25000003 PHARM REV CODE 250

## 2023-10-23 PROCEDURE — 63600175 PHARM REV CODE 636 W HCPCS

## 2023-10-23 PROCEDURE — 87186 SC STD MICRODIL/AGAR DIL: CPT

## 2023-10-23 PROCEDURE — 11000001 HC ACUTE MED/SURG PRIVATE ROOM

## 2023-10-23 PROCEDURE — 87077 CULTURE AEROBIC IDENTIFY: CPT

## 2023-10-23 PROCEDURE — 80048 BASIC METABOLIC PNL TOTAL CA: CPT | Mod: XB

## 2023-10-23 PROCEDURE — 80053 COMPREHEN METABOLIC PANEL: CPT

## 2023-10-23 PROCEDURE — 36415 COLL VENOUS BLD VENIPUNCTURE: CPT

## 2023-10-23 PROCEDURE — 80307 DRUG TEST PRSMV CHEM ANLYZR: CPT

## 2023-10-23 PROCEDURE — 84100 ASSAY OF PHOSPHORUS: CPT

## 2023-10-23 PROCEDURE — 85025 COMPLETE CBC W/AUTO DIFF WBC: CPT

## 2023-10-23 PROCEDURE — 87086 URINE CULTURE/COLONY COUNT: CPT

## 2023-10-23 PROCEDURE — 83735 ASSAY OF MAGNESIUM: CPT

## 2023-10-23 PROCEDURE — 87088 URINE BACTERIA CULTURE: CPT

## 2023-10-23 PROCEDURE — 99406 BEHAV CHNG SMOKING 3-10 MIN: CPT | Mod: S$GLB,,,

## 2023-10-23 PROCEDURE — 81000 URINALYSIS NONAUTO W/SCOPE: CPT

## 2023-10-23 PROCEDURE — 99406 PT REFUSED TOBACCO CESSATION: ICD-10-PCS | Mod: S$GLB,,,

## 2023-10-23 PROCEDURE — 63600175 PHARM REV CODE 636 W HCPCS: Performed by: STUDENT IN AN ORGANIZED HEALTH CARE EDUCATION/TRAINING PROGRAM

## 2023-10-23 RX ORDER — PANTOPRAZOLE SODIUM 40 MG/1
40 TABLET, DELAYED RELEASE ORAL DAILY
Status: DISCONTINUED | OUTPATIENT
Start: 2023-10-23 | End: 2023-10-24 | Stop reason: HOSPADM

## 2023-10-23 RX ORDER — MAGNESIUM SULFATE HEPTAHYDRATE 40 MG/ML
2 INJECTION, SOLUTION INTRAVENOUS ONCE
Status: COMPLETED | OUTPATIENT
Start: 2023-10-23 | End: 2023-10-23

## 2023-10-23 RX ORDER — KETOROLAC TROMETHAMINE 30 MG/ML
15 INJECTION, SOLUTION INTRAMUSCULAR; INTRAVENOUS ONCE
Status: COMPLETED | OUTPATIENT
Start: 2023-10-23 | End: 2023-10-23

## 2023-10-23 RX ORDER — FOLIC ACID 1 MG/1
1 TABLET ORAL DAILY
Status: DISCONTINUED | OUTPATIENT
Start: 2023-10-24 | End: 2023-10-24 | Stop reason: HOSPADM

## 2023-10-23 RX ORDER — PROCHLORPERAZINE EDISYLATE 5 MG/ML
5 INJECTION INTRAMUSCULAR; INTRAVENOUS ONCE
Status: DISCONTINUED | OUTPATIENT
Start: 2023-10-23 | End: 2023-10-23

## 2023-10-23 RX ORDER — MAGNESIUM SULFATE HEPTAHYDRATE 40 MG/ML
2 INJECTION, SOLUTION INTRAVENOUS ONCE
Status: DISCONTINUED | OUTPATIENT
Start: 2023-10-23 | End: 2023-10-23

## 2023-10-23 RX ORDER — PROCHLORPERAZINE EDISYLATE 5 MG/ML
5 INJECTION INTRAMUSCULAR; INTRAVENOUS ONCE
Status: DISCONTINUED | OUTPATIENT
Start: 2023-10-23 | End: 2023-10-24 | Stop reason: HOSPADM

## 2023-10-23 RX ORDER — POTASSIUM CHLORIDE 7.45 MG/ML
10 INJECTION INTRAVENOUS
Status: COMPLETED | OUTPATIENT
Start: 2023-10-23 | End: 2023-10-23

## 2023-10-23 RX ORDER — MAG HYDROX/ALUMINUM HYD/SIMETH 200-200-20
30 SUSPENSION, ORAL (FINAL DOSE FORM) ORAL ONCE
Status: COMPLETED | OUTPATIENT
Start: 2023-10-23 | End: 2023-10-23

## 2023-10-23 RX ORDER — THIAMINE HCL 100 MG
100 TABLET ORAL DAILY
Status: DISCONTINUED | OUTPATIENT
Start: 2023-10-24 | End: 2023-10-24 | Stop reason: HOSPADM

## 2023-10-23 RX ADMIN — ALUMINUM HYDROXIDE, MAGNESIUM HYDROXIDE, AND SIMETHICONE 30 ML: 200; 200; 20 SUSPENSION ORAL at 12:10

## 2023-10-23 RX ADMIN — THIAMINE HYDROCHLORIDE 100 MG: 100 INJECTION, SOLUTION INTRAMUSCULAR; INTRAVENOUS at 12:10

## 2023-10-23 RX ADMIN — POTASSIUM CHLORIDE 10 MEQ: 7.46 INJECTION, SOLUTION INTRAVENOUS at 11:10

## 2023-10-23 RX ADMIN — ONDANSETRON 4 MG: 2 INJECTION INTRAMUSCULAR; INTRAVENOUS at 08:10

## 2023-10-23 RX ADMIN — POTASSIUM CHLORIDE 10 MEQ: 7.46 INJECTION, SOLUTION INTRAVENOUS at 02:10

## 2023-10-23 RX ADMIN — MAGNESIUM SULFATE HEPTAHYDRATE 2 G: 40 INJECTION, SOLUTION INTRAVENOUS at 01:10

## 2023-10-23 RX ADMIN — POTASSIUM CHLORIDE 10 MEQ: 7.46 INJECTION, SOLUTION INTRAVENOUS at 09:10

## 2023-10-23 RX ADMIN — SODIUM CHLORIDE, POTASSIUM CHLORIDE, SODIUM LACTATE AND CALCIUM CHLORIDE: 600; 310; 30; 20 INJECTION, SOLUTION INTRAVENOUS at 04:10

## 2023-10-23 RX ADMIN — POTASSIUM CHLORIDE 10 MEQ: 7.46 INJECTION, SOLUTION INTRAVENOUS at 03:10

## 2023-10-23 RX ADMIN — FOLIC ACID 1 MG: 5 INJECTION, SOLUTION INTRAMUSCULAR; INTRAVENOUS; SUBCUTANEOUS at 09:10

## 2023-10-23 RX ADMIN — POTASSIUM CHLORIDE 10 MEQ: 7.46 INJECTION, SOLUTION INTRAVENOUS at 12:10

## 2023-10-23 RX ADMIN — POTASSIUM BICARBONATE 50 MEQ: 978 TABLET, EFFERVESCENT ORAL at 12:10

## 2023-10-23 RX ADMIN — SODIUM PHOSPHATE, MONOBASIC, MONOHYDRATE AND SODIUM PHOSPHATE, DIBASIC, ANHYDROUS 20.01 MMOL: 142; 276 INJECTION, SOLUTION INTRAVENOUS at 03:10

## 2023-10-23 RX ADMIN — PANTOPRAZOLE SODIUM 40 MG: 40 TABLET, DELAYED RELEASE ORAL at 12:10

## 2023-10-23 RX ADMIN — KETOROLAC TROMETHAMINE 15 MG: 30 INJECTION, SOLUTION INTRAMUSCULAR; INTRAVENOUS at 08:10

## 2023-10-23 RX ADMIN — THERA TABS 1 TABLET: TAB at 12:10

## 2023-10-23 RX ADMIN — POTASSIUM CHLORIDE 10 MEQ: 7.46 INJECTION, SOLUTION INTRAVENOUS at 01:10

## 2023-10-23 RX ADMIN — SODIUM PHOSPHATE, MONOBASIC, MONOHYDRATE AND SODIUM PHOSPHATE, DIBASIC, ANHYDROUS 20.01 MMOL: 142; 276 INJECTION, SOLUTION INTRAVENOUS at 01:10

## 2023-10-23 NOTE — PLAN OF CARE
Pt arrived to unit. Introduced self as VN for this shift. Admission questions completed by VN. Educated pt on VTE risk, safety precautions, and VN's role in pt care. Opportunity given for pt's questions. All questions answered.      10/22/23 2021   Nurse Notification   Nurse Notified Of Care Plan;Education   Admission   Initial VN Admission Questions Complete   Shift   Virtual Nurse - Rounding Complete   Pain Management Interventions care clustered   Virtual Nurse - Patient Verbalized Approval Of Camera Use;VN Rounding   Type of Frequent Check   Type Patient Rounds   Safety/Activity   Patient Rounds bed in low position;bed wheels locked;call light in patient/parent reach;clutter free environment maintained;ID band on;visualized patient;toileting offered;placement of personal items at bedside   Safety Promotion/Fall Prevention assistive device/personal item within reach;Fall Risk reviewed with patient/family;family to remain at bedside   Safety Precautions emergency equipment at bedside   Activity Assistance Provided assistance, 1 person   Positioning   Body Position position changed independently   Pain/Comfort/Sleep   Sleep/Rest/Relaxation no problem identified

## 2023-10-23 NOTE — ASSESSMENT & PLAN NOTE
Patient with recurrent hx of hypokalemia requiring admission  2.3 on presentation today, Qtc 474  Given potassium bicarb 50 mEq, Kcl 10 mEq in ED    Plan:  Continue to replete  Cardiac telemetry  Repeat BMPs as necessary
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
## 2023-10-23 NOTE — PROGRESS NOTES
Pharmacist Intervention IV to PO Note    Ember Rivera is a 53 y.o. female being treated with IV medication thiamine    Patient Data:    Vital Signs (Most Recent):  Temp: 98.2 °F (36.8 °C) (10/23/23 1200)  Pulse: 80 (10/23/23 1200)  Resp: 18 (10/23/23 1200)  BP: 128/77 (10/23/23 1200)  SpO2: 98 % (10/23/23 1200) Vital Signs (72h Range):  Temp:  [98.2 °F (36.8 °C)-98.7 °F (37.1 °C)]   Pulse:  []   Resp:  [15-20]   BP: (113-129)/(62-77)   SpO2:  [95 %-98 %]      CBC:  Recent Labs   Lab 10/22/23  1133 10/23/23  0719   WBC 14.76* 8.67   RBC 3.86* 3.10*   HGB 12.5 10.0*   HCT 36.0* 30.3*    168   MCV 93 98   MCH 32.4* 32.3*   MCHC 34.7 33.0     CMP:     Recent Labs   Lab 10/22/23  1133 10/22/23  1814 10/22/23  2311 10/23/23  0719   * 95 96 93   CALCIUM 9.1 8.3* 8.1* 8.6*   ALBUMIN 2.6*  --   --  2.1*   PROT 7.6  --   --  6.0   * 133* 134* 136   K 2.3* 2.9* 2.9* 3.3*   CO2 25 30* 32* 33*   CL 86* 89* 91* 93*   BUN 12 11 10 8   CREATININE 0.9 0.7 0.7 0.7   ALKPHOS 255*  --   --  202*   ALT 34  --   --  27   *  --   --  163*   BILITOT 2.4*  --   --  2.4*       Dietary Orders:  Diet Orders            Diet clear liquid: Clear Liquid starting at 10/23 1029            Based on the following criteria, this patient qualifies for intravenous to oral conversion:  [x] The patients gastrointestinal tract is functioning (tolerating medications via oral or enteral route for 24 hours and tolerating food or enteral feeds for 24 hours).  [x] The patient is hemodynamically stable for 24 hours (heart rate <100 beats per minute, systolic blood pressure >99 mm Hg, and respiratory rate <20 breaths per minute).  [x] The patient shows clinical improvement (afebrile for at least 24 hours and white blood cell count downtrending or normalized). Additionally, the patient must be non-neutropenic (absolute neutrophil count >500 cells/mm3).  [x] For antimicrobials, the patient has received IV therapy for at least 24  hours.    IV medication thiamine will be changed to oral medication     Pharmacist's Name: Juan Tong  Pharmacist's Extension: [phone]

## 2023-10-23 NOTE — PLAN OF CARE
SOCIAL WORK DISCHARGE PLANNING ASSESSMENT    Sw completed discharge planning assessment with pt. Pt was easily engaged and education on the role of  was provided. Pt reported she lives with her  and two children. Pt stated she has the following DME: rolling walker and shower chair. Pt stated she is not current with  services. Pt stated family drives her to doctor appointments and family will provide transportation home following discharge. Pt was encouraged to call with any questions or concerns. Pt verbalized understanding.     Future Appointments   Date Time Provider Department Center   11/20/2023 11:00 AM APPOINTMENT LAB, [facility]   11/30/2023  1:30 PM [facility] HEPATOLOGY, [facility]   11/30/2023  2:00 PM Lennie Nix, NP [facility]        Patient Active Problem List   Diagnosis    Prolonged QT interval    Transaminitis    EtOH dependence    Weakness of both lower extremities    Hypokalemia    Primary hypertension    Alcohol-induced polyneuropathy    Peripheral polyneuropathy    Tobacco abuse    Impaired functional mobility, balance, gait, and endurance    Normocytic anemia    High anion gap metabolic acidosis    Weakness    Weakness of both legs    Imbalance    Risk for falls    Hepatic steatosis    Alcohol abuse    Elevated LFTs    Hepatomegaly    Intractable nausea and vomiting      10/23/23 1048   Discharge Planning   Assessment Type Discharge Planning Brief Assessment   Resource/Environmental Concerns none   Support Systems Spouse/significant other;Children   Equipment Currently Used at Home walker, rolling;shower chair   Current Living Arrangements home   Patient/Family Anticipates Transition to home;home with family   Patient/Family Anticipated Services at Transition none   DME Needed Upon Discharge    (TBD)   Discharge Plan A Home with family;Home

## 2023-10-23 NOTE — PROGRESS NOTES
Pharmacist Intervention IV to PO Note    Ember Rivera is a 53 y.o. female being treated with IV medication folic acid    Patient Data:    Vital Signs (Most Recent):  Temp: 98.2 °F (36.8 °C) (10/23/23 1200)  Pulse: 80 (10/23/23 1200)  Resp: 18 (10/23/23 1200)  BP: 128/77 (10/23/23 1200)  SpO2: 98 % (10/23/23 1200) Vital Signs (72h Range):  Temp:  [98.2 °F (36.8 °C)-98.7 °F (37.1 °C)]   Pulse:  []   Resp:  [15-20]   BP: (113-129)/(62-77)   SpO2:  [95 %-98 %]      CBC:  Recent Labs   Lab 10/22/23  1133 10/23/23  0719   WBC 14.76* 8.67   RBC 3.86* 3.10*   HGB 12.5 10.0*   HCT 36.0* 30.3*    168   MCV 93 98   MCH 32.4* 32.3*   MCHC 34.7 33.0     CMP:     Recent Labs   Lab 10/22/23  1133 10/22/23  1814 10/22/23  2311 10/23/23  0719   * 95 96 93   CALCIUM 9.1 8.3* 8.1* 8.6*   ALBUMIN 2.6*  --   --  2.1*   PROT 7.6  --   --  6.0   * 133* 134* 136   K 2.3* 2.9* 2.9* 3.3*   CO2 25 30* 32* 33*   CL 86* 89* 91* 93*   BUN 12 11 10 8   CREATININE 0.9 0.7 0.7 0.7   ALKPHOS 255*  --   --  202*   ALT 34  --   --  27   *  --   --  163*   BILITOT 2.4*  --   --  2.4*       Dietary Orders:  Diet Orders            Diet clear liquid: Clear Liquid starting at 10/23 1029            Based on the following criteria, this patient qualifies for intravenous to oral conversion:  [x] The patients gastrointestinal tract is functioning (tolerating medications via oral or enteral route for 24 hours and tolerating food or enteral feeds for 24 hours).  [x] The patient is hemodynamically stable for 24 hours (heart rate <100 beats per minute, systolic blood pressure >99 mm Hg, and respiratory rate <20 breaths per minute).  [x] The patient shows clinical improvement (afebrile for at least 24 hours and white blood cell count downtrending or normalized). Additionally, the patient must be non-neutropenic (absolute neutrophil count >500 cells/mm3).  [x] For antimicrobials, the patient has received IV therapy for at least 24  hours.    IV medication folic acid will be changed to oral medication     Pharmacist's Name: Juan Tong  Pharmacist's Extension: [phone]

## 2023-10-23 NOTE — PROGRESS NOTES
"[facility]           Pharmacy  Admission Medication Reconciliation     Based on information gathered for medication list, you may go to "Admission" then "Reconcile Home Medications" tabs to review and/or act upon those items.     The home medication list has been updated by the Pharmacy department.   Please read ALL comments highlighted in red.   Please address this information as you see fit.    Feel free to contact us if you have any questions or require assistance.    Home medication list has been compared to current inpatient medications. Please review the following discrepancies noted below:    Patient reports STILL TAKING the following medication(s) which was not ordered upon admit  Lexapro 10mg daily  Lyrica 100mg twice daily    Feel free to contact us if you have any questions or require assistance.    Rolan Hawkins, PharmD  [phone]    "

## 2023-10-23 NOTE — PLAN OF CARE
Problem: Adult Inpatient Plan of Care  Goal: Plan of Care Review  Outcome: Ongoing, Progressing     Problem: Fall Injury Risk  Goal: Absence of Fall and Fall-Related Injury  Outcome: Ongoing, Progressing     Problem: Nausea and Vomiting  Goal: Fluid and Electrolyte Balance  Outcome: Ongoing, Progressing     Problem: Pain Acute  Goal: Acceptable Pain Control and Functional Ability  Outcome: Ongoing, Progressing     Problem: Alcohol Withdrawal  Goal: Alcohol Withdrawal Symptom Control  Outcome: Ongoing, Progressing
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
## 2023-10-23 NOTE — ASSESSMENT & PLAN NOTE
Present for 1 week with decreased PO tolerance  Denies sick contacts, unclear trigger. Possible viral illness  Denies bloody emesis, endorses loose stools, weakness and dizziness with standing  Improved with morphine and zofran in ED  Given 1 L LR    Plan:  - Nausea control w/ zofran PRN, will place scopolamine patch. Can consider compazine if necessary.  - Will continue maintenance fluids overnight with  cc/hr  - Patient requesting full diet, will trial. Will order bland diet  - COVID, influenza negative

## 2023-10-24 VITALS
HEIGHT: 66 IN | SYSTOLIC BLOOD PRESSURE: 134 MMHG | TEMPERATURE: 99 F | HEART RATE: 78 BPM | BODY MASS INDEX: 24.77 KG/M2 | RESPIRATION RATE: 18 BRPM | WEIGHT: 154.13 LBS | OXYGEN SATURATION: 97 % | DIASTOLIC BLOOD PRESSURE: 65 MMHG

## 2023-10-24 PROBLEM — R11.10 VOMITING: Status: ACTIVE | Noted: 2023-10-24

## 2023-10-24 PROBLEM — E87.20 LACTIC ACIDOSIS: Status: ACTIVE | Noted: 2023-10-24

## 2023-10-24 LAB
ALBUMIN SERPL BCP-MCNC: 2 G/DL (ref 3.5–5.2)
ALP SERPL-CCNC: 190 U/L (ref 55–135)
ALT SERPL W/O P-5'-P-CCNC: 25 U/L (ref 10–44)
AMPHET+METHAMPHET UR QL: NEGATIVE
ANION GAP SERPL CALC-SCNC: 9 MMOL/L (ref 8–16)
AST SERPL-CCNC: 122 U/L (ref 10–40)
BARBITURATES UR QL SCN>200 NG/ML: NEGATIVE
BASOPHILS # BLD AUTO: 0.02 K/UL (ref 0–0.2)
BASOPHILS NFR BLD: 0.3 % (ref 0–1.9)
BENZODIAZ UR QL SCN>200 NG/ML: NEGATIVE
BILIRUB SERPL-MCNC: 2.1 MG/DL (ref 0.1–1)
BUN SERPL-MCNC: 6 MG/DL (ref 6–20)
BZE UR QL SCN: NEGATIVE
CALCIUM SERPL-MCNC: 8.2 MG/DL (ref 8.7–10.5)
CANNABINOIDS UR QL SCN: NEGATIVE
CHLORIDE SERPL-SCNC: 96 MMOL/L (ref 95–110)
CO2 SERPL-SCNC: 31 MMOL/L (ref 23–29)
CREAT SERPL-MCNC: 0.6 MG/DL (ref 0.5–1.4)
CREAT UR-MCNC: 83 MG/DL (ref 15–325)
DIFFERENTIAL METHOD: ABNORMAL
EOSINOPHIL # BLD AUTO: 0 K/UL (ref 0–0.5)
EOSINOPHIL NFR BLD: 0.6 % (ref 0–8)
ERYTHROCYTE [DISTWIDTH] IN BLOOD BY AUTOMATED COUNT: 11.9 % (ref 11.5–14.5)
EST. GFR  (NO RACE VARIABLE): >60 ML/MIN/1.73 M^2
ETHANOL UR-MCNC: <10 MG/DL
GLUCOSE SERPL-MCNC: 89 MG/DL (ref 70–110)
HCT VFR BLD AUTO: 27.6 % (ref 37–48.5)
HGB BLD-MCNC: 9.2 G/DL (ref 12–16)
IMM GRANULOCYTES # BLD AUTO: 0.14 K/UL (ref 0–0.04)
IMM GRANULOCYTES NFR BLD AUTO: 2 % (ref 0–0.5)
LYMPHOCYTES # BLD AUTO: 1 K/UL (ref 1–4.8)
LYMPHOCYTES NFR BLD: 14.4 % (ref 18–48)
MAGNESIUM SERPL-MCNC: 1.7 MG/DL (ref 1.6–2.6)
MCH RBC QN AUTO: 31.8 PG (ref 27–31)
MCHC RBC AUTO-ENTMCNC: 33.3 G/DL (ref 32–36)
MCV RBC AUTO: 96 FL (ref 82–98)
METHADONE UR QL SCN>300 NG/ML: NEGATIVE
MONOCYTES # BLD AUTO: 0.7 K/UL (ref 0.3–1)
MONOCYTES NFR BLD: 9.9 % (ref 4–15)
NEUTROPHILS # BLD AUTO: 5.1 K/UL (ref 1.8–7.7)
NEUTROPHILS NFR BLD: 72.8 % (ref 38–73)
NRBC BLD-RTO: 0 /100 WBC
OPIATES UR QL SCN: ABNORMAL
PCP UR QL SCN>25 NG/ML: NEGATIVE
PHOSPHATE SERPL-MCNC: 3.6 MG/DL (ref 2.7–4.5)
PLATELET # BLD AUTO: 149 K/UL (ref 150–450)
PMV BLD AUTO: 10.1 FL (ref 9.2–12.9)
POTASSIUM SERPL-SCNC: 3 MMOL/L (ref 3.5–5.1)
PROT SERPL-MCNC: 5.8 G/DL (ref 6–8.4)
RBC # BLD AUTO: 2.89 M/UL (ref 4–5.4)
SODIUM SERPL-SCNC: 136 MMOL/L (ref 136–145)
TOXICOLOGY INFORMATION: ABNORMAL
WBC # BLD AUTO: 6.96 K/UL (ref 3.9–12.7)

## 2023-10-24 PROCEDURE — 80053 COMPREHEN METABOLIC PANEL: CPT

## 2023-10-24 PROCEDURE — 25000003 PHARM REV CODE 250

## 2023-10-24 PROCEDURE — 84100 ASSAY OF PHOSPHORUS: CPT

## 2023-10-24 PROCEDURE — 25000003 PHARM REV CODE 250: Performed by: STUDENT IN AN ORGANIZED HEALTH CARE EDUCATION/TRAINING PROGRAM

## 2023-10-24 PROCEDURE — 36415 COLL VENOUS BLD VENIPUNCTURE: CPT

## 2023-10-24 PROCEDURE — 83735 ASSAY OF MAGNESIUM: CPT

## 2023-10-24 PROCEDURE — 63600175 PHARM REV CODE 636 W HCPCS

## 2023-10-24 PROCEDURE — 85025 COMPLETE CBC W/AUTO DIFF WBC: CPT

## 2023-10-24 RX ORDER — MAGNESIUM SULFATE HEPTAHYDRATE 40 MG/ML
2 INJECTION, SOLUTION INTRAVENOUS ONCE
Status: COMPLETED | OUTPATIENT
Start: 2023-10-24 | End: 2023-10-24

## 2023-10-24 RX ORDER — POTASSIUM CHLORIDE 7.45 MG/ML
10 INJECTION INTRAVENOUS
Status: DISPENSED | OUTPATIENT
Start: 2023-10-24 | End: 2023-10-24

## 2023-10-24 RX ORDER — ONDANSETRON 4 MG/1
4 TABLET, ORALLY DISINTEGRATING ORAL EVERY 6 HOURS PRN
Qty: 30 TABLET | Refills: 0 | Status: ON HOLD | OUTPATIENT
Start: 2023-10-24 | End: 2023-12-21

## 2023-10-24 RX ADMIN — FOLIC ACID 1 MG: 1 TABLET ORAL at 08:10

## 2023-10-24 RX ADMIN — POTASSIUM CHLORIDE 10 MEQ: 7.46 INJECTION, SOLUTION INTRAVENOUS at 01:10

## 2023-10-24 RX ADMIN — Medication 100 MG: at 08:10

## 2023-10-24 RX ADMIN — PANTOPRAZOLE SODIUM 40 MG: 40 TABLET, DELAYED RELEASE ORAL at 08:10

## 2023-10-24 RX ADMIN — THERA TABS 1 TABLET: TAB at 08:10

## 2023-10-24 RX ADMIN — POTASSIUM CHLORIDE 10 MEQ: 7.46 INJECTION, SOLUTION INTRAVENOUS at 08:10

## 2023-10-24 RX ADMIN — MAGNESIUM SULFATE HEPTAHYDRATE 2 G: 40 INJECTION, SOLUTION INTRAVENOUS at 08:10

## 2023-10-24 RX ADMIN — POTASSIUM CHLORIDE 10 MEQ: 7.46 INJECTION, SOLUTION INTRAVENOUS at 11:10

## 2023-10-24 RX ADMIN — POTASSIUM CHLORIDE 10 MEQ: 7.46 INJECTION, SOLUTION INTRAVENOUS at 10:10

## 2023-10-24 NOTE — PROGRESS NOTES
[facility]                    Pharmacy       Discharge Medication Education    Patient ACCEPTED medication education. Pharmacy has provided education on the name, indication, and possible side effects of the medication(s) prescribed, using teach-back method.     The following medications have also been discussed, during this admission.        Medication List        CONTINUE taking these medications      cyanocobalamin 1000 MCG tablet  Commonly known as: VITAMIN B-12  Take 1 tablet (1,000 mcg total) by mouth once daily.     EScitalopram oxalate 10 MG tablet  Commonly known as: LEXAPRO  Take 1 tablet (10 mg total) by mouth once daily.     folic acid 1 MG tablet  Commonly known as: FOLVITE  Take 1 tablet (1 mg total) by mouth once daily.     potassium bicarbonate disintegrating tablet  Commonly known as: K-LYTE  Take 1 tablet (20 mEq total) by mouth 2 (two) times a day.     pregabalin 100 MG capsule  Commonly known as: LYRICA  Take 1 capsule (100 mg total) by mouth 2 (two) times daily.     spironolactone 25 MG tablet  Commonly known as: ALDACTONE  Take 1 tablet (25 mg total) by mouth once daily.     THERA-M 27-0.4 mg Tab  Generic drug: multivit-min-iron-CA-FA     topiramate 25 MG tablet  Commonly known as: TOPAMAX  Take 1 tablet (25 mg total) by mouth once daily.     traZODone 100 MG tablet  Commonly known as: DESYREL  Take 1 tablet (100 mg total) by mouth every evening.               Thank you  Juan Tong, PharmD  [phone]

## 2023-10-24 NOTE — PLAN OF CARE
Problem: Adult Inpatient Plan of Care  Goal: Plan of Care Review  Outcome: Ongoing, Progressing     Problem: Fall Injury Risk  Goal: Absence of Fall and Fall-Related Injury  Outcome: Ongoing, Progressing     Problem: Nausea and Vomiting  Goal: Fluid and Electrolyte Balance  Outcome: Ongoing, Progressing     Problem: Pain Acute  Goal: Acceptable Pain Control and Functional Ability  Outcome: Ongoing, Progressing

## 2023-10-24 NOTE — PLAN OF CARE
CM met with pt prior to d/c   Advised pt that f/u apt is on AVS - gave pt numerous resources for inpt and outpt addiction treatment options   Pt states she has transportation to home   No dme, no hh ordered   Discharging nurse to review all d/c meds/instructions   Future Appointments   Date Time Provider Department Center   11/1/2023  3:00 PM Manjit Wynn PA-C [facility]   11/20/2023 11:00 AM APPOINTMENT [facility]   11/30/2023  1:30 PM [facility] HEPATOLOGY, FIBROSCAN [facility] HEPAT [facility]   11/30/2023  2:00 PM Lennie Nix, NP [facility] HEPAT [facility]      10/24/23 1422   Final Note   Assessment Type Final Discharge Note   Anticipated Discharge Disposition Home   What phone number can be called within the next 1-3 days to see how you are doing after discharge? [phone]   Hospital Resources/Appts/Education Provided Appointments scheduled and added to AVS;Post-Acute resouces added to AVS   Post-Acute Status   Discharge Delays None known at this time

## 2023-10-24 NOTE — PROGRESS NOTES
Virtual Nurse:Discharge orders noted; additional clinical references attached.  and pharmacy tech notified.  Patient's discharge instruction packet given by bedside RN.    Cued into patient's room.  Permission received per patient to turn camera to view patient.  Introduced as VN that will be instructing on discharge instructions. Educated patient on reason for admission; medications to hold, continue, and start, appointment to follow-up with doctor, and when to return to ED. Teach back method used. Verbalized understanding  Number given for 24/7 Nurse Line. Opportunity given for questions and questions answered.  Bedside nurse updated.        10/24/23 1454   Shift   Virtual Nurse - Patient Verbalized Approval Of Camera Use;VN Rounding   Safety/Activity   Patient Rounds visualized patient

## 2023-10-24 NOTE — DISCHARGE SUMMARY
[facility] Medicine  Discharge Summary      Patient Name: Ember Rivera  MRN: [id number]  NAVIN: [id number]  Patient Class: IP- Inpatient  Admission Date: 10/22/2023  Hospital Length of Stay: 1 days  Discharge Date and Time: No discharge date for patient encounter.  Attending Physician: Liu Dawn MD   Discharging Provider: Tommy Pickens MD  Primary Care Provider: Young Neil MD    Primary Care Team: Networked reference to record PCT     HPI:   52 yo female with PMH of alcohol use disorder, hypokalemia, hepatic steatosis, peripheral neuropathy, HTN presented to ED after 1 week of nausea and non bloody emesis. Reports near constant nausea and emesis with poor PO intake at that time. Endorses subjective fevers, loose stool, diffuse abdominal pain, dizziness with standing, general malaise. Endorses 4 beers and 4 shots per day however no ethanol for two days. Her continued poor PO intake prompted visit to ED.     In the ED, she was tachycardic otherwise VSS on RA. Labs noted WBC 15, lactate 4.7, Na 132, K 2.3, Cl 86, Mg 1.7, bilirubin 2.4, Alk phos 255, AST 1787, ALT 34, AG 21, lipase and trop negative. CTAP noted stable hepatomegaly and hepatic steatosis. 1 liter LR, morphine 4 mg IV x 2, zofran 4 mg IV x 2, potassium bicarb 50 mEq, Kcl 10 mg IV, mag sulfate 2 g x 2 given. Patient admitted to [facility] for management of intractable nausea, vomiting, hypokalemia.     Of note, patient on topiramate to reduce alcohol cravings that she reports compliance. Does not feel she gets much benefit. Failed naltrexone due to GI side effects. She has never had issues with withdrawals during previous admissions. Denies hx of seizures, ICU admission, or intubation due to alcohol withdrawal.     * No surgery found *      Hospital Course:   Pt admitted to [facility] service for 3 days of emesis. Pt had nausea and 1 bout of emesis second day and no other episode following that. Her main complaint was  throat pain and discomfort due to the retching. She received scopolamine patch and compazine to address her nausea. She was also given vitamins and replacement electrolytes. Her diet was progressed from soft, bland to regular. She was able to tolerate solid food and soft foods without further emesis. She was informed of the plans, pt had any questions answered, and  she was discharged to home. *Patient had concerns about lingering nausea and wanted prophylaxis. Was prescribed prn Zofran ODT.    Goals of Care Treatment Preferences:  Code Status: Full Code      Consults:   Consults (From admission, onward)          Status Ordering Provider     IP consult to case management  Once        Provider:  (Not yet assigned)    Completed JAMES GTZ            No new Assessment & Plan notes have been filed under this hospital service since the last note was generated.  Service: Hospital Medicine    Final Active Diagnoses:    Diagnosis Date Noted POA    PRINCIPAL PROBLEM:  Intractable nausea and vomiting [R11.2] 10/22/2023 Unknown    Lactic acidosis [E87.20] 10/24/2023 No    Vomiting [R11.10] 10/24/2023 Yes    Peripheral polyneuropathy [G62.9] 05/24/2023 Yes    EtOH dependence [F10.20] 05/16/2023 Yes    Hypokalemia [E87.6] 05/16/2023 Yes    Primary hypertension [I10] 05/16/2023 Yes    Transaminitis [R74.01] 05/16/2023 Yes      Problems Resolved During this Admission:       Discharged Condition: fair    Disposition: Home or Self Care    Follow Up:   Follow-up Information       [facility] Follow up on 11/1/2023.    Specialty: Family Medicine  Why: 3:00 pm  Contact information:  [address], [address]  [facility] [address] [phone]  Additional information:  Please park in [address] or D and use [address]encer. Take Medical Office Bldg. elevators.                         Patient Instructions:      COMPREHENSIVE METABOLIC PANEL   Standing Status: Future Standing Exp. Date: 12/21/24        Significant Diagnostic Studies: Labs: All labs within the past 24 hours have been reviewed    Pending Diagnostic Studies:       None           Medications:  Reconciled Home Medications:      Medication List        START taking these medications      ondansetron 4 MG Tbdl  Commonly known as: ZOFRAN-ODT  Take 1 tablet (4 mg total) by mouth every 6 (six) hours as needed (For nausea. Take only as needed.).            CONTINUE taking these medications      cyanocobalamin 1000 MCG tablet  Commonly known as: VITAMIN B-12  Take 1 tablet (1,000 mcg total) by mouth once daily.     EScitalopram oxalate 10 MG tablet  Commonly known as: LEXAPRO  Take 1 tablet (10 mg total) by mouth once daily.     folic acid 1 MG tablet  Commonly known as: FOLVITE  Take 1 tablet (1 mg total) by mouth once daily.     potassium bicarbonate disintegrating tablet  Commonly known as: K-LYTE  Take 1 tablet (20 mEq total) by mouth 2 (two) times a day.     pregabalin 100 MG capsule  Commonly known as: LYRICA  Take 1 capsule (100 mg total) by mouth 2 (two) times daily.     spironolactone 25 MG tablet  Commonly known as: ALDACTONE  Take 1 tablet (25 mg total) by mouth once daily.     THERA-M 27-0.4 mg Tab  Generic drug: multivit-min-iron-CA-FA  Take 1 tablet by mouth once daily.     topiramate 25 MG tablet  Commonly known as: TOPAMAX  Take 1 tablet (25 mg total) by mouth once daily.     traZODone 100 MG tablet  Commonly known as: DESYREL  Take 1 tablet (100 mg total) by mouth every evening.              Indwelling Lines/Drains at time of discharge:   Lines/Drains/Airways       None                   Time spent on the discharge of patient: 30 minutes         Tommy Pickens MD  Department of Hospital Medicine  [facility]

## 2023-10-24 NOTE — HOSPITAL COURSE
Pt admitted to [facility] service for 3 days of emesis. Pt had nausea and 1 bout of emesis second day and no other episode following that. Her main complaint was throat pain and discomfort due to the retching. She received scopolamine patch and compazine to address her nausea. She was also given vitamins and replacement electrolytes. Her diet was progressed from soft, bland to regular. She was able to tolerate solid food and soft foods without further emesis. She was informed of the plans, pt had any questions answered, and  she was discharged to home.

## 2023-10-25 LAB — BACTERIA UR CULT: ABNORMAL

## 2023-10-31 DIAGNOSIS — G62.1 ALCOHOL-INDUCED POLYNEUROPATHY: ICD-10-CM

## 2023-10-31 DIAGNOSIS — F10.90 ALCOHOL USE DISORDER: ICD-10-CM

## 2023-10-31 RX ORDER — TOPIRAMATE 25 MG/1
25 TABLET ORAL DAILY
Qty: 60 TABLET | Refills: 0 | Status: SHIPPED | OUTPATIENT
Start: 2023-10-31 | End: 2023-11-28 | Stop reason: DRUGHIGH

## 2023-11-18 ENCOUNTER — HOSPITAL ENCOUNTER (INPATIENT)
Facility: HOSPITAL | Age: 53
LOS: 4 days | Discharge: HOME | DRG: 392 | End: 2023-11-22
Attending: EMERGENCY MEDICINE | Admitting: STUDENT IN AN ORGANIZED HEALTH CARE EDUCATION/TRAINING PROGRAM
Payer: MEDICAID

## 2023-11-18 DIAGNOSIS — E87.6 HYPOKALEMIA: Primary | ICD-10-CM

## 2023-11-18 DIAGNOSIS — F10.20 UNCOMPLICATED ALCOHOL DEPENDENCE: ICD-10-CM

## 2023-11-18 DIAGNOSIS — K76.0 HEPATIC STEATOSIS: ICD-10-CM

## 2023-11-18 DIAGNOSIS — R53.1 WEAKNESS: ICD-10-CM

## 2023-11-18 DIAGNOSIS — E83.42 HYPOMAGNESEMIA: ICD-10-CM

## 2023-11-18 DIAGNOSIS — R11.2 INTRACTABLE NAUSEA AND VOMITING: ICD-10-CM

## 2023-11-18 DIAGNOSIS — G62.9 PERIPHERAL POLYNEUROPATHY: ICD-10-CM

## 2023-11-18 DIAGNOSIS — R79.89 ELEVATED LFTS: ICD-10-CM

## 2023-11-18 DIAGNOSIS — R11.2 NAUSEA AND VOMITING: ICD-10-CM

## 2023-11-18 LAB
ALBUMIN SERPL BCP-MCNC: 2.6 G/DL (ref 3.5–5.2)
ALP SERPL-CCNC: 279 U/L (ref 55–135)
ALT SERPL W/O P-5'-P-CCNC: 12 U/L (ref 10–44)
ANION GAP SERPL CALC-SCNC: 19 MMOL/L (ref 8–16)
AST SERPL-CCNC: 73 U/L (ref 10–40)
BACTERIA #/AREA URNS HPF: ABNORMAL /HPF
BASOPHILS # BLD AUTO: 0.03 K/UL (ref 0–0.2)
BASOPHILS NFR BLD: 0.2 % (ref 0–1.9)
BILIRUB SERPL-MCNC: 0.9 MG/DL (ref 0.1–1)
BILIRUB UR QL STRIP: ABNORMAL
BUN SERPL-MCNC: 3 MG/DL (ref 6–20)
CALCIUM SERPL-MCNC: 8.6 MG/DL (ref 8.7–10.5)
CHLORIDE SERPL-SCNC: 94 MMOL/L (ref 95–110)
CLARITY UR: ABNORMAL
CO2 SERPL-SCNC: 26 MMOL/L (ref 23–29)
COLOR UR: YELLOW
CREAT SERPL-MCNC: 0.6 MG/DL (ref 0.5–1.4)
DIFFERENTIAL METHOD: ABNORMAL
EOSINOPHIL # BLD AUTO: 0 K/UL (ref 0–0.5)
EOSINOPHIL NFR BLD: 0.1 % (ref 0–8)
ERYTHROCYTE [DISTWIDTH] IN BLOOD BY AUTOMATED COUNT: 14 % (ref 11.5–14.5)
EST. GFR  (NO RACE VARIABLE): >60 ML/MIN/1.73 M^2
ETHANOL SERPL-MCNC: <10 MG/DL
GLUCOSE SERPL-MCNC: 93 MG/DL (ref 70–110)
GLUCOSE UR QL STRIP: NEGATIVE
HCT VFR BLD AUTO: 33.1 % (ref 37–48.5)
HGB BLD-MCNC: 10.5 G/DL (ref 12–16)
HGB UR QL STRIP: NEGATIVE
HYALINE CASTS #/AREA URNS LPF: 27 /LPF
IMM GRANULOCYTES # BLD AUTO: 0.09 K/UL (ref 0–0.04)
IMM GRANULOCYTES NFR BLD AUTO: 0.5 % (ref 0–0.5)
KETONES UR QL STRIP: NEGATIVE
LEUKOCYTE ESTERASE UR QL STRIP: ABNORMAL
LIPASE SERPL-CCNC: 44 U/L (ref 4–60)
LYMPHOCYTES # BLD AUTO: 1.8 K/UL (ref 1–4.8)
LYMPHOCYTES NFR BLD: 10.6 % (ref 18–48)
MAGNESIUM SERPL-MCNC: 1.3 MG/DL (ref 1.6–2.6)
MCH RBC QN AUTO: 32.5 PG (ref 27–31)
MCHC RBC AUTO-ENTMCNC: 31.7 G/DL (ref 32–36)
MCV RBC AUTO: 103 FL (ref 82–98)
MICROSCOPIC COMMENT: ABNORMAL
MONOCYTES # BLD AUTO: 1.2 K/UL (ref 0.3–1)
MONOCYTES NFR BLD: 7.2 % (ref 4–15)
NEUTROPHILS # BLD AUTO: 13.6 K/UL (ref 1.8–7.7)
NEUTROPHILS NFR BLD: 81.4 % (ref 38–73)
NITRITE UR QL STRIP: NEGATIVE
NON-SQ EPI CELLS #/AREA URNS HPF: 2 /HPF
NRBC BLD-RTO: 0 /100 WBC
PH UR STRIP: 6 [PH] (ref 5–8)
PLATELET # BLD AUTO: 335 K/UL (ref 150–450)
PMV BLD AUTO: 9.7 FL (ref 9.2–12.9)
POTASSIUM SERPL-SCNC: 2.6 MMOL/L (ref 3.5–5.1)
PROT SERPL-MCNC: 7.2 G/DL (ref 6–8.4)
PROT UR QL STRIP: ABNORMAL
RBC # BLD AUTO: 3.23 M/UL (ref 4–5.4)
RBC #/AREA URNS HPF: 1 /HPF (ref 0–4)
SODIUM SERPL-SCNC: 139 MMOL/L (ref 136–145)
SP GR UR STRIP: 1.02 (ref 1–1.03)
SQUAMOUS #/AREA URNS HPF: 3 /HPF
URN SPEC COLLECT METH UR: ABNORMAL
UROBILINOGEN UR STRIP-ACNC: >=8 EU/DL
WBC # BLD AUTO: 16.72 K/UL (ref 3.9–12.7)
WBC #/AREA URNS HPF: >100 /HPF (ref 0–5)
WBC CLUMPS URNS QL MICRO: ABNORMAL

## 2023-11-18 PROCEDURE — 96376 TX/PRO/DX INJ SAME DRUG ADON: CPT

## 2023-11-18 PROCEDURE — G0378 HOSPITAL OBSERVATION PER HR: HCPCS

## 2023-11-18 PROCEDURE — 96375 TX/PRO/DX INJ NEW DRUG ADDON: CPT

## 2023-11-18 PROCEDURE — 87077 CULTURE AEROBIC IDENTIFY: CPT | Performed by: EMERGENCY MEDICINE

## 2023-11-18 PROCEDURE — 99285 EMERGENCY DEPT VISIT HI MDM: CPT | Mod: 25

## 2023-11-18 PROCEDURE — 93005 ELECTROCARDIOGRAM TRACING: CPT

## 2023-11-18 PROCEDURE — 81000 URINALYSIS NONAUTO W/SCOPE: CPT | Performed by: EMERGENCY MEDICINE

## 2023-11-18 PROCEDURE — 83735 ASSAY OF MAGNESIUM: CPT | Performed by: EMERGENCY MEDICINE

## 2023-11-18 PROCEDURE — 93010 EKG 12-LEAD: ICD-10-PCS | Mod: ,,, | Performed by: INTERNAL MEDICINE

## 2023-11-18 PROCEDURE — 83690 ASSAY OF LIPASE: CPT | Performed by: EMERGENCY MEDICINE

## 2023-11-18 PROCEDURE — 25000003 PHARM REV CODE 250

## 2023-11-18 PROCEDURE — 93010 ELECTROCARDIOGRAM REPORT: CPT | Mod: ,,, | Performed by: INTERNAL MEDICINE

## 2023-11-18 PROCEDURE — 96365 THER/PROPH/DIAG IV INF INIT: CPT

## 2023-11-18 PROCEDURE — 63600175 PHARM REV CODE 636 W HCPCS

## 2023-11-18 PROCEDURE — 87088 URINE BACTERIA CULTURE: CPT | Performed by: EMERGENCY MEDICINE

## 2023-11-18 PROCEDURE — 11000001 HC ACUTE MED/SURG PRIVATE ROOM

## 2023-11-18 PROCEDURE — 87186 SC STD MICRODIL/AGAR DIL: CPT | Performed by: EMERGENCY MEDICINE

## 2023-11-18 PROCEDURE — 63600175 PHARM REV CODE 636 W HCPCS: Performed by: STUDENT IN AN ORGANIZED HEALTH CARE EDUCATION/TRAINING PROGRAM

## 2023-11-18 PROCEDURE — 87086 URINE CULTURE/COLONY COUNT: CPT | Performed by: EMERGENCY MEDICINE

## 2023-11-18 PROCEDURE — 96368 THER/DIAG CONCURRENT INF: CPT

## 2023-11-18 PROCEDURE — 25000003 PHARM REV CODE 250: Performed by: EMERGENCY MEDICINE

## 2023-11-18 PROCEDURE — 63600175 PHARM REV CODE 636 W HCPCS: Performed by: EMERGENCY MEDICINE

## 2023-11-18 PROCEDURE — 82077 ASSAY SPEC XCP UR&BREATH IA: CPT | Performed by: EMERGENCY MEDICINE

## 2023-11-18 PROCEDURE — 96361 HYDRATE IV INFUSION ADD-ON: CPT

## 2023-11-18 PROCEDURE — 25000003 PHARM REV CODE 250: Performed by: STUDENT IN AN ORGANIZED HEALTH CARE EDUCATION/TRAINING PROGRAM

## 2023-11-18 PROCEDURE — 85025 COMPLETE CBC W/AUTO DIFF WBC: CPT | Performed by: EMERGENCY MEDICINE

## 2023-11-18 PROCEDURE — 80053 COMPREHEN METABOLIC PANEL: CPT | Performed by: EMERGENCY MEDICINE

## 2023-11-18 RX ORDER — IBUPROFEN 200 MG
24 TABLET ORAL
Status: DISCONTINUED | OUTPATIENT
Start: 2023-11-18 | End: 2023-11-22 | Stop reason: HOSPADM

## 2023-11-18 RX ORDER — ENOXAPARIN SODIUM 100 MG/ML
40 INJECTION SUBCUTANEOUS EVERY 24 HOURS
Status: DISCONTINUED | OUTPATIENT
Start: 2023-11-19 | End: 2023-11-22 | Stop reason: HOSPADM

## 2023-11-18 RX ORDER — SPIRONOLACTONE 25 MG/1
25 TABLET ORAL DAILY
Status: DISCONTINUED | OUTPATIENT
Start: 2023-11-19 | End: 2023-11-22 | Stop reason: HOSPADM

## 2023-11-18 RX ORDER — ACETAMINOPHEN 325 MG/1
650 TABLET ORAL EVERY 4 HOURS PRN
Status: DISCONTINUED | OUTPATIENT
Start: 2023-11-18 | End: 2023-11-22 | Stop reason: HOSPADM

## 2023-11-18 RX ORDER — POTASSIUM CHLORIDE 7.45 MG/ML
10 INJECTION INTRAVENOUS
Status: COMPLETED | OUTPATIENT
Start: 2023-11-18 | End: 2023-11-19

## 2023-11-18 RX ORDER — ONDANSETRON 2 MG/ML
8 INJECTION INTRAMUSCULAR; INTRAVENOUS ONCE
Status: DISCONTINUED | OUTPATIENT
Start: 2023-11-18 | End: 2023-11-18

## 2023-11-18 RX ORDER — TOPIRAMATE 25 MG/1
25 TABLET ORAL DAILY
Status: DISCONTINUED | OUTPATIENT
Start: 2023-11-19 | End: 2023-11-18

## 2023-11-18 RX ORDER — LANOLIN ALCOHOL/MO/W.PET/CERES
1000 CREAM (GRAM) TOPICAL DAILY
Status: DISCONTINUED | OUTPATIENT
Start: 2023-11-19 | End: 2023-11-22 | Stop reason: HOSPADM

## 2023-11-18 RX ORDER — AMOXICILLIN 250 MG
1 CAPSULE ORAL 2 TIMES DAILY PRN
Status: DISCONTINUED | OUTPATIENT
Start: 2023-11-18 | End: 2023-11-22 | Stop reason: HOSPADM

## 2023-11-18 RX ORDER — POTASSIUM CHLORIDE 7.45 MG/ML
10 INJECTION INTRAVENOUS
Status: COMPLETED | OUTPATIENT
Start: 2023-11-18 | End: 2023-11-18

## 2023-11-18 RX ORDER — MAGNESIUM SULFATE HEPTAHYDRATE 40 MG/ML
2 INJECTION, SOLUTION INTRAVENOUS
Status: COMPLETED | OUTPATIENT
Start: 2023-11-18 | End: 2023-11-19

## 2023-11-18 RX ORDER — KETOROLAC TROMETHAMINE 30 MG/ML
30 INJECTION, SOLUTION INTRAMUSCULAR; INTRAVENOUS ONCE
Status: COMPLETED | OUTPATIENT
Start: 2023-11-18 | End: 2023-11-18

## 2023-11-18 RX ORDER — LORAZEPAM 2 MG/ML
2 INJECTION INTRAMUSCULAR
Status: DISCONTINUED | OUTPATIENT
Start: 2023-11-18 | End: 2023-11-22 | Stop reason: HOSPADM

## 2023-11-18 RX ORDER — FOLIC ACID 1 MG/1
1 TABLET ORAL DAILY
Status: DISCONTINUED | OUTPATIENT
Start: 2023-11-19 | End: 2023-11-22 | Stop reason: HOSPADM

## 2023-11-18 RX ORDER — ONDANSETRON 2 MG/ML
4 INJECTION INTRAMUSCULAR; INTRAVENOUS
Status: COMPLETED | OUTPATIENT
Start: 2023-11-18 | End: 2023-11-18

## 2023-11-18 RX ORDER — KETOROLAC TROMETHAMINE 30 MG/ML
30 INJECTION, SOLUTION INTRAMUSCULAR; INTRAVENOUS ONCE
Status: DISCONTINUED | OUTPATIENT
Start: 2023-11-18 | End: 2023-11-18

## 2023-11-18 RX ORDER — SODIUM CHLORIDE, SODIUM LACTATE, POTASSIUM CHLORIDE, CALCIUM CHLORIDE 600; 310; 30; 20 MG/100ML; MG/100ML; MG/100ML; MG/100ML
INJECTION, SOLUTION INTRAVENOUS CONTINUOUS
Status: ACTIVE | OUTPATIENT
Start: 2023-11-18 | End: 2023-11-19

## 2023-11-18 RX ORDER — NALOXONE HCL 0.4 MG/ML
0.02 VIAL (ML) INJECTION
Status: DISCONTINUED | OUTPATIENT
Start: 2023-11-18 | End: 2023-11-22 | Stop reason: HOSPADM

## 2023-11-18 RX ORDER — ESCITALOPRAM OXALATE 10 MG/1
10 TABLET ORAL DAILY
Status: DISCONTINUED | OUTPATIENT
Start: 2023-11-19 | End: 2023-11-22 | Stop reason: HOSPADM

## 2023-11-18 RX ORDER — ONDANSETRON 2 MG/ML
8 INJECTION INTRAMUSCULAR; INTRAVENOUS ONCE
Status: COMPLETED | OUTPATIENT
Start: 2023-11-18 | End: 2023-11-18

## 2023-11-18 RX ORDER — PROCHLORPERAZINE EDISYLATE 5 MG/ML
2.5 INJECTION INTRAMUSCULAR; INTRAVENOUS EVERY 6 HOURS PRN
Status: DISCONTINUED | OUTPATIENT
Start: 2023-11-18 | End: 2023-11-22 | Stop reason: HOSPADM

## 2023-11-18 RX ORDER — IBUPROFEN 200 MG
16 TABLET ORAL
Status: DISCONTINUED | OUTPATIENT
Start: 2023-11-18 | End: 2023-11-22 | Stop reason: HOSPADM

## 2023-11-18 RX ORDER — GLUCAGON 1 MG
1 KIT INJECTION
Status: DISCONTINUED | OUTPATIENT
Start: 2023-11-18 | End: 2023-11-22 | Stop reason: HOSPADM

## 2023-11-18 RX ORDER — INSULIN ASPART 100 [IU]/ML
1-10 INJECTION, SOLUTION INTRAVENOUS; SUBCUTANEOUS
Status: DISCONTINUED | OUTPATIENT
Start: 2023-11-18 | End: 2023-11-22 | Stop reason: HOSPADM

## 2023-11-18 RX ORDER — TRAZODONE HYDROCHLORIDE 100 MG/1
100 TABLET ORAL NIGHTLY
Status: DISCONTINUED | OUTPATIENT
Start: 2023-11-18 | End: 2023-11-22 | Stop reason: HOSPADM

## 2023-11-18 RX ORDER — SODIUM CHLORIDE 0.9 % (FLUSH) 0.9 %
5 SYRINGE (ML) INJECTION
Status: DISCONTINUED | OUTPATIENT
Start: 2023-11-18 | End: 2023-11-22 | Stop reason: HOSPADM

## 2023-11-18 RX ORDER — TALC
6 POWDER (GRAM) TOPICAL NIGHTLY PRN
Status: DISCONTINUED | OUTPATIENT
Start: 2023-11-18 | End: 2023-11-22 | Stop reason: HOSPADM

## 2023-11-18 RX ORDER — ONDANSETRON 2 MG/ML
4 INJECTION INTRAMUSCULAR; INTRAVENOUS EVERY 8 HOURS PRN
Status: DISCONTINUED | OUTPATIENT
Start: 2023-11-18 | End: 2023-11-22 | Stop reason: HOSPADM

## 2023-11-18 RX ORDER — PANTOPRAZOLE SODIUM 40 MG/10ML
40 INJECTION, POWDER, LYOPHILIZED, FOR SOLUTION INTRAVENOUS DAILY
Status: DISCONTINUED | OUTPATIENT
Start: 2023-11-19 | End: 2023-11-19

## 2023-11-18 RX ORDER — PREGABALIN 50 MG/1
100 CAPSULE ORAL 2 TIMES DAILY
Status: DISCONTINUED | OUTPATIENT
Start: 2023-11-18 | End: 2023-11-22 | Stop reason: HOSPADM

## 2023-11-18 RX ORDER — MAGNESIUM SULFATE HEPTAHYDRATE 40 MG/ML
2 INJECTION, SOLUTION INTRAVENOUS
Status: COMPLETED | OUTPATIENT
Start: 2023-11-18 | End: 2023-11-18

## 2023-11-18 RX ADMIN — CEFTRIAXONE SODIUM 1 G: 1 INJECTION, POWDER, FOR SOLUTION INTRAMUSCULAR; INTRAVENOUS at 06:11

## 2023-11-18 RX ADMIN — KETOROLAC TROMETHAMINE 30 MG: 30 INJECTION, SOLUTION INTRAMUSCULAR at 09:11

## 2023-11-18 RX ADMIN — THIAMINE HYDROCHLORIDE 100 MG: 100 INJECTION, SOLUTION INTRAMUSCULAR; INTRAVENOUS at 09:11

## 2023-11-18 RX ADMIN — TRAZODONE HYDROCHLORIDE 100 MG: 100 TABLET ORAL at 09:11

## 2023-11-18 RX ADMIN — SODIUM CHLORIDE, POTASSIUM CHLORIDE, SODIUM LACTATE AND CALCIUM CHLORIDE: 600; 310; 30; 20 INJECTION, SOLUTION INTRAVENOUS at 08:11

## 2023-11-18 RX ADMIN — POTASSIUM CHLORIDE 10 MEQ: 7.46 INJECTION, SOLUTION INTRAVENOUS at 11:11

## 2023-11-18 RX ADMIN — SODIUM CHLORIDE 1000 ML: 9 INJECTION, SOLUTION INTRAVENOUS at 05:11

## 2023-11-18 RX ADMIN — PROCHLORPERAZINE EDISYLATE 2.5 MG: 5 INJECTION INTRAMUSCULAR; INTRAVENOUS at 09:11

## 2023-11-18 RX ADMIN — POTASSIUM CHLORIDE 10 MEQ: 7.46 INJECTION, SOLUTION INTRAVENOUS at 06:11

## 2023-11-18 RX ADMIN — PREGABALIN 100 MG: 50 CAPSULE ORAL at 09:11

## 2023-11-18 RX ADMIN — MAGNESIUM SULFATE HEPTAHYDRATE 2 G: 40 INJECTION, SOLUTION INTRAVENOUS at 10:11

## 2023-11-18 RX ADMIN — POTASSIUM CHLORIDE 10 MEQ: 7.46 INJECTION, SOLUTION INTRAVENOUS at 10:11

## 2023-11-18 RX ADMIN — ONDANSETRON 8 MG: 2 INJECTION INTRAMUSCULAR; INTRAVENOUS at 06:11

## 2023-11-18 RX ADMIN — ONDANSETRON 4 MG: 2 INJECTION INTRAMUSCULAR; INTRAVENOUS at 05:11

## 2023-11-18 RX ADMIN — MAGNESIUM SULFATE HEPTAHYDRATE 2 G: 40 INJECTION, SOLUTION INTRAVENOUS at 06:11
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
## 2023-11-19 PROBLEM — R82.90 URINE ABNORMALITY: Status: ACTIVE | Noted: 2023-11-19

## 2023-11-19 LAB
ALBUMIN SERPL BCP-MCNC: 1.9 G/DL (ref 3.5–5.2)
ALP SERPL-CCNC: 200 U/L (ref 55–135)
ALT SERPL W/O P-5'-P-CCNC: 9 U/L (ref 10–44)
AMPHET+METHAMPHET UR QL: NEGATIVE
ANION GAP SERPL CALC-SCNC: 10 MMOL/L (ref 8–16)
AST SERPL-CCNC: 49 U/L (ref 10–40)
BARBITURATES UR QL SCN>200 NG/ML: NEGATIVE
BASOPHILS # BLD AUTO: 0.03 K/UL (ref 0–0.2)
BASOPHILS NFR BLD: 0.4 % (ref 0–1.9)
BENZODIAZ UR QL SCN>200 NG/ML: NEGATIVE
BILIRUB SERPL-MCNC: 0.8 MG/DL (ref 0.1–1)
BUN SERPL-MCNC: 4 MG/DL (ref 6–20)
BZE UR QL SCN: NEGATIVE
CALCIUM SERPL-MCNC: 7.8 MG/DL (ref 8.7–10.5)
CANNABINOIDS UR QL SCN: NEGATIVE
CHLORIDE SERPL-SCNC: 101 MMOL/L (ref 95–110)
CO2 SERPL-SCNC: 27 MMOL/L (ref 23–29)
CREAT SERPL-MCNC: 0.6 MG/DL (ref 0.5–1.4)
CREAT UR-MCNC: 207 MG/DL (ref 15–325)
DIFFERENTIAL METHOD: ABNORMAL
EOSINOPHIL # BLD AUTO: 0.1 K/UL (ref 0–0.5)
EOSINOPHIL NFR BLD: 0.6 % (ref 0–8)
ERYTHROCYTE [DISTWIDTH] IN BLOOD BY AUTOMATED COUNT: 14.4 % (ref 11.5–14.5)
EST. GFR  (NO RACE VARIABLE): >60 ML/MIN/1.73 M^2
ETHANOL UR-MCNC: 24 MG/DL
GLUCOSE SERPL-MCNC: 87 MG/DL (ref 70–110)
HCT VFR BLD AUTO: 25.7 % (ref 37–48.5)
HCT VFR BLD AUTO: 27.4 % (ref 37–48.5)
HGB BLD-MCNC: 8.1 G/DL (ref 12–16)
HGB BLD-MCNC: 8.8 G/DL (ref 12–16)
IMM GRANULOCYTES # BLD AUTO: 0.03 K/UL (ref 0–0.04)
IMM GRANULOCYTES NFR BLD AUTO: 0.4 % (ref 0–0.5)
LYMPHOCYTES # BLD AUTO: 1.4 K/UL (ref 1–4.8)
LYMPHOCYTES NFR BLD: 16.5 % (ref 18–48)
MAGNESIUM SERPL-MCNC: 2.5 MG/DL (ref 1.6–2.6)
MCH RBC QN AUTO: 32.9 PG (ref 27–31)
MCHC RBC AUTO-ENTMCNC: 31.5 G/DL (ref 32–36)
MCV RBC AUTO: 105 FL (ref 82–98)
METHADONE UR QL SCN>300 NG/ML: NEGATIVE
MONOCYTES # BLD AUTO: 0.7 K/UL (ref 0.3–1)
MONOCYTES NFR BLD: 8.8 % (ref 4–15)
NEUTROPHILS # BLD AUTO: 6.2 K/UL (ref 1.8–7.7)
NEUTROPHILS NFR BLD: 73.3 % (ref 38–73)
NRBC BLD-RTO: 0 /100 WBC
OPIATES UR QL SCN: NEGATIVE
PCP UR QL SCN>25 NG/ML: NEGATIVE
PHOSPHATE SERPL-MCNC: 3.4 MG/DL (ref 2.7–4.5)
PLATELET # BLD AUTO: 209 K/UL (ref 150–450)
PMV BLD AUTO: 9.9 FL (ref 9.2–12.9)
POCT GLUCOSE: 80 MG/DL (ref 70–110)
POCT GLUCOSE: 80 MG/DL (ref 70–110)
POCT GLUCOSE: 96 MG/DL (ref 70–110)
POTASSIUM SERPL-SCNC: 3.2 MMOL/L (ref 3.5–5.1)
PROT SERPL-MCNC: 5.2 G/DL (ref 6–8.4)
RBC # BLD AUTO: 2.46 M/UL (ref 4–5.4)
SODIUM SERPL-SCNC: 138 MMOL/L (ref 136–145)
TOXICOLOGY INFORMATION: ABNORMAL
WBC # BLD AUTO: 8.4 K/UL (ref 3.9–12.7)

## 2023-11-19 PROCEDURE — 85014 HEMATOCRIT: CPT

## 2023-11-19 PROCEDURE — 63600175 PHARM REV CODE 636 W HCPCS: Performed by: STUDENT IN AN ORGANIZED HEALTH CARE EDUCATION/TRAINING PROGRAM

## 2023-11-19 PROCEDURE — 80307 DRUG TEST PRSMV CHEM ANLYZR: CPT | Performed by: EMERGENCY MEDICINE

## 2023-11-19 PROCEDURE — 25000003 PHARM REV CODE 250

## 2023-11-19 PROCEDURE — 25000003 PHARM REV CODE 250: Performed by: STUDENT IN AN ORGANIZED HEALTH CARE EDUCATION/TRAINING PROGRAM

## 2023-11-19 PROCEDURE — 63600175 PHARM REV CODE 636 W HCPCS

## 2023-11-19 PROCEDURE — C9113 INJ PANTOPRAZOLE SODIUM, VIA: HCPCS

## 2023-11-19 PROCEDURE — 36415 COLL VENOUS BLD VENIPUNCTURE: CPT

## 2023-11-19 PROCEDURE — 85018 HEMOGLOBIN: CPT

## 2023-11-19 PROCEDURE — 80053 COMPREHEN METABOLIC PANEL: CPT

## 2023-11-19 PROCEDURE — 11000001 HC ACUTE MED/SURG PRIVATE ROOM

## 2023-11-19 PROCEDURE — 84100 ASSAY OF PHOSPHORUS: CPT

## 2023-11-19 PROCEDURE — 85025 COMPLETE CBC W/AUTO DIFF WBC: CPT

## 2023-11-19 PROCEDURE — 83735 ASSAY OF MAGNESIUM: CPT

## 2023-11-19 RX ORDER — SCOLOPAMINE TRANSDERMAL SYSTEM 1 MG/1
1 PATCH, EXTENDED RELEASE TRANSDERMAL
Status: DISCONTINUED | OUTPATIENT
Start: 2023-11-19 | End: 2023-11-22 | Stop reason: HOSPADM

## 2023-11-19 RX ORDER — DICYCLOMINE HYDROCHLORIDE 10 MG/1
10 CAPSULE ORAL 4 TIMES DAILY
Status: DISCONTINUED | OUTPATIENT
Start: 2023-11-19 | End: 2023-11-22 | Stop reason: HOSPADM

## 2023-11-19 RX ORDER — MAG HYDROX/ALUMINUM HYD/SIMETH 200-200-20
30 SUSPENSION, ORAL (FINAL DOSE FORM) ORAL ONCE
Status: COMPLETED | OUTPATIENT
Start: 2023-11-19 | End: 2023-11-19

## 2023-11-19 RX ORDER — HYDROMORPHONE HYDROCHLORIDE 1 MG/ML
0.2 INJECTION, SOLUTION INTRAMUSCULAR; INTRAVENOUS; SUBCUTANEOUS ONCE
Status: COMPLETED | OUTPATIENT
Start: 2023-11-19 | End: 2023-11-19

## 2023-11-19 RX ORDER — POTASSIUM CHLORIDE 7.45 MG/ML
10 INJECTION INTRAVENOUS
Status: COMPLETED | OUTPATIENT
Start: 2023-11-19 | End: 2023-11-19

## 2023-11-19 RX ORDER — PANTOPRAZOLE SODIUM 40 MG/10ML
40 INJECTION, POWDER, LYOPHILIZED, FOR SOLUTION INTRAVENOUS DAILY
Status: DISCONTINUED | OUTPATIENT
Start: 2023-11-20 | End: 2023-11-22 | Stop reason: HOSPADM

## 2023-11-19 RX ORDER — LOPERAMIDE HYDROCHLORIDE 2 MG/1
2 CAPSULE ORAL ONCE
Status: COMPLETED | OUTPATIENT
Start: 2023-11-19 | End: 2023-11-19

## 2023-11-19 RX ORDER — LIDOCAINE HYDROCHLORIDE 20 MG/ML
15 SOLUTION OROPHARYNGEAL ONCE
Status: DISCONTINUED | OUTPATIENT
Start: 2023-11-19 | End: 2023-11-22 | Stop reason: HOSPADM

## 2023-11-19 RX ORDER — PANTOPRAZOLE SODIUM 40 MG/10ML
40 INJECTION, POWDER, LYOPHILIZED, FOR SOLUTION INTRAVENOUS 2 TIMES DAILY
Status: DISCONTINUED | OUTPATIENT
Start: 2023-11-19 | End: 2023-11-19

## 2023-11-19 RX ADMIN — POTASSIUM CHLORIDE 10 MEQ: 7.46 INJECTION, SOLUTION INTRAVENOUS at 10:11

## 2023-11-19 RX ADMIN — FOLIC ACID 1 MG: 5 INJECTION, SOLUTION INTRAMUSCULAR; INTRAVENOUS; SUBCUTANEOUS at 09:11

## 2023-11-19 RX ADMIN — POTASSIUM CHLORIDE 10 MEQ: 7.46 INJECTION, SOLUTION INTRAVENOUS at 03:11

## 2023-11-19 RX ADMIN — DICYCLOMINE HYDROCHLORIDE 10 MG: 10 CAPSULE ORAL at 08:11

## 2023-11-19 RX ADMIN — PANTOPRAZOLE SODIUM 40 MG: 40 INJECTION, POWDER, LYOPHILIZED, FOR SOLUTION INTRAVENOUS at 08:11

## 2023-11-19 RX ADMIN — POTASSIUM CHLORIDE 10 MEQ: 7.46 INJECTION, SOLUTION INTRAVENOUS at 01:11

## 2023-11-19 RX ADMIN — THIAMINE HYDROCHLORIDE 100 MG: 100 INJECTION, SOLUTION INTRAMUSCULAR; INTRAVENOUS at 08:11

## 2023-11-19 RX ADMIN — POTASSIUM CHLORIDE 10 MEQ: 7.46 INJECTION, SOLUTION INTRAVENOUS at 02:11

## 2023-11-19 RX ADMIN — POTASSIUM CHLORIDE 10 MEQ: 7.46 INJECTION, SOLUTION INTRAVENOUS at 04:11

## 2023-11-19 RX ADMIN — POTASSIUM CHLORIDE 10 MEQ: 7.46 INJECTION, SOLUTION INTRAVENOUS at 08:11

## 2023-11-19 RX ADMIN — ONDANSETRON 4 MG: 2 INJECTION INTRAMUSCULAR; INTRAVENOUS at 08:11

## 2023-11-19 RX ADMIN — ALUMINUM HYDROXIDE, MAGNESIUM HYDROXIDE, AND SIMETHICONE 30 ML: 200; 200; 20 SUSPENSION ORAL at 06:11

## 2023-11-19 RX ADMIN — SCOPALAMINE 1 PATCH: 1 PATCH, EXTENDED RELEASE TRANSDERMAL at 11:11

## 2023-11-19 RX ADMIN — POTASSIUM CHLORIDE 10 MEQ: 7.46 INJECTION, SOLUTION INTRAVENOUS at 12:11

## 2023-11-19 RX ADMIN — POTASSIUM CHLORIDE 10 MEQ: 7.46 INJECTION, SOLUTION INTRAVENOUS at 06:11

## 2023-11-19 RX ADMIN — POTASSIUM CHLORIDE 10 MEQ: 7.46 INJECTION, SOLUTION INTRAVENOUS at 09:11

## 2023-11-19 RX ADMIN — HYDROMORPHONE HYDROCHLORIDE 0.2 MG: 1 INJECTION, SOLUTION INTRAMUSCULAR; INTRAVENOUS; SUBCUTANEOUS at 12:11

## 2023-11-19 RX ADMIN — MAGNESIUM SULFATE HEPTAHYDRATE 2 G: 40 INJECTION, SOLUTION INTRAVENOUS at 12:11

## 2023-11-19 RX ADMIN — LOPERAMIDE HYDROCHLORIDE 2 MG: 2 CAPSULE ORAL at 09:11

## 2023-11-19 RX ADMIN — ONDANSETRON 4 MG: 2 INJECTION INTRAMUSCULAR; INTRAVENOUS at 05:11

## 2023-11-19 RX ADMIN — POTASSIUM CHLORIDE 10 MEQ: 7.46 INJECTION, SOLUTION INTRAVENOUS at 05:11

## 2023-11-19 RX ADMIN — ACETAMINOPHEN 650 MG: 325 TABLET ORAL at 06:11

## 2023-11-19 NOTE — ASSESSMENT & PLAN NOTE
Patient has hypokalemia which is Acute on Chronic and currently uncontrolled. Most recent potassium levels reviewed-   Lab Results   Component Value Date    K 2.6 (LL) 11/18/2023   . Will continue potassium replacement per protocol and recheck repeat levels after replacement completed.

## 2023-11-19 NOTE — ASSESSMENT & PLAN NOTE
Last drink 11/16.    -ETOH level  -[facility] protocols  -Seizure precautions

## 2023-11-19 NOTE — ASSESSMENT & PLAN NOTE
Patient has hypokalemia which is Acute on Chronic and currently uncontrolled. Most recent potassium levels reviewed-   Lab Results   Component Value Date    K 3.2 (L) 11/19/2023   . Will continue potassium replacement per protocol and recheck repeat levels after replacement completed.
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
## 2023-11-19 NOTE — ASSESSMENT & PLAN NOTE
Prolonged QT on ECG. Likely secondary to hypokalemia and hypomagnesemia.    -Replete electrolytes (K, Mg, Phos)  -

## 2023-11-19 NOTE — ASSESSMENT & PLAN NOTE
Recurrent N/V in setting of chronic alcohol abuse.    Plan:  -Zofran 1st choice  -Compazine 2nd choice  - Patient reports improved nausea this morning. Will monitor for PO toleration.  - CT Abdo/Pelvis:  1. Hepatosplenomegaly with hepatic steatosis.  2. Incomplete distension of the urinary bladder with probable minimal wall thickening.  Mild cystitis is a consideration.  3. Minimal diverticulosis of the left colon.

## 2023-11-19 NOTE — ASSESSMENT & PLAN NOTE
AST/ALT 73/12    PLAN:  Improved to 49/9 today  CT Abdomen/pelvis revealed Hepatosplenomegaly with hepatic steatosis, but no acute abnormality  Most likely secondary to alcohol  Continue to monitor
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
## 2023-11-19 NOTE — ASSESSMENT & PLAN NOTE
Recurrent N/V in setting of chronic alcohol abuse.    -Zofran 1st choice  -Compazine 2nd choice

## 2023-11-19 NOTE — ED NOTES
Pt presents with fatigue, weakness, and n/v x several days, ETOH abuse, last drink yesterday.      Patient identifiers verified by spelling and stated name on armband along with .     Review of patient's allergies indicates:  Review of patient's allergies indicates:  No Known Allergies    APPEARANCE: . No apparent distress or deformities noted.  NEURO: A&Ox4, GCS-15.   No neuro deficits noted.   CARDIAC: NSR.  Denies CP.   PERIPHERAL VASCULAR: Peripheral pulses present. Cap refill < 3 seconds x4. Warm to touch.    RESPIRATORY:Respirations are even and unlabored with regular rate and effort, No use of accessory muscles, denies SOB.   GASTRO: Soft, tender with no distention noted. +nausea and vomiting      Patient updated on plan of care and changed into hospital gown.  Placed on continuous CM/SpO2/NIBP.  Bed locked and in low position with side rails up x2, call light within reach.     Will continue to monitor.

## 2023-11-19 NOTE — SUBJECTIVE & OBJECTIVE
Interval History: Unable to tolerate PO. Kept NPO for suspicion of obstruction. CT revealed no obstruction. Got Toradol for pain X1. IV K for repletion.    Review of Systems   Constitutional:  Negative for chills and fever.   HENT:  Negative for voice change.    Eyes:  Negative for visual disturbance.   Respiratory:  Negative for cough and shortness of breath.    Cardiovascular:  Negative for chest pain and palpitations.   Gastrointestinal:  Negative for constipation, diarrhea, nausea and vomiting.   Genitourinary:  Negative for dysuria.   Neurological:  Negative for headaches.   Psychiatric/Behavioral:  Positive for sleep disturbance.      Objective:     Vital Signs (Most Recent):  Temp: 97.9 °F (36.6 °C) (11/19/23 0755)  Pulse: 75 (11/19/23 0755)  Resp: 20 (11/19/23 0755)  BP: 106/61 (11/19/23 0755)  SpO2: 98 % (11/19/23 0755) Vital Signs (24h Range):  Temp:  [97.7 °F (36.5 °C)-98.8 °F (37.1 °C)] 97.9 °F (36.6 °C)  Pulse:  [] 75  Resp:  [18-20] 20  SpO2:  [97 %-100 %] 98 %  BP: ()/(58-74) 106/61     Weight: 70 kg (154 lb 5.2 oz)  Body mass index is 24.91 kg/m².    Intake/Output Summary (Last 24 hours) at 11/19/2023 0936  Last data filed at 11/19/2023 0400  Gross per 24 hour   Intake 1270 ml   Output 0 ml   Net 1270 ml         Physical Exam  Constitutional:       Appearance: Normal appearance.   Eyes:      General:         Right eye: No discharge.         Left eye: No discharge.      Conjunctiva/sclera: Conjunctivae normal.   Cardiovascular:      Rate and Rhythm: Normal rate.      Pulses: Normal pulses.   Pulmonary:      Effort: Pulmonary effort is normal.      Breath sounds: No wheezing.   Abdominal:      General: Bowel sounds are normal.      Palpations: Abdomen is soft.      Tenderness: There is abdominal tenderness. There is no guarding or rebound.      Comments: Tenderness diffusely but more prominent at epigastric region   Skin:     General: Skin is warm.      Coloration: Skin is not jaundiced.    Neurological:      Mental Status: She is alert and oriented to person, place, and time.   Psychiatric:         Mood and Affect: Mood normal.         Behavior: Behavior normal.             Significant Labs: All pertinent labs within the past 24 hours have been reviewed.  CBC:   Recent Labs   Lab 11/18/23  1709 11/19/23  0539   WBC 16.72* 8.40   HGB 10.5* 8.1*   HCT 33.1* 25.7*    209     CMP:   Recent Labs   Lab 11/18/23  1709 11/19/23  0539    138   K 2.6* 3.2*   CL 94* 101   CO2 26 27   GLU 93 87   BUN 3* 4*   CREATININE 0.6 0.6   CALCIUM 8.6* 7.8*   PROT 7.2 5.2*   ALBUMIN 2.6* 1.9*   BILITOT 0.9 0.8   ALKPHOS 279* 200*   AST 73* 49*   ALT 12 9*   ANIONGAP 19* 10       Significant Imaging: I have reviewed all pertinent imaging results/findings within the past 24 hours.

## 2023-11-19 NOTE — PROGRESS NOTES
"   11/18/23 2056   Admission   Initial VN Admission Questions Complete   Shift   Virtual Nurse - Patient Verbalized Approval Of Camera Use;VN Rounding   Safety/Activity   Safety Promotion/Fall Prevention Fall Risk reviewed with patient/family  (room dark; unable to visualize pt and surroundings)     VN cued in to pt's room with permission to complete admission questions. Room dark. Unable to visualize pt. Pt sounds increasingly agitated t/o admission questions. Pt asks "Are we done yet?" Plan of care review deferred to bedside nurse. Bedside nurse notified. Pt denies any needs. . Instructed to call for needs/assist oob.   "
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
## 2023-11-19 NOTE — NURSING
Pt arrived to unit AAOx4, c/o abdominal pain in all quadrants with nausea, vomiting and diarrhea.. CIWA score on admit is 6. Plan of care and safety protocols reviewed with pt. Bed in low/locked position with call light within reach.

## 2023-11-20 ENCOUNTER — CLINICAL SUPPORT (OUTPATIENT)
Dept: SMOKING CESSATION | Facility: CLINIC | Age: 53
End: 2023-11-20

## 2023-11-20 DIAGNOSIS — F17.210 CIGARETTE SMOKER: Primary | ICD-10-CM

## 2023-11-20 LAB
AFP SERPL-MCNC: 2.6 NG/ML (ref 0–8.4)
ALBUMIN SERPL BCP-MCNC: 1.9 G/DL (ref 3.5–5.2)
ALP SERPL-CCNC: 193 U/L (ref 55–135)
ALT SERPL W/O P-5'-P-CCNC: 11 U/L (ref 10–44)
ANION GAP SERPL CALC-SCNC: 9 MMOL/L (ref 8–16)
AST SERPL-CCNC: 73 U/L (ref 10–40)
BACTERIA UR CULT: ABNORMAL
BASOPHILS # BLD AUTO: 0.02 K/UL (ref 0–0.2)
BASOPHILS NFR BLD: 0.3 % (ref 0–1.9)
BILIRUB SERPL-MCNC: 1 MG/DL (ref 0.1–1)
BUN SERPL-MCNC: 3 MG/DL (ref 6–20)
CALCIUM SERPL-MCNC: 8 MG/DL (ref 8.7–10.5)
CHLORIDE SERPL-SCNC: 105 MMOL/L (ref 95–110)
CO2 SERPL-SCNC: 24 MMOL/L (ref 23–29)
CREAT SERPL-MCNC: 0.6 MG/DL (ref 0.5–1.4)
DIFFERENTIAL METHOD: ABNORMAL
EOSINOPHIL # BLD AUTO: 0.1 K/UL (ref 0–0.5)
EOSINOPHIL NFR BLD: 1.1 % (ref 0–8)
ERYTHROCYTE [DISTWIDTH] IN BLOOD BY AUTOMATED COUNT: 14.3 % (ref 11.5–14.5)
EST. GFR  (NO RACE VARIABLE): >60 ML/MIN/1.73 M^2
GLUCOSE SERPL-MCNC: 74 MG/DL (ref 70–110)
HAV IGG SER QL IA: NORMAL
HBV CORE AB SERPL QL IA: NORMAL
HBV SURFACE AG SERPL QL IA: NORMAL
HCT VFR BLD AUTO: 27.6 % (ref 37–48.5)
HGB BLD-MCNC: 8.5 G/DL (ref 12–16)
IMM GRANULOCYTES # BLD AUTO: 0.04 K/UL (ref 0–0.04)
IMM GRANULOCYTES NFR BLD AUTO: 0.6 % (ref 0–0.5)
INR PPP: 1.4 (ref 0.8–1.2)
LYMPHOCYTES # BLD AUTO: 1 K/UL (ref 1–4.8)
LYMPHOCYTES NFR BLD: 14.3 % (ref 18–48)
MAGNESIUM SERPL-MCNC: 2 MG/DL (ref 1.6–2.6)
MCH RBC QN AUTO: 32.3 PG (ref 27–31)
MCHC RBC AUTO-ENTMCNC: 30.8 G/DL (ref 32–36)
MCV RBC AUTO: 105 FL (ref 82–98)
MONOCYTES # BLD AUTO: 0.6 K/UL (ref 0.3–1)
MONOCYTES NFR BLD: 8.8 % (ref 4–15)
NEUTROPHILS # BLD AUTO: 5.3 K/UL (ref 1.8–7.7)
NEUTROPHILS NFR BLD: 74.9 % (ref 38–73)
NRBC BLD-RTO: 0 /100 WBC
PHOSPHATE SERPL-MCNC: 3.6 MG/DL (ref 2.7–4.5)
PLATELET # BLD AUTO: 207 K/UL (ref 150–450)
PMV BLD AUTO: 10.1 FL (ref 9.2–12.9)
POCT GLUCOSE: 78 MG/DL (ref 70–110)
POCT GLUCOSE: 85 MG/DL (ref 70–110)
POCT GLUCOSE: 91 MG/DL (ref 70–110)
POTASSIUM SERPL-SCNC: 3.4 MMOL/L (ref 3.5–5.1)
PROT SERPL-MCNC: 5.4 G/DL (ref 6–8.4)
PROTHROMBIN TIME: 14.7 SEC (ref 9–12.5)
RBC # BLD AUTO: 2.63 M/UL (ref 4–5.4)
SODIUM SERPL-SCNC: 138 MMOL/L (ref 136–145)
WBC # BLD AUTO: 7.08 K/UL (ref 3.9–12.7)

## 2023-11-20 PROCEDURE — 82105 ALPHA-FETOPROTEIN SERUM: CPT

## 2023-11-20 PROCEDURE — 87340 HEPATITIS B SURFACE AG IA: CPT

## 2023-11-20 PROCEDURE — 63600175 PHARM REV CODE 636 W HCPCS

## 2023-11-20 PROCEDURE — 85610 PROTHROMBIN TIME: CPT

## 2023-11-20 PROCEDURE — 80321 ALCOHOLS BIOMARKERS 1OR 2: CPT

## 2023-11-20 PROCEDURE — 63600175 PHARM REV CODE 636 W HCPCS: Performed by: STUDENT IN AN ORGANIZED HEALTH CARE EDUCATION/TRAINING PROGRAM

## 2023-11-20 PROCEDURE — 36415 COLL VENOUS BLD VENIPUNCTURE: CPT

## 2023-11-20 PROCEDURE — 86790 VIRUS ANTIBODY NOS: CPT

## 2023-11-20 PROCEDURE — 25000003 PHARM REV CODE 250: Performed by: STUDENT IN AN ORGANIZED HEALTH CARE EDUCATION/TRAINING PROGRAM

## 2023-11-20 PROCEDURE — 25000003 PHARM REV CODE 250

## 2023-11-20 PROCEDURE — 83735 ASSAY OF MAGNESIUM: CPT

## 2023-11-20 PROCEDURE — 86704 HEP B CORE ANTIBODY TOTAL: CPT

## 2023-11-20 PROCEDURE — 11000001 HC ACUTE MED/SURG PRIVATE ROOM

## 2023-11-20 PROCEDURE — 85025 COMPLETE CBC W/AUTO DIFF WBC: CPT

## 2023-11-20 PROCEDURE — 84100 ASSAY OF PHOSPHORUS: CPT

## 2023-11-20 PROCEDURE — C9113 INJ PANTOPRAZOLE SODIUM, VIA: HCPCS

## 2023-11-20 PROCEDURE — 86706 HEP B SURFACE ANTIBODY: CPT

## 2023-11-20 PROCEDURE — 99406 BEHAV CHNG SMOKING 3-10 MIN: CPT | Mod: S$GLB,,,

## 2023-11-20 PROCEDURE — 80053 COMPREHEN METABOLIC PANEL: CPT

## 2023-11-20 PROCEDURE — 99406 PT REFUSED TOBACCO CESSATION: ICD-10-PCS | Mod: S$GLB,,,

## 2023-11-20 RX ORDER — POTASSIUM CHLORIDE 7.45 MG/ML
10 INJECTION INTRAVENOUS
Status: COMPLETED | OUTPATIENT
Start: 2023-11-20 | End: 2023-11-20

## 2023-11-20 RX ORDER — LOPERAMIDE HYDROCHLORIDE 2 MG/1
2 CAPSULE ORAL 4 TIMES DAILY PRN
Status: DISCONTINUED | OUTPATIENT
Start: 2023-11-20 | End: 2023-11-22 | Stop reason: HOSPADM

## 2023-11-20 RX ORDER — KETOROLAC TROMETHAMINE 30 MG/ML
30 INJECTION, SOLUTION INTRAMUSCULAR; INTRAVENOUS ONCE
Status: COMPLETED | OUTPATIENT
Start: 2023-11-20 | End: 2023-11-20

## 2023-11-20 RX ORDER — POTASSIUM CHLORIDE 20 MEQ/1
40 TABLET, EXTENDED RELEASE ORAL EVERY 4 HOURS
Status: DISCONTINUED | OUTPATIENT
Start: 2023-11-20 | End: 2023-11-20

## 2023-11-20 RX ADMIN — DICYCLOMINE HYDROCHLORIDE 10 MG: 10 CAPSULE ORAL at 08:11

## 2023-11-20 RX ADMIN — FOLIC ACID 1 MG: 5 INJECTION, SOLUTION INTRAMUSCULAR; INTRAVENOUS; SUBCUTANEOUS at 12:11

## 2023-11-20 RX ADMIN — POTASSIUM CHLORIDE 10 MEQ: 7.46 INJECTION, SOLUTION INTRAVENOUS at 08:11

## 2023-11-20 RX ADMIN — ONDANSETRON 4 MG: 2 INJECTION INTRAMUSCULAR; INTRAVENOUS at 08:11

## 2023-11-20 RX ADMIN — ONDANSETRON 4 MG: 2 INJECTION INTRAMUSCULAR; INTRAVENOUS at 09:11

## 2023-11-20 RX ADMIN — POTASSIUM CHLORIDE 10 MEQ: 7.46 INJECTION, SOLUTION INTRAVENOUS at 11:11

## 2023-11-20 RX ADMIN — POTASSIUM CHLORIDE 10 MEQ: 7.46 INJECTION, SOLUTION INTRAVENOUS at 06:11

## 2023-11-20 RX ADMIN — LOPERAMIDE HYDROCHLORIDE 2 MG: 2 CAPSULE ORAL at 11:11

## 2023-11-20 RX ADMIN — ENOXAPARIN SODIUM 40 MG: 40 INJECTION SUBCUTANEOUS at 05:11

## 2023-11-20 RX ADMIN — PROCHLORPERAZINE EDISYLATE 2.5 MG: 5 INJECTION INTRAMUSCULAR; INTRAVENOUS at 05:11

## 2023-11-20 RX ADMIN — POTASSIUM CHLORIDE 10 MEQ: 7.46 INJECTION, SOLUTION INTRAVENOUS at 09:11

## 2023-11-20 RX ADMIN — PANTOPRAZOLE SODIUM 40 MG: 40 INJECTION, POWDER, LYOPHILIZED, FOR SOLUTION INTRAVENOUS at 08:11

## 2023-11-20 RX ADMIN — CEFTRIAXONE SODIUM 1 G: 1 INJECTION, POWDER, FOR SOLUTION INTRAMUSCULAR; INTRAVENOUS at 12:11

## 2023-11-20 RX ADMIN — DICYCLOMINE HYDROCHLORIDE 10 MG: 10 CAPSULE ORAL at 12:11

## 2023-11-20 RX ADMIN — DICYCLOMINE HYDROCHLORIDE 10 MG: 10 CAPSULE ORAL at 05:11

## 2023-11-20 RX ADMIN — KETOROLAC TROMETHAMINE 30 MG: 30 INJECTION, SOLUTION INTRAMUSCULAR at 08:11

## 2023-11-20 RX ADMIN — TRAZODONE HYDROCHLORIDE 100 MG: 100 TABLET ORAL at 08:11

## 2023-11-20 RX ADMIN — CYANOCOBALAMIN TAB 1000 MCG 1000 MCG: 1000 TAB at 08:11

## 2023-11-20 NOTE — HOSPITAL COURSE
52 y/o F with above HPI. Patient started on zofran and compazine as needed for nausea. CT Abd/pel w/o contrast notable for hepatosplenomegaly with hepatic steatosis, incomplete distension of the urinary bladder with probable minimal wall thickening, possible mild cystitis, and minimal diverticulosis of the left colon. UCx grew >100,000 cfu/ml E. coli. Patient s/p 1 dose of IV rocephin in ED, restarted IV rocephin will inpatient. Patient to be discharged on keflex x 3 days for UTI. On day of discharge, patient endorsing improvement in nausea and diarrhea. She is tolerating po intake this AM.    On the day of discharge patient was back to baseline and hemodynamically stable. She  was sent home to resume home meds. Added Keflex for 3 days (refer below) Education was provided about UTI and fluid hydration. She agreed to attempt to progress diet and get adequate diet. Patient will need to follow up with PCP for hospital D/C Follow up. Patient agreeable to discharge plan, expressed understanding, all questions answered, return precautions were discussed.

## 2023-11-20 NOTE — ASSESSMENT & PLAN NOTE
AST/ALT 73/11 today    PLAN:  CT Abdomen/pelvis revealed Hepatosplenomegaly with hepatic steatosis, but no acute abnormality  Most likely secondary to alcohol  Continue to monitor

## 2023-11-20 NOTE — ASSESSMENT & PLAN NOTE
Patient has hypokalemia which is Acute on Chronic and currently uncontrolled. Most recent potassium levels reviewed-   Lab Results   Component Value Date    K 3.4 (L) 11/20/2023   . Will continue potassium replacement per protocol and recheck repeat levels after replacement completed.     Improving with improved PO intake. Will monitor

## 2023-11-20 NOTE — PLAN OF CARE
CM met with pt - she is AAOx3 - c/o nausea   Confirmed demographics    Lives with  Des Parker  [phone]   Independent prior to admit - no dme, no HH    will transport to home at d/c   Pharmacy [facility] - able to purchase meds     dx:  N/V    Future Appointments   Date Time Provider Department Center   11/28/2023 10:00 AM Young Neil MD [facility]   11/30/2023  1:30 PM [facility] HEPATOLOGY, [facility] HEPAT [facility]   11/30/2023  2:00 PM Lennie Nix, NP [facility] HEPAT [facility]        11/20/23 1432   Discharge Planning   Assessment Type Discharge Planning Brief Assessment   Resource/Environmental Concerns none   Support Systems Spouse/significant other  ( Des Parker  [phone])   Equipment Currently Used at Home none   Current Living Arrangements home   Patient/Family Anticipates Transition to home;home with family   Patient/Family Anticipated Services at Transition none   DME Needed Upon Discharge  none   Discharge Plan A Home;Home with family
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
## 2023-11-20 NOTE — NURSING
Patient with frequent bouts of diarrhea after taking GI cocktail. Notified on call care team.    Bently given for abdominal pain.  A one time dose of imodium ordered for diarrhea.

## 2023-11-20 NOTE — SUBJECTIVE & OBJECTIVE
Interval History: Patient states that she has still had 4 episodes of diarrhea yesterday night and she is slowly tolerating PO intake. She still has nausea and has had one episode of vomiting this morning and she describes it as watery.     Review of Systems   Constitutional:  Negative for chills, fatigue and fever.   HENT:  Negative for rhinorrhea, sinus pain and sore throat.    Eyes:  Negative for redness.   Respiratory:  Negative for chest tightness and shortness of breath.    Cardiovascular:  Negative for chest pain, palpitations and leg swelling.   Gastrointestinal:  Positive for diarrhea, nausea and vomiting.   Genitourinary:  Negative for dysuria.   Skin:  Negative for pallor, rash and wound.   Neurological:  Negative for dizziness, tremors, seizures, weakness and headaches.   Psychiatric/Behavioral:  Negative for confusion.      Objective:     Vital Signs (Most Recent):  Temp: 97.9 °F (36.6 °C) (11/20/23 1101)  Pulse: 83 (11/20/23 1101)  Resp: 20 (11/20/23 1101)  BP: 124/63 (11/20/23 1101)  SpO2: 98 % (11/20/23 1101) Vital Signs (24h Range):  Temp:  [97.9 °F (36.6 °C)-98.8 °F (37.1 °C)] 97.9 °F (36.6 °C)  Pulse:  [71-83] 83  Resp:  [18-20] 20  SpO2:  [96 %-98 %] 98 %  BP: ()/(53-70) 124/63     Weight: 70.7 kg (155 lb 13.8 oz)  Body mass index is 25.16 kg/m².    Intake/Output Summary (Last 24 hours) at 11/20/2023 1209  Last data filed at 11/20/2023 0800  Gross per 24 hour   Intake 600 ml   Output --   Net 600 ml         Physical Exam  Vitals and nursing note reviewed.   Constitutional:       General: She is not in acute distress.     Appearance: Normal appearance. She is not ill-appearing, toxic-appearing or diaphoretic.   HENT:      Head: Normocephalic and atraumatic.      Nose: No congestion or rhinorrhea.      Mouth/Throat:      Pharynx: No oropharyngeal exudate or posterior oropharyngeal erythema.   Eyes:      General: No scleral icterus.     Extraocular Movements: Extraocular movements intact.       Pupils: Pupils are equal, round, and reactive to light.   Cardiovascular:      Rate and Rhythm: Normal rate and regular rhythm.      Heart sounds: Normal heart sounds. No murmur heard.     No friction rub. No gallop.   Pulmonary:      Effort: No respiratory distress.      Breath sounds: No stridor. No wheezing, rhonchi or rales.   Abdominal:      General: Abdomen is flat. Bowel sounds are normal. There is no distension.      Palpations: There is no mass.      Tenderness: There is no abdominal tenderness. There is no guarding.      Hernia: No hernia is present.   Musculoskeletal:      Cervical back: Normal range of motion.      Right lower leg: No edema.      Left lower leg: No edema.   Skin:     Coloration: Skin is not jaundiced.      Findings: No bruising or rash.   Neurological:      General: No focal deficit present.      Mental Status: She is alert and oriented to person, place, and time.             Significant Labs: All pertinent labs within the past 24 hours have been reviewed.  CBC:   Recent Labs   Lab 11/18/23 1709 11/19/23  0539 11/19/23  1923 11/20/23  0404   WBC 16.72* 8.40  --  7.08   HGB 10.5* 8.1* 8.8* 8.5*   HCT 33.1* 25.7* 27.4* 27.6*    209  --  207     CMP:   Recent Labs   Lab 11/18/23 1709 11/19/23  0539 11/20/23  0404    138 138   K 2.6* 3.2* 3.4*   CL 94* 101 105   CO2 26 27 24   GLU 93 87 74   BUN 3* 4* 3*   CREATININE 0.6 0.6 0.6   CALCIUM 8.6* 7.8* 8.0*   PROT 7.2 5.2* 5.4*   ALBUMIN 2.6* 1.9* 1.9*   BILITOT 0.9 0.8 1.0   ALKPHOS 279* 200* 193*   AST 73* 49* 73*   ALT 12 9* 11   ANIONGAP 19* 10 9       Significant Imaging: I have reviewed all pertinent imaging results/findings within the past 24 hours.

## 2023-11-20 NOTE — ASSESSMENT & PLAN NOTE
Recurrent N/V in setting of chronic alcohol abuse.    Plan:  -Zofran 1st choice  -Compazine 2nd choice  -Scopolamine patch PRN  - Patient reports improved nausea this morning. Will monitor for PO toleration.  - CT Abdo/Pelvis:  1. Hepatosplenomegaly with hepatic steatosis.  2. Incomplete distension of the urinary bladder with probable minimal wall thickening.  Mild cystitis is a consideration.  3. Minimal diverticulosis of the left colon.

## 2023-11-20 NOTE — PROGRESS NOTES
Future Appointments   Date Time Provider Department Center   11/30/2023  1:30 PM [facility] HEPATOLOGY, [facility] HEPAT [facility]   11/30/2023  2:00 PM Lennie Nix NP [facility] HEPAT [facility]

## 2023-11-20 NOTE — PLAN OF CARE
AAOx4. RA. C/O nausea and vomiting and lower abdominal pain. PRN Zofran administered. Scopalmin patch applied behind right ear. Pt received 6 bags of IV K+ . 1X dose of Dilaudid administered for pain. Medications administered per MAR. Standby assist. Glucose monitored. Tele monitor in place. Safety maintained. Call light within reach. Bed alarm active. Pt encouraged to call for assistance.

## 2023-11-21 LAB
ALBUMIN SERPL BCP-MCNC: 2 G/DL (ref 3.5–5.2)
ALP SERPL-CCNC: 181 U/L (ref 55–135)
ALT SERPL W/O P-5'-P-CCNC: 8 U/L (ref 10–44)
ANION GAP SERPL CALC-SCNC: 9 MMOL/L (ref 8–16)
AST SERPL-CCNC: 75 U/L (ref 10–40)
BASOPHILS # BLD AUTO: 0.03 K/UL (ref 0–0.2)
BASOPHILS NFR BLD: 0.4 % (ref 0–1.9)
BILIRUB SERPL-MCNC: 0.8 MG/DL (ref 0.1–1)
BUN SERPL-MCNC: 3 MG/DL (ref 6–20)
CALCIUM SERPL-MCNC: 8.2 MG/DL (ref 8.7–10.5)
CHLORIDE SERPL-SCNC: 108 MMOL/L (ref 95–110)
CO2 SERPL-SCNC: 21 MMOL/L (ref 23–29)
CREAT SERPL-MCNC: 0.6 MG/DL (ref 0.5–1.4)
DIFFERENTIAL METHOD: ABNORMAL
EOSINOPHIL # BLD AUTO: 0.1 K/UL (ref 0–0.5)
EOSINOPHIL NFR BLD: 0.9 % (ref 0–8)
ERYTHROCYTE [DISTWIDTH] IN BLOOD BY AUTOMATED COUNT: 14.2 % (ref 11.5–14.5)
EST. GFR  (NO RACE VARIABLE): >60 ML/MIN/1.73 M^2
GLUCOSE SERPL-MCNC: 77 MG/DL (ref 70–110)
HCT VFR BLD AUTO: 27.5 % (ref 37–48.5)
HGB BLD-MCNC: 8.2 G/DL (ref 12–16)
IMM GRANULOCYTES # BLD AUTO: 0.05 K/UL (ref 0–0.04)
IMM GRANULOCYTES NFR BLD AUTO: 0.7 % (ref 0–0.5)
LYMPHOCYTES # BLD AUTO: 1.1 K/UL (ref 1–4.8)
LYMPHOCYTES NFR BLD: 14.7 % (ref 18–48)
MAGNESIUM SERPL-MCNC: 1.7 MG/DL (ref 1.6–2.6)
MCH RBC QN AUTO: 33.1 PG (ref 27–31)
MCHC RBC AUTO-ENTMCNC: 29.8 G/DL (ref 32–36)
MCV RBC AUTO: 111 FL (ref 82–98)
MONOCYTES # BLD AUTO: 0.7 K/UL (ref 0.3–1)
MONOCYTES NFR BLD: 9.7 % (ref 4–15)
NEUTROPHILS # BLD AUTO: 5.5 K/UL (ref 1.8–7.7)
NEUTROPHILS NFR BLD: 73.6 % (ref 38–73)
NRBC BLD-RTO: 0 /100 WBC
PHOSPHATE SERPL-MCNC: 4 MG/DL (ref 2.7–4.5)
PLATELET # BLD AUTO: 194 K/UL (ref 150–450)
PMV BLD AUTO: 9.8 FL (ref 9.2–12.9)
POCT GLUCOSE: 77 MG/DL (ref 70–110)
POCT GLUCOSE: 87 MG/DL (ref 70–110)
POCT GLUCOSE: 93 MG/DL (ref 70–110)
POTASSIUM SERPL-SCNC: 3.7 MMOL/L (ref 3.5–5.1)
PROT SERPL-MCNC: 5.4 G/DL (ref 6–8.4)
RBC # BLD AUTO: 2.48 M/UL (ref 4–5.4)
SODIUM SERPL-SCNC: 138 MMOL/L (ref 136–145)
WBC # BLD AUTO: 7.49 K/UL (ref 3.9–12.7)

## 2023-11-21 PROCEDURE — C9113 INJ PANTOPRAZOLE SODIUM, VIA: HCPCS

## 2023-11-21 PROCEDURE — 25000003 PHARM REV CODE 250

## 2023-11-21 PROCEDURE — 11000001 HC ACUTE MED/SURG PRIVATE ROOM

## 2023-11-21 PROCEDURE — 84100 ASSAY OF PHOSPHORUS: CPT

## 2023-11-21 PROCEDURE — 25000003 PHARM REV CODE 250: Performed by: STUDENT IN AN ORGANIZED HEALTH CARE EDUCATION/TRAINING PROGRAM

## 2023-11-21 PROCEDURE — 36415 COLL VENOUS BLD VENIPUNCTURE: CPT

## 2023-11-21 PROCEDURE — 80053 COMPREHEN METABOLIC PANEL: CPT

## 2023-11-21 PROCEDURE — 63600175 PHARM REV CODE 636 W HCPCS

## 2023-11-21 PROCEDURE — 83735 ASSAY OF MAGNESIUM: CPT

## 2023-11-21 PROCEDURE — 85025 COMPLETE CBC W/AUTO DIFF WBC: CPT

## 2023-11-21 RX ORDER — SODIUM CHLORIDE 9 MG/ML
INJECTION, SOLUTION INTRAVENOUS ONCE
Status: COMPLETED | OUTPATIENT
Start: 2023-11-21 | End: 2023-11-21

## 2023-11-21 RX ORDER — POTASSIUM CHLORIDE 7.45 MG/ML
10 INJECTION INTRAVENOUS
Status: DISPENSED | OUTPATIENT
Start: 2023-11-21 | End: 2023-11-21

## 2023-11-21 RX ORDER — MAGNESIUM SULFATE HEPTAHYDRATE 40 MG/ML
2 INJECTION, SOLUTION INTRAVENOUS ONCE
Status: COMPLETED | OUTPATIENT
Start: 2023-11-21 | End: 2023-11-21

## 2023-11-21 RX ADMIN — PANTOPRAZOLE SODIUM 40 MG: 40 INJECTION, POWDER, LYOPHILIZED, FOR SOLUTION INTRAVENOUS at 08:11

## 2023-11-21 RX ADMIN — Medication 15 ML: at 08:11

## 2023-11-21 RX ADMIN — PREGABALIN 100 MG: 50 CAPSULE ORAL at 04:11

## 2023-11-21 RX ADMIN — ONDANSETRON 4 MG: 2 INJECTION INTRAMUSCULAR; INTRAVENOUS at 09:11

## 2023-11-21 RX ADMIN — FOLIC ACID 1 MG: 5 INJECTION, SOLUTION INTRAMUSCULAR; INTRAVENOUS; SUBCUTANEOUS at 10:11

## 2023-11-21 RX ADMIN — CEFTRIAXONE SODIUM 1 G: 1 INJECTION, POWDER, FOR SOLUTION INTRAMUSCULAR; INTRAVENOUS at 09:11

## 2023-11-21 RX ADMIN — POTASSIUM CHLORIDE 10 MEQ: 7.46 INJECTION, SOLUTION INTRAVENOUS at 09:11

## 2023-11-21 RX ADMIN — DICYCLOMINE HYDROCHLORIDE 10 MG: 10 CAPSULE ORAL at 04:11

## 2023-11-21 RX ADMIN — SODIUM CHLORIDE: 9 INJECTION, SOLUTION INTRAVENOUS at 09:11

## 2023-11-21 RX ADMIN — DICYCLOMINE HYDROCHLORIDE 10 MG: 10 CAPSULE ORAL at 12:11

## 2023-11-21 RX ADMIN — ONDANSETRON 4 MG: 2 INJECTION INTRAMUSCULAR; INTRAVENOUS at 08:11

## 2023-11-21 RX ADMIN — POTASSIUM CHLORIDE 10 MEQ: 7.46 INJECTION, SOLUTION INTRAVENOUS at 10:11

## 2023-11-21 RX ADMIN — MAGNESIUM SULFATE HEPTAHYDRATE 2 G: 40 INJECTION, SOLUTION INTRAVENOUS at 07:11

## 2023-11-21 RX ADMIN — TRAZODONE HYDROCHLORIDE 100 MG: 100 TABLET ORAL at 08:11

## 2023-11-21 NOTE — ASSESSMENT & PLAN NOTE
Patient has hypokalemia which is Acute on Chronic and currently uncontrolled. Most recent potassium levels reviewed-   Lab Results   Component Value Date    K 3.7 11/21/2023   . Will continue potassium replacement per protocol and recheck repeat levels after replacement completed.     Improving with improved PO intake. Will monitor and replete PRN   147

## 2023-11-21 NOTE — SUBJECTIVE & OBJECTIVE
Interval History: Patient states that she had two episodes of vomiting yesterday in the morning but none since then. She still has some nausea but it is improving. She denies fever, chills, SOB, chest pain. She states that she's been still having episodes of non bloody diarrhea. She has been able to tolerate broth and would like to advance to a soft diet. She endorses mid abdominal pain    Review of Systems   Constitutional:  Negative for chills, fatigue and fever.   HENT:  Negative for rhinorrhea, sinus pain and sore throat.    Eyes:  Negative for redness.   Respiratory:  Negative for chest tightness and shortness of breath.    Cardiovascular:  Negative for chest pain, palpitations and leg swelling.   Gastrointestinal:  Positive for diarrhea and nausea. Negative for vomiting.   Genitourinary:  Negative for dysuria.   Skin:  Negative for pallor, rash and wound.   Neurological:  Negative for dizziness, tremors, seizures, weakness and headaches.   Psychiatric/Behavioral:  Negative for confusion.      Objective:     Vital Signs (Most Recent):  Temp: 98.3 °F (36.8 °C) (11/21/23 1054)  Pulse: 74 (11/21/23 1054)  Resp: 20 (11/21/23 1054)  BP: 130/73 (11/21/23 1054)  SpO2: 97 % (11/21/23 1054) Vital Signs (24h Range):  Temp:  [97.7 °F (36.5 °C)-98.4 °F (36.9 °C)] 98.3 °F (36.8 °C)  Pulse:  [69-82] 74  Resp:  [18-20] 20  SpO2:  [97 %-99 %] 97 %  BP: (101-130)/(62-73) 130/73     Weight: 70.7 kg (155 lb 13.8 oz)  Body mass index is 25.16 kg/m².    Intake/Output Summary (Last 24 hours) at 11/21/2023 1120  Last data filed at 11/21/2023 0800  Gross per 24 hour   Intake 180 ml   Output --   Net 180 ml         Physical Exam  Vitals and nursing note reviewed.   Constitutional:       General: She is not in acute distress.     Appearance: Normal appearance. She is not ill-appearing, toxic-appearing or diaphoretic.   HENT:      Head: Normocephalic and atraumatic.      Nose: No congestion or rhinorrhea.      Mouth/Throat:      Pharynx:  No oropharyngeal exudate or posterior oropharyngeal erythema.   Eyes:      General: No scleral icterus.     Extraocular Movements: Extraocular movements intact.      Pupils: Pupils are equal, round, and reactive to light.   Cardiovascular:      Rate and Rhythm: Normal rate and regular rhythm.      Heart sounds: Normal heart sounds. No murmur heard.     No friction rub. No gallop.   Pulmonary:      Effort: No respiratory distress.      Breath sounds: No stridor. No wheezing, rhonchi or rales.   Abdominal:      General: Abdomen is flat. Bowel sounds are normal. There is no distension.      Palpations: There is no mass.      Tenderness: There is no abdominal tenderness. There is no guarding.      Hernia: No hernia is present.   Musculoskeletal:      Cervical back: Normal range of motion.      Right lower leg: No edema.      Left lower leg: No edema.   Skin:     Coloration: Skin is not jaundiced.      Findings: No bruising or rash.   Neurological:      General: No focal deficit present.      Mental Status: She is alert and oriented to person, place, and time.             Significant Labs: All pertinent labs within the past 24 hours have been reviewed.  CBC:   Recent Labs   Lab 11/19/23  1923 11/20/23  0404 11/21/23  0451   WBC  --  7.08 7.49   HGB 8.8* 8.5* 8.2*   HCT 27.4* 27.6* 27.5*   PLT  --  207 194     CMP:   Recent Labs   Lab 11/20/23  0404 11/21/23  0450    138   K 3.4* 3.7    108   CO2 24 21*   GLU 74 77   BUN 3* 3*   CREATININE 0.6 0.6   CALCIUM 8.0* 8.2*   PROT 5.4* 5.4*   ALBUMIN 1.9* 2.0*   BILITOT 1.0 0.8   ALKPHOS 193* 181*   AST 73* 75*   ALT 11 8*   ANIONGAP 9 9       Significant Imaging: I have reviewed all pertinent imaging results/findings within the past 24 hours.
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
## 2023-11-21 NOTE — PLAN OF CARE
Chart reviewed/case discussed in am MDR     Lives with  Des Parker  [phone]   Independent prior to admit - no dme, no HH    will transport to home at d/c   Pharmacy [facility] - able to purchase meds      dx:  N/V  - pmh:alcoholic included fatty liver. GERD, guillain-[place] syndrome, hepatic steatosis, and hypertension   Future Appointments   Date Time Provider Department Center   11/28/2023 10:00 AM Young Neil MD [facility]   11/30/2023  1:30 PM [facility] HEPATOLOGY, [facility]   11/30/2023  2:00 PM Lennie Nix, NP [facility]PAT [facility]        11/21/23 1742   Post-Acute Status   Post-Acute Authorization Other   Other Status No Post-Acute Service Needs   Discharge Delays   (pending medical stability)   Discharge Plan   Discharge Plan A Home;Home with family

## 2023-11-21 NOTE — NURSING
Nursing care assumed for this pt. Pt found laying in bed, c/o nausea and abd pain 8/10. White board updated with POC. Seizure precautions observed. Pt instructed to call with any needs, and no OOB without staff assistance, verbalized understanding. Bed in lowest position, locked and bed alarms on. Family member at bedside. Call bell within pt's reach.

## 2023-11-21 NOTE — PLAN OF CARE
Problem: Adult Inpatient Plan of Care  Goal: Plan of Care Review  Outcome: Ongoing, Progressing  Goal: Patient-Specific Goal (Individualized)  Outcome: Ongoing, Progressing  Goal: Absence of Hospital-Acquired Illness or Injury  Outcome: Ongoing, Progressing  Goal: Optimal Comfort and Wellbeing  Outcome: Ongoing, Progressing     Problem: Fall Injury Risk  Goal: Absence of Fall and Fall-Related Injury  Outcome: Ongoing, Progressing     Problem: Alcohol Withdrawal  Goal: Alcohol Withdrawal Symptom Control  Outcome: Ongoing, Progressing

## 2023-11-21 NOTE — ASSESSMENT & PLAN NOTE
AST/ALT 75/8 today, history of hepatic steatosis    PLAN:  CT Abdomen/pelvis revealed Hepatosplenomegaly with hepatic steatosis, but no acute abnormality  Most likely secondary to alcohol  Continue to monitor

## 2023-11-21 NOTE — ASSESSMENT & PLAN NOTE
Recurrent N/V in setting of chronic alcohol abuse.    Plan:  -Zofran 1st choice  -Compazine 2nd choice  -Scopolamine patch PRN  - Patient reports improved nausea this morning. Will monitor for PO toleration today she would like to try soft diet.    - CT Abdo/Pelvis:  1. Hepatosplenomegaly with hepatic steatosis.  2. Incomplete distension of the urinary bladder with probable minimal wall thickening.  Mild cystitis is a consideration.  3. Minimal diverticulosis of the left colon.

## 2023-11-22 VITALS
OXYGEN SATURATION: 98 % | WEIGHT: 155.88 LBS | RESPIRATION RATE: 18 BRPM | TEMPERATURE: 98 F | BODY MASS INDEX: 25.05 KG/M2 | HEART RATE: 74 BPM | DIASTOLIC BLOOD PRESSURE: 78 MMHG | SYSTOLIC BLOOD PRESSURE: 126 MMHG | HEIGHT: 66 IN

## 2023-11-22 LAB
ALBUMIN SERPL BCP-MCNC: 2 G/DL (ref 3.5–5.2)
ALP SERPL-CCNC: 181 U/L (ref 55–135)
ALT SERPL W/O P-5'-P-CCNC: 10 U/L (ref 10–44)
ANION GAP SERPL CALC-SCNC: 8 MMOL/L (ref 8–16)
AST SERPL-CCNC: 80 U/L (ref 10–40)
BASOPHILS # BLD AUTO: 0.02 K/UL (ref 0–0.2)
BASOPHILS NFR BLD: 0.3 % (ref 0–1.9)
BILIRUB SERPL-MCNC: 0.8 MG/DL (ref 0.1–1)
BUN SERPL-MCNC: 2 MG/DL (ref 6–20)
CALCIUM SERPL-MCNC: 8.2 MG/DL (ref 8.7–10.5)
CHLORIDE SERPL-SCNC: 109 MMOL/L (ref 95–110)
CO2 SERPL-SCNC: 24 MMOL/L (ref 23–29)
CREAT SERPL-MCNC: 0.7 MG/DL (ref 0.5–1.4)
DIFFERENTIAL METHOD: ABNORMAL
EOSINOPHIL # BLD AUTO: 0 K/UL (ref 0–0.5)
EOSINOPHIL NFR BLD: 0.5 % (ref 0–8)
ERYTHROCYTE [DISTWIDTH] IN BLOOD BY AUTOMATED COUNT: 14.5 % (ref 11.5–14.5)
EST. GFR  (NO RACE VARIABLE): >60 ML/MIN/1.73 M^2
GLUCOSE SERPL-MCNC: 81 MG/DL (ref 70–110)
HCT VFR BLD AUTO: 27.1 % (ref 37–48.5)
HGB BLD-MCNC: 8.6 G/DL (ref 12–16)
IMM GRANULOCYTES # BLD AUTO: 0.04 K/UL (ref 0–0.04)
IMM GRANULOCYTES NFR BLD AUTO: 0.5 % (ref 0–0.5)
LYMPHOCYTES # BLD AUTO: 1 K/UL (ref 1–4.8)
LYMPHOCYTES NFR BLD: 13.3 % (ref 18–48)
MAGNESIUM SERPL-MCNC: 1.9 MG/DL (ref 1.6–2.6)
MCH RBC QN AUTO: 33 PG (ref 27–31)
MCHC RBC AUTO-ENTMCNC: 31.7 G/DL (ref 32–36)
MCV RBC AUTO: 104 FL (ref 82–98)
MONOCYTES # BLD AUTO: 0.6 K/UL (ref 0.3–1)
MONOCYTES NFR BLD: 8.1 % (ref 4–15)
NEUTROPHILS # BLD AUTO: 6 K/UL (ref 1.8–7.7)
NEUTROPHILS NFR BLD: 77.3 % (ref 38–73)
NRBC BLD-RTO: 0 /100 WBC
PHOSPHATE SERPL-MCNC: 4.4 MG/DL (ref 2.7–4.5)
PLATELET # BLD AUTO: 206 K/UL (ref 150–450)
PMV BLD AUTO: 9.5 FL (ref 9.2–12.9)
POTASSIUM SERPL-SCNC: 3.8 MMOL/L (ref 3.5–5.1)
PROT SERPL-MCNC: 5.7 G/DL (ref 6–8.4)
RBC # BLD AUTO: 2.61 M/UL (ref 4–5.4)
SODIUM SERPL-SCNC: 141 MMOL/L (ref 136–145)
WBC # BLD AUTO: 7.77 K/UL (ref 3.9–12.7)

## 2023-11-22 PROCEDURE — 83735 ASSAY OF MAGNESIUM: CPT

## 2023-11-22 PROCEDURE — 25000003 PHARM REV CODE 250: Performed by: STUDENT IN AN ORGANIZED HEALTH CARE EDUCATION/TRAINING PROGRAM

## 2023-11-22 PROCEDURE — 85025 COMPLETE CBC W/AUTO DIFF WBC: CPT

## 2023-11-22 PROCEDURE — 25000003 PHARM REV CODE 250

## 2023-11-22 PROCEDURE — C9113 INJ PANTOPRAZOLE SODIUM, VIA: HCPCS

## 2023-11-22 PROCEDURE — 63600175 PHARM REV CODE 636 W HCPCS

## 2023-11-22 PROCEDURE — 80053 COMPREHEN METABOLIC PANEL: CPT

## 2023-11-22 PROCEDURE — 36415 COLL VENOUS BLD VENIPUNCTURE: CPT

## 2023-11-22 PROCEDURE — 84100 ASSAY OF PHOSPHORUS: CPT

## 2023-11-22 RX ORDER — POTASSIUM CHLORIDE 7.45 MG/ML
10 INJECTION INTRAVENOUS
Status: DISPENSED | OUTPATIENT
Start: 2023-11-22 | End: 2023-11-22

## 2023-11-22 RX ORDER — MAGNESIUM SULFATE HEPTAHYDRATE 40 MG/ML
2 INJECTION, SOLUTION INTRAVENOUS ONCE
Status: COMPLETED | OUTPATIENT
Start: 2023-11-22 | End: 2023-11-22

## 2023-11-22 RX ORDER — CEPHALEXIN 500 MG/1
500 CAPSULE ORAL EVERY 6 HOURS
Qty: 12 CAPSULE | Refills: 0 | Status: SHIPPED | OUTPATIENT
Start: 2023-11-22 | End: 2023-11-25

## 2023-11-22 RX ADMIN — SCOPALAMINE 1 PATCH: 1 PATCH, EXTENDED RELEASE TRANSDERMAL at 12:11

## 2023-11-22 RX ADMIN — MAGNESIUM SULFATE HEPTAHYDRATE 2 G: 40 INJECTION, SOLUTION INTRAVENOUS at 07:11

## 2023-11-22 RX ADMIN — PREGABALIN 100 MG: 50 CAPSULE ORAL at 09:11

## 2023-11-22 RX ADMIN — PANTOPRAZOLE SODIUM 40 MG: 40 INJECTION, POWDER, LYOPHILIZED, FOR SOLUTION INTRAVENOUS at 09:11

## 2023-11-22 RX ADMIN — POTASSIUM CHLORIDE 10 MEQ: 7.46 INJECTION, SOLUTION INTRAVENOUS at 09:11

## 2023-11-22 RX ADMIN — POTASSIUM CHLORIDE 10 MEQ: 7.46 INJECTION, SOLUTION INTRAVENOUS at 06:11
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
## 2023-11-22 NOTE — PLAN OF CARE
Discharge orders noted. Additional clinical references attached.    Patient's discharge instructions given by bedside RN and reviewed via this VN.  Education provided on new medication, diagnosis, and follow-up appointments.  Teach back method used. Patient verbalized understanding. All questions answered.  Family coming to  patient now.    11/22/23 1201   AVS Confirmation   Discharge instructions and AVS given to and reviewed with patient and/or significant other. Yes      Floor nurse notified.

## 2023-11-22 NOTE — PLAN OF CARE
SW met with patient at bedside to discuss discharge planning. Pt will dc with no HH or DME needs noted. All pt appts have been scheduled and added to AVS. Pt will contact family or friend to assist with transportation home at discharge.    Pt cleared from CM standpoint. Bedside nurse and VN notified.    Future Appointments   Date Time Provider Department Center   11/28/2023 10:00 AM Young Neil MD [facility]   11/30/2023  1:30 PM [facility] HEPATOLOGY, [facility]   11/30/2023  2:00 PM Lennie Nix, NP [facility]        11/22/23 1106   Final Note   Assessment Type Final Discharge Note   Anticipated Discharge Disposition Home   What phone number can be called within the next 1-3 days to see how you are doing after discharge? [phone]   Hospital Resources/Appts/Education Provided Appointments scheduled and added to AVS   Post-Acute Status   Discharge Delays None known at this time

## 2023-11-22 NOTE — PLAN OF CARE
Problem: Adult Inpatient Plan of Care  Goal: Plan of Care Review  Outcome: Ongoing, Progressing  Goal: Patient-Specific Goal (Individualized)  Outcome: Ongoing, Progressing  Goal: Absence of Hospital-Acquired Illness or Injury  Outcome: Ongoing, Progressing     Problem: Nausea and Vomiting  Goal: Fluid and Electrolyte Balance  Outcome: Ongoing, Progressing     Problem: Alcohol Withdrawal  Goal: Alcohol Withdrawal Symptom Control  Outcome: Ongoing, Progressing

## 2023-11-22 NOTE — PLAN OF CARE
Patient AAO x 4.  Meds administered per MAR.  Complains of nausea, Zofran administered.  Patient tolerated about 30-50% of each meal today.  Bed in lowest position, call light within reach, bed alarm activated.  All questions answered.

## 2023-11-24 LAB
HBV SURFACE AB SER QL IA: NEGATIVE
HBV SURFACE AB SERPL IA-ACNC: <3 MIU/ML

## 2023-11-27 LAB
CLINICAL BIOCHEMIST REVIEW: NORMAL
PLPETH BLD-MCNC: NORMAL NG/ML
POPETH BLD-MCNC: 254 NG/ML

## 2023-11-28 ENCOUNTER — OFFICE VISIT (OUTPATIENT)
Dept: FAMILY MEDICINE | Facility: HOSPITAL | Age: 53
End: 2023-11-28
Payer: MEDICAID

## 2023-11-28 VITALS
DIASTOLIC BLOOD PRESSURE: 78 MMHG | HEIGHT: 66 IN | WEIGHT: 152.13 LBS | BODY MASS INDEX: 24.45 KG/M2 | SYSTOLIC BLOOD PRESSURE: 124 MMHG | HEART RATE: 98 BPM

## 2023-11-28 DIAGNOSIS — F10.90 ALCOHOL USE DISORDER: Primary | ICD-10-CM

## 2023-11-28 DIAGNOSIS — G62.1 ALCOHOL-INDUCED POLYNEUROPATHY: ICD-10-CM

## 2023-11-28 DIAGNOSIS — Z12.31 ENCOUNTER FOR SCREENING MAMMOGRAM FOR MALIGNANT NEOPLASM OF BREAST: ICD-10-CM

## 2023-11-28 DIAGNOSIS — D53.9 MACROCYTIC ANEMIA: ICD-10-CM

## 2023-11-28 PROCEDURE — 99213 OFFICE O/P EST LOW 20 MIN: CPT | Performed by: STUDENT IN AN ORGANIZED HEALTH CARE EDUCATION/TRAINING PROGRAM

## 2023-11-28 RX ORDER — TOPIRAMATE 50 MG/1
50 TABLET, FILM COATED ORAL DAILY
Qty: 90 TABLET | Refills: 0 | Status: SHIPPED | OUTPATIENT
Start: 2023-11-28 | End: 2024-01-31

## 2023-11-28 NOTE — PROGRESS NOTES
Clinic Note  [facility]    Subjective:      Ember Rivera is a 53 y.o. female with PMHx alcohol use disorder, alcohol-induced neuropathy, chronic alcohol-associated emesis, hepatic steatosis here today for hospital f/u following stay for emesis. Patient feeling better today, no longer vomiting.    Alcohol use - Continues to reduce alcohol consumption, but still drinks daily - down to 3 beers or less (instead of 6). Half shots instead of whole shots of fireball. Eating better. Taking topiramate 25 mg daily - tolerating well. Has hepatology on 11/30. Notes improvement in neuropathy on 100 mg BID lyrica.     Macrocytic anemia - Taking folic acid and B12    Never had mammogram. Not interested in pap smear.      Review of Systems   Constitutional:  Negative for chills and fever.   HENT:  Negative for congestion and sore throat.    Respiratory:  Negative for cough.    Cardiovascular:  Negative for chest pain and palpitations.   Gastrointestinal:  Negative for abdominal pain, diarrhea, nausea and vomiting.   Genitourinary:  Negative for dysuria.   Musculoskeletal:  Negative for joint pain and myalgias.   Neurological:  Positive for tingling. Negative for dizziness, weakness and headaches.   Psychiatric/Behavioral:  Negative for depression. The patient is not nervous/anxious.           Objective:      Vitals:    11/28/23 1020   BP: 124/78   Pulse: 98     Body mass index is 24.55 kg/m².      Physical Exam  Constitutional:       General: She is not in acute distress.     Appearance: Normal appearance. She is well-developed. She is not diaphoretic.   HENT:      Head: Normocephalic and atraumatic.      Mouth/Throat:      Mouth: Mucous membranes are moist.      Pharynx: Oropharynx is clear.   Cardiovascular:      Rate and Rhythm: Normal rate and regular rhythm.      Pulses: Normal pulses.   Pulmonary:      Effort: Pulmonary effort is normal. No respiratory distress.      Breath sounds: Normal breath sounds. No  wheezing.   Abdominal:      General: Abdomen is flat. Bowel sounds are normal.      Palpations: Abdomen is soft.      Tenderness: There is no abdominal tenderness.   Musculoskeletal:         General: Normal range of motion.      Cervical back: Normal range of motion and neck supple.      Comments: Ambulates with walker   Skin:     General: Skin is warm and dry.      Capillary Refill: Capillary refill takes less than 2 seconds.      Findings: No rash.   Neurological:      General: No focal deficit present.      Mental Status: She is alert and oriented to person, place, and time.   Psychiatric:         Mood and Affect: Mood normal.         Behavior: Behavior normal.            Assessment/Plan:      Continues to gradually reduce alcohol consumption, gave continued encouragement to do so. Will see if can tolerate increased dose of 50 mg topamax for better alcohol control - if not will go back to 25 mg.    1. Alcohol use disorder    - topiramate (TOPAMAX) 50 MG tablet; Take 1 tablet (50 mg total) by mouth once daily.  Dispense: 90 tablet; Refill: 0    2. Alcohol-induced polyneuropathy    - topiramate (TOPAMAX) 50 MG tablet; Take 1 tablet (50 mg total) by mouth once daily.  Dispense: 90 tablet; Refill: 0    3. Macrocytic anemia  - Continue folate and b12 supplementation    4. Encounter for screening mammogram for malignant neoplasm of breast    - Mammo Digital Screening Bilat; Future      Patient discussed with attending physician, Dr. Edel Neil MD  [facility] PGY-3  11/28/2023      The following information is provided to all patients.  This information is to help you find resources for any of the problems found today that may be affecting your health:                Living healthy guide: www.[facility].[place].gov       Understanding Diabetes: www.diabetes.org       Eating healthy: www.cdc.gov/healthyweight      CDC home safety checklist: www.cdc.gov/steadi/patient.html      Agency on Aging:  www.goea.[place].[facility]       Alcoholics anonymous (AA): www.aa.org      Physical Activity: www.rashad.nih.gov/xf0tola       Tobacco use: www.quitwithusla.org

## 2023-12-12 ENCOUNTER — TELEPHONE (OUTPATIENT)
Dept: FAMILY MEDICINE | Facility: HOSPITAL | Age: 53
End: 2023-12-12
Payer: MEDICAID

## 2023-12-19 ENCOUNTER — HOSPITAL ENCOUNTER (OUTPATIENT)
Facility: HOSPITAL | Age: 53
Discharge: HOME | End: 2023-12-21
Attending: EMERGENCY MEDICINE | Admitting: FAMILY MEDICINE
Payer: MEDICAID

## 2023-12-19 DIAGNOSIS — R11.0 NAUSEA IN ADULT: ICD-10-CM

## 2023-12-19 DIAGNOSIS — E83.42 HYPOMAGNESEMIA: Primary | ICD-10-CM

## 2023-12-19 DIAGNOSIS — R94.31 QT PROLONGATION: ICD-10-CM

## 2023-12-19 DIAGNOSIS — R11.0 NAUSEA: ICD-10-CM

## 2023-12-19 DIAGNOSIS — R53.1 WEAKNESS: ICD-10-CM

## 2023-12-19 DIAGNOSIS — G62.1 ALCOHOL-INDUCED POLYNEUROPATHY: ICD-10-CM

## 2023-12-19 LAB
ALBUMIN SERPL BCP-MCNC: 2.8 G/DL (ref 3.5–5.2)
ALP SERPL-CCNC: 282 U/L (ref 55–135)
ALT SERPL W/O P-5'-P-CCNC: 13 U/L (ref 10–44)
ANION GAP SERPL CALC-SCNC: 16 MMOL/L (ref 8–16)
AST SERPL-CCNC: 71 U/L (ref 10–40)
BACTERIA #/AREA URNS HPF: ABNORMAL /HPF
BASOPHILS # BLD AUTO: 0.02 K/UL (ref 0–0.2)
BASOPHILS NFR BLD: 0.2 % (ref 0–1.9)
BILIRUB SERPL-MCNC: 0.8 MG/DL (ref 0.1–1)
BILIRUB UR QL STRIP: NEGATIVE
BUN SERPL-MCNC: 3 MG/DL (ref 6–20)
CALCIUM SERPL-MCNC: 8.6 MG/DL (ref 8.7–10.5)
CHLORIDE SERPL-SCNC: 99 MMOL/L (ref 95–110)
CLARITY UR: CLEAR
CO2 SERPL-SCNC: 24 MMOL/L (ref 23–29)
COLOR UR: YELLOW
CREAT SERPL-MCNC: 0.6 MG/DL (ref 0.5–1.4)
DIFFERENTIAL METHOD: ABNORMAL
EOSINOPHIL # BLD AUTO: 0 K/UL (ref 0–0.5)
EOSINOPHIL NFR BLD: 0.1 % (ref 0–8)
ERYTHROCYTE [DISTWIDTH] IN BLOOD BY AUTOMATED COUNT: 12.9 % (ref 11.5–14.5)
EST. GFR  (NO RACE VARIABLE): >60 ML/MIN/1.73 M^2
GLUCOSE SERPL-MCNC: 99 MG/DL (ref 70–110)
GLUCOSE UR QL STRIP: NEGATIVE
HCT VFR BLD AUTO: 36.4 % (ref 37–48.5)
HGB BLD-MCNC: 11.6 G/DL (ref 12–16)
HGB UR QL STRIP: NEGATIVE
IMM GRANULOCYTES # BLD AUTO: 0.04 K/UL (ref 0–0.04)
IMM GRANULOCYTES NFR BLD AUTO: 0.4 % (ref 0–0.5)
KETONES UR QL STRIP: NEGATIVE
LEUKOCYTE ESTERASE UR QL STRIP: ABNORMAL
LIPASE SERPL-CCNC: 32 U/L (ref 4–60)
LYMPHOCYTES # BLD AUTO: 1.2 K/UL (ref 1–4.8)
LYMPHOCYTES NFR BLD: 10.5 % (ref 18–48)
MAGNESIUM SERPL-MCNC: 1.5 MG/DL (ref 1.6–2.6)
MCH RBC QN AUTO: 31.4 PG (ref 27–31)
MCHC RBC AUTO-ENTMCNC: 31.9 G/DL (ref 32–36)
MCV RBC AUTO: 99 FL (ref 82–98)
MICROSCOPIC COMMENT: ABNORMAL
MONOCYTES # BLD AUTO: 0.7 K/UL (ref 0.3–1)
MONOCYTES NFR BLD: 6 % (ref 4–15)
NEUTROPHILS # BLD AUTO: 9.4 K/UL (ref 1.8–7.7)
NEUTROPHILS NFR BLD: 82.8 % (ref 38–73)
NITRITE UR QL STRIP: NEGATIVE
NRBC BLD-RTO: 0 /100 WBC
PH UR STRIP: 7 [PH] (ref 5–8)
PLATELET # BLD AUTO: 209 K/UL (ref 150–450)
PMV BLD AUTO: 9.9 FL (ref 9.2–12.9)
POTASSIUM SERPL-SCNC: 3.7 MMOL/L (ref 3.5–5.1)
PROT SERPL-MCNC: 7.3 G/DL (ref 6–8.4)
PROT UR QL STRIP: ABNORMAL
RBC # BLD AUTO: 3.69 M/UL (ref 4–5.4)
RBC #/AREA URNS HPF: 1 /HPF (ref 0–4)
SODIUM SERPL-SCNC: 139 MMOL/L (ref 136–145)
SP GR UR STRIP: 1.01 (ref 1–1.03)
SQUAMOUS #/AREA URNS HPF: 1 /HPF
URN SPEC COLLECT METH UR: ABNORMAL
UROBILINOGEN UR STRIP-ACNC: ABNORMAL EU/DL
WBC # BLD AUTO: 11.33 K/UL (ref 3.9–12.7)
WBC #/AREA URNS HPF: 12 /HPF (ref 0–5)

## 2023-12-19 PROCEDURE — 96375 TX/PRO/DX INJ NEW DRUG ADDON: CPT

## 2023-12-19 PROCEDURE — 99285 EMERGENCY DEPT VISIT HI MDM: CPT | Mod: 25

## 2023-12-19 PROCEDURE — 25000003 PHARM REV CODE 250

## 2023-12-19 PROCEDURE — G0378 HOSPITAL OBSERVATION PER HR: HCPCS

## 2023-12-19 PROCEDURE — 83690 ASSAY OF LIPASE: CPT

## 2023-12-19 PROCEDURE — 87088 URINE BACTERIA CULTURE: CPT

## 2023-12-19 PROCEDURE — 83735 ASSAY OF MAGNESIUM: CPT

## 2023-12-19 PROCEDURE — 81000 URINALYSIS NONAUTO W/SCOPE: CPT

## 2023-12-19 PROCEDURE — 63600175 PHARM REV CODE 636 W HCPCS

## 2023-12-19 PROCEDURE — 96361 HYDRATE IV INFUSION ADD-ON: CPT

## 2023-12-19 PROCEDURE — 87186 SC STD MICRODIL/AGAR DIL: CPT

## 2023-12-19 PROCEDURE — 87077 CULTURE AEROBIC IDENTIFY: CPT

## 2023-12-19 PROCEDURE — 80053 COMPREHEN METABOLIC PANEL: CPT

## 2023-12-19 PROCEDURE — 93005 ELECTROCARDIOGRAM TRACING: CPT

## 2023-12-19 PROCEDURE — 87086 URINE CULTURE/COLONY COUNT: CPT

## 2023-12-19 PROCEDURE — 85025 COMPLETE CBC W/AUTO DIFF WBC: CPT

## 2023-12-19 PROCEDURE — 96366 THER/PROPH/DIAG IV INF ADDON: CPT

## 2023-12-19 PROCEDURE — 25500020 PHARM REV CODE 255: Performed by: EMERGENCY MEDICINE

## 2023-12-19 PROCEDURE — 96365 THER/PROPH/DIAG IV INF INIT: CPT | Mod: 59

## 2023-12-19 RX ORDER — FOLIC ACID 1 MG/1
1 TABLET ORAL DAILY
Status: DISCONTINUED | OUTPATIENT
Start: 2023-12-20 | End: 2023-12-20

## 2023-12-19 RX ORDER — MAG HYDROX/ALUMINUM HYD/SIMETH 200-200-20
5 SUSPENSION, ORAL (FINAL DOSE FORM) ORAL
Status: COMPLETED | OUTPATIENT
Start: 2023-12-19 | End: 2023-12-19

## 2023-12-19 RX ORDER — SODIUM CHLORIDE, SODIUM LACTATE, POTASSIUM CHLORIDE, CALCIUM CHLORIDE 600; 310; 30; 20 MG/100ML; MG/100ML; MG/100ML; MG/100ML
INJECTION, SOLUTION INTRAVENOUS CONTINUOUS
Status: ACTIVE | OUTPATIENT
Start: 2023-12-19 | End: 2023-12-20

## 2023-12-19 RX ORDER — DIPHENHYDRAMINE HYDROCHLORIDE 50 MG/ML
6.25 INJECTION INTRAMUSCULAR; INTRAVENOUS
Status: COMPLETED | OUTPATIENT
Start: 2023-12-19 | End: 2023-12-19

## 2023-12-19 RX ORDER — GLUCAGON 1 MG
1 KIT INJECTION
Status: DISCONTINUED | OUTPATIENT
Start: 2023-12-19 | End: 2023-12-21 | Stop reason: HOSPADM

## 2023-12-19 RX ORDER — ONDANSETRON 2 MG/ML
4 INJECTION INTRAMUSCULAR; INTRAVENOUS EVERY 6 HOURS PRN
Status: DISCONTINUED | OUTPATIENT
Start: 2023-12-19 | End: 2023-12-21 | Stop reason: HOSPADM

## 2023-12-19 RX ORDER — METOCLOPRAMIDE HYDROCHLORIDE 5 MG/ML
10 INJECTION INTRAMUSCULAR; INTRAVENOUS EVERY 8 HOURS PRN
Status: DISCONTINUED | OUTPATIENT
Start: 2023-12-19 | End: 2023-12-19

## 2023-12-19 RX ORDER — MORPHINE SULFATE 4 MG/ML
4 INJECTION, SOLUTION INTRAMUSCULAR; INTRAVENOUS
Status: DISCONTINUED | OUTPATIENT
Start: 2023-12-19 | End: 2023-12-19

## 2023-12-19 RX ORDER — ESCITALOPRAM OXALATE 10 MG/1
10 TABLET ORAL DAILY
Status: DISCONTINUED | OUTPATIENT
Start: 2023-12-20 | End: 2023-12-21 | Stop reason: HOSPADM

## 2023-12-19 RX ORDER — ENOXAPARIN SODIUM 100 MG/ML
40 INJECTION SUBCUTANEOUS EVERY 24 HOURS
Status: DISCONTINUED | OUTPATIENT
Start: 2023-12-19 | End: 2023-12-21 | Stop reason: HOSPADM

## 2023-12-19 RX ORDER — ONDANSETRON 2 MG/ML
4 INJECTION INTRAMUSCULAR; INTRAVENOUS
Status: COMPLETED | OUTPATIENT
Start: 2023-12-19 | End: 2023-12-19

## 2023-12-19 RX ORDER — IBUPROFEN 200 MG
24 TABLET ORAL
Status: DISCONTINUED | OUTPATIENT
Start: 2023-12-19 | End: 2023-12-21 | Stop reason: HOSPADM

## 2023-12-19 RX ORDER — KETOROLAC TROMETHAMINE 30 MG/ML
15 INJECTION, SOLUTION INTRAMUSCULAR; INTRAVENOUS
Status: COMPLETED | OUTPATIENT
Start: 2023-12-19 | End: 2023-12-19

## 2023-12-19 RX ORDER — PROCHLORPERAZINE EDISYLATE 5 MG/ML
2.5 INJECTION INTRAMUSCULAR; INTRAVENOUS
Status: COMPLETED | OUTPATIENT
Start: 2023-12-19 | End: 2023-12-19

## 2023-12-19 RX ORDER — METOCLOPRAMIDE HYDROCHLORIDE 5 MG/ML
10 INJECTION INTRAMUSCULAR; INTRAVENOUS EVERY 8 HOURS PRN
Status: DISCONTINUED | OUTPATIENT
Start: 2023-12-19 | End: 2023-12-21 | Stop reason: HOSPADM

## 2023-12-19 RX ORDER — IBUPROFEN 200 MG
16 TABLET ORAL
Status: DISCONTINUED | OUTPATIENT
Start: 2023-12-19 | End: 2023-12-21 | Stop reason: HOSPADM

## 2023-12-19 RX ORDER — NALOXONE HCL 0.4 MG/ML
0.02 VIAL (ML) INJECTION
Status: DISCONTINUED | OUTPATIENT
Start: 2023-12-19 | End: 2023-12-21 | Stop reason: HOSPADM

## 2023-12-19 RX ORDER — TALC
6 POWDER (GRAM) TOPICAL NIGHTLY PRN
Status: DISCONTINUED | OUTPATIENT
Start: 2023-12-19 | End: 2023-12-21 | Stop reason: HOSPADM

## 2023-12-19 RX ORDER — SCOLOPAMINE TRANSDERMAL SYSTEM 1 MG/1
1 PATCH, EXTENDED RELEASE TRANSDERMAL ONCE
Status: DISCONTINUED | OUTPATIENT
Start: 2023-12-19 | End: 2023-12-21 | Stop reason: HOSPADM

## 2023-12-19 RX ORDER — SODIUM CHLORIDE 0.9 % (FLUSH) 0.9 %
5 SYRINGE (ML) INJECTION
Status: DISCONTINUED | OUTPATIENT
Start: 2023-12-19 | End: 2023-12-21 | Stop reason: HOSPADM

## 2023-12-19 RX ORDER — MAGNESIUM SULFATE HEPTAHYDRATE 40 MG/ML
2 INJECTION, SOLUTION INTRAVENOUS ONCE
Status: COMPLETED | OUTPATIENT
Start: 2023-12-19 | End: 2023-12-19

## 2023-12-19 RX ORDER — SUCRALFATE 1 G/10ML
1 SUSPENSION ORAL
Status: COMPLETED | OUTPATIENT
Start: 2023-12-19 | End: 2023-12-19

## 2023-12-19 RX ORDER — TOPIRAMATE 25 MG/1
50 TABLET ORAL DAILY
Status: DISCONTINUED | OUTPATIENT
Start: 2023-12-20 | End: 2023-12-21 | Stop reason: HOSPADM

## 2023-12-19 RX ORDER — ONDANSETRON 2 MG/ML
4 INJECTION INTRAMUSCULAR; INTRAVENOUS EVERY 8 HOURS PRN
Status: DISCONTINUED | OUTPATIENT
Start: 2023-12-19 | End: 2023-12-19

## 2023-12-19 RX ORDER — AMOXICILLIN 250 MG
1 CAPSULE ORAL 2 TIMES DAILY PRN
Status: DISCONTINUED | OUTPATIENT
Start: 2023-12-19 | End: 2023-12-21 | Stop reason: HOSPADM

## 2023-12-19 RX ORDER — ACETAMINOPHEN 325 MG/1
650 TABLET ORAL EVERY 4 HOURS PRN
Status: DISCONTINUED | OUTPATIENT
Start: 2023-12-19 | End: 2023-12-21 | Stop reason: HOSPADM

## 2023-12-19 RX ADMIN — SODIUM CHLORIDE, POTASSIUM CHLORIDE, SODIUM LACTATE AND CALCIUM CHLORIDE 1000 ML: 600; 310; 30; 20 INJECTION, SOLUTION INTRAVENOUS at 02:12

## 2023-12-19 RX ADMIN — POTASSIUM BICARBONATE 40 MEQ: 391 TABLET, EFFERVESCENT ORAL at 10:12

## 2023-12-19 RX ADMIN — PROCHLORPERAZINE EDISYLATE 2.5 MG: 5 INJECTION INTRAMUSCULAR; INTRAVENOUS at 04:12

## 2023-12-19 RX ADMIN — SUCRALFATE 1 G: 1 SUSPENSION ORAL at 03:12

## 2023-12-19 RX ADMIN — SCOPALAMINE 1 PATCH: 1 PATCH, EXTENDED RELEASE TRANSDERMAL at 10:12

## 2023-12-19 RX ADMIN — KETOROLAC TROMETHAMINE 15 MG: 30 INJECTION, SOLUTION INTRAMUSCULAR at 03:12

## 2023-12-19 RX ADMIN — SODIUM CHLORIDE, POTASSIUM CHLORIDE, SODIUM LACTATE AND CALCIUM CHLORIDE 500 ML: 600; 310; 30; 20 INJECTION, SOLUTION INTRAVENOUS at 04:12

## 2023-12-19 RX ADMIN — ONDANSETRON 4 MG: 2 INJECTION INTRAMUSCULAR; INTRAVENOUS at 02:12

## 2023-12-19 RX ADMIN — IOHEXOL 75 ML: 350 INJECTION, SOLUTION INTRAVENOUS at 06:12

## 2023-12-19 RX ADMIN — MAGNESIUM SULFATE IN WATER 2 G: 40 INJECTION, SOLUTION INTRAVENOUS at 03:12

## 2023-12-19 RX ADMIN — DIPHENHYDRAMINE HYDROCHLORIDE 6.25 MG: 50 INJECTION, SOLUTION INTRAMUSCULAR; INTRAVENOUS at 04:12

## 2023-12-19 RX ADMIN — ALUMINUM HYDROXIDE, MAGNESIUM HYDROXIDE, AND SIMETHICONE 5 ML: 200; 200; 20 SUSPENSION ORAL at 03:12

## 2023-12-19 RX ADMIN — SODIUM CHLORIDE, POTASSIUM CHLORIDE, SODIUM LACTATE AND CALCIUM CHLORIDE: 600; 310; 30; 20 INJECTION, SOLUTION INTRAVENOUS at 10:12
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
## 2023-12-19 NOTE — Clinical Note
Diagnosis: Nausea [201717]   Future Attending Provider: PALOMA AGUILAR [[id number]]   Admitting Provider:: PALOMA AGUILAR [[id number]]

## 2023-12-20 ENCOUNTER — CLINICAL SUPPORT (OUTPATIENT)
Dept: SMOKING CESSATION | Facility: CLINIC | Age: 53
End: 2023-12-20

## 2023-12-20 ENCOUNTER — TELEPHONE (OUTPATIENT)
Dept: HEPATOLOGY | Facility: CLINIC | Age: 53
End: 2023-12-20
Payer: MEDICAID

## 2023-12-20 DIAGNOSIS — F17.210 CIGARETTE SMOKER: Primary | ICD-10-CM

## 2023-12-20 PROBLEM — R19.7 DIARRHEA: Status: ACTIVE | Noted: 2023-12-20

## 2023-12-20 LAB
ALBUMIN SERPL BCP-MCNC: 2.2 G/DL (ref 3.5–5.2)
ALP SERPL-CCNC: 211 U/L (ref 55–135)
ALT SERPL W/O P-5'-P-CCNC: 10 U/L (ref 10–44)
ANION GAP SERPL CALC-SCNC: 7 MMOL/L (ref 8–16)
ANION GAP SERPL CALC-SCNC: 9 MMOL/L (ref 8–16)
AST SERPL-CCNC: 51 U/L (ref 10–40)
BASOPHILS # BLD AUTO: 0.03 K/UL (ref 0–0.2)
BASOPHILS NFR BLD: 0.4 % (ref 0–1.9)
BILIRUB SERPL-MCNC: 0.7 MG/DL (ref 0.1–1)
BUN SERPL-MCNC: 3 MG/DL (ref 6–20)
BUN SERPL-MCNC: 3 MG/DL (ref 6–20)
CALCIUM SERPL-MCNC: 8 MG/DL (ref 8.7–10.5)
CALCIUM SERPL-MCNC: 8.1 MG/DL (ref 8.7–10.5)
CHLORIDE SERPL-SCNC: 101 MMOL/L (ref 95–110)
CHLORIDE SERPL-SCNC: 103 MMOL/L (ref 95–110)
CO2 SERPL-SCNC: 28 MMOL/L (ref 23–29)
CO2 SERPL-SCNC: 29 MMOL/L (ref 23–29)
CREAT SERPL-MCNC: 0.6 MG/DL (ref 0.5–1.4)
CREAT SERPL-MCNC: 0.6 MG/DL (ref 0.5–1.4)
DIFFERENTIAL METHOD: ABNORMAL
EOSINOPHIL # BLD AUTO: 0.1 K/UL (ref 0–0.5)
EOSINOPHIL NFR BLD: 1 % (ref 0–8)
ERYTHROCYTE [DISTWIDTH] IN BLOOD BY AUTOMATED COUNT: 13.1 % (ref 11.5–14.5)
EST. GFR  (NO RACE VARIABLE): >60 ML/MIN/1.73 M^2
EST. GFR  (NO RACE VARIABLE): >60 ML/MIN/1.73 M^2
GLUCOSE SERPL-MCNC: 81 MG/DL (ref 70–110)
GLUCOSE SERPL-MCNC: 87 MG/DL (ref 70–110)
HCT VFR BLD AUTO: 30.6 % (ref 37–48.5)
HGB BLD-MCNC: 9.8 G/DL (ref 12–16)
IMM GRANULOCYTES # BLD AUTO: 0.03 K/UL (ref 0–0.04)
IMM GRANULOCYTES NFR BLD AUTO: 0.4 % (ref 0–0.5)
LYMPHOCYTES # BLD AUTO: 1.2 K/UL (ref 1–4.8)
LYMPHOCYTES NFR BLD: 15.4 % (ref 18–48)
MAGNESIUM SERPL-MCNC: 1.9 MG/DL (ref 1.6–2.6)
MAGNESIUM SERPL-MCNC: 1.9 MG/DL (ref 1.6–2.6)
MCH RBC QN AUTO: 32.1 PG (ref 27–31)
MCHC RBC AUTO-ENTMCNC: 32 G/DL (ref 32–36)
MCV RBC AUTO: 100 FL (ref 82–98)
MONOCYTES # BLD AUTO: 0.6 K/UL (ref 0.3–1)
MONOCYTES NFR BLD: 7.1 % (ref 4–15)
NEUTROPHILS # BLD AUTO: 5.9 K/UL (ref 1.8–7.7)
NEUTROPHILS NFR BLD: 75.7 % (ref 38–73)
NRBC BLD-RTO: 0 /100 WBC
PHOSPHATE SERPL-MCNC: 4.1 MG/DL (ref 2.7–4.5)
PHOSPHATE SERPL-MCNC: 4.1 MG/DL (ref 2.7–4.5)
PLATELET # BLD AUTO: 171 K/UL (ref 150–450)
PMV BLD AUTO: 9.4 FL (ref 9.2–12.9)
POTASSIUM SERPL-SCNC: 3.3 MMOL/L (ref 3.5–5.1)
POTASSIUM SERPL-SCNC: 3.6 MMOL/L (ref 3.5–5.1)
PROT SERPL-MCNC: 5.6 G/DL (ref 6–8.4)
RBC # BLD AUTO: 3.05 M/UL (ref 4–5.4)
SODIUM SERPL-SCNC: 138 MMOL/L (ref 136–145)
SODIUM SERPL-SCNC: 139 MMOL/L (ref 136–145)
WBC # BLD AUTO: 7.75 K/UL (ref 3.9–12.7)
WBC #/AREA STL HPF: NORMAL /[HPF]

## 2023-12-20 PROCEDURE — 25000003 PHARM REV CODE 250

## 2023-12-20 PROCEDURE — 63600175 PHARM REV CODE 636 W HCPCS

## 2023-12-20 PROCEDURE — 87449 NOS EACH ORGANISM AG IA: CPT

## 2023-12-20 PROCEDURE — 84100 ASSAY OF PHOSPHORUS: CPT | Mod: 91

## 2023-12-20 PROCEDURE — 83735 ASSAY OF MAGNESIUM: CPT | Mod: 91

## 2023-12-20 PROCEDURE — 99406 BEHAV CHNG SMOKING 3-10 MIN: CPT | Mod: S$GLB,,,

## 2023-12-20 PROCEDURE — 99406 PT REFUSED TOBACCO CESSATION: ICD-10-PCS | Mod: S$GLB,,,

## 2023-12-20 PROCEDURE — 80048 BASIC METABOLIC PNL TOTAL CA: CPT | Mod: XB

## 2023-12-20 PROCEDURE — 96367 TX/PROPH/DG ADDL SEQ IV INF: CPT

## 2023-12-20 PROCEDURE — 93005 ELECTROCARDIOGRAM TRACING: CPT

## 2023-12-20 PROCEDURE — 96375 TX/PRO/DX INJ NEW DRUG ADDON: CPT

## 2023-12-20 PROCEDURE — 87338 HPYLORI STOOL AG IA: CPT | Performed by: STUDENT IN AN ORGANIZED HEALTH CARE EDUCATION/TRAINING PROGRAM

## 2023-12-20 PROCEDURE — 85025 COMPLETE CBC W/AUTO DIFF WBC: CPT

## 2023-12-20 PROCEDURE — 87427 SHIGA-LIKE TOXIN AG IA: CPT

## 2023-12-20 PROCEDURE — 99900035 HC TECH TIME PER 15 MIN (STAT)

## 2023-12-20 PROCEDURE — G0378 HOSPITAL OBSERVATION PER HR: HCPCS

## 2023-12-20 PROCEDURE — 89055 LEUKOCYTE ASSESSMENT FECAL: CPT

## 2023-12-20 PROCEDURE — 63600175 PHARM REV CODE 636 W HCPCS: Performed by: STUDENT IN AN ORGANIZED HEALTH CARE EDUCATION/TRAINING PROGRAM

## 2023-12-20 PROCEDURE — 96366 THER/PROPH/DIAG IV INF ADDON: CPT

## 2023-12-20 PROCEDURE — 25000003 PHARM REV CODE 250: Performed by: STUDENT IN AN ORGANIZED HEALTH CARE EDUCATION/TRAINING PROGRAM

## 2023-12-20 PROCEDURE — 87046 STOOL CULTR AEROBIC BACT EA: CPT

## 2023-12-20 PROCEDURE — C9113 INJ PANTOPRAZOLE SODIUM, VIA: HCPCS

## 2023-12-20 PROCEDURE — 80053 COMPREHEN METABOLIC PANEL: CPT

## 2023-12-20 PROCEDURE — 36415 COLL VENOUS BLD VENIPUNCTURE: CPT

## 2023-12-20 PROCEDURE — 87045 FECES CULTURE AEROBIC BACT: CPT

## 2023-12-20 PROCEDURE — 83735 ASSAY OF MAGNESIUM: CPT

## 2023-12-20 PROCEDURE — 84100 ASSAY OF PHOSPHORUS: CPT

## 2023-12-20 PROCEDURE — 96376 TX/PRO/DX INJ SAME DRUG ADON: CPT

## 2023-12-20 RX ORDER — KETOROLAC TROMETHAMINE 30 MG/ML
15 INJECTION, SOLUTION INTRAMUSCULAR; INTRAVENOUS EVERY 6 HOURS PRN
Status: DISCONTINUED | OUTPATIENT
Start: 2023-12-20 | End: 2023-12-21 | Stop reason: HOSPADM

## 2023-12-20 RX ORDER — LORAZEPAM 1 MG/1
1 TABLET ORAL
Status: DISCONTINUED | OUTPATIENT
Start: 2023-12-20 | End: 2023-12-21 | Stop reason: HOSPADM

## 2023-12-20 RX ORDER — KETOROLAC TROMETHAMINE 10 MG/1
10 TABLET, FILM COATED ORAL EVERY 6 HOURS PRN
Status: DISCONTINUED | OUTPATIENT
Start: 2023-12-20 | End: 2023-12-20

## 2023-12-20 RX ORDER — PANTOPRAZOLE SODIUM 40 MG/10ML
40 INJECTION, POWDER, LYOPHILIZED, FOR SOLUTION INTRAVENOUS DAILY
Status: DISCONTINUED | OUTPATIENT
Start: 2023-12-20 | End: 2023-12-21 | Stop reason: HOSPADM

## 2023-12-20 RX ORDER — POTASSIUM CHLORIDE 7.45 MG/ML
10 INJECTION INTRAVENOUS
Status: COMPLETED | OUTPATIENT
Start: 2023-12-20 | End: 2023-12-20

## 2023-12-20 RX ORDER — MAGNESIUM SULFATE HEPTAHYDRATE 40 MG/ML
2 INJECTION, SOLUTION INTRAVENOUS ONCE
Status: COMPLETED | OUTPATIENT
Start: 2023-12-20 | End: 2023-12-20

## 2023-12-20 RX ORDER — MORPHINE SULFATE 2 MG/ML
2 INJECTION, SOLUTION INTRAMUSCULAR; INTRAVENOUS ONCE
Status: COMPLETED | OUTPATIENT
Start: 2023-12-20 | End: 2023-12-20

## 2023-12-20 RX ORDER — POTASSIUM CHLORIDE 7.45 MG/ML
20 INJECTION INTRAVENOUS
Status: DISCONTINUED | OUTPATIENT
Start: 2023-12-20 | End: 2023-12-20

## 2023-12-20 RX ADMIN — FOLIC ACID 1 MG: 1 TABLET ORAL at 08:12

## 2023-12-20 RX ADMIN — POTASSIUM CHLORIDE 10 MEQ: 7.46 INJECTION, SOLUTION INTRAVENOUS at 04:12

## 2023-12-20 RX ADMIN — POTASSIUM CHLORIDE 10 MEQ: 7.46 INJECTION, SOLUTION INTRAVENOUS at 11:12

## 2023-12-20 RX ADMIN — THIAMINE HYDROCHLORIDE 100 MG: 100 INJECTION, SOLUTION INTRAMUSCULAR; INTRAVENOUS at 12:12

## 2023-12-20 RX ADMIN — ESCITALOPRAM OXALATE 10 MG: 10 TABLET ORAL at 08:12

## 2023-12-20 RX ADMIN — POTASSIUM CHLORIDE 10 MEQ: 7.46 INJECTION, SOLUTION INTRAVENOUS at 10:12

## 2023-12-20 RX ADMIN — MAGNESIUM SULFATE IN WATER 2 G: 40 INJECTION, SOLUTION INTRAVENOUS at 08:12

## 2023-12-20 RX ADMIN — MORPHINE SULFATE 2 MG: 2 INJECTION, SOLUTION INTRAMUSCULAR; INTRAVENOUS at 01:12

## 2023-12-20 RX ADMIN — POTASSIUM CHLORIDE 10 MEQ: 7.46 INJECTION, SOLUTION INTRAVENOUS at 02:12

## 2023-12-20 RX ADMIN — ONDANSETRON 4 MG: 2 INJECTION INTRAMUSCULAR; INTRAVENOUS at 10:12

## 2023-12-20 RX ADMIN — POTASSIUM CHLORIDE 10 MEQ: 7.46 INJECTION, SOLUTION INTRAVENOUS at 08:12

## 2023-12-20 RX ADMIN — TOPIRAMATE 50 MG: 25 TABLET, FILM COATED ORAL at 08:12

## 2023-12-20 RX ADMIN — POTASSIUM CHLORIDE 10 MEQ: 7.46 INJECTION, SOLUTION INTRAVENOUS at 01:12

## 2023-12-20 RX ADMIN — ONDANSETRON 4 MG: 2 INJECTION INTRAMUSCULAR; INTRAVENOUS at 09:12

## 2023-12-20 RX ADMIN — KETOROLAC TROMETHAMINE 15 MG: 30 INJECTION, SOLUTION INTRAMUSCULAR; INTRAVENOUS at 10:12

## 2023-12-20 RX ADMIN — POTASSIUM CHLORIDE 10 MEQ: 7.46 INJECTION, SOLUTION INTRAVENOUS at 12:12

## 2023-12-20 RX ADMIN — POTASSIUM CHLORIDE 10 MEQ: 7.46 INJECTION, SOLUTION INTRAVENOUS at 05:12

## 2023-12-20 RX ADMIN — PANTOPRAZOLE SODIUM 40 MG: 40 INJECTION, POWDER, LYOPHILIZED, FOR SOLUTION INTRAVENOUS at 08:12

## 2023-12-20 NOTE — ASSESSMENT & PLAN NOTE
History of hepatic steatosis and evidence of it on CT a/p on admit.     Plan:  Monitor LFTs  Outpatient GI referral on discharge (hepatic steatosis workup, consider H pylori workup)

## 2023-12-20 NOTE — NURSING
Dr Francis  notified of pt c/o abdominal pain 8/10 and only has tylenol prn pain rated 1-3 and pt refuses. Md stated he will inform Dr Dixon. No orders given. Awaiting orders.

## 2023-12-20 NOTE — ASSESSMENT & PLAN NOTE
QTc prolonged to 505 on this admit. On admit patient was hypokalemic at 3.7, hypomagnesemic at 1.5. Repleted in ED and on admit.     Plan:  Monitor on tele  F/u Repeat EKG  Mg goal 2, Phos goal 3, K goal 4, replete PRN  Avoid QTc prolonging medications when possible   No

## 2023-12-20 NOTE — ED NOTES
Introduced self to patient. Patient reports improved nausea but still having right sided abdominal pain. Pain rated 8/10. Patient aware CT results are pending.

## 2023-12-20 NOTE — ASSESSMENT & PLAN NOTE
"Patient has hypokalemia which is Acute and currently uncontrolled. Most recent potassium levels reviewed-   Lab Results   Component Value Date    K 3.3 (L) 12/20/2023   . Will continue potassium replacement per protocol and recheck repeat levels after replacement completed.     Plan:  See plan for "Prolonged QT interval"  "
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
## 2023-12-20 NOTE — PROGRESS NOTES
"[facility]           Pharmacy  Admission Medication History     The home medication history was taken by Juan Tong, NiecyD.      Medication history obtained from Medications listed below were obtained from: Patient/family    Based on information gathered for medication list, you may go to "Admission" then "Reconcile Home Medications" tabs to review and/or act upon those items.     The home medication list has been updated by the Pharmacy department.   Please read ALL comments highlighted in yellow.   Please address this information as you see fit.    Feel free to contact us if you have any questions or require assistance.    The medications listed below were removed from the home medication list.  Please reorder if appropriate:  No discrepancy noted  Potential issues to be addressed PRIOR TO DISCHARGE      No current facility-administered medications on file prior to encounter.     Current Outpatient Medications on File Prior to Encounter   Medication Sig Dispense Refill    EScitalopram oxalate (LEXAPRO) 10 MG tablet Take 1 tablet (10 mg total) by mouth once daily. 90 tablet 1    folic acid (FOLVITE) 1 MG tablet Take 1 tablet (1 mg total) by mouth once daily. 60 tablet 2    ondansetron (ZOFRAN-ODT) 4 MG TbDL Take 1 tablet (4 mg total) by mouth every 6 (six) hours as needed (For nausea. Take only as needed.). 30 tablet 0    potassium bicarbonate (K-LYTE) disintegrating tablet Take 1 tablet (20 mEq total) by mouth 2 (two) times a day. 90 tablet 0    pregabalin (LYRICA) 100 MG capsule Take 1 capsule (100 mg total) by mouth 2 (two) times daily. 60 capsule 5    topiramate (TOPAMAX) 50 MG tablet Take 1 tablet (50 mg total) by mouth once daily. 90 tablet 0    traZODone (DESYREL) 100 MG tablet Take 1 tablet (100 mg total) by mouth every evening. 30 tablet 11       Please address this information as you see fit.  Feel free to contact us if you have any questions or require assistance.    Juan Tong, " PharmD  [phone]

## 2023-12-20 NOTE — ED PROVIDER NOTES
==== ASSUMPTION OF CARE NOTE ====    Care of patient assumed by self, from Dr. Mello (under the supervision of Dr. Fern Espinosa, ED attending), as of shift change.    Patient presented with nausea vomiting for the past 4 days.  Patient found to be hypomagnesemic with a magnesium of 1.4.  She did have QT prolongation or EKG.  She was administered IV magnesium replacement.  Patient continued to have nausea without subsequent episodes of emesis in the ED despite receiving IV fluids, antiemetics.  CT scan of the abdomen pelvis was obtained by myself which demonstrated no acute abnormality.  I ordered this in light of the fact the patient continued to complain of epigastric pain.  Her lipase was normal.  She has not hypokalemic or hyperkalemic as she has been in the past.  In light of the patient's continued, electrolyte abnormality, mi admit for observation to [facility] family Medicine service for further evaluation management of presumed intractable nausea, epigastric pain, hypomagnesemia.  Patient is comfortable plan for admission at this time.      Orville Ambriz MD, FACEP   Emergency Medicine       Orville Ambriz MD  12/19/23 6853

## 2023-12-20 NOTE — PLAN OF CARE
CM met with pt - lives with  Des Parker  [phone]   Pt is AAO x 3; Demographics confirmed     Dx:  N/V, Fatty Liver, hx of ETOH use.    Pt declined CM's offer of resources re:  ETOH      Pt has a st cane, RW, WC and SC   Family / friends transport  to errands, apts     Pharmacy - [facility] on [address]  - able to afford meds.    Family will transport pt to home at discharge    apt with Hepatology requested - per notes from Sept '23 visit - pt due to follow up in late November     Pt told CM she has not had success getting awarded disability  -- With pt's permission - SSI/SSDI Referral request sent to [email or web address].      Future Appointments   Date Time Provider Department Center   1/4/2024  3:00 PM Manjit Wynn, [facility]        12/20/23 1429   Discharge Planning   Assessment Type Discharge Planning Brief Assessment   Resource/Environmental Concerns none   Support Systems Spouse/significant other  ( Des Pratt  - [phone])   Equipment Currently Used at Home cane, straight;walker, rolling;wheelchair;shower chair   Patient/Family Anticipates Transition to home;home with family   Patient/Family Anticipated Services at Transition none   DME Needed Upon Discharge  none   Discharge Plan A Home;Home with family   Physical Activity   On average, how many days per week do you engage in moderate to strenuous exercise (like a brisk walk)? 4 days   Financial Resource Strain   How hard is it for you to pay for the very basics like food, housing, medical care, and heating? Not very   Housing Stability   In the last 12 months, was there a time when you were not able to pay the mortgage or rent on time? N   In the last 12 months, how many places have you lived? 1   In the last 12 months, was there a time when you did not have a steady place to sleep or slept in a shelter (including now)? N   Transportation Needs   In the past 12 months, has lack of transportation kept you from  medical appointments or from getting medications? no   In the past 12 months, has lack of transportation kept you from meetings, work, or from getting things needed for daily living? No   Food Insecurity   Within the past 12 months, you worried that your food would run out before you got the money to buy more. Never true   Within the past 12 months, the food you bought just didn't last and you didn't have money to get more. Never true   Social Connections   In a typical week, how many times do you talk on the phone with family, friends, or neighbors? Three   How often do you get together with friends or relatives? Three times   Are you , , , , never , or living with a partner?    Alcohol Use   Q1: How often do you have a drink containing alcohol?   (4 drinks/day)   Q2: How many drinks containing alcohol do you have on a typical day when you are drinking? 3 or 4  (declined resources)

## 2023-12-20 NOTE — ED NOTES
When patient returned she let nurse know she had bright red blood in toilet. States this has occurred over the past 2 days after constant diarrhea. States rectum does have a burning sensation. When asked about a hemorrhoid she stated possibly. Will let admit team know.

## 2023-12-20 NOTE — ASSESSMENT & PLAN NOTE
QTc prolonged to 505 on this admit. On admit patient was hypokalemic at 3.7, hypomagnesemic at 1.5. Repleted in ED and on admit.     Plan:  Monitor on tele  F/u Repeat EKG  Mg goal 2, Phos goal 3, K goal 4, replete PRN  Avoid QTc prolonging medications when necessary

## 2023-12-20 NOTE — TELEPHONE ENCOUNTER
----- Message from Lennie Nix NP sent at 12/20/2023  3:15 PM CST -----  Regarding: RE: HFU  I can see her in follow up, but I don't know if I have any appointments left before maternity leave. If I do, I can see her, otherwise she will need to see next available MD in follow up. Thanks   ----- Message -----  From: Loki Campbell MA  Sent: 12/20/2023   2:44 PM CST  To: Lennie Nix NP  Subject: FW: HFU                                          Hey Ms. Hogue just trying to verify if pt can be seen by you . Thanks !  ----- Message -----  From: Falguni Jones  Sent: 12/20/2023  12:14 PM CST  To: [facility] Staff  Subject: HFU                                              Patient will be discharging from [facility] and a HFU was requested with Hepatology by DC provider.  Please schedule and message me back so DC can relay appointment information to patient prior to discharge.   Patient is established.    DX: Alcohol induced fatty liver    Janey Contreras  Physician Referral Specialist/Discharge

## 2023-12-20 NOTE — ASSESSMENT & PLAN NOTE
Patient reports history of frequent and heavy drinking, with typical amounts of approximately 7-16 drinks daily. Patient reports no history of withdrawal or alcoholic delirium. Last drink Sunday 12/17/23.    Past 72 hours since last drink    Plan:  - Thiamine and Folate  -Correct electrolyte abnormalities.    -[facility] Protocol  -Ativan PRN for serious withdrawal and/or seizures

## 2023-12-20 NOTE — ASSESSMENT & PLAN NOTE
"Patient has hypokalemia which is Acute and currently uncontrolled. Most recent potassium levels reviewed-   Lab Results   Component Value Date    K 3.6 12/20/2023   . Will continue potassium replacement per protocol and recheck repeat levels after replacement completed.     Plan:  See plan for "Prolonged QT interval"  "

## 2023-12-20 NOTE — SUBJECTIVE & OBJECTIVE
Interval History: Patient still having nausea, retching, and abdominal tenderness secondary to the retching. Unable to tolerate PO still at this time. Liquid bowel movement.     Review of Systems   Constitutional:  Positive for appetite change. Negative for chills and fever.   HENT:  Negative for congestion, postnasal drip, rhinorrhea and sinus pain.    Respiratory:  Negative for shortness of breath.    Cardiovascular:  Negative for chest pain.   Gastrointestinal:  Positive for abdominal pain, diarrhea, nausea and vomiting. Negative for blood in stool.        Epigastric pain   Genitourinary:  Negative for dysuria and urgency.   Musculoskeletal:  Positive for myalgias (abdomen).   Skin:  Negative for pallor, rash and wound.   Neurological:  Negative for dizziness and headaches.     Objective:     Vital Signs (Most Recent):  Temp: 97.9 °F (36.6 °C) (12/20/23 1124)  Pulse: 73 (12/20/23 1225)  Resp: 18 (12/20/23 1124)  BP: 126/72 (12/20/23 1124)  SpO2: 98 % (12/20/23 1124) Vital Signs (24h Range):  Temp:  [97.9 °F (36.6 °C)-98.9 °F (37.2 °C)] 97.9 °F (36.6 °C)  Pulse:  [70-87] 73  Resp:  [16-20] 18  SpO2:  [97 %-99 %] 98 %  BP: (105-133)/(60-80) 126/72     Weight: 66.8 kg (147 lb 4.3 oz)  Body mass index is 23.77 kg/m².    Intake/Output Summary (Last 24 hours) at 12/20/2023 1450  Last data filed at 12/20/2023 1032  Gross per 24 hour   Intake 310 ml   Output --   Net 310 ml         Physical Exam  Vitals and nursing note reviewed.   Constitutional:       General: She is not in acute distress.     Appearance: Normal appearance. She is not ill-appearing, toxic-appearing or diaphoretic.   HENT:      Head: Normocephalic and atraumatic.      Right Ear: External ear normal.      Left Ear: External ear normal.      Nose: No congestion or rhinorrhea.      Mouth/Throat:      Mouth: Mucous membranes are moist.   Eyes:      General: No scleral icterus.     Extraocular Movements: Extraocular movements intact.      Pupils: Pupils are  equal, round, and reactive to light.   Cardiovascular:      Rate and Rhythm: Normal rate and regular rhythm.      Pulses: Normal pulses.      Heart sounds: Normal heart sounds.   Pulmonary:      Effort: Pulmonary effort is normal.      Breath sounds: Normal breath sounds.   Abdominal:      General: Bowel sounds are normal. There is no distension.      Palpations: There is no mass.      Tenderness: There is abdominal tenderness.      Hernia: No hernia is present.      Comments: Epigastric tenderness  Midline abdominal surgical scar c/d/i   Musculoskeletal:      Right lower leg: No edema.      Left lower leg: No edema.   Skin:     General: Skin is warm and dry.   Neurological:      General: No focal deficit present.      Mental Status: She is alert and oriented to person, place, and time.             Significant Labs: All pertinent labs within the past 24 hours have been reviewed.  CMP:   Recent Labs   Lab 12/19/23  1405 12/20/23  0033 12/20/23  0345    139 138   K 3.7 3.3* 3.6   CL 99 101 103   CO2 24 29 28   GLU 99 87 81   BUN 3* 3* 3*   CREATININE 0.6 0.6 0.6   CALCIUM 8.6* 8.1* 8.0*   PROT 7.3  --  5.6*   ALBUMIN 2.8*  --  2.2*   BILITOT 0.8  --  0.7   ALKPHOS 282*  --  211*   AST 71*  --  51*   ALT 13  --  10   ANIONGAP 16 9 7*     Magnesium:   Recent Labs   Lab 12/19/23  1405 12/20/23  0033 12/20/23  0345   MG 1.5* 1.9 1.9     Recent Lab Results         12/20/23  0345   12/20/23  0033        Albumin 2.2                  ALT 10         Anion Gap 7   9       AST 51         Baso # 0.03         Basophil % 0.4         BILIRUBIN TOTAL 0.7  Comment: For infants and newborns, interpretation of results should be based  on gestational age, weight and in agreement with clinical  observations.    Premature Infant recommended reference ranges:  Up to 24 hours.............<8.0 mg/dL  Up to 48 hours............<12.0 mg/dL  3-5 days..................<15.0 mg/dL  6-29 days.................<15.0 mg/dL           BUN  3   3       Calcium 8.0   8.1       Chloride 103   101       CO2 28   29       Creatinine 0.6   0.6       Differential Method Automated         eGFR >60   >60       Eos # 0.1         Eosinophil % 1.0         Glucose 81   87       Gran # (ANC) 5.9         Gran % 75.7         Hematocrit 30.6         Hemoglobin 9.8         Immature Grans (Abs) 0.03  Comment: Mild elevation in immature granulocytes is non specific and   can be seen in a variety of conditions including stress response,   acute inflammation, trauma and pregnancy. Correlation with other   laboratory and clinical findings is essential.           Immature Granulocytes 0.4         Lymph # 1.2         Lymph % 15.4         Magnesium  1.9   1.9       MCH 32.1         MCHC 32.0                  Mono # 0.6         Mono % 7.1         MPV 9.4         nRBC 0         Phosphorus Level 4.1   4.1       Platelet Count 171         Potassium 3.6   3.3       PROTEIN TOTAL 5.6         RBC 3.05         RDW 13.1         Sodium 138   139       WBC 7.75                 Significant Imaging: I have reviewed all pertinent imaging results/findings within the past 24 hours.  CT: I have reviewed all pertinent results/findings within the past 24 hours and my personal findings are:  No acute intra-abdominal or pelvic process. Hepatic steatosis and hepatomegaly.

## 2023-12-20 NOTE — HPI
"Patient is a 53-year-old female PMHx alcoholic included fatty liver, GERD, guillain-[place] syndrome, hepatic steatosis, s/p colostomy and closure of colostomy, and hypertension presenting to the ED due to 4 day history of nausea vomiting. Patient states that four days ago she started having diarrhea and was unable to eat or drink anything. The nausea is associated with non bloody vomiting. Diarrhea is not dark and occasionally has red spots in which the patient states that she has a history of bleeding hemorrhoids. Her intractable nausea and vomiting is associated with diffuse abdominal pain in the right and left upper quadrants that radiate to her epigastric area and it feels like a "pressure/burning". She denies any fevers, chills, shortness of breath, or chest pain. She denies any recent illnesses or sick contacts. She was hospitalized approximately 1 month ago for similar symptoms of intractable nausea/vomiting due to diverticulosis and at that time was treated with antibiotics.      ED vitals: Temp 98.9, HR 96, /74, RR 18, SpO2 99% on room air. CBC was WNL, CMP significant for potassium of 3.7, AST of 71, and alk phos of 282. Magnesium was 1.5, lipase negative. UA showed 2+ leukocytes and 2-3 urobilinogen. EKG showed normal sinus rhythm with QTc prolongation of 505. CT abdomen pelvis showed no acute processes and image stable from previous but with copious stool and evidence of hepatic steatosis. In the ED patient was treated with magnesium zofran, 500cc of LR, a GI cocktail, and phenergan for nausea. Patient admitted to [facility] for management of intractable nausea, vomiting, 4 days of diarrhea, and QTc prolongation in the context of hypomagnesemia.      "

## 2023-12-20 NOTE — ASSESSMENT & PLAN NOTE
Patient with history of colostomy and closure, previous episodes of intractable nausea and vomiting now associated with diarrhea and abdomina/epigastric pain. CT showed no acute abnormalities and chronic hepatic steatosis, lipase normal.     Plan:  Zofran, Reglan, and scopolamine PRNs for nausea  100cc/hr LR due to poor PO intake  IM pantoprazole daily  Clear liquid diet, advance as tolerated  Multimodal pain regimen for abdominal pain

## 2023-12-20 NOTE — ASSESSMENT & PLAN NOTE
Patient with history of intractable nausea/vomiting and diverticulosis and was admitted with 4 days of daily diarrhea. Was treated with antibiotics on previous admit 1 month ago. CT a/p on this admit shows evidence of some stool burden.    Plan:  WBC stool  Stool culture

## 2023-12-20 NOTE — PROGRESS NOTES
"[facility] Medicine  Progress Note    Patient Name: Ember Rivera  MRN: [id number]  Patient Class: OP- Observation   Admission Date: 12/19/2023  Length of Stay: 0 days  Attending Physician: Miles Berman,*  Primary Care Provider: Young Neil MD        Subjective:     Principal Problem:Intractable nausea and vomiting        HPI:  Patient is a 53-year-old female PMHx alcoholic included fatty liver, GERD, guillain-[place] syndrome, hepatic steatosis, s/p colostomy and closure of colostomy, and hypertension presenting to the ED due to 4 day history of nausea vomiting. Patient states that four days ago she started having diarrhea and was unable to eat or drink anything. The nausea is associated with non bloody vomiting. Diarrhea is not dark and occasionally has red spots in which the patient states that she has a history of bleeding hemorrhoids. Her intractable nausea and vomiting is associated with diffuse abdominal pain in the right and left upper quadrants that radiate to her epigastric area and it feels like a "pressure/burning". She denies any fevers, chills, shortness of breath, or chest pain. She denies any recent illnesses or sick contacts. She was hospitalized approximately 1 month ago for similar symptoms of intractable nausea/vomiting due to diverticulosis and at that time was treated with antibiotics.      ED vitals: Temp 98.9, HR 96, /74, RR 18, SpO2 99% on room air. CBC was WNL, CMP significant for potassium of 3.7, AST of 71, and alk phos of 282. Magnesium was 1.5, lipase negative. UA showed 2+ leukocytes and 2-3 urobilinogen. EKG showed normal sinus rhythm with QTc prolongation of 505. CT abdomen pelvis showed no acute processes and image stable from previous but with copious stool and evidence of hepatic steatosis. In the ED patient was treated with magnesium zofran, 500cc of LR, a GI cocktail, and phenergan for nausea. Patient admitted to [facility] for " management of intractable nausea, vomiting, 4 days of diarrhea, and QTc prolongation in the context of hypomagnesemia.        Overview/Hospital Course:  No notes on file    Interval History: Patient still having nausea, retching, and abdominal tenderness secondary to the retching. Unable to tolerate PO still at this time. Liquid bowel movement.     Review of Systems   Constitutional:  Positive for appetite change. Negative for chills and fever.   HENT:  Negative for congestion, postnasal drip, rhinorrhea and sinus pain.    Respiratory:  Negative for shortness of breath.    Cardiovascular:  Negative for chest pain.   Gastrointestinal:  Positive for abdominal pain, diarrhea, nausea and vomiting. Negative for blood in stool.        Epigastric pain   Genitourinary:  Negative for dysuria and urgency.   Musculoskeletal:  Positive for myalgias (abdomen).   Skin:  Negative for pallor, rash and wound.   Neurological:  Negative for dizziness and headaches.     Objective:     Vital Signs (Most Recent):  Temp: 97.9 °F (36.6 °C) (12/20/23 1124)  Pulse: 73 (12/20/23 1225)  Resp: 18 (12/20/23 1124)  BP: 126/72 (12/20/23 1124)  SpO2: 98 % (12/20/23 1124) Vital Signs (24h Range):  Temp:  [97.9 °F (36.6 °C)-98.9 °F (37.2 °C)] 97.9 °F (36.6 °C)  Pulse:  [70-87] 73  Resp:  [16-20] 18  SpO2:  [97 %-99 %] 98 %  BP: (105-133)/(60-80) 126/72     Weight: 66.8 kg (147 lb 4.3 oz)  Body mass index is 23.77 kg/m².    Intake/Output Summary (Last 24 hours) at 12/20/2023 1450  Last data filed at 12/20/2023 1032  Gross per 24 hour   Intake 310 ml   Output --   Net 310 ml         Physical Exam  Vitals and nursing note reviewed.   Constitutional:       General: She is not in acute distress.     Appearance: Normal appearance. She is not ill-appearing, toxic-appearing or diaphoretic.   HENT:      Head: Normocephalic and atraumatic.      Right Ear: External ear normal.      Left Ear: External ear normal.      Nose: No congestion or rhinorrhea.       Mouth/Throat:      Mouth: Mucous membranes are moist.   Eyes:      General: No scleral icterus.     Extraocular Movements: Extraocular movements intact.      Pupils: Pupils are equal, round, and reactive to light.   Cardiovascular:      Rate and Rhythm: Normal rate and regular rhythm.      Pulses: Normal pulses.      Heart sounds: Normal heart sounds.   Pulmonary:      Effort: Pulmonary effort is normal.      Breath sounds: Normal breath sounds.   Abdominal:      General: Bowel sounds are normal. There is no distension.      Palpations: There is no mass.      Tenderness: There is abdominal tenderness.      Hernia: No hernia is present.      Comments: Epigastric tenderness  Midline abdominal surgical scar c/d/i   Musculoskeletal:      Right lower leg: No edema.      Left lower leg: No edema.   Skin:     General: Skin is warm and dry.   Neurological:      General: No focal deficit present.      Mental Status: She is alert and oriented to person, place, and time.             Significant Labs: All pertinent labs within the past 24 hours have been reviewed.  CMP:   Recent Labs   Lab 12/19/23  1405 12/20/23  0033 12/20/23  0345    139 138   K 3.7 3.3* 3.6   CL 99 101 103   CO2 24 29 28   GLU 99 87 81   BUN 3* 3* 3*   CREATININE 0.6 0.6 0.6   CALCIUM 8.6* 8.1* 8.0*   PROT 7.3  --  5.6*   ALBUMIN 2.8*  --  2.2*   BILITOT 0.8  --  0.7   ALKPHOS 282*  --  211*   AST 71*  --  51*   ALT 13  --  10   ANIONGAP 16 9 7*     Magnesium:   Recent Labs   Lab 12/19/23  1405 12/20/23  0033 12/20/23  0345   MG 1.5* 1.9 1.9     Recent Lab Results         12/20/23  0345   12/20/23  0033        Albumin 2.2                  ALT 10         Anion Gap 7   9       AST 51         Baso # 0.03         Basophil % 0.4         BILIRUBIN TOTAL 0.7  Comment: For infants and newborns, interpretation of results should be based  on gestational age, weight and in agreement with clinical  observations.    Premature Infant recommended reference  ranges:  Up to 24 hours.............<8.0 mg/dL  Up to 48 hours............<12.0 mg/dL  3-5 days..................<15.0 mg/dL  6-29 days.................<15.0 mg/dL           BUN 3   3       Calcium 8.0   8.1       Chloride 103   101       CO2 28   29       Creatinine 0.6   0.6       Differential Method Automated         eGFR >60   >60       Eos # 0.1         Eosinophil % 1.0         Glucose 81   87       Gran # (ANC) 5.9         Gran % 75.7         Hematocrit 30.6         Hemoglobin 9.8         Immature Grans (Abs) 0.03  Comment: Mild elevation in immature granulocytes is non specific and   can be seen in a variety of conditions including stress response,   acute inflammation, trauma and pregnancy. Correlation with other   laboratory and clinical findings is essential.           Immature Granulocytes 0.4         Lymph # 1.2         Lymph % 15.4         Magnesium  1.9   1.9       MCH 32.1         MCHC 32.0                  Mono # 0.6         Mono % 7.1         MPV 9.4         nRBC 0         Phosphorus Level 4.1   4.1       Platelet Count 171         Potassium 3.6   3.3       PROTEIN TOTAL 5.6         RBC 3.05         RDW 13.1         Sodium 138   139       WBC 7.75                 Significant Imaging: I have reviewed all pertinent imaging results/findings within the past 24 hours.  CT: I have reviewed all pertinent results/findings within the past 24 hours and my personal findings are:  No acute intra-abdominal or pelvic process. Hepatic steatosis and hepatomegaly.    Assessment/Plan:      * Intractable nausea and vomiting  Patient with history of colostomy and closure, previous episodes of intractable nausea and vomiting now associated with diarrhea and abdomina/epigastric pain. CT showed no acute abnormalities and chronic hepatic steatosis, lipase normal.     Plan:  Zofran, Reglan, and scopolamine PRNs for nausea  100cc/hr LR due to poor PO intake  IM pantoprazole daily  Clear liquid diet, advance as  "tolerated  Multimodal pain regimen for abdominal pain        Diarrhea  Patient with history of intractable nausea/vomiting and diverticulosis and was admitted with 4 days of daily diarrhea. Was treated with antibiotics on previous admit 1 month ago. CT a/p on this admit shows evidence of some stool burden.    Plan:  WBC stool  Stool culture    Alcohol abuse  Patient reports history of frequent and heavy drinking, with typical amounts of approximately 7-16 drinks daily. Patient reports no history of withdrawal or alcoholic delirium. Last drink Sunday 12/17/23.    Past 72 hours since last drink    Plan:  - Thiamine and Folate  -Correct electrolyte abnormalities.    -[facility] Protocol  -Ativan PRN for serious withdrawal and/or seizures      Hepatic steatosis  History of hepatic steatosis and evidence of it on CT a/p on admit.     Plan:  Monitor LFTs  Outpatient GI referral on discharge (hepatic steatosis workup, consider H pylori workup)      Peripheral polyneuropathy  Patient on lyrica at home    Plan:  Hold lyrica in context of prolonged QTc       Hypokalemia  Patient has hypokalemia which is Acute and currently uncontrolled. Most recent potassium levels reviewed-   Lab Results   Component Value Date    K 3.6 12/20/2023   . Will continue potassium replacement per protocol and recheck repeat levels after replacement completed.     Plan:  See plan for "Prolonged QT interval"    Prolonged QT interval  QTc prolonged to 505 on this admit. On admit patient was hypokalemic at 3.7, hypomagnesemic at 1.5. Repleted in ED and on admit.     Plan:  Monitor on tele  F/u Repeat EKG  Mg goal 2, Phos goal 3, K goal 4, replete PRN  Avoid QTc prolonging medications when possible      VTE Risk Mitigation (From admission, onward)           Ordered     enoxaparin injection 40 mg  Daily         12/19/23 2100     Place sequential compression device  Until discontinued         12/19/23 2100                    Discharge Planning   YUN:      Code " Status: Full Code   Is the patient medically ready for discharge?:     Reason for patient still in hospital (select all that apply): Treatment  Discharge Plan A: Home, Home with family                  Tommy Pickens MD  Department of [facility]

## 2023-12-20 NOTE — H&P
"  Family Medicine  History & Physical    Patient Name: Ember Rivera  MRN: [id number]  Patient Class: OP- Observation  Admission Date: 12/19/2023  Attending Physician: Abhi Andersen III, MD   Primary Care Provider: Young Neil MD         Patient information was obtained from patient and ER records.     Subjective:     Principal Problem:Intractable nausea and vomiting    Chief Complaint:   Chief Complaint   Patient presents with    Vomiting     Nausea, vomiting x 4 days. Unable to tolerate PO.        HPI: Patient is a 53-year-old female PMHx alcoholic included fatty liver, GERD, guillain-Effie syndrome, hepatic steatosis, s/p colostomy and closure of colostomy, and hypertension presenting to the ED due to 4 day history of nausea vomiting. Patient states that four days ago she started having diarrhea and was unable to eat or drink anything. The nausea is associated with non bloody vomiting. Diarrhea is not dark and occasionally has red spots in which the patient states that she has a history of bleeding hemorrhoids. Her intractable nausea and vomiting is associated with diffuse abdominal pain in the right and left upper quadrants that radiate to her epigastric area and it feels like a "pressure/burning". She denies any fevers, chills, shortness of breath, or chest pain. She denies any recent illnesses or sick contacts. She was hospitalized approximately 1 month ago for similar symptoms of intractable nausea/vomiting due to diverticulosis and at that time was treated with antibiotics.      ED vitals: Temp 98.9, HR 96, /74, RR 18, SpO2 99% on room air. CBC was WNL, CMP significant for potassium of 3.7, AST of 71, and alk phos of 282. Magnesium was 1.5, lipase negative. UA showed 2+ leukocytes and 2-3 urobilinogen. EKG showed normal sinus rhythm with QTc prolongation of 505. CT abdomen pelvis showed no acute processes and image stable from previous but with copious stool and evidence of hepatic steatosis. In the " "ED patient was treated with magnesium zofran, 500cc of LR, a GI cocktail, and phenergan for nausea. Patient admitted to [facility] for management of intractable nausea, vomiting, 4 days of diarrhea, and QTc prolongation in the context of hypomagnesemia.        No new subjective & objective note has been filed under this hospital service since the last note was generated.    Assessment/Plan:     * Intractable nausea and vomiting  Patient with history of colostomy and closure, previous episodes of intractable nausea and vomiting now associated with diarrhea and abdomina/epigastric pain. CT showed no acute abnormalities and chronic hepatic steatosis, lipase normal.     Plan:  Zofran, Reglan, and scopolamine PRNs for nausea  100cc/hr LR due to poor PO intake  IM pantoprazole daily  Clear liquid diet, advance as tolerated  Multimodal pain regimen for abdominal pain        Diarrhea  Patient with history of intractable nausea/vomiting and diverticulosis and was admitted with 4 days of daily diarrhea. Was treated with antibiotics on previous admit 1 month ago. CT a/p on this admit shows evidence of some stool burden.    Plan:  WBC stool  Stool culture    Hepatic steatosis  History of hepatic steatosis and evidence of it on CT a/p on admit.     Plan:  Monitor LFTs  Outpatient GI referral on discharge (hepatic steatosis workup, consider H pylori workup)      Peripheral polyneuropathy  Patient on lyrica at home    Plan:  Hold lyrica in context of prolonged QTc       Hypokalemia  Patient has hypokalemia which is Acute and currently uncontrolled. Most recent potassium levels reviewed-   Lab Results   Component Value Date    K 3.3 (L) 12/20/2023   . Will continue potassium replacement per protocol and recheck repeat levels after replacement completed.     Plan:  See plan for "Prolonged QT interval"    Prolonged QT interval  QTc prolonged to 505 on this admit. On admit patient was hypokalemic at 3.7, hypomagnesemic at " 1.5. Repleted in ED and on admit.     Plan:  Monitor on tele  F/u Repeat EKG  Mg goal 2, Phos goal 3, K goal 4, replete PRN  Avoid QTc prolonging medications when possible      VTE Risk Mitigation (From admission, onward)           Ordered     enoxaparin injection 40 mg  Daily         12/19/23 2100     Place sequential compression device  Until discontinued         12/19/23 2100                       On 12/20/2023, patient should be placed in hospital observation services under my care in collaboration with Dr. Andersen.      Rafa Dixon MD  [facility], PGY-1  12/20/2023

## 2023-12-20 NOTE — PLAN OF CARE
Problem: Adult Inpatient Plan of Care  Goal: Plan of Care Review  Outcome: Ongoing, Progressing     VIRTUAL NURSE:  Cued into patient's room.  Lights off and unable to view patient's room.  Permission received per patient to review admission assessment.  Introduced as VN for night shift that will be working with floor nurse and nursing assistant.  Educated patient on VN's role in patient care and  VIP model.  Plan of care reviewed with patient.  Education per flowsheet.   Informed patient that staff will round on them every 2 hours but to use call light for any other needs they may have; informed of fall risk and fall precautions.  Patient verbalized understanding.  Opportunity given for questions and questions answered.  Patient denies complaints or any needs at this time. Instructed to call for assistance.  Will cont to monitor and intervene as needed.    Labs, notes, orders, and careplan initiated.       12/20/23 0044   Patient Request   Patient Requested lights off and unabel to view patient; no complaints or needs currently   Admission   Initial VN Admission Questions Complete   Shift   Virtual Nurse - Rounding Complete   Pain Management Interventions pain management plan reviewed with patient/caregiver   Virtual Nurse - Patient Verbalized Approval Of Camera Use;VN Rounding   Type of Frequent Check   Type Patient Rounds   Pain/Comfort/Sleep   Preferred Pain Scale number (Numeric Rating Pain Scale)   Comfort/Acceptable Pain Level 0   Pain Rating (0-10): Rest 0   POSS (Pasero Opioid-Induced Sed Scale) 1 - Awake and alert   Sleep/Rest/Relaxation no problem identified;awake

## 2023-12-20 NOTE — PHARMACY MED REC
"[facility]           Pharmacy  Admission Medication History     The home medication history was taken by Lila Sharpe.      Medication history obtained from Medications listed below were obtained from: Patient/family    Based on information gathered for medication list, you may go to "Admission" then "Reconcile Home Medications" tabs to review and/or act upon those items.     The home medication list has been updated by the Pharmacy department.   Please read ALL comments highlighted in yellow.   Please address this information as you see fit.    Feel free to contact us if you have any questions or require assistance.    The medications listed below were removed from the home medication list.  Please reorder if appropriate:    Patient reports NOT TAKING the following medication(s):  Thera m  Vitamin b 12 1000mcg  Aldactone 25mg        No current facility-administered medications on file prior to encounter.     Current Outpatient Medications on File Prior to Encounter   Medication Sig Dispense Refill    EScitalopram oxalate (LEXAPRO) 10 MG tablet Take 1 tablet (10 mg total) by mouth once daily. 90 tablet 1    folic acid (FOLVITE) 1 MG tablet Take 1 tablet (1 mg total) by mouth once daily. 60 tablet 2    ondansetron (ZOFRAN-ODT) 4 MG TbDL Take 1 tablet (4 mg total) by mouth every 6 (six) hours as needed (For nausea. Take only as needed.). 30 tablet 0    potassium bicarbonate (K-LYTE) disintegrating tablet Take 1 tablet (20 mEq total) by mouth 2 (two) times a day. 90 tablet 0    pregabalin (LYRICA) 100 MG capsule Take 1 capsule (100 mg total) by mouth 2 (two) times daily. 60 capsule 5    topiramate (TOPAMAX) 50 MG tablet Take 1 tablet (50 mg total) by mouth once daily. 90 tablet 0    traZODone (DESYREL) 100 MG tablet Take 1 tablet (100 mg total) by mouth every evening. 30 tablet 11       Please address this information as you see fit.  Feel free to contact us if you have any questions or require " assistance.    Lila Sharpe  [phone]                  .
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
## 2023-12-20 NOTE — ASSESSMENT & PLAN NOTE
QTc prolonged to 505 on this admit. On admit patient was hypokalemic at 3.7, hypomagnesemic at 1.5. Repleted in ED and on admit.     Plan:  Monitor on tele  F/u Repeat EKG  Mg goal 2, Phos goal 3, K goal 4, replete PRN  Avoid QTc prolonging medications when possible

## 2023-12-21 VITALS
HEIGHT: 66 IN | DIASTOLIC BLOOD PRESSURE: 73 MMHG | HEART RATE: 75 BPM | RESPIRATION RATE: 18 BRPM | WEIGHT: 147.25 LBS | SYSTOLIC BLOOD PRESSURE: 140 MMHG | TEMPERATURE: 98 F | BODY MASS INDEX: 23.66 KG/M2 | OXYGEN SATURATION: 98 %

## 2023-12-21 LAB
ALBUMIN SERPL BCP-MCNC: 2.3 G/DL (ref 3.5–5.2)
ALP SERPL-CCNC: 219 U/L (ref 55–135)
ALT SERPL W/O P-5'-P-CCNC: 9 U/L (ref 10–44)
ANION GAP SERPL CALC-SCNC: 12 MMOL/L (ref 8–16)
AST SERPL-CCNC: 55 U/L (ref 10–40)
BASOPHILS # BLD AUTO: 0.03 K/UL (ref 0–0.2)
BASOPHILS NFR BLD: 0.4 % (ref 0–1.9)
BILIRUB SERPL-MCNC: 0.8 MG/DL (ref 0.1–1)
BUN SERPL-MCNC: 4 MG/DL (ref 6–20)
CALCIUM SERPL-MCNC: 8 MG/DL (ref 8.7–10.5)
CHLORIDE SERPL-SCNC: 103 MMOL/L (ref 95–110)
CO2 SERPL-SCNC: 23 MMOL/L (ref 23–29)
CREAT SERPL-MCNC: 0.6 MG/DL (ref 0.5–1.4)
DIFFERENTIAL METHOD: ABNORMAL
EOSINOPHIL # BLD AUTO: 0.1 K/UL (ref 0–0.5)
EOSINOPHIL NFR BLD: 0.7 % (ref 0–8)
ERYTHROCYTE [DISTWIDTH] IN BLOOD BY AUTOMATED COUNT: 12.9 % (ref 11.5–14.5)
EST. GFR  (NO RACE VARIABLE): >60 ML/MIN/1.73 M^2
GLUCOSE SERPL-MCNC: 78 MG/DL (ref 70–110)
HCT VFR BLD AUTO: 32.6 % (ref 37–48.5)
HGB BLD-MCNC: 10.1 G/DL (ref 12–16)
IMM GRANULOCYTES # BLD AUTO: 0.01 K/UL (ref 0–0.04)
IMM GRANULOCYTES NFR BLD AUTO: 0.1 % (ref 0–0.5)
LYMPHOCYTES # BLD AUTO: 1.2 K/UL (ref 1–4.8)
LYMPHOCYTES NFR BLD: 17.3 % (ref 18–48)
MAGNESIUM SERPL-MCNC: 2.2 MG/DL (ref 1.6–2.6)
MCH RBC QN AUTO: 31.6 PG (ref 27–31)
MCHC RBC AUTO-ENTMCNC: 31 G/DL (ref 32–36)
MCV RBC AUTO: 102 FL (ref 82–98)
MONOCYTES # BLD AUTO: 0.4 K/UL (ref 0.3–1)
MONOCYTES NFR BLD: 6 % (ref 4–15)
NEUTROPHILS # BLD AUTO: 5.2 K/UL (ref 1.8–7.7)
NEUTROPHILS NFR BLD: 75.5 % (ref 38–73)
NRBC BLD-RTO: 0 /100 WBC
PHOSPHATE SERPL-MCNC: 3.8 MG/DL (ref 2.7–4.5)
PLATELET # BLD AUTO: 176 K/UL (ref 150–450)
PMV BLD AUTO: 9.2 FL (ref 9.2–12.9)
POTASSIUM SERPL-SCNC: 3.9 MMOL/L (ref 3.5–5.1)
PROT SERPL-MCNC: 5.8 G/DL (ref 6–8.4)
RBC # BLD AUTO: 3.2 M/UL (ref 4–5.4)
SODIUM SERPL-SCNC: 138 MMOL/L (ref 136–145)
WBC # BLD AUTO: 6.83 K/UL (ref 3.9–12.7)

## 2023-12-21 PROCEDURE — C9113 INJ PANTOPRAZOLE SODIUM, VIA: HCPCS

## 2023-12-21 PROCEDURE — 63600175 PHARM REV CODE 636 W HCPCS: Performed by: STUDENT IN AN ORGANIZED HEALTH CARE EDUCATION/TRAINING PROGRAM

## 2023-12-21 PROCEDURE — 80053 COMPREHEN METABOLIC PANEL: CPT

## 2023-12-21 PROCEDURE — 63600175 PHARM REV CODE 636 W HCPCS

## 2023-12-21 PROCEDURE — 83735 ASSAY OF MAGNESIUM: CPT

## 2023-12-21 PROCEDURE — 96366 THER/PROPH/DIAG IV INF ADDON: CPT

## 2023-12-21 PROCEDURE — 96376 TX/PRO/DX INJ SAME DRUG ADON: CPT

## 2023-12-21 PROCEDURE — 84100 ASSAY OF PHOSPHORUS: CPT

## 2023-12-21 PROCEDURE — 25000003 PHARM REV CODE 250

## 2023-12-21 PROCEDURE — 96367 TX/PROPH/DG ADDL SEQ IV INF: CPT

## 2023-12-21 PROCEDURE — 36415 COLL VENOUS BLD VENIPUNCTURE: CPT

## 2023-12-21 PROCEDURE — 85025 COMPLETE CBC W/AUTO DIFF WBC: CPT

## 2023-12-21 PROCEDURE — 25000003 PHARM REV CODE 250: Performed by: STUDENT IN AN ORGANIZED HEALTH CARE EDUCATION/TRAINING PROGRAM

## 2023-12-21 PROCEDURE — G0378 HOSPITAL OBSERVATION PER HR: HCPCS

## 2023-12-21 RX ORDER — PREGABALIN 100 MG/1
100 CAPSULE ORAL 2 TIMES DAILY
Qty: 60 CAPSULE | Refills: 1 | Status: SHIPPED | OUTPATIENT
Start: 2023-12-21 | End: 2024-02-14 | Stop reason: SDUPTHER

## 2023-12-21 RX ORDER — PREGABALIN 100 MG/1
100 CAPSULE ORAL 2 TIMES DAILY
Qty: 60 CAPSULE | Refills: 1 | Status: SHIPPED | OUTPATIENT
Start: 2023-12-21 | End: 2023-12-21

## 2023-12-21 RX ORDER — TRAZODONE HYDROCHLORIDE 100 MG/1
100 TABLET ORAL NIGHTLY
Status: DISCONTINUED | OUTPATIENT
Start: 2023-12-21 | End: 2023-12-21 | Stop reason: HOSPADM

## 2023-12-21 RX ORDER — ONDANSETRON 4 MG/1
4 TABLET, ORALLY DISINTEGRATING ORAL EVERY 6 HOURS PRN
Qty: 30 TABLET | Refills: 0 | Status: SHIPPED | OUTPATIENT
Start: 2023-12-21 | End: 2024-01-31

## 2023-12-21 RX ADMIN — THIAMINE HYDROCHLORIDE 100 MG: 100 INJECTION, SOLUTION INTRAMUSCULAR; INTRAVENOUS at 10:12

## 2023-12-21 RX ADMIN — ESCITALOPRAM OXALATE 10 MG: 10 TABLET ORAL at 09:12

## 2023-12-21 RX ADMIN — PANTOPRAZOLE SODIUM 40 MG: 40 INJECTION, POWDER, LYOPHILIZED, FOR SOLUTION INTRAVENOUS at 09:12

## 2023-12-21 RX ADMIN — FOLIC ACID 1 MG: 5 INJECTION, SOLUTION INTRAMUSCULAR; INTRAVENOUS; SUBCUTANEOUS at 10:12

## 2023-12-21 RX ADMIN — TRAZODONE HYDROCHLORIDE 100 MG: 100 TABLET ORAL at 12:12

## 2023-12-21 RX ADMIN — ONDANSETRON 4 MG: 2 INJECTION INTRAMUSCULAR; INTRAVENOUS at 10:12

## 2023-12-21 RX ADMIN — TOPIRAMATE 50 MG: 25 TABLET, FILM COATED ORAL at 09:12

## 2023-12-21 NOTE — PROGRESS NOTES
Discharge orders noted. Additional clinical references attached. Patient's discharge instructions given by bedside RN and reviewed via this VN.  Education provided on new and previous medications, diagnosis, and follow-up appointments.   Patient verbalized understanding and teach back method was used. Patient's ride/transportation home has been called by patient. All questions answered. Transport to [facility] will be requested. Floor nurse notified.                12/21/23 9314    Notification    Notified Of Discharge Status   Admission   Communication Issues? None   Shift   Virtual Nurse - Rounding Complete  (discharge)   Virtual Nurse - Patient Verbalized Approval Of Camera Use   Safety/Activity   Patient Rounds bed in low position;call light in patient/parent reach;clutter free environment maintained;visualized patient   Safety Promotion/Fall Prevention side rails raised x 2   Positioning   Body Position supine   Head of Bed (HOB) Positioning HOB at 30-45 degrees

## 2023-12-21 NOTE — PLAN OF CARE
Pt for d/c to home today   Per initial eval -  will transport her to home   No dme, no HH   Discharging nurse will review d/c meds/instructions   Pt has declined resources re:  ETOH     Future Appointments   Date Time Provider Department Center   1/4/2024  3:00 PM Manjit Wynn PA-C [facility]   2/20/2024  9:00 AM Jonny Soni MD [facility]        12/21/23 1442   Final Note   Assessment Type Final Discharge Note   Anticipated Discharge Disposition Home   What phone number can be called within the next 1-3 days to see how you are doing after discharge? [phone]   Hospital Resources/Appts/Education Provided Appointments scheduled and added to AVS   Post-Acute Status   Discharge Delays None known at this time

## 2023-12-21 NOTE — NURSING
Patient for discharge today. No nausea noted after zofran dose. AVS printed and handed out to patient. PIV and tele removed. VN to review AVS and request transport.

## 2023-12-21 NOTE — PLAN OF CARE
Problem: Adult Inpatient Plan of Care  Goal: Plan of Care Review  Outcome: Met  Goal: Patient-Specific Goal (Individualized)  Outcome: Met  Goal: Absence of Hospital-Acquired Illness or Injury  Outcome: Met  Goal: Optimal Comfort and Wellbeing  Outcome: Met  Goal: Readiness for Transition of Care  Outcome: Met     Problem: Nausea and Vomiting  Goal: Fluid and Electrolyte Balance  Outcome: Met     Problem: Pain Acute  Goal: Acceptable Pain Control and Functional Ability  Outcome: Met     Problem: Alcohol Withdrawal  Goal: Alcohol Withdrawal Symptom Control  Outcome: Met     Problem: Acute Neurologic Deterioration (Alcohol Withdrawal)  Goal: Optimal Neurologic Function  Outcome: Met     Problem: Substance Misuse (Alcohol Withdrawal)  Goal: Readiness for Change Identified  Outcome: Met     Problem: Fall Injury Risk  Goal: Absence of Fall and Fall-Related Injury  Outcome: Met     Problem: Hypertension Comorbidity  Goal: Blood Pressure in Desired Range  Outcome: Met     Problem: Diarrhea  Goal: Fluid and Electrolyte Balance  Outcome: Met

## 2023-12-21 NOTE — PLAN OF CARE
Problem: Adult Inpatient Plan of Care  Goal: Plan of Care Review  Outcome: Ongoing, Progressing  Goal: Patient-Specific Goal (Individualized)  Outcome: Ongoing, Progressing  Goal: Absence of Hospital-Acquired Illness or Injury  Outcome: Ongoing, Progressing  Goal: Optimal Comfort and Wellbeing  Outcome: Ongoing, Progressing  Goal: Readiness for Transition of Care  Outcome: Ongoing, Progressing     Problem: Nausea and Vomiting  Goal: Fluid and Electrolyte Balance  Outcome: Ongoing, Progressing     Problem: Fall Injury Risk  Goal: Absence of Fall and Fall-Related Injury  Outcome: Ongoing, Progressing

## 2023-12-21 NOTE — PROGRESS NOTES
Future Appointments   Date Time Provider Department Center   1/4/2024  3:00 PM Manjit Wynn PA-C [facility]   2/20/2024  9:00 AM Jon[facility] Son[facility] MD [facility]

## 2023-12-22 LAB
BACTERIA UR CULT: ABNORMAL
E COLI SXT1 STL QL IA: NEGATIVE
E COLI SXT2 STL QL IA: NEGATIVE

## 2023-12-22 NOTE — HOSPITAL COURSE
Pt admitted to  inpatient service for intractable nausea and vomiting likely secondary to alcohol abuse and liver disease. Pt placed on clear liquid diet and had nausea that improved with zofran, but continued throughout admission. Initially, patient had one episode of watery stool and one episode of liquid vomiting of her diet. Pt was given tylenol for abdominal pain related to retching. Patient progressed on the amount of liquid intake she could tolerate and was advanced to a soft diet and boost nourishments. She was tolerating well, and while still nauseous, had no additional bouts of emesis or diarrhea. Labs and vital signs showed improvement. Stool studies were all negative. Patient felt better and well enough to go home, so patient education was provided and patient agreed with plan of care. She was given lunch and tolerated without issue. Patient given zofran for nausea and home meds were refilled and brought to bedside. Patient will follow up with PCP.

## 2023-12-22 NOTE — DISCHARGE SUMMARY
"[facility] Medicine  Discharge Summary      Patient Name: Ember Rivera  MRN: [id number]  NAVIN: [id number]  Patient Class: OP- Observation  Admission Date: 12/19/2023  Hospital Length of Stay: 0 days  Discharge Date and Time: No discharge date for patient encounter.  Attending Physician: Miles Berman,*   Discharging Provider: Tommy Pickens MD  Primary Care Provider: Young Neil MD    Primary Care Team: Networked reference to record PCT     HPI:   Patient is a 53-year-old female PMHx alcoholic included fatty liver, GERD, guillain-[place] syndrome, hepatic steatosis, s/p colostomy and closure of colostomy, and hypertension presenting to the ED due to 4 day history of nausea vomiting. Patient states that four days ago she started having diarrhea and was unable to eat or drink anything. The nausea is associated with non bloody vomiting. Diarrhea is not dark and occasionally has red spots in which the patient states that she has a history of bleeding hemorrhoids. Her intractable nausea and vomiting is associated with diffuse abdominal pain in the right and left upper quadrants that radiate to her epigastric area and it feels like a "pressure/burning". She denies any fevers, chills, shortness of breath, or chest pain. She denies any recent illnesses or sick contacts. She was hospitalized approximately 1 month ago for similar symptoms of intractable nausea/vomiting due to diverticulosis and at that time was treated with antibiotics.      ED vitals: Temp 98.9, HR 96, /74, RR 18, SpO2 99% on room air. CBC was WNL, CMP significant for potassium of 3.7, AST of 71, and alk phos of 282. Magnesium was 1.5, lipase negative. UA showed 2+ leukocytes and 2-3 urobilinogen. EKG showed normal sinus rhythm with QTc prolongation of 505. CT abdomen pelvis showed no acute processes and image stable from previous but with copious stool and evidence of hepatic steatosis. In the ED patient was treated with " magnesium zofran, 500cc of LR, a GI cocktail, and phenergan for nausea. Patient admitted to [facility] for management of intractable nausea, vomiting, 4 days of diarrhea, and QTc prolongation in the context of hypomagnesemia.        * No surgery found *      Hospital Course:   Pt admitted to  inpatient service for intractable nausea and vomiting likely secondary to alcohol abuse and liver disease. Pt placed on clear liquid diet and had nausea that improved with zofran, but continued throughout admission. Initially, patient had one episode of watery stool and one episode of liquid vomiting of her diet. Pt was given tylenol for abdominal pain related to retching. Patient progressed on the amount of liquid intake she could tolerate and was advanced to a soft diet and boost nourishments. She was tolerating well, and while still nauseous, had no additional bouts of emesis or diarrhea. Labs and vital signs showed improvement. Stool studies were all negative. Patient felt better and well enough to go home, so patient education was provided and patient agreed with plan of care. She was given lunch and tolerated without issue. Patient given zofran for nausea and home meds were refilled and brought to bedside. Patient will follow up with PCP.     Vitals:    12/21/23 1233   BP: (!) 140/73   Pulse: 75   Resp: 18   Temp: 98.4 °F (36.9 °C)       Physical Exam  Vitals and nursing note reviewed.   Constitutional:       General: She is not in acute distress.     Appearance: Normal appearance. She is not ill-appearing, toxic-appearing or diaphoretic.   HENT:      Head: Normocephalic and atraumatic.      Right Ear: External ear normal.      Left Ear: External ear normal.      Nose: No congestion or rhinorrhea.      Mouth/Throat:      Mouth: Mucous membranes are moist.   Eyes:      General: No scleral icterus.     Extraocular Movements: Extraocular movements intact.      Pupils: Pupils are equal, round, and reactive to  light.   Cardiovascular:      Rate and Rhythm: Normal rate and regular rhythm.      Pulses: Normal pulses.      Heart sounds: Normal heart sounds.   Pulmonary:      Effort: Pulmonary effort is normal.      Breath sounds: Normal breath sounds.   Abdominal:      General: Bowel sounds are normal. There is no distension.      Palpations: There is no mass.      Tenderness: There is abdominal tenderness.      Hernia: No hernia is present.      Comments: Epigastric tenderness  Midline abdominal surgical scar c/d/i   Musculoskeletal:      Right lower leg: No edema.      Left lower leg: No edema.   Skin:     General: Skin is warm and dry.   Neurological:      General: No focal deficit present.      Mental Status: She is alert and oriented to person, place, and time.      Goals of Care Treatment Preferences:  Code Status: Full Code      Consults:     No new Assessment & Plan notes have been filed under this hospital service since the last note was generated.  Service: Hospital Medicine    Final Active Diagnoses:    Diagnosis Date Noted POA    PRINCIPAL PROBLEM:  Intractable nausea and vomiting [R11.2] 10/22/2023 Yes    Diarrhea [R19.7] 12/20/2023 Unknown    Hepatic steatosis [K76.0] 09/28/2023 Yes    Alcohol abuse [F10.10] 09/28/2023 Yes    Peripheral polyneuropathy [G62.9] 05/24/2023 Yes    Prolonged QT interval [R94.31] 05/16/2023 Yes    Hypokalemia [E87.6] 05/16/2023 Yes      Problems Resolved During this Admission:       Discharged Condition: fair    Disposition: Home or Self Care    Follow Up:   Follow-up Information       [facility] Follow up on 1/4/2024.    Specialty: Family Medicine  Why: 3:00 pm  Contact information:  [address], [address]  [facility] [address] [phone]  Additional information:  Please park in [address] C or D and use Lindsey spencer. Take Medical Office Bldg. elevators.             Jonny Soni MD Follow up on 2/20/2024.    Specialties: Transplant,  Hepatology, Gastroenterology  Why: 9:00 am  Contact information:  [address]  [facility] [address]  [phone]                           Patient Instructions:      Notify your health care provider if you experience any of the following:  temperature >100.4     Notify your health care provider if you experience any of the following:  persistent nausea and vomiting or diarrhea     Notify your health care provider if you experience any of the following:  redness, tenderness, or signs of infection (pain, swelling, redness, odor or green/yellow discharge around incision site)     Activity as tolerated       Significant Diagnostic Studies: Labs: CMP   Recent Labs   Lab 12/20/23  0033 12/20/23 0345 12/21/23  0420    138 138   K 3.3* 3.6 3.9    103 103   CO2 29 28 23   GLU 87 81 78   BUN 3* 3* 4*   CREATININE 0.6 0.6 0.6   CALCIUM 8.1* 8.0* 8.0*   PROT  --  5.6* 5.8*   ALBUMIN  --  2.2* 2.3*   BILITOT  --  0.7 0.8   ALKPHOS  --  211* 219*   AST  --  51* 55*   ALT  --  10 9*   ANIONGAP 9 7* 12   , CBC   Recent Labs   Lab 12/20/23 0345 12/21/23  0420   WBC 7.75 6.83   HGB 9.8* 10.1*   HCT 30.6* 32.6*    176   , and All labs within the past 24 hours have been reviewed    Pending Diagnostic Studies:       Procedure Component Value Units Date/Time    H. pylori antigen, stool [[id number]] Collected: 12/20/23 1145    Order Status: Sent Lab Status: In process Updated: 12/21/23 0746    Specimen: Stool            Medications:  Reconciled Home Medications:      Medication List        CONTINUE taking these medications      EScitalopram oxalate 10 MG tablet  Commonly known as: LEXAPRO  Take 1 tablet (10 mg total) by mouth once daily.     folic acid 1 MG tablet  Commonly known as: FOLVITE  Take 1 tablet (1 mg total) by mouth once daily.     ondansetron 4 MG Tbdl  Commonly known as: ZOFRAN-ODT  Take 1 tablet (4 mg total) by mouth every 6 (six) hours as needed (For nausea. Take only as needed.).     potassium  bicarbonate disintegrating tablet  Commonly known as: K-LYTE  Take 1 tablet (20 mEq total) by mouth 2 (two) times a day.     pregabalin 100 MG capsule  Commonly known as: LYRICA  Take 1 capsule (100 mg total) by mouth 2 (two) times daily.     topiramate 50 MG tablet  Commonly known as: TOPAMAX  Take 1 tablet (50 mg total) by mouth once daily.     traZODone 100 MG tablet  Commonly known as: DESYREL  Take 1 tablet (100 mg total) by mouth every evening.              Indwelling Lines/Drains at time of discharge:   Lines/Drains/Airways       None                   Time spent on the discharge of patient: 35 minutes         Tommy Pickens MD  Department of Hospital Medicine  [facility]

## 2023-12-23 LAB — BACTERIA STL CULT: NORMAL

## 2023-12-29 LAB — H PYLORI AG STL QL IA: NOT DETECTED

## 2024-01-24 ENCOUNTER — PATIENT MESSAGE (OUTPATIENT)
Dept: ADMINISTRATIVE | Facility: HOSPITAL | Age: 54
End: 2024-01-24
Payer: MEDICAID

## 2024-01-31 ENCOUNTER — HOSPITAL ENCOUNTER (INPATIENT)
Facility: HOSPITAL | Age: 54
LOS: 3 days | Discharge: HOME | DRG: 443 | End: 2024-02-03
Attending: EMERGENCY MEDICINE | Admitting: HOSPITALIST
Payer: MEDICAID

## 2024-01-31 ENCOUNTER — OFFICE VISIT (OUTPATIENT)
Dept: FAMILY MEDICINE | Facility: HOSPITAL | Age: 54
DRG: 443 | End: 2024-01-31
Attending: FAMILY MEDICINE
Payer: MEDICAID

## 2024-01-31 VITALS
WEIGHT: 159.81 LBS | SYSTOLIC BLOOD PRESSURE: 152 MMHG | HEIGHT: 66 IN | HEART RATE: 111 BPM | BODY MASS INDEX: 25.68 KG/M2 | DIASTOLIC BLOOD PRESSURE: 94 MMHG | OXYGEN SATURATION: 98 %

## 2024-01-31 DIAGNOSIS — R18.8 OTHER ASCITES: Primary | ICD-10-CM

## 2024-01-31 DIAGNOSIS — R18.8 OTHER ASCITES: ICD-10-CM

## 2024-01-31 DIAGNOSIS — R06.02 SHORTNESS OF BREATH: ICD-10-CM

## 2024-01-31 DIAGNOSIS — Z09 HOSPITAL DISCHARGE FOLLOW-UP: Primary | ICD-10-CM

## 2024-01-31 DIAGNOSIS — F10.10 ALCOHOL ABUSE: ICD-10-CM

## 2024-01-31 DIAGNOSIS — R18.8 ASCITES: ICD-10-CM

## 2024-01-31 PROBLEM — F10.90 ALCOHOL USE DISORDER: Status: ACTIVE | Noted: 2023-05-16

## 2024-01-31 LAB
ALBUMIN FLD-MCNC: 0.8 G/DL
ALBUMIN SERPL BCP-MCNC: 2.4 G/DL (ref 3.5–5.2)
ALP SERPL-CCNC: 171 U/L (ref 55–135)
ALT SERPL W/O P-5'-P-CCNC: 15 U/L (ref 10–44)
AMYLASE, BODY FLUID: 14 U/L
ANION GAP SERPL CALC-SCNC: 12 MMOL/L (ref 8–16)
APPEARANCE FLD: CLEAR
AST SERPL-CCNC: 38 U/L (ref 10–40)
BACTERIA #/AREA URNS HPF: NORMAL /HPF
BASOPHILS # BLD AUTO: 0.04 K/UL (ref 0–0.2)
BASOPHILS NFR BLD: 0.4 % (ref 0–1.9)
BILIRUB FLD-MCNC: 0.3 MG/DL
BILIRUB SERPL-MCNC: 1.2 MG/DL (ref 0.1–1)
BILIRUB UR QL STRIP: NEGATIVE
BNP SERPL-MCNC: 48 PG/ML (ref 0–99)
BODY FLD TYPE: NORMAL
BODY FLUID SOURCE AMYLASE: NORMAL
BODY FLUID SOURCE, BILIRUBIN: NORMAL
BODY FLUID SOURCE, LDH: NORMAL
BUN SERPL-MCNC: 12 MG/DL (ref 6–20)
CALCIUM SERPL-MCNC: 8.3 MG/DL (ref 8.7–10.5)
CHLORIDE SERPL-SCNC: 107 MMOL/L (ref 95–110)
CLARITY UR: CLEAR
CO2 SERPL-SCNC: 20 MMOL/L (ref 23–29)
COLOR FLD: YELLOW
COLOR UR: YELLOW
CREAT SERPL-MCNC: 0.7 MG/DL (ref 0.5–1.4)
DIFFERENTIAL METHOD BLD: ABNORMAL
EOSINOPHIL # BLD AUTO: 0.1 K/UL (ref 0–0.5)
EOSINOPHIL NFR BLD: 0.7 % (ref 0–8)
ERYTHROCYTE [DISTWIDTH] IN BLOOD BY AUTOMATED COUNT: 14.9 % (ref 11.5–14.5)
EST. GFR  (NO RACE VARIABLE): >60 ML/MIN/1.73 M^2
ETHANOL SERPL-MCNC: <10 MG/DL
GLUCOSE FLD-MCNC: 94 MG/DL
GLUCOSE SERPL-MCNC: 92 MG/DL (ref 70–110)
GLUCOSE UR QL STRIP: NEGATIVE
HCT VFR BLD AUTO: 36.1 % (ref 37–48.5)
HGB BLD-MCNC: 11.4 G/DL (ref 12–16)
HGB UR QL STRIP: NEGATIVE
HYALINE CASTS #/AREA URNS LPF: 0 /LPF
IMM GRANULOCYTES # BLD AUTO: 0.04 K/UL (ref 0–0.04)
IMM GRANULOCYTES NFR BLD AUTO: 0.4 % (ref 0–0.5)
INR PPP: 1.3 (ref 0.8–1.2)
KETONES UR QL STRIP: NEGATIVE
LDH FLD L TO P-CCNC: 41 U/L
LEUKOCYTE ESTERASE UR QL STRIP: NEGATIVE
LIPASE SERPL-CCNC: 52 U/L (ref 4–60)
LYMPHOCYTES # BLD AUTO: 1.6 K/UL (ref 1–4.8)
LYMPHOCYTES NFR BLD: 15 % (ref 18–48)
LYMPHOCYTES NFR FLD MANUAL: 51 %
MAGNESIUM SERPL-MCNC: 1.6 MG/DL (ref 1.6–2.6)
MCH RBC QN AUTO: 30.6 PG (ref 27–31)
MCHC RBC AUTO-ENTMCNC: 31.6 G/DL (ref 32–36)
MCV RBC AUTO: 97 FL (ref 82–98)
MESOTHL CELL NFR FLD MANUAL: 1 %
MICROSCOPIC COMMENT: NORMAL
MONOCYTES # BLD AUTO: 0.6 K/UL (ref 0.3–1)
MONOCYTES NFR BLD: 5.3 % (ref 4–15)
MONOS+MACROS NFR FLD MANUAL: 14 %
NEUTROPHILS # BLD AUTO: 8.4 K/UL (ref 1.8–7.7)
NEUTROPHILS NFR BLD: 78.2 % (ref 38–73)
NEUTROPHILS NFR FLD MANUAL: 34 %
NITRITE UR QL STRIP: NEGATIVE
NRBC BLD-RTO: 0 /100 WBC
PH UR STRIP: 6 [PH] (ref 5–8)
PLATELET # BLD AUTO: 296 K/UL (ref 150–450)
PMV BLD AUTO: 9.3 FL (ref 9.2–12.9)
POTASSIUM SERPL-SCNC: 4 MMOL/L (ref 3.5–5.1)
PROT FLD-MCNC: 1.7 G/DL
PROT SERPL-MCNC: 6.1 G/DL (ref 6–8.4)
PROT UR QL STRIP: ABNORMAL
PROTHROMBIN TIME: 13.6 SEC (ref 9–12.5)
RBC # BLD AUTO: 3.72 M/UL (ref 4–5.4)
RBC #/AREA URNS HPF: 0 /HPF (ref 0–4)
SODIUM SERPL-SCNC: 139 MMOL/L (ref 136–145)
SP GR UR STRIP: >1.03 (ref 1–1.03)
SPECIMEN SOURCE: NORMAL
SQUAMOUS #/AREA URNS HPF: 19 /HPF
TROPONIN I SERPL DL<=0.01 NG/ML-MCNC: <0.006 NG/ML (ref 0–0.03)
URN SPEC COLLECT METH UR: ABNORMAL
UROBILINOGEN UR STRIP-ACNC: NEGATIVE EU/DL
WBC # BLD AUTO: 10.75 K/UL (ref 3.9–12.7)
WBC # FLD: 213 /CU MM
WBC #/AREA URNS HPF: 3 /HPF (ref 0–5)

## 2024-01-31 PROCEDURE — 11000001 HC ACUTE MED/SURG PRIVATE ROOM

## 2024-01-31 PROCEDURE — 85025 COMPLETE CBC W/AUTO DIFF WBC: CPT | Performed by: NURSE PRACTITIONER

## 2024-01-31 PROCEDURE — 82042 OTHER SOURCE ALBUMIN QUAN EA: CPT | Performed by: NURSE PRACTITIONER

## 2024-01-31 PROCEDURE — 83690 ASSAY OF LIPASE: CPT | Performed by: NURSE PRACTITIONER

## 2024-01-31 PROCEDURE — 89051 BODY FLUID CELL COUNT: CPT | Performed by: NURSE PRACTITIONER

## 2024-01-31 PROCEDURE — 84157 ASSAY OF PROTEIN OTHER: CPT | Performed by: NURSE PRACTITIONER

## 2024-01-31 PROCEDURE — 25500020 PHARM REV CODE 255: Performed by: NURSE PRACTITIONER

## 2024-01-31 PROCEDURE — 80053 COMPREHEN METABOLIC PANEL: CPT | Performed by: NURSE PRACTITIONER

## 2024-01-31 PROCEDURE — 85610 PROTHROMBIN TIME: CPT | Performed by: NURSE PRACTITIONER

## 2024-01-31 PROCEDURE — 25000003 PHARM REV CODE 250

## 2024-01-31 PROCEDURE — 83615 LACTATE (LD) (LDH) ENZYME: CPT | Performed by: NURSE PRACTITIONER

## 2024-01-31 PROCEDURE — 0W9G3ZZ DRAINAGE OF PERITONEAL CAVITY, PERCUTANEOUS APPROACH: ICD-10-PCS | Performed by: HOSPITALIST

## 2024-01-31 PROCEDURE — 81000 URINALYSIS NONAUTO W/SCOPE: CPT | Performed by: NURSE PRACTITIONER

## 2024-01-31 PROCEDURE — 82247 BILIRUBIN TOTAL: CPT | Performed by: NURSE PRACTITIONER

## 2024-01-31 PROCEDURE — 99284 EMERGENCY DEPT VISIT MOD MDM: CPT | Mod: NSCH,,, | Performed by: PHYSICIAN ASSISTANT

## 2024-01-31 PROCEDURE — 88305 TISSUE EXAM BY PATHOLOGIST: CPT | Performed by: STUDENT IN AN ORGANIZED HEALTH CARE EDUCATION/TRAINING PROGRAM

## 2024-01-31 PROCEDURE — 99213 OFFICE O/P EST LOW 20 MIN: CPT | Mod: 25 | Performed by: FAMILY MEDICINE

## 2024-01-31 PROCEDURE — 99285 EMERGENCY DEPT VISIT HI MDM: CPT | Mod: 25,27

## 2024-01-31 PROCEDURE — 83880 ASSAY OF NATRIURETIC PEPTIDE: CPT | Performed by: NURSE PRACTITIONER

## 2024-01-31 PROCEDURE — 82077 ASSAY SPEC XCP UR&BREATH IA: CPT | Performed by: NURSE PRACTITIONER

## 2024-01-31 PROCEDURE — 88305 TISSUE EXAM BY PATHOLOGIST: CPT | Mod: 26,,, | Performed by: STUDENT IN AN ORGANIZED HEALTH CARE EDUCATION/TRAINING PROGRAM

## 2024-01-31 PROCEDURE — 83735 ASSAY OF MAGNESIUM: CPT | Performed by: NURSE PRACTITIONER

## 2024-01-31 PROCEDURE — 84484 ASSAY OF TROPONIN QUANT: CPT | Performed by: NURSE PRACTITIONER

## 2024-01-31 PROCEDURE — 25000003 PHARM REV CODE 250: Performed by: PHYSICIAN ASSISTANT

## 2024-01-31 PROCEDURE — 88112 CYTOPATH CELL ENHANCE TECH: CPT | Mod: 26,,, | Performed by: STUDENT IN AN ORGANIZED HEALTH CARE EDUCATION/TRAINING PROGRAM

## 2024-01-31 PROCEDURE — 82945 GLUCOSE OTHER FLUID: CPT | Performed by: NURSE PRACTITIONER

## 2024-01-31 PROCEDURE — 84484 ASSAY OF TROPONIN QUANT: CPT

## 2024-01-31 PROCEDURE — 88112 CYTOPATH CELL ENHANCE TECH: CPT | Performed by: STUDENT IN AN ORGANIZED HEALTH CARE EDUCATION/TRAINING PROGRAM

## 2024-01-31 PROCEDURE — 93010 ELECTROCARDIOGRAM REPORT: CPT | Mod: ,,, | Performed by: INTERNAL MEDICINE

## 2024-01-31 PROCEDURE — 93005 ELECTROCARDIOGRAM TRACING: CPT

## 2024-01-31 PROCEDURE — 82150 ASSAY OF AMYLASE: CPT | Performed by: NURSE PRACTITIONER

## 2024-01-31 PROCEDURE — 63600175 PHARM REV CODE 636 W HCPCS

## 2024-01-31 RX ORDER — SODIUM CHLORIDE 0.9 % (FLUSH) 0.9 %
5 SYRINGE (ML) INJECTION
Status: DISCONTINUED | OUTPATIENT
Start: 2024-01-31 | End: 2024-02-03 | Stop reason: HOSPADM

## 2024-01-31 RX ORDER — KETOROLAC TROMETHAMINE 30 MG/ML
30 INJECTION, SOLUTION INTRAMUSCULAR; INTRAVENOUS ONCE
Status: COMPLETED | OUTPATIENT
Start: 2024-01-31 | End: 2024-01-31

## 2024-01-31 RX ORDER — HYDROCODONE BITARTRATE AND ACETAMINOPHEN 5; 325 MG/1; MG/1
1 TABLET ORAL EVERY 8 HOURS PRN
Status: DISCONTINUED | OUTPATIENT
Start: 2024-01-31 | End: 2024-02-02

## 2024-01-31 RX ORDER — SPIRONOLACTONE 25 MG/1
100 TABLET ORAL DAILY
Status: DISCONTINUED | OUTPATIENT
Start: 2024-01-31 | End: 2024-02-03 | Stop reason: HOSPADM

## 2024-01-31 RX ORDER — FOLIC ACID 1 MG/1
1 TABLET ORAL DAILY
Qty: 60 TABLET | Refills: 2 | Status: SHIPPED | OUTPATIENT
Start: 2024-01-31

## 2024-01-31 RX ORDER — NALOXONE HCL 0.4 MG/ML
0.02 VIAL (ML) INJECTION
Status: DISCONTINUED | OUTPATIENT
Start: 2024-01-31 | End: 2024-02-03 | Stop reason: HOSPADM

## 2024-01-31 RX ORDER — ACETAMINOPHEN 325 MG/1
650 TABLET ORAL EVERY 6 HOURS PRN
Status: DISCONTINUED | OUTPATIENT
Start: 2024-01-31 | End: 2024-02-03 | Stop reason: HOSPADM

## 2024-01-31 RX ORDER — METOCLOPRAMIDE 10 MG/1
TABLET ORAL
COMMUNITY
Start: 2024-01-18 | End: 2024-01-31

## 2024-01-31 RX ORDER — FOLIC ACID 1 MG/1
1 TABLET ORAL DAILY
Status: DISCONTINUED | OUTPATIENT
Start: 2024-02-01 | End: 2024-02-03 | Stop reason: HOSPADM

## 2024-01-31 RX ORDER — PREGABALIN 50 MG/1
100 CAPSULE ORAL 2 TIMES DAILY
Status: DISCONTINUED | OUTPATIENT
Start: 2024-01-31 | End: 2024-02-03 | Stop reason: HOSPADM

## 2024-01-31 RX ORDER — TRAZODONE HYDROCHLORIDE 100 MG/1
100 TABLET ORAL NIGHTLY
Status: DISCONTINUED | OUTPATIENT
Start: 2024-01-31 | End: 2024-02-03 | Stop reason: HOSPADM

## 2024-01-31 RX ORDER — ENOXAPARIN SODIUM 100 MG/ML
40 INJECTION SUBCUTANEOUS EVERY 24 HOURS
Status: DISCONTINUED | OUTPATIENT
Start: 2024-01-31 | End: 2024-02-03 | Stop reason: HOSPADM

## 2024-01-31 RX ORDER — ACETAMINOPHEN 325 MG/1
650 TABLET ORAL EVERY 4 HOURS PRN
Status: DISCONTINUED | OUTPATIENT
Start: 2024-01-31 | End: 2024-01-31

## 2024-01-31 RX ORDER — PANTOPRAZOLE SODIUM 40 MG/1
1 TABLET, DELAYED RELEASE ORAL EVERY MORNING
COMMUNITY
Start: 2024-01-19 | End: 2024-01-31

## 2024-01-31 RX ORDER — ONDANSETRON HYDROCHLORIDE 2 MG/ML
4 INJECTION, SOLUTION INTRAVENOUS EVERY 8 HOURS PRN
Status: DISCONTINUED | OUTPATIENT
Start: 2024-01-31 | End: 2024-02-03 | Stop reason: HOSPADM

## 2024-01-31 RX ORDER — LIDOCAINE HYDROCHLORIDE 10 MG/ML
INJECTION INFILTRATION; PERINEURAL
Status: COMPLETED | OUTPATIENT
Start: 2024-01-31 | End: 2024-01-31

## 2024-01-31 RX ORDER — FUROSEMIDE 10 MG/ML
40 INJECTION INTRAMUSCULAR; INTRAVENOUS DAILY
Status: DISCONTINUED | OUTPATIENT
Start: 2024-02-01 | End: 2024-02-01

## 2024-01-31 RX ORDER — AMOXICILLIN 250 MG
1 CAPSULE ORAL 2 TIMES DAILY PRN
Status: DISCONTINUED | OUTPATIENT
Start: 2024-01-31 | End: 2024-02-03 | Stop reason: HOSPADM

## 2024-01-31 RX ADMIN — SPIRONOLACTONE 100 MG: 25 TABLET, FILM COATED ORAL at 07:01

## 2024-01-31 RX ADMIN — KETOROLAC TROMETHAMINE 30 MG: 30 INJECTION, SOLUTION INTRAMUSCULAR; INTRAVENOUS at 07:01

## 2024-01-31 RX ADMIN — IOHEXOL 75 ML: 350 INJECTION, SOLUTION INTRAVENOUS at 12:01

## 2024-01-31 RX ADMIN — LIDOCAINE HYDROCHLORIDE 3 ML: 10 INJECTION, SOLUTION INFILTRATION; PERINEURAL at 03:01

## 2024-01-31 RX ADMIN — TRAZODONE HYDROCHLORIDE 100 MG: 100 TABLET ORAL at 09:01

## 2024-01-31 RX ADMIN — PREGABALIN 100 MG: 50 CAPSULE ORAL at 09:01

## 2024-01-31 NOTE — ED NOTES
Patient presents to the ED for evaluation after seeing PCP. Patient explains she was admitted to the hospital earlier this month and discharged on 1/20. Patient states that on 1/21 she began to experience swelling to abd and bilateral lower extremities. Patient did not follow up with PCP until today. Upon arrival, patient abd very distended and swollen. Swelling noted to bilateral legs with 3+ pitting edema. Patient denies hx of same. Patient endorses SOB s/t abdominal swelling and dyspnea on exertion. Patient denies liver or kidney issues. Does endorses heavy drinking x 30 years; however, explains she has been sober for one month. Patient endorses pain to the lower aspect of her abdomen and a tight feeling. Denies CP. Pt hypertensive and tachycardic. Alert and oriented. Placed on continuous cardiac, BP, and O2 monitoring. Safety intact. Call light in reach. Mother at bedside. Awaiting orders. Care ongoing.

## 2024-01-31 NOTE — HPI
Patient is a 55 yo F w/ PMHx of HTN, Hepatic steatosis, GERD, alcohol use disorder. Presented to the ED with abdominal distention and pain. Also endorsed shortness of breath from the abdominal pressure as well as lower extremity swelling. Onset of symptoms after [place] [facility] milagros on 1/16. Denies fever, chills, chest pain, cough, headache.    In the ED, initial vital signs /100, , Temp 97.9F, SpO2 99% on room air. Labs include CBC with stable H/H 11.4/36.1 and WBC within normal limits 10.75. CMP with stable elevtrolytes and BUN/Cr 12/0.7 (baseline Cr < 1.0). PT-INR 13.6-1.3. BNP and troponin within normal limits, 48 and <0.006, respectively. CT Abd/Pelvis with . EKG with sinus tachycardia and Qtc interval 463. IR consulted and performed paracentesis resulting in removal of just under 4L of yellow fluid. [facility] consulted for evaluation for admission for ascites.

## 2024-01-31 NOTE — ASSESSMENT & PLAN NOTE
S/p approx 4L IR removal of ascites  LFTs within normal limits.  Alcohol level <10 in ED.  Patient reports last drink approx 1 week ago.    Plan:  - Paracentesis ascites labs pending.  - Lab results that resulted so far leading to potentially more likely cardiac etiology and less likely liver or kidney.  - Echo Pending.  - UA pending.  - Continue to monitor symptoms.  - Initiate diuresis. Spironolactone and furosemide.  - Monitor K on labs.  - Pain: PRNs  - Nausea: scopolamine patch and PRN zofran  - Continue alcohol cessation counseling.

## 2024-01-31 NOTE — ED NOTES
Patient returned to room at this time. Vitally stable. Denies needs. Awaiting orders. Care ongoing.

## 2024-01-31 NOTE — PROGRESS NOTES
"Subjective:       Patient ID: Ember Rivera is a 54 y.o. female.    Chief Complaint: Hospital Follow Up    53-year-old female PMHx alcoholic included fatty liver, GERD, guillain-[place] syndrome, hepatic steatosis,  and hypertension recently discharged from the hospital service on 12/21/2024 for intractable N/V well as electrolyte abnormalities which has resolved since she stopped drinking alcohol. Her last drink was at least a month ago. Since discharge her appetite has picked back up again and overall feeling much better however the past 2 days has seen significant rentention of fluid all over. Having a hard time breathing due to her swollen belly. Ambulates with a rolling walker but having a harder time with now with her swollen legs. No fevers or chills. She denies every having this problem before. Rates her abdominal pain 8/10 which she says is severe. Desiring something to help her urinate more but has not had an issue urinating thus far. Has alternating loose stools with solid ones regularly.       Review of Systems    Objective:      Vitals:    01/31/24 1011   BP: (!) 152/94   Pulse: (!) 111   SpO2: 98%   Weight: 72.5 kg (159 lb 13.3 oz)   Height: 5' 6" (1.676 m)     Physical Exam  Vitals and nursing note reviewed.   Constitutional:       General: She is in acute distress.      Appearance: She is well-developed. She is ill-appearing.   HENT:      Head: Normocephalic and atraumatic.      Nose: Nose normal.   Eyes:      Conjunctiva/sclera: Conjunctivae normal.   Cardiovascular:      Rate and Rhythm: Normal rate and regular rhythm.      Heart sounds: Normal heart sounds.   Pulmonary:      Effort: Pulmonary effort is normal. No respiratory distress.      Breath sounds: Normal breath sounds. No wheezing or rales.   Abdominal:      General: There is distension.      Palpations: Abdomen is soft. There is no mass.      Tenderness: There is abdominal tenderness. There is no right CVA tenderness, left CVA tenderness, " "guarding or rebound.      Hernia: No hernia is present.      Comments: TTP throughout her abdomen poorly localized. No guarding. Vertical well healed surgical scars.   Neurological:      Mental Status: She is alert.      Cranial Nerves: No cranial nerve deficit.             Lab Results   Component Value Date     12/21/2023    K 3.9 12/21/2023     12/21/2023    CO2 23 12/21/2023    BUN 4 (L) 12/21/2023    CREATININE 0.6 12/21/2023    ANIONGAP 12 12/21/2023     Lab Results   Component Value Date    HGBA1C 4.9 07/28/2023     No results found for: "BNP", "BNPTRIAGEBLO"    Lab Results   Component Value Date    WBC 12.9 (H) 01/17/2024    WBC 6.83 12/21/2023    HGB 11.3 (L) 01/17/2024    HGB 10.1 (L) 12/21/2023    HCT 35.8 01/17/2024    HCT 32.6 (L) 12/21/2023     12/21/2023    GRAN 5.2 12/21/2023    GRAN 75.5 (H) 12/21/2023     Lab Results   Component Value Date    CHOL 235 (H) 05/08/2023    HDL 30 (L) 05/08/2023    LDLCALC 144.4 05/08/2023    TRIG 303 (H) 05/08/2023          Current Outpatient Medications:     metoclopramide HCl (REGLAN) 10 MG tablet, Take by mouth., Disp: , Rfl:     potassium bicarbonate (K-LYTE) disintegrating tablet, Take 1 tablet (20 mEq total) by mouth 2 (two) times a day., Disp: 90 tablet, Rfl: 0    pregabalin (LYRICA) 100 MG capsule, Take 1 capsule (100 mg total) by mouth 2 (two) times daily., Disp: 60 capsule, Rfl: 1    topiramate (TOPAMAX) 50 MG tablet, Take 1 tablet (50 mg total) by mouth once daily., Disp: 90 tablet, Rfl: 0    traZODone (DESYREL) 100 MG tablet, Take 1 tablet (100 mg total) by mouth every evening., Disp: 30 tablet, Rfl: 11    folic acid (FOLVITE) 1 MG tablet, Take 1 tablet (1 mg total) by mouth once daily., Disp: 60 tablet, Rfl: 2        Assessment:       1. Hospital discharge follow-up    2. Alcohol abuse    3. Other ascites           Plan:       Hospital discharge follow-up  New onset ascites in a pt with hx of long standing alcohol abuse now sober for the " past week per pt. Recent hospitalization for intactable N/V that has resolved since alcohol cessation for the past month. Abd pain is severe and escorted to ER for further workup sign out given. Grossly edematous lower ext and abdomen x 48 hours with severe pain.      Alcohol abuse  -     folic acid (FOLVITE) 1 MG tablet; Take 1 tablet (1 mg total) by mouth once daily.  Dispense: 60 tablet; Refill: 2  As above. Denies any alcohol for the past month  Other ascites  As above. With hx of hepatomegaly in a heavy drinker, metabolic abnormalities needs urgent labs etcs with paracentesis.

## 2024-01-31 NOTE — ED NOTES
Patient made aware of urine order. States unable to void at this time.  Provided with urine cup and call light. Instructed to call out when able to void. VU.  Will check back shortly.

## 2024-01-31 NOTE — PROCEDURES
Radiology Post-Procedure Note    Pre Op Diagnosis: Ascites  Post Op Diagnosis: Same    Procedure: Ultrasound Guided Paracentesis    Procedure performed by: Buffy Dumont PA-C    Written Informed Consent Obtained: Yes  Specimen Removed: YES   Estimated Blood Loss: Minimal    Findings:   Successful paracentesis.  3950 ml clear yellow fluid removed.  Albumin was not administered     Patient tolerated procedure well.    Buffy Dumont PA-C

## 2024-01-31 NOTE — ASSESSMENT & PLAN NOTE
Elevated on presentation.    Plan:  - acute elevation potentially 2/2 to pain, discomfort  - consider initiation of medication when appropriate.
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
## 2024-01-31 NOTE — PHARMACY MED REC
"Admission Medication History     The home medication history was taken by Bsuhra Tabares CPhT.    Medication history obtained from, Patient Verified    You may go to "Admission" then "Reconcile Home Medications" tabs to review and/or act upon these items.     The home medication list has been updated by the Pharmacy department.   Please read ALL comments highlighted in yellow.   Please address this information as you see fit.    Feel free to contact us if you have any questions or require assistance.      The medications listed below were removed from the home medication list.  Please reorder if appropriate:  Patient reports no longer taking the following medication(s):  Metoclopramide 10 mg  Topiramate 50 mg        Bushra Tabares CPhT.  Ext [phone]               .          "
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
## 2024-02-01 ENCOUNTER — CLINICAL SUPPORT (OUTPATIENT)
Dept: SMOKING CESSATION | Facility: CLINIC | Age: 54
End: 2024-02-01

## 2024-02-01 DIAGNOSIS — F17.210 CIGARETTE SMOKER: Primary | ICD-10-CM

## 2024-02-01 LAB
ALBUMIN SERPL BCP-MCNC: 1.7 G/DL (ref 3.5–5.2)
ALP SERPL-CCNC: 121 U/L (ref 55–135)
ALT SERPL W/O P-5'-P-CCNC: 9 U/L (ref 10–44)
ANION GAP SERPL CALC-SCNC: 10 MMOL/L (ref 8–16)
ANION GAP SERPL CALC-SCNC: 12 MMOL/L (ref 8–16)
ASCENDING AORTA: 3.07 CM
AST SERPL-CCNC: 29 U/L (ref 10–40)
AV INDEX (PROSTH): 0.67
AV MEAN GRADIENT: 6 MMHG
AV PEAK GRADIENT: 13 MMHG
AV VALVE AREA BY VELOCITY RATIO: 1.39 CM²
AV VALVE AREA: 1.58 CM²
AV VELOCITY RATIO: 0.58
BASOPHILS # BLD AUTO: 0.03 K/UL (ref 0–0.2)
BASOPHILS NFR BLD: 0.5 % (ref 0–1.9)
BILIRUB SERPL-MCNC: 1 MG/DL (ref 0.1–1)
BSA FOR ECHO PROCEDURE: 1.82 M2
BUN SERPL-MCNC: 11 MG/DL (ref 6–20)
BUN SERPL-MCNC: 11 MG/DL (ref 6–20)
CALCIUM SERPL-MCNC: 7.7 MG/DL (ref 8.7–10.5)
CALCIUM SERPL-MCNC: 7.9 MG/DL (ref 8.7–10.5)
CHLORIDE SERPL-SCNC: 105 MMOL/L (ref 95–110)
CHLORIDE SERPL-SCNC: 109 MMOL/L (ref 95–110)
CO2 SERPL-SCNC: 20 MMOL/L (ref 23–29)
CO2 SERPL-SCNC: 23 MMOL/L (ref 23–29)
CREAT SERPL-MCNC: 0.7 MG/DL (ref 0.5–1.4)
CREAT SERPL-MCNC: 0.8 MG/DL (ref 0.5–1.4)
CV ECHO LV RWT: 0.5 CM
DIFFERENTIAL METHOD BLD: ABNORMAL
DOP CALC AO PEAK VEL: 1.78 M/S
DOP CALC AO VTI: 25.4 CM
DOP CALC LVOT AREA: 2.4 CM2
DOP CALC LVOT DIAMETER: 1.74 CM
DOP CALC LVOT PEAK VEL: 1.04 M/S
DOP CALC LVOT STROKE VOLUME: 40.17 CM3
DOP CALC MV VTI: 18.4 CM
DOP CALCLVOT PEAK VEL VTI: 16.9 CM
E WAVE DECELERATION TIME: 76.17 MSEC
E/A RATIO: 0.54
E/E' RATIO: 5.9 M/S
ECHO LV POSTERIOR WALL: 0.98 CM (ref 0.6–1.1)
EOSINOPHIL # BLD AUTO: 0.1 K/UL (ref 0–0.5)
EOSINOPHIL NFR BLD: 2.3 % (ref 0–8)
ERYTHROCYTE [DISTWIDTH] IN BLOOD BY AUTOMATED COUNT: 14.8 % (ref 11.5–14.5)
EST. GFR  (NO RACE VARIABLE): >60 ML/MIN/1.73 M^2
EST. GFR  (NO RACE VARIABLE): >60 ML/MIN/1.73 M^2
FRACTIONAL SHORTENING: 33 % (ref 28–44)
GLUCOSE SERPL-MCNC: 79 MG/DL (ref 70–110)
GLUCOSE SERPL-MCNC: 90 MG/DL (ref 70–110)
HCT VFR BLD AUTO: 28.7 % (ref 37–48.5)
HGB BLD-MCNC: 9.1 G/DL (ref 12–16)
IMM GRANULOCYTES # BLD AUTO: 0.03 K/UL (ref 0–0.04)
IMM GRANULOCYTES NFR BLD AUTO: 0.5 % (ref 0–0.5)
INTERVENTRICULAR SEPTUM: 1.09 CM (ref 0.6–1.1)
LA MAJOR: 4.45 CM
LA MINOR: 4.56 CM
LA WIDTH: 2.9 CM
LEFT ATRIUM SIZE: 3.59 CM
LEFT ATRIUM VOLUME INDEX MOD: 17.5 ML/M2
LEFT ATRIUM VOLUME INDEX: 22.1 ML/M2
LEFT ATRIUM VOLUME MOD: 31.42 CM3
LEFT ATRIUM VOLUME: 39.86 CM3
LEFT INTERNAL DIMENSION IN SYSTOLE: 2.62 CM (ref 2.1–4)
LEFT VENTRICLE DIASTOLIC VOLUME INDEX: 36.61 ML/M2
LEFT VENTRICLE DIASTOLIC VOLUME: 65.9 ML
LEFT VENTRICLE MASS INDEX: 71 G/M2
LEFT VENTRICLE SYSTOLIC VOLUME INDEX: 13.9 ML/M2
LEFT VENTRICLE SYSTOLIC VOLUME: 24.96 ML
LEFT VENTRICULAR INTERNAL DIMENSION IN DIASTOLE: 3.9 CM (ref 3.5–6)
LEFT VENTRICULAR MASS: 128.28 G
LV LATERAL E/E' RATIO: 4.54 M/S
LV SEPTAL E/E' RATIO: 8.43 M/S
LVOT MG: 2.35 MMHG
LVOT MV: 0.73 CM/S
LYMPHOCYTES # BLD AUTO: 1.7 K/UL (ref 1–4.8)
LYMPHOCYTES NFR BLD: 27.4 % (ref 18–48)
MAGNESIUM SERPL-MCNC: 1.5 MG/DL (ref 1.6–2.6)
MCH RBC QN AUTO: 30.3 PG (ref 27–31)
MCHC RBC AUTO-ENTMCNC: 31.7 G/DL (ref 32–36)
MCV RBC AUTO: 96 FL (ref 82–98)
MONOCYTES # BLD AUTO: 0.4 K/UL (ref 0.3–1)
MONOCYTES NFR BLD: 6.6 % (ref 4–15)
MV MEAN GRADIENT: 2 MMHG
MV PEAK A VEL: 1.09 M/S
MV PEAK E VEL: 0.59 M/S
MV PEAK GRADIENT: 5 MMHG
MV STENOSIS PRESSURE HALF TIME: 22.58 MS
MV VALVE AREA BY CONTINUITY EQUATION: 2.18 CM2
MV VALVE AREA P 1/2 METHOD: 9.74 CM2
NEUTROPHILS # BLD AUTO: 3.9 K/UL (ref 1.8–7.7)
NEUTROPHILS NFR BLD: 62.7 % (ref 38–73)
NRBC BLD-RTO: 0 /100 WBC
OHS LV EJECTION FRACTION SIMPSONS BIPLANE MOD: 67 %
PHOSPHATE SERPL-MCNC: 4.3 MG/DL (ref 2.7–4.5)
PISA TR MAX VEL: 2.54 M/S
PLATELET # BLD AUTO: 193 K/UL (ref 150–450)
PMV BLD AUTO: 9 FL (ref 9.2–12.9)
POTASSIUM SERPL-SCNC: 3.8 MMOL/L (ref 3.5–5.1)
POTASSIUM SERPL-SCNC: 4 MMOL/L (ref 3.5–5.1)
PROT SERPL-MCNC: 4.4 G/DL (ref 6–8.4)
PULM VEIN S/D RATIO: 0.8
PV PEAK D VEL: 0.61 M/S
PV PEAK GRADIENT: 6 MMHG
PV PEAK S VEL: 0.49 M/S
PV PEAK VELOCITY: 1.18 M/S
RA MAJOR: 3.59 CM
RA PRESSURE ESTIMATED: 3 MMHG
RA WIDTH: 2.8 CM
RBC # BLD AUTO: 3 M/UL (ref 4–5.4)
RV TB RVSP: 6 MMHG
RV TISSUE DOPPLER FREE WALL SYSTOLIC VELOCITY 1 (APICAL 4 CHAMBER VIEW): 17.38 CM/S
SINUS: 3.12 CM
SODIUM SERPL-SCNC: 139 MMOL/L (ref 136–145)
SODIUM SERPL-SCNC: 140 MMOL/L (ref 136–145)
STJ: 2.36 CM
TDI LATERAL: 0.13 M/S
TDI SEPTAL: 0.07 M/S
TDI: 0.1 M/S
TR MAX PG: 26 MMHG
TRICUSPID ANNULAR PLANE SYSTOLIC EXCURSION: 1.59 CM
TV REST PULMONARY ARTERY PRESSURE: 29 MMHG
WBC # BLD AUTO: 6.2 K/UL (ref 3.9–12.7)
Z-SCORE OF LEFT VENTRICULAR DIMENSION IN END DIASTOLE: -2.44
Z-SCORE OF LEFT VENTRICULAR DIMENSION IN END SYSTOLE: -1.27

## 2024-02-01 PROCEDURE — 80048 BASIC METABOLIC PNL TOTAL CA: CPT | Mod: XB

## 2024-02-01 PROCEDURE — 63600175 PHARM REV CODE 636 W HCPCS

## 2024-02-01 PROCEDURE — 25000003 PHARM REV CODE 250

## 2024-02-01 PROCEDURE — 11000001 HC ACUTE MED/SURG PRIVATE ROOM

## 2024-02-01 PROCEDURE — 99406 BEHAV CHNG SMOKING 3-10 MIN: CPT | Mod: S$GLB,,,

## 2024-02-01 PROCEDURE — 84100 ASSAY OF PHOSPHORUS: CPT

## 2024-02-01 PROCEDURE — 80053 COMPREHEN METABOLIC PANEL: CPT

## 2024-02-01 PROCEDURE — 51798 US URINE CAPACITY MEASURE: CPT

## 2024-02-01 PROCEDURE — 85025 COMPLETE CBC W/AUTO DIFF WBC: CPT

## 2024-02-01 PROCEDURE — 36415 COLL VENOUS BLD VENIPUNCTURE: CPT

## 2024-02-01 PROCEDURE — 83735 ASSAY OF MAGNESIUM: CPT

## 2024-02-01 PROCEDURE — 63600175 PHARM REV CODE 636 W HCPCS: Performed by: HOSPITALIST

## 2024-02-01 RX ORDER — FUROSEMIDE 10 MG/ML
100 INJECTION INTRAMUSCULAR; INTRAVENOUS DAILY
Status: DISCONTINUED | OUTPATIENT
Start: 2024-02-01 | End: 2024-02-02

## 2024-02-01 RX ORDER — MAGNESIUM SULFATE 1 G/100ML
1 INJECTION INTRAVENOUS
Status: DISCONTINUED | OUTPATIENT
Start: 2024-02-01 | End: 2024-02-01

## 2024-02-01 RX ORDER — FUROSEMIDE 10 MG/ML
100 INJECTION INTRAMUSCULAR; INTRAVENOUS ONCE
Status: COMPLETED | OUTPATIENT
Start: 2024-02-01 | End: 2024-02-01

## 2024-02-01 RX ORDER — FUROSEMIDE 10 MG/ML
60 INJECTION INTRAMUSCULAR; INTRAVENOUS ONCE
Status: DISCONTINUED | OUTPATIENT
Start: 2024-02-01 | End: 2024-02-01

## 2024-02-01 RX ORDER — MAGNESIUM SULFATE HEPTAHYDRATE 40 MG/ML
2 INJECTION, SOLUTION INTRAVENOUS ONCE
Status: COMPLETED | OUTPATIENT
Start: 2024-02-01 | End: 2024-02-01

## 2024-02-01 RX ADMIN — FOLIC ACID 1 MG: 1 TABLET ORAL at 08:02

## 2024-02-01 RX ADMIN — MAGNESIUM SULFATE HEPTAHYDRATE 2 G: 40 INJECTION, SOLUTION INTRAVENOUS at 06:02

## 2024-02-01 RX ADMIN — FUROSEMIDE 100 MG: 10 INJECTION, SOLUTION INTRAMUSCULAR; INTRAVENOUS at 08:02

## 2024-02-01 RX ADMIN — PREGABALIN 100 MG: 50 CAPSULE ORAL at 08:02

## 2024-02-01 RX ADMIN — FUROSEMIDE 100 MG: 10 INJECTION, SOLUTION INTRAMUSCULAR; INTRAVENOUS at 04:02

## 2024-02-01 RX ADMIN — TRAZODONE HYDROCHLORIDE 100 MG: 100 TABLET ORAL at 08:02

## 2024-02-01 RX ADMIN — SPIRONOLACTONE 100 MG: 25 TABLET, FILM COATED ORAL at 08:02

## 2024-02-01 RX ADMIN — HYDROCODONE BITARTRATE AND ACETAMINOPHEN 1 TABLET: 5; 325 TABLET ORAL at 11:02

## 2024-02-01 NOTE — NURSING
MD okay with doing and in and out cath. The only output was 20 ml of dark urine; unable to complete, because pt complaining of pain. Pt okay with trying later with am nurse.  On coming nurse made aware. MD notified.
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
## 2024-02-01 NOTE — ASSESSMENT & PLAN NOTE
S/p approx 4L IR removal of ascites  LFTs within normal limits.  Alcohol level <10 in ED.  Patient reports last drink approx 1 week ago.    Plan:  - Paracentesis ascites labs pending.  - Lab results leading to potentially more likely liver etiology such as portal HTN and less likely cardiac or kidney etiology.  - Echo Pending.  - UA with 1+ protein but otherwise unremarkable.  - Liver US w/ doppler pending.  - Continue to monitor symptoms.  - Initiate diuresis. Spironolactone and furosemide.  - Monitor K on labs.  - Pain: PRNs  - Nausea: scopolamine patch and PRN zofran  - Continue alcohol cessation counseling.

## 2024-02-01 NOTE — SUBJECTIVE & OBJECTIVE
Interval History: Patient with limited UOP after 40mg lasix x1. Approx 200cc UOP after 100mg lasix this AM. Edema fairly unchanged on morning exam. Patient remains hemodynamically stable.    Review of Systems   Constitutional:  Negative for chills and fever.   HENT:  Negative for congestion and sore throat.    Respiratory:  Negative for cough and shortness of breath.    Cardiovascular:  Positive for leg swelling. Negative for chest pain and palpitations.   Gastrointestinal:  Positive for abdominal pain. Negative for abdominal distention, constipation, diarrhea, nausea and vomiting.   Genitourinary:  Negative for difficulty urinating and dysuria.   Musculoskeletal:  Negative for arthralgias and joint swelling.   Skin:  Negative for color change and pallor.   Neurological:  Positive for light-headedness. Negative for dizziness and headaches.   Psychiatric/Behavioral:  Negative for agitation and confusion.      Objective:     Vital Signs (Most Recent):  Temp: 97.8 °F (36.6 °C) (02/01/24 1140)  Pulse: 100 (02/01/24 1140)  Resp: 18 (02/01/24 1140)  BP: 123/71 (02/01/24 1140)  SpO2: 99 % (02/01/24 1140) Vital Signs (24h Range):  Temp:  [97.5 °F (36.4 °C)-98.1 °F (36.7 °C)] 97.8 °F (36.6 °C)  Pulse:  [] 100  Resp:  [12-21] 18  SpO2:  [95 %-100 %] 99 %  BP: ()/(58-95) 123/71     Weight: 70.8 kg (156 lb)  Body mass index is 25.18 kg/m².    Intake/Output Summary (Last 24 hours) at 2/1/2024 1205  Last data filed at 2/1/2024 1055  Gross per 24 hour   Intake --   Output 4350 ml   Net -4350 ml         Physical Exam  Vitals and nursing note reviewed.   Constitutional:       General: She is not in acute distress.     Appearance: Normal appearance. She is normal weight. She is not ill-appearing, toxic-appearing or diaphoretic.   HENT:      Head: Normocephalic.      Right Ear: External ear normal.      Left Ear: External ear normal.      Nose: Nose normal.      Mouth/Throat:      Pharynx: Oropharynx is clear.   Eyes:       Extraocular Movements: Extraocular movements intact.   Cardiovascular:      Rate and Rhythm: Regular rhythm. Tachycardia present.      Pulses: Normal pulses.      Heart sounds: Normal heart sounds.   Pulmonary:      Effort: Pulmonary effort is normal. No respiratory distress.      Breath sounds: Normal breath sounds. No wheezing, rhonchi or rales.      Comments: No clinical signs of increased work of breathing, including patient speaking in complete sentences, no accessory muscle use, or tripoding.  Abdominal:      General: Abdomen is flat. There is distension (increased from previous exam).      Palpations: Abdomen is soft.      Tenderness: There is abdominal tenderness (diffuse, improved from previous exam). There is no guarding or rebound.   Musculoskeletal:         General: Normal range of motion.      Cervical back: Normal range of motion.      Right lower leg: Edema present.      Left lower leg: Edema present.      Comments: Significant 2+/3+ pitting edema bilateral lower extremities   Neurological:      General: No focal deficit present.      Mental Status: She is alert and oriented to person, place, and time. Mental status is at baseline.   Psychiatric:         Mood and Affect: Mood normal.         Behavior: Behavior normal.         Thought Content: Thought content normal.             Significant Labs: All pertinent labs within the past 24 hours have been reviewed.  CBC:   Recent Labs   Lab 01/31/24  1134 02/01/24  0355   WBC 10.75 6.20   HGB 11.4* 9.1*   HCT 36.1* 28.7*    193     CMP:   Recent Labs   Lab 01/31/24  1134 02/01/24  0355    139   K 4.0 4.0    109   CO2 20* 20*   GLU 92 79   BUN 12 11   CREATININE 0.7 0.7   CALCIUM 8.3* 7.7*   PROT 6.1 4.4*   ALBUMIN 2.4* 1.7*   BILITOT 1.2* 1.0   ALKPHOS 171* 121   AST 38 29   ALT 15 9*   ANIONGAP 12 10       Significant Imaging: I have reviewed all pertinent imaging results/findings within the past 24 hours.
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
## 2024-02-01 NOTE — PLAN OF CARE
01/31/24 2036   Admission   Initial VN Admission Questions Complete   Communication Issues? None   Shift   Virtual Nurse - Rounding Complete   Pain Management Interventions pain management plan reviewed with patient/caregiver   Virtual Nurse - Patient Verbalized Approval Of Camera Use;VN Rounding   Type of Frequent Check   Type Patient Rounds;Telemetry Monitoring   Safety/Activity   Patient Rounds bed in low position;call light in patient/parent reach;clutter free environment maintained;bed wheels locked;visualized patient;ID band on   Safety Promotion/Fall Prevention assistive device/personal item within reach;instructed to call staff for mobility;medications reviewed;side rails raised x 2   Positioning   Body Position supine   Head of Bed (HOB) Positioning HOB elevated   Pain/Comfort/Sleep   Comfort/Acceptable Pain Level 2   Cardiac   Cardiac/Telemetry Monitor On Yes     Admission questions completed. Introduced patient to VIP model, patient verbalized understanding. Educated patient on fall prevention protocol, updated on plan of care. Opportunity given for pt's questions. All questions answered. Denies needs at this time

## 2024-02-01 NOTE — PLAN OF CARE
CM met with pt - lives with  Des Parker  [phone]   Pt has a rollator, SC, WC and [place]     to transport pt to home at d/c    No HH prior to admit     Pharmacy- [address]  - able to buy food   dx:  Ascites, SOB     Paracentesis -- ascites labs pending.      02/01/24 1210   Discharge Assessment   Assessment Type Discharge Planning Brief Assessment   Confirmed/corrected address, phone number and insurance Yes   Confirmed Demographics Correct on Facesheet   Source of Information patient;health record   Communicated YUN with patient/caregiver Yes   Reason For Admission Ascites   People in Home spouse;child(melvina), adult  ( Des Parker  [phone]; adult son)   Do you expect to return to your current living situation? Yes   Current cognitive status: Alert/Oriented   Walking or Climbing Stairs Difficulty yes   Walking or Climbing Stairs ambulation difficulty, requires equipment   Home Accessibility wheelchair accessible   Equipment Currently Used at Home rollator;shower chair;wheelchair;cane, straight   Readmission within 30 days? No   Patient currently being followed by outpatient case management? No   Do you currently have service(s) that help you manage your care at home? No   Do you take prescription medications? Yes  ([facility])   Do you have any problems affording any of your prescribed medications? No   Is the patient taking medications as prescribed? yes   How do you get to doctors appointments? family or friend will provide  ( Des)   Are you on dialysis? No   Do you take coumadin? No   Discharge Plan A Home;Home with family   Discharge Plan B Home;Home with family;Home Health   DME Needed Upon Discharge  none   Discharge Plan discussed with: Patient   Transition of Care Barriers None   Physical Activity   On average, how many days per week do you engage in moderate to strenuous exercise (like a brisk walk)? 0 days   Financial Resource Strain   How hard  is it for you to pay for the very basics like food, housing, medical care, and heating? Somewhat  (somewhat hard to pay mortgage - resources for housing added to AVS)   Housing Stability   In the last 12 months, was there a time when you were not able to pay the mortgage or rent on time? Y   Transportation Needs   In the past 12 months, has lack of transportation kept you from medical appointments or from getting medications? no   Social Connections   In a typical week, how many times do you talk on the phone with family, friends, or neighbors? More than 3   How often do you get together with friends or relatives? Never  (pt states she is homebound)   Do you belong to any clubs or organizations such as Taoism groups, unions, fraternal or athletic groups, or school groups? No   How often do you attend meetings of the clubs or organizations you belong to? Never   Are you , , , , never , or living with a partner?    Alcohol Use   Q1: How often do you have a drink containing alcohol? Never

## 2024-02-02 PROBLEM — K70.31 ALCOHOLIC CIRRHOSIS OF LIVER WITH ASCITES: Status: ACTIVE | Noted: 2024-02-02

## 2024-02-02 PROBLEM — K76.6 PORTAL HYPERTENSION: Status: ACTIVE | Noted: 2024-02-02

## 2024-02-02 LAB
ALBUMIN SERPL BCP-MCNC: 1.8 G/DL (ref 3.5–5.2)
ALP SERPL-CCNC: 132 U/L (ref 55–135)
ALT SERPL W/O P-5'-P-CCNC: 9 U/L (ref 10–44)
ANION GAP SERPL CALC-SCNC: 12 MMOL/L (ref 8–16)
AST SERPL-CCNC: 33 U/L (ref 10–40)
BASOPHILS # BLD AUTO: 0.04 K/UL (ref 0–0.2)
BASOPHILS NFR BLD: 0.7 % (ref 0–1.9)
BILIRUB SERPL-MCNC: 0.8 MG/DL (ref 0.1–1)
BUN SERPL-MCNC: 11 MG/DL (ref 6–20)
CALCIUM SERPL-MCNC: 7.6 MG/DL (ref 8.7–10.5)
CHLORIDE SERPL-SCNC: 106 MMOL/L (ref 95–110)
CO2 SERPL-SCNC: 22 MMOL/L (ref 23–29)
CREAT SERPL-MCNC: 0.8 MG/DL (ref 0.5–1.4)
DIFFERENTIAL METHOD BLD: ABNORMAL
EOSINOPHIL # BLD AUTO: 0.1 K/UL (ref 0–0.5)
EOSINOPHIL NFR BLD: 0.8 % (ref 0–8)
ERYTHROCYTE [DISTWIDTH] IN BLOOD BY AUTOMATED COUNT: 14.7 % (ref 11.5–14.5)
EST. GFR  (NO RACE VARIABLE): >60 ML/MIN/1.73 M^2
GLUCOSE SERPL-MCNC: 76 MG/DL (ref 70–110)
HCT VFR BLD AUTO: 30.2 % (ref 37–48.5)
HGB BLD-MCNC: 9.9 G/DL (ref 12–16)
IMM GRANULOCYTES # BLD AUTO: 0.01 K/UL (ref 0–0.04)
IMM GRANULOCYTES NFR BLD AUTO: 0.2 % (ref 0–0.5)
LYMPHOCYTES # BLD AUTO: 1.8 K/UL (ref 1–4.8)
LYMPHOCYTES NFR BLD: 28.9 % (ref 18–48)
MAGNESIUM SERPL-MCNC: 1.7 MG/DL (ref 1.6–2.6)
MCH RBC QN AUTO: 31.1 PG (ref 27–31)
MCHC RBC AUTO-ENTMCNC: 32.8 G/DL (ref 32–36)
MCV RBC AUTO: 95 FL (ref 82–98)
MONOCYTES # BLD AUTO: 0.4 K/UL (ref 0.3–1)
MONOCYTES NFR BLD: 7.2 % (ref 4–15)
NEUTROPHILS # BLD AUTO: 3.8 K/UL (ref 1.8–7.7)
NEUTROPHILS NFR BLD: 62.2 % (ref 38–73)
NRBC BLD-RTO: 0 /100 WBC
PHOSPHATE SERPL-MCNC: 4.7 MG/DL (ref 2.7–4.5)
PLATELET # BLD AUTO: 222 K/UL (ref 150–450)
PMV BLD AUTO: 9.3 FL (ref 9.2–12.9)
POTASSIUM SERPL-SCNC: 3.5 MMOL/L (ref 3.5–5.1)
PROT SERPL-MCNC: 4.7 G/DL (ref 6–8.4)
RBC # BLD AUTO: 3.18 M/UL (ref 4–5.4)
SODIUM SERPL-SCNC: 140 MMOL/L (ref 136–145)
WBC # BLD AUTO: 6.08 K/UL (ref 3.9–12.7)

## 2024-02-02 PROCEDURE — 63600175 PHARM REV CODE 636 W HCPCS

## 2024-02-02 PROCEDURE — 80053 COMPREHEN METABOLIC PANEL: CPT

## 2024-02-02 PROCEDURE — A4216 STERILE WATER/SALINE, 10 ML: HCPCS

## 2024-02-02 PROCEDURE — 83735 ASSAY OF MAGNESIUM: CPT

## 2024-02-02 PROCEDURE — 85025 COMPLETE CBC W/AUTO DIFF WBC: CPT

## 2024-02-02 PROCEDURE — 25000003 PHARM REV CODE 250

## 2024-02-02 PROCEDURE — 11000001 HC ACUTE MED/SURG PRIVATE ROOM

## 2024-02-02 PROCEDURE — 36415 COLL VENOUS BLD VENIPUNCTURE: CPT

## 2024-02-02 PROCEDURE — 84100 ASSAY OF PHOSPHORUS: CPT

## 2024-02-02 RX ORDER — LIDOCAINE 50 MG/G
1 PATCH TOPICAL ONCE
Status: COMPLETED | OUTPATIENT
Start: 2024-02-02 | End: 2024-02-02

## 2024-02-02 RX ORDER — FUROSEMIDE 10 MG/ML
80 INJECTION INTRAMUSCULAR; INTRAVENOUS EVERY 12 HOURS
Status: DISCONTINUED | OUTPATIENT
Start: 2024-02-02 | End: 2024-02-03 | Stop reason: HOSPADM

## 2024-02-02 RX ORDER — METOLAZONE 2.5 MG/1
5 TABLET ORAL DAILY
Status: DISCONTINUED | OUTPATIENT
Start: 2024-02-02 | End: 2024-02-02

## 2024-02-02 RX ORDER — HYDROCODONE BITARTRATE AND ACETAMINOPHEN 5; 325 MG/1; MG/1
1 TABLET ORAL EVERY 8 HOURS PRN
Status: DISCONTINUED | OUTPATIENT
Start: 2024-02-02 | End: 2024-02-03 | Stop reason: HOSPADM

## 2024-02-02 RX ORDER — POTASSIUM CHLORIDE 750 MG/1
30 TABLET, EXTENDED RELEASE ORAL ONCE
Status: COMPLETED | OUTPATIENT
Start: 2024-02-02 | End: 2024-02-02

## 2024-02-02 RX ORDER — MAGNESIUM SULFATE HEPTAHYDRATE 40 MG/ML
2 INJECTION, SOLUTION INTRAVENOUS
Status: COMPLETED | OUTPATIENT
Start: 2024-02-02 | End: 2024-02-02

## 2024-02-02 RX ADMIN — MAGNESIUM SULFATE HEPTAHYDRATE 2 G: 40 INJECTION, SOLUTION INTRAVENOUS at 12:02

## 2024-02-02 RX ADMIN — Medication 5 ML: at 09:02

## 2024-02-02 RX ADMIN — METOLAZONE 5 MG: 2.5 TABLET ORAL at 09:02

## 2024-02-02 RX ADMIN — SPIRONOLACTONE 100 MG: 25 TABLET, FILM COATED ORAL at 09:02

## 2024-02-02 RX ADMIN — POTASSIUM CHLORIDE 30 MEQ: 750 TABLET, EXTENDED RELEASE ORAL at 08:02

## 2024-02-02 RX ADMIN — MAGNESIUM SULFATE HEPTAHYDRATE 2 G: 40 INJECTION, SOLUTION INTRAVENOUS at 09:02

## 2024-02-02 RX ADMIN — PREGABALIN 100 MG: 50 CAPSULE ORAL at 08:02

## 2024-02-02 RX ADMIN — FUROSEMIDE 80 MG: 10 INJECTION, SOLUTION INTRAVENOUS at 08:02

## 2024-02-02 RX ADMIN — FUROSEMIDE 100 MG: 10 INJECTION, SOLUTION INTRAMUSCULAR; INTRAVENOUS at 09:02

## 2024-02-02 RX ADMIN — TRAZODONE HYDROCHLORIDE 100 MG: 100 TABLET ORAL at 08:02

## 2024-02-02 RX ADMIN — HYDROCODONE BITARTRATE AND ACETAMINOPHEN 1 TABLET: 5; 325 TABLET ORAL at 09:02

## 2024-02-02 RX ADMIN — LIDOCAINE 1 PATCH: 50 PATCH CUTANEOUS at 06:02

## 2024-02-02 RX ADMIN — HYDROCODONE BITARTRATE AND ACETAMINOPHEN 1 TABLET: 5; 325 TABLET ORAL at 08:02

## 2024-02-02 RX ADMIN — FOLIC ACID 1 MG: 1 TABLET ORAL at 08:02

## 2024-02-02 NOTE — PROGRESS NOTES
Future Appointments   Date Time Provider Department Center   2/8/2024  1:00 PM Manjit Wynn, [facility]   2/20/2024  9:00 AM Jonny Soni MD [facility]     Chart reviewed / case discussed in am MDR   No d/c today   f/u apts with pcp and Hepatology in place - dx:  decompensated cirrhosis with portal hypertension and ascites.    lives with  Des Parker  [phone]   Pt has a rollator, SC, WC and [place]     to transport pt to home at d/c    No HH prior to admit     Pharmacy- [facility] - [address]  - able to buy food

## 2024-02-02 NOTE — PROGRESS NOTES
[facility] Medicine  Progress Note    Patient Name: Ember Rivera  MRN: [id number]  Patient Class: IP- Inpatient   Admission Date: 1/31/2024  Length of Stay: 2 days  Attending Physician: Miles Berman,*  Primary Care Provider: Young Neil MD        Subjective:     Principal Problem:Portal hypertension        HPI:  Patient is a 53 yo F w/ PMHx of HTN, Hepatic steatosis, GERD, alcohol use disorder. Presented to the ED with abdominal distention and pain. Also endorsed shortness of breath from the abdominal pressure as well as lower extremity swelling. Onset of symptoms after recently discharged from [facility] in which she had a lap milagros on 1/16. Denies fever, chills, chest pain, cough, headache.    In the ED, initial vital signs /100, , Temp 97.9F, SpO2 99% on room air. Labs include CBC with stable H/H 11.4/36.1 and WBC within normal limits 10.75. CMP with stable elevtrolytes and BUN/Cr 12/0.7 (baseline Cr < 1.0). PT-INR 13.6-1.3. BNP and troponin within normal limits, 48 and <0.006, respectively. CT Abd/Pelvis with . EKG with sinus tachycardia and Qtc interval 463. IR consulted and performed paracentesis resulting in removal of just under 4L of yellow fluid. [facility] consulted for evaluation for admission for ascites.    Overview/Hospital Course:  No notes on file    Interval History: No adverse events overnight. Continued UOP with diuresis.    Review of Systems   Constitutional:  Negative for chills and fever.   HENT:  Negative for congestion and sore throat.    Respiratory:  Negative for cough and shortness of breath.    Cardiovascular:  Positive for leg swelling. Negative for chest pain and palpitations.   Gastrointestinal:  Positive for abdominal pain. Negative for abdominal distention, constipation, diarrhea, nausea and vomiting.   Genitourinary:  Negative for difficulty urinating and dysuria.   Musculoskeletal:  Negative for arthralgias and joint swelling.   Skin:   Negative for color change and pallor.   Neurological:  Negative for dizziness, light-headedness and headaches.   Psychiatric/Behavioral:  Negative for agitation and confusion.      Objective:     Vital Signs (Most Recent):  Temp: 98.6 °F (37 °C) (02/02/24 0719)  Pulse: 86 (02/02/24 0719)  Resp: 18 (02/02/24 0900)  BP: 113/81 (02/02/24 0719)  SpO2: 97 % (02/02/24 0719) Vital Signs (24h Range):  Temp:  [97.1 °F (36.2 °C)-98.6 °F (37 °C)] 98.6 °F (37 °C)  Pulse:  [] 86  Resp:  [15-18] 18  SpO2:  [96 %-99 %] 97 %  BP: (110-140)/(64-88) 113/81     Weight: 70.8 kg (156 lb)  Body mass index is 25.18 kg/m².    Intake/Output Summary (Last 24 hours) at 2/2/2024 1133  Last data filed at 2/2/2024 1029  Gross per 24 hour   Intake 236 ml   Output 975 ml   Net -739 ml         Physical Exam  Vitals and nursing note reviewed.   Constitutional:       General: She is not in acute distress.     Appearance: Normal appearance. She is normal weight. She is not ill-appearing, toxic-appearing or diaphoretic.   HENT:      Head: Normocephalic.      Right Ear: External ear normal.      Left Ear: External ear normal.      Nose: Nose normal.      Mouth/Throat:      Pharynx: Oropharynx is clear.   Eyes:      Extraocular Movements: Extraocular movements intact.   Cardiovascular:      Rate and Rhythm: Regular rhythm. Tachycardia present.      Pulses: Normal pulses.      Heart sounds: Normal heart sounds.   Pulmonary:      Effort: Pulmonary effort is normal. No respiratory distress.      Breath sounds: Normal breath sounds. No wheezing, rhonchi or rales.      Comments: No clinical signs of increased work of breathing, including patient speaking in complete sentences, no accessory muscle use, or tripoding.  Abdominal:      General: Abdomen is flat. There is distension (stable to mild improvement).      Palpations: Abdomen is soft.      Tenderness: There is abdominal tenderness (diffuse, improved). There is no guarding or rebound.    Musculoskeletal:         General: Normal range of motion.      Cervical back: Normal range of motion.      Right lower leg: Edema present.      Left lower leg: Edema present.      Comments: Significant 2+ pitting edema bilateral lower extremities. Improving   Neurological:      General: No focal deficit present.      Mental Status: She is alert and oriented to person, place, and time. Mental status is at baseline.   Psychiatric:         Mood and Affect: Mood normal.         Behavior: Behavior normal.         Thought Content: Thought content normal.             Significant Labs: All pertinent labs within the past 24 hours have been reviewed.  CBC:   Recent Labs   Lab 02/01/24  0355 02/02/24 0427   WBC 6.20 6.08   HGB 9.1* 9.9*   HCT 28.7* 30.2*    222     CMP:   Recent Labs   Lab 02/01/24  0355 02/01/24  1656 02/02/24 0427    140 140   K 4.0 3.8 3.5    105 106   CO2 20* 23 22*   GLU 79 90 76   BUN 11 11 11   CREATININE 0.7 0.8 0.8   CALCIUM 7.7* 7.9* 7.6*   PROT 4.4*  --  4.7*   ALBUMIN 1.7*  --  1.8*   BILITOT 1.0  --  0.8   ALKPHOS 121  --  132   AST 29  --  33   ALT 9*  --  9*   ANIONGAP 10 12 12       Significant Imaging: I have reviewed all pertinent imaging results/findings within the past 24 hours.    2/1/2024 - RUDDY    Left Ventricle: The left ventricle is normal in size. There is concentric remodeling. Normal wall motion. There is normal systolic function. Biplane (2D) method of discs ejection fraction is 67%. There is normal diastolic function.    Right Ventricle: Normal right ventricular cavity size. Systolic function is borderline low. TAPSE is 1.59 cm. TDI S' is 17 cm/s.    Aortic Valve: Aortic valve area by velocity is 1.39 cm². Aortic valve peak velocity is 1.78 m/s. Mean gradient is 6 mmHg.    Tricuspid Valve: There is mild regurgitation.    Pulmonary Artery: The estimated pulmonary artery systolic pressure is 29 mmHg.    IVC/SVC: Normal venous pressure at 3  mmHg.      ---      EXAMINATION:  US LIVER WITH DOPPLER    CLINICAL HISTORY:  concern for portal htn;    TECHNIQUE:  Complete abdominal ultrasound with doppler.  Color and spectral Doppler were performed.    COMPARISON:  None.    FINDINGS:  The visualized portion of the pancreas is unremarkable.    The liver is enlarged measuring 20 cm in length the liver demonstrates cirrhotic echotexture.  No focal hepatic lesions are seen.    The gallbladder absent the common duct is not dilated, measuring 5 mm.    The spleen is borderline enlarged in size measuring 13 cm with a homogeneous echotexture.    The aorta tapers normally.    The kidneys are normal in size without focal abnormality or evidence of hydronephrosis.    There is a small amount of abdominopelvic of ascites.    The main portal vein, right portal vein, left portal vein, middle hepatic vein, and IVC are patent with proper directional flow.  Right and left hepatic veins are not well visualized.  The main hepatic artery is patent.   Impression:       Cirrhosis, ascites and splenomegaly with the constellation of findings suggesting portal hypertension.    Liver Doppler is within normal limits, noting nonvisualization of the right and left hepatic veins.      Electronically signed by: Sage Shea Jr  Date: 02/02/2024  Time: 09:29       Assessment/Plan:      * Portal hypertension  Plan as in ascites.    Alcoholic cirrhosis of liver with ascites  Plan as in ascites.    Ascites  S/p approx 4L IR removal of ascites  LFTs within normal limits.  Alcohol level <10 in ED.  Patient reports last drink approx 1 week ago.    Plan:  - Paracentesis ascites lab results leading to potentially more likely liver etiology such as portal HTN and less likely cardiac or kidney etiology.  - 2/1 Echo w/ EF 67%. PA pressure 29mmHg.   - UA with 1+ protein but otherwise unremarkable.  - Liver US w/ doppler with cirrhosis, ascites, and splenomegaly with the constellation of findings  suggesting portal hypertension.  - Continue to monitor symptoms.  - Continue diuresis. Spironolactone and furosemide.  - Monitor K on labs.  - Pain: PRNs  - Nausea: scopolamine patch and PRN zofran  - Continue alcohol cessation counseling.  - GI-Hepatology referral outpatient.    Hepatic steatosis  Plan as in ascites.    Weakness of both legs  Plan:  - PT/OT consulted, appreciate recommendations.      Peripheral polyneuropathy        Alcohol-induced polyneuropathy  Plan:  - Continue home medications (lyrica).       HTN (hypertension)  Elevated on presentation.    Plan:  - acute elevation potentially 2/2 to pain, discomfort  - consider initiation of medication when appropriate.    Alcohol use disorder  Plan as in ascites.        VTE Risk Mitigation (From admission, onward)           Ordered     enoxaparin injection 40 mg  Daily         01/31/24 1734     IP VTE LOW RISK PATIENT  Once         01/31/24 1734     Place sequential compression device  Until discontinued         01/31/24 1734                    Discharge Planning   YUN:      Code Status: Full Code   Is the patient medically ready for discharge?:     Reason for patient still in hospital (select all that apply): Patient trending condition and Treatment  Discharge Plan A: Home, Home with family        ________________________  Venkatesh Collazo MD  [facility] PGY-2

## 2024-02-02 NOTE — NURSING
Pt complaining of abdominal discomfort. Assessed pt abdomen; tender to palpation and abdomen distention the same size as the start of the shift. Pain medication given.

## 2024-02-02 NOTE — NURSING
Pt complaining of abdominal discomfort. Assessed pt abdomen. Abdomen appears more distended, since start of shift. MD Mckeon notified.

## 2024-02-02 NOTE — SUBJECTIVE & OBJECTIVE
Interval History: No adverse events overnight. Continued UOP with diuresis.    Review of Systems   Constitutional:  Negative for chills and fever.   HENT:  Negative for congestion and sore throat.    Respiratory:  Negative for cough and shortness of breath.    Cardiovascular:  Positive for leg swelling. Negative for chest pain and palpitations.   Gastrointestinal:  Positive for abdominal pain. Negative for abdominal distention, constipation, diarrhea, nausea and vomiting.   Genitourinary:  Negative for difficulty urinating and dysuria.   Musculoskeletal:  Negative for arthralgias and joint swelling.   Skin:  Negative for color change and pallor.   Neurological:  Negative for dizziness, light-headedness and headaches.   Psychiatric/Behavioral:  Negative for agitation and confusion.      Objective:     Vital Signs (Most Recent):  Temp: 98.6 °F (37 °C) (02/02/24 0719)  Pulse: 86 (02/02/24 0719)  Resp: 18 (02/02/24 0900)  BP: 113/81 (02/02/24 0719)  SpO2: 97 % (02/02/24 0719) Vital Signs (24h Range):  Temp:  [97.1 °F (36.2 °C)-98.6 °F (37 °C)] 98.6 °F (37 °C)  Pulse:  [] 86  Resp:  [15-18] 18  SpO2:  [96 %-99 %] 97 %  BP: (110-140)/(64-88) 113/81     Weight: 70.8 kg (156 lb)  Body mass index is 25.18 kg/m².    Intake/Output Summary (Last 24 hours) at 2/2/2024 1133  Last data filed at 2/2/2024 1029  Gross per 24 hour   Intake 236 ml   Output 975 ml   Net -739 ml         Physical Exam  Vitals and nursing note reviewed.   Constitutional:       General: She is not in acute distress.     Appearance: Normal appearance. She is normal weight. She is not ill-appearing, toxic-appearing or diaphoretic.   HENT:      Head: Normocephalic.      Right Ear: External ear normal.      Left Ear: External ear normal.      Nose: Nose normal.      Mouth/Throat:      Pharynx: Oropharynx is clear.   Eyes:      Extraocular Movements: Extraocular movements intact.   Cardiovascular:      Rate and Rhythm: Regular rhythm. Tachycardia present.       Pulses: Normal pulses.      Heart sounds: Normal heart sounds.   Pulmonary:      Effort: Pulmonary effort is normal. No respiratory distress.      Breath sounds: Normal breath sounds. No wheezing, rhonchi or rales.      Comments: No clinical signs of increased work of breathing, including patient speaking in complete sentences, no accessory muscle use, or tripoding.  Abdominal:      General: Abdomen is flat. There is distension (stable to mild improvement).      Palpations: Abdomen is soft.      Tenderness: There is abdominal tenderness (diffuse, improved). There is no guarding or rebound.   Musculoskeletal:         General: Normal range of motion.      Cervical back: Normal range of motion.      Right lower leg: Edema present.      Left lower leg: Edema present.      Comments: Significant 2+ pitting edema bilateral lower extremities. Improving   Neurological:      General: No focal deficit present.      Mental Status: She is alert and oriented to person, place, and time. Mental status is at baseline.   Psychiatric:         Mood and Affect: Mood normal.         Behavior: Behavior normal.         Thought Content: Thought content normal.             Significant Labs: All pertinent labs within the past 24 hours have been reviewed.  CBC:   Recent Labs   Lab 02/01/24  0355 02/02/24  0427   WBC 6.20 6.08   HGB 9.1* 9.9*   HCT 28.7* 30.2*    222     CMP:   Recent Labs   Lab 02/01/24  0355 02/01/24  1656 02/02/24  0427    140 140   K 4.0 3.8 3.5    105 106   CO2 20* 23 22*   GLU 79 90 76   BUN 11 11 11   CREATININE 0.7 0.8 0.8   CALCIUM 7.7* 7.9* 7.6*   PROT 4.4*  --  4.7*   ALBUMIN 1.7*  --  1.8*   BILITOT 1.0  --  0.8   ALKPHOS 121  --  132   AST 29  --  33   ALT 9*  --  9*   ANIONGAP 10 12 12       Significant Imaging: I have reviewed all pertinent imaging results/findings within the past 24 hours.    2/1/2024 - RUDDY    Left Ventricle: The left ventricle is normal in size. There is concentric  remodeling. Normal wall motion. There is normal systolic function. Biplane (2D) method of discs ejection fraction is 67%. There is normal diastolic function.    Right Ventricle: Normal right ventricular cavity size. Systolic function is borderline low. TAPSE is 1.59 cm. TDI S' is 17 cm/s.    Aortic Valve: Aortic valve area by velocity is 1.39 cm². Aortic valve peak velocity is 1.78 m/s. Mean gradient is 6 mmHg.    Tricuspid Valve: There is mild regurgitation.    Pulmonary Artery: The estimated pulmonary artery systolic pressure is 29 mmHg.    IVC/SVC: Normal venous pressure at 3 mmHg.      ---      EXAMINATION:  US LIVER WITH DOPPLER    CLINICAL HISTORY:  concern for portal htn;    TECHNIQUE:  Complete abdominal ultrasound with doppler.  Color and spectral Doppler were performed.    COMPARISON:  None.    FINDINGS:  The visualized portion of the pancreas is unremarkable.    The liver is enlarged measuring 20 cm in length the liver demonstrates cirrhotic echotexture.  No focal hepatic lesions are seen.    The gallbladder absent the common duct is not dilated, measuring 5 mm.    The spleen is borderline enlarged in size measuring 13 cm with a homogeneous echotexture.    The aorta tapers normally.    The kidneys are normal in size without focal abnormality or evidence of hydronephrosis.    There is a small amount of abdominopelvic of ascites.    The main portal vein, right portal vein, left portal vein, middle hepatic vein, and IVC are patent with proper directional flow.  Right and left hepatic veins are not well visualized.  The main hepatic artery is patent.   Impression:       Cirrhosis, ascites and splenomegaly with the constellation of findings suggesting portal hypertension.    Liver Doppler is within normal limits, noting nonvisualization of the right and left hepatic veins.      Electronically signed by: Sage Shea Jr  Date: 02/02/2024  Time: 09:29

## 2024-02-02 NOTE — ASSESSMENT & PLAN NOTE
S/p approx 4L IR removal of ascites  LFTs within normal limits.  Alcohol level <10 in ED.  Patient reports last drink approx 1 week ago.    Plan:  - Paracentesis ascites lab results leading to potentially more likely liver etiology such as portal HTN and less likely cardiac or kidney etiology.  - 2/1 Echo w/ EF 67%. PA pressure 29mmHg.   - UA with 1+ protein but otherwise unremarkable.  - Liver US w/ doppler with cirrhosis, ascites, and splenomegaly with the constellation of findings suggesting portal hypertension.  - Continue to monitor symptoms.  - Continue diuresis. Spironolactone and furosemide.  - Monitor K on labs.  - Pain: PRNs  - Nausea: scopolamine patch and PRN zofran  - Continue alcohol cessation counseling.  - GI-Hepatology referral outpatient.

## 2024-02-03 VITALS
SYSTOLIC BLOOD PRESSURE: 104 MMHG | DIASTOLIC BLOOD PRESSURE: 66 MMHG | HEART RATE: 100 BPM | RESPIRATION RATE: 18 BRPM | HEIGHT: 66 IN | OXYGEN SATURATION: 97 % | WEIGHT: 146.63 LBS | TEMPERATURE: 98 F | BODY MASS INDEX: 23.57 KG/M2

## 2024-02-03 LAB
ALBUMIN SERPL BCP-MCNC: 1.8 G/DL (ref 3.5–5.2)
ALP SERPL-CCNC: 125 U/L (ref 55–135)
ALT SERPL W/O P-5'-P-CCNC: 9 U/L (ref 10–44)
ANION GAP SERPL CALC-SCNC: 10 MMOL/L (ref 8–16)
AST SERPL-CCNC: 31 U/L (ref 10–40)
BASOPHILS # BLD AUTO: 0.03 K/UL (ref 0–0.2)
BASOPHILS NFR BLD: 0.5 % (ref 0–1.9)
BILIRUB SERPL-MCNC: 0.6 MG/DL (ref 0.1–1)
BUN SERPL-MCNC: 11 MG/DL (ref 6–20)
CALCIUM SERPL-MCNC: 7.5 MG/DL (ref 8.7–10.5)
CHLORIDE SERPL-SCNC: 104 MMOL/L (ref 95–110)
CO2 SERPL-SCNC: 22 MMOL/L (ref 23–29)
CREAT SERPL-MCNC: 0.8 MG/DL (ref 0.5–1.4)
DIFFERENTIAL METHOD BLD: ABNORMAL
EOSINOPHIL # BLD AUTO: 0.1 K/UL (ref 0–0.5)
EOSINOPHIL NFR BLD: 1.2 % (ref 0–8)
ERYTHROCYTE [DISTWIDTH] IN BLOOD BY AUTOMATED COUNT: 14.6 % (ref 11.5–14.5)
EST. GFR  (NO RACE VARIABLE): >60 ML/MIN/1.73 M^2
GLUCOSE SERPL-MCNC: 82 MG/DL (ref 70–110)
HCT VFR BLD AUTO: 28.7 % (ref 37–48.5)
HGB BLD-MCNC: 9.2 G/DL (ref 12–16)
IMM GRANULOCYTES # BLD AUTO: 0.03 K/UL (ref 0–0.04)
IMM GRANULOCYTES NFR BLD AUTO: 0.5 % (ref 0–0.5)
LYMPHOCYTES # BLD AUTO: 1.6 K/UL (ref 1–4.8)
LYMPHOCYTES NFR BLD: 23.5 % (ref 18–48)
MAGNESIUM SERPL-MCNC: 2.1 MG/DL (ref 1.6–2.6)
MCH RBC QN AUTO: 30.2 PG (ref 27–31)
MCHC RBC AUTO-ENTMCNC: 32.1 G/DL (ref 32–36)
MCV RBC AUTO: 94 FL (ref 82–98)
MONOCYTES # BLD AUTO: 0.6 K/UL (ref 0.3–1)
MONOCYTES NFR BLD: 8.7 % (ref 4–15)
NEUTROPHILS # BLD AUTO: 4.4 K/UL (ref 1.8–7.7)
NEUTROPHILS NFR BLD: 65.6 % (ref 38–73)
NRBC BLD-RTO: 0 /100 WBC
PHOSPHATE SERPL-MCNC: 4.5 MG/DL (ref 2.7–4.5)
PLATELET # BLD AUTO: 209 K/UL (ref 150–450)
PMV BLD AUTO: 9.4 FL (ref 9.2–12.9)
POTASSIUM SERPL-SCNC: 3.1 MMOL/L (ref 3.5–5.1)
PROT SERPL-MCNC: 4.6 G/DL (ref 6–8.4)
RBC # BLD AUTO: 3.05 M/UL (ref 4–5.4)
SODIUM SERPL-SCNC: 136 MMOL/L (ref 136–145)
WBC # BLD AUTO: 6.64 K/UL (ref 3.9–12.7)

## 2024-02-03 PROCEDURE — 63600175 PHARM REV CODE 636 W HCPCS

## 2024-02-03 PROCEDURE — 83735 ASSAY OF MAGNESIUM: CPT

## 2024-02-03 PROCEDURE — 25000003 PHARM REV CODE 250

## 2024-02-03 PROCEDURE — 80053 COMPREHEN METABOLIC PANEL: CPT

## 2024-02-03 PROCEDURE — 84100 ASSAY OF PHOSPHORUS: CPT

## 2024-02-03 PROCEDURE — 36415 COLL VENOUS BLD VENIPUNCTURE: CPT

## 2024-02-03 PROCEDURE — 85025 COMPLETE CBC W/AUTO DIFF WBC: CPT

## 2024-02-03 RX ORDER — POTASSIUM CHLORIDE 20 MEQ/1
40 TABLET, EXTENDED RELEASE ORAL EVERY 4 HOURS
Status: DISCONTINUED | OUTPATIENT
Start: 2024-02-03 | End: 2024-02-03

## 2024-02-03 RX ORDER — FUROSEMIDE 40 MG/1
40 TABLET ORAL DAILY
Qty: 30 TABLET | Refills: 2 | Status: SHIPPED | OUTPATIENT
Start: 2024-02-03 | End: 2024-02-21 | Stop reason: SDUPTHER

## 2024-02-03 RX ORDER — SPIRONOLACTONE 100 MG/1
100 TABLET, FILM COATED ORAL DAILY
Qty: 30 TABLET | Refills: 2 | Status: SHIPPED | OUTPATIENT
Start: 2024-02-03 | End: 2024-02-14 | Stop reason: SDUPTHER

## 2024-02-03 RX ADMIN — POTASSIUM BICARBONATE 25 MEQ: 977.5 TABLET, EFFERVESCENT ORAL at 07:02

## 2024-02-03 RX ADMIN — POTASSIUM BICARBONATE 25 MEQ: 977.5 TABLET, EFFERVESCENT ORAL at 11:02

## 2024-02-03 RX ADMIN — PREGABALIN 100 MG: 50 CAPSULE ORAL at 08:02

## 2024-02-03 RX ADMIN — FUROSEMIDE 80 MG: 10 INJECTION, SOLUTION INTRAVENOUS at 08:02

## 2024-02-03 RX ADMIN — FOLIC ACID 1 MG: 1 TABLET ORAL at 08:02

## 2024-02-03 NOTE — ASSESSMENT & PLAN NOTE
S/p approx 4L IR removal of ascites  LFTs within normal limits.  Alcohol level <10 in ED.  Patient reports last drink approx 1 week ago.    Plan:  - Paracentesis ascites lab results leading to potentially more likely liver etiology such as portal HTN and less likely cardiac or kidney etiology.  - 2/1 Echo w/ EF 67%. PA pressure 29mmHg.   - UA with 1+ protein but otherwise unremarkable.  - Liver US w/ doppler with cirrhosis, ascites, and splenomegaly with the constellation of findings suggesting portal hypertension.  - Continue to monitor symptoms.  - Continue diuresis. Spironolactone and furosemide. Will need home regimen for discharge  - Monitor K on labs.  - Pain: PRNs  - Nausea: scopolamine patch and PRN zofran  - Continue alcohol cessation counseling.  - GI-Hepatology referral outpatient.

## 2024-02-03 NOTE — PLAN OF CARE
Problem: Adult Inpatient Plan of Care  Goal: Plan of Care Review  Outcome: Ongoing, Progressing     Problem: Fall Injury Risk  Goal: Absence of Fall and Fall-Related Injury  Outcome: Ongoing, Progressing     Problem: Fluid Volume Excess  Goal: Fluid Balance  Outcome: Ongoing, Progressing

## 2024-02-03 NOTE — SUBJECTIVE & OBJECTIVE
Interval History: No acute events overnight. Continues to have good UOP, 1875cc past 24 hours. Feels like her edema is slowly getting better, requesting to go home.     Review of Systems   Constitutional:  Negative for chills and fever.   HENT:  Negative for congestion and sore throat.    Respiratory:  Negative for cough and shortness of breath.    Cardiovascular:  Positive for leg swelling. Negative for chest pain and palpitations.   Gastrointestinal:  Positive for abdominal pain. Negative for abdominal distention, constipation, diarrhea, nausea and vomiting.   Genitourinary:  Negative for difficulty urinating and dysuria.   Musculoskeletal:  Negative for arthralgias and joint swelling.   Skin:  Negative for color change and pallor.   Neurological:  Negative for dizziness, light-headedness and headaches.   Psychiatric/Behavioral:  Negative for agitation and confusion.      Objective:     Vital Signs (Most Recent):  Temp: 98.2 °F (36.8 °C) (02/03/24 0336)  Pulse: 88 (02/03/24 0354)  Resp: 18 (02/03/24 0336)  BP: 102/60 (02/03/24 0341)  SpO2: (!) 94 % (02/03/24 0336) Vital Signs (24h Range):  Temp:  [98.2 °F (36.8 °C)-98.5 °F (36.9 °C)] 98.2 °F (36.8 °C)  Pulse:  [] 88  Resp:  [18-19] 18  SpO2:  [94 %-99 %] 94 %  BP: ()/(54-74) 102/60     Weight: 66.5 kg (146 lb 9.7 oz)  Body mass index is 23.66 kg/m².    Intake/Output Summary (Last 24 hours) at 2/3/2024 0730  Last data filed at 2/3/2024 0230  Gross per 24 hour   Intake 241 ml   Output 1875 ml   Net -1634 ml         Physical Exam  Vitals and nursing note reviewed.   Constitutional:       General: She is not in acute distress.     Appearance: Normal appearance. She is normal weight. She is not ill-appearing, toxic-appearing or diaphoretic.   HENT:      Head: Normocephalic.      Right Ear: External ear normal.      Left Ear: External ear normal.      Nose: Nose normal.      Mouth/Throat:      Pharynx: Oropharynx is clear.   Eyes:      Extraocular Movements:  Extraocular movements intact.   Cardiovascular:      Rate and Rhythm: Normal rate and regular rhythm.      Pulses: Normal pulses.      Heart sounds: Normal heart sounds.   Pulmonary:      Effort: Pulmonary effort is normal. No respiratory distress.      Breath sounds: Normal breath sounds. No wheezing, rhonchi or rales.      Comments: No clinical signs of increased work of breathing, including patient speaking in complete sentences, no accessory muscle use, or tripoding.  Abdominal:      General: Abdomen is flat. There is distension (stable to mild improvement).      Palpations: Abdomen is soft.      Tenderness: There is abdominal tenderness (diffuse, improved). There is no guarding or rebound.   Musculoskeletal:         General: Normal range of motion.      Cervical back: Normal range of motion.      Right lower leg: Edema present.      Left lower leg: Edema present.      Comments: Significant 2+ pitting edema bilateral lower extremities. Improving. Feet > legs.   Neurological:      General: No focal deficit present.      Mental Status: She is alert and oriented to person, place, and time. Mental status is at baseline.   Psychiatric:         Mood and Affect: Mood normal.         Behavior: Behavior normal.         Thought Content: Thought content normal.             Significant Labs: All pertinent labs within the past 24 hours have been reviewed.    Significant Imaging: I have reviewed all pertinent imaging results/findings within the past 24 hours.

## 2024-02-03 NOTE — PROGRESS NOTES
Virtual Nurse:Discharge orders noted; additional clinical references attached.  and pharmacy tech notified.  Patient's discharge instruction packet given by bedside RN.    Cued into patient's room.  Permission received per patient to turn camera to view patient.  Introduced as VN that will be instructing on discharge instructions.  Family member at bedside.  Educated patient on reason for admission; medications to hold, continue, and start, appointment to follow-up with doctor, and when to return to ED. Teach back method used. Verbalized understanding  Number given for 24/7 Nurse Line. Opportunity given for questions and questions answered.  Bedside nurse updated.        02/03/24 1517   Shift   Virtual Nurse - Patient Verbalized Approval Of Camera Use;VN Rounding   Type of Frequent Check   Type Patient Rounds   Safety/Activity   Patient Rounds visualized patient

## 2024-02-03 NOTE — PLAN OF CARE
02/03/24 1153   Final Note   Assessment Type Final Discharge Note   Anticipated Discharge Disposition Home   What phone number can be called within the next 1-3 days to see how you are doing after discharge? [phone]   Post-Acute Status   Discharge Delays None known at this time      spoke with pt regarding discharge plan to home. Pt informed EDGAR that she lived with her spouse and two dependent  teenage daughters. Pt requires assistance with ADL's and uses assistive equipment ( walker, shower chair, cane, and w/c). Pt does not drive and is currently unemployed, but  filing for disability. Pt has no issues affording medication and no social needs at this time.    Pt informed EDGAR that she would have her mother or mother in law to transport her home at D/C.     Follow up appointments scheduled below.     Future Appointments   Date Time Provider Department Center   2/8/2024  1:00 PM Manjit Wynn PA-C [facility]   2/20/2024  9:00 AM Jonny Soni MD [facility]

## 2024-02-03 NOTE — PROGRESS NOTES
[facility] Medicine  Progress Note    Patient Name: Ember Rivera  MRN: [id number]  Patient Class: IP- Inpatient   Admission Date: 1/31/2024  Length of Stay: 3 days  Attending Physician: Miles Berman,*  Primary Care Provider: Young Neil MD        Subjective:     Principal Problem:Portal hypertension        HPI:  Patient is a 53 yo F w/ PMHx of HTN, Hepatic steatosis, GERD, alcohol use disorder. Presented to the ED with abdominal distention and pain. Also endorsed shortness of breath from the abdominal pressure as well as lower extremity swelling. Onset of symptoms after [place] [facility] milagros on 1/16. Denies fever, chills, chest pain, cough, headache.    In the ED, initial vital signs /100, , Temp 97.9F, SpO2 99% on room air. Labs include CBC with stable H/H 11.4/36.1 and WBC within normal limits 10.75. CMP with stable elevtrolytes and BUN/Cr 12/0.7 (baseline Cr < 1.0). PT-INR 13.6-1.3. BNP and troponin within normal limits, 48 and <0.006, respectively. CT Abd/Pelvis with . EKG with sinus tachycardia and Qtc interval 463. IR consulted and performed paracentesis resulting in removal of just under 4L of yellow fluid. [facility] consulted for evaluation for admission for ascites.      Interval History: No acute events overnight. Continues to have good UOP, 1875cc past 24 hours. Feels like her edema is slowly getting better, requesting to go home. Has Hepatology and hospital follow up scheduled.     Review of Systems   Constitutional:  Negative for chills and fever.   HENT:  Negative for congestion and sore throat.    Respiratory:  Negative for cough and shortness of breath.    Cardiovascular:  Positive for leg swelling. Negative for chest pain and palpitations.   Gastrointestinal:  Positive for abdominal pain. Negative for abdominal distention, constipation, diarrhea, nausea and vomiting.   Genitourinary:  Negative for difficulty  urinating and dysuria.   Musculoskeletal:  Negative for arthralgias and joint swelling.   Skin:  Negative for color change and pallor.   Neurological:  Negative for dizziness, light-headedness and headaches.   Psychiatric/Behavioral:  Negative for agitation and confusion.      Objective:     Vital Signs (Most Recent):  Temp: 98.2 °F (36.8 °C) (02/03/24 0336)  Pulse: 88 (02/03/24 0354)  Resp: 18 (02/03/24 0336)  BP: 102/60 (02/03/24 0341)  SpO2: (!) 94 % (02/03/24 0336) Vital Signs (24h Range):  Temp:  [98.2 °F (36.8 °C)-98.5 °F (36.9 °C)] 98.2 °F (36.8 °C)  Pulse:  [] 88  Resp:  [18-19] 18  SpO2:  [94 %-99 %] 94 %  BP: ()/(54-74) 102/60     Weight: 66.5 kg (146 lb 9.7 oz)  Body mass index is 23.66 kg/m².    Intake/Output Summary (Last 24 hours) at 2/3/2024 0730  Last data filed at 2/3/2024 0230  Gross per 24 hour   Intake 241 ml   Output 1875 ml   Net -1634 ml         Physical Exam  Vitals and nursing note reviewed.   Constitutional:       General: She is not in acute distress.     Appearance: Normal appearance. She is normal weight. She is not ill-appearing, toxic-appearing or diaphoretic.   HENT:      Head: Normocephalic.      Right Ear: External ear normal.      Left Ear: External ear normal.      Nose: Nose normal.      Mouth/Throat:      Pharynx: Oropharynx is clear.   Eyes:      Extraocular Movements: Extraocular movements intact.   Cardiovascular:      Rate and Rhythm: Normal rate and regular rhythm.      Pulses: Normal pulses.      Heart sounds: Normal heart sounds.   Pulmonary:      Effort: Pulmonary effort is normal. No respiratory distress.      Breath sounds: Normal breath sounds. No wheezing, rhonchi or rales.      Comments: No clinical signs of increased work of breathing, including patient speaking in complete sentences, no accessory muscle use, or tripoding.  Abdominal:      General: Abdomen is flat. There is distension (stable to mild improvement).      Palpations: Abdomen is soft.       Tenderness: There is abdominal tenderness (diffuse, improved). There is no guarding or rebound.   Musculoskeletal:         General: Normal range of motion.      Cervical back: Normal range of motion.      Right lower leg: Edema present.      Left lower leg: Edema present.      Comments: Significant 2+ pitting edema bilateral lower extremities. Improving. Feet > legs.   Neurological:      General: No focal deficit present.      Mental Status: She is alert and oriented to person, place, and time. Mental status is at baseline.   Psychiatric:         Mood and Affect: Mood normal.         Behavior: Behavior normal.         Thought Content: Thought content normal.             Significant Labs: All pertinent labs within the past 24 hours have been reviewed.    Significant Imaging: I have reviewed all pertinent imaging results/findings within the past 24 hours.    Assessment/Plan:      * Portal hypertension  Plan as in ascites.    Alcoholic cirrhosis of liver with ascites  Plan as in ascites.    Ascites  S/p approx 4L IR removal of ascites  LFTs within normal limits.  Alcohol level <10 in ED.  Patient reports last drink approx 1 week ago.    Plan:  - Paracentesis ascites lab results leading to potentially more likely liver etiology such as portal HTN and less likely cardiac or kidney etiology.  - 2/1 Echo w/ EF 67%. PA pressure 29mmHg.   - UA with 1+ protein but otherwise unremarkable.  - Liver US w/ doppler with cirrhosis, ascites, and splenomegaly with the constellation of findings suggesting portal hypertension.  - Continue to monitor symptoms.  - Continue diuresis. Spironolactone and furosemide. Will need home regimen for discharge  - Monitor K on labs.  - Pain: PRNs  - Nausea: scopolamine patch and PRN zofran  - Continue alcohol cessation counseling.  - GI-Hepatology referral outpatient.    Hepatic steatosis  Plan as in ascites.    Weakness of both legs  Plan:  - PT/OT consulted, appreciate  recommendations.      Peripheral polyneuropathy        Alcohol-induced polyneuropathy  Plan:  - Continue home medications (lyrica).       HTN (hypertension)  Elevated on presentation.    Plan:  - acute elevation potentially 2/2 to pain, discomfort  - consider initiation of medication when appropriate.    Alcohol use disorder  Discussed importance of alcohol cessation especially in preventing worsening of her ascites        VTE Risk Mitigation (From admission, onward)           Ordered     enoxaparin injection 40 mg  Daily         01/31/24 1734     IP VTE LOW RISK PATIENT  Once         01/31/24 1734     Place sequential compression device  Until discontinued         01/31/24 1734                    Discharge Planning   YUN:      Code Status: Full Code   Is the patient medically ready for discharge?:     Reason for patient still in hospital (select all that apply): Treatment and Pending disposition  Discharge Plan A: Home, Home with family            Lennie Dawson MD  [facility], PGY-2  02/03/2024

## 2024-02-03 NOTE — HOSPITAL COURSE
Patient with HPI as above. Patient initated on spironolactone and furosemide for peripheral edema and ascites. Diagnostic paracentesis fluid studies presumed to be secondary to patient's heavy alcohol history. Echo notable for ejection fraction of 67% and normal diastolic dysfunction. Liver doppler U/S notable for cirrhosis, ascites and splenomegaly with the constellation of findings suggesting portal hypertension. Patient continued to diurese with moderate urine output and reduction to patient's edema. Patient was clinically improved at day of discharge. Patient instructed to take lasix and spironolactone at discharge. Patient was instructed to follow up with PCP and have a repeat BMP to assess electrolyte status in the setting of newly prescribed diuretics. Referrals placed for patient to follow up with hepatology. All questions answered. Patient verbalized understanding.

## 2024-02-03 NOTE — DISCHARGE SUMMARY
[facility] Medicine  Discharge Summary      Patient Name: Ember Rivera  MRN: [id number]  NAVIN: [id number]  Patient Class: IP- Inpatient  Admission Date: 1/31/2024  Hospital Length of Stay: 3 days  Discharge Date and Time:  02/03/2024 12:19 PM  Attending Physician: Miles Berman,*   Discharging Provider: Carmelo Lord MD  Primary Care Provider: Young Neil MD    Primary Care Team: Networked reference to record PCT     HPI:   Patient is a 53 yo F w/ PMHx of HTN, Hepatic steatosis, GERD, alcohol use disorder. Presented to the ED with abdominal distention and pain. Also endorsed shortness of breath from the abdominal pressure as well as lower extremity swelling. Onset of symptoms after recently discharged from [facility] in which she had a lap milagros on 1/16. Denies fever, chills, chest pain, cough, headache.    In the ED, initial vital signs /100, , Temp 97.9F, SpO2 99% on room air. Labs include CBC with stable H/H 11.4/36.1 and WBC within normal limits 10.75. CMP with stable elevtrolytes and BUN/Cr 12/0.7 (baseline Cr < 1.0). PT-INR 13.6-1.3. BNP and troponin within normal limits, 48 and <0.006, respectively. CT Abd/Pelvis with . EKG with sinus tachycardia and Qtc interval 463. IR consulted and performed paracentesis resulting in removal of just under 4L of yellow fluid. [facility] consulted for evaluation for admission for ascites.    * No surgery found *      Hospital Course:   Patient with HPI as above. Patient initated on spironolactone and furosemide for peripheral edema and ascites. Diagnostic paracentesis fluid studies presumed to be secondary to patient's heavy alcohol history. Echo notable for ejection fraction of 67% and normal diastolic dysfunction. Liver doppler U/S notable for cirrhosis, ascites and splenomegaly with the constellation of findings suggesting portal hypertension. Patient continued to diurese with moderate urine output and reduction to patient's  edema. Patient was clinically improved at day of discharge. Patient instructed to take lasix and spironolactone at discharge. Patient was instructed to follow up with PCP and have a repeat BMP to assess electrolyte status in the setting of newly prescribed diuretics. Referrals placed for patient to follow up with hepatology. All questions answered. Patient verbalized understanding.      Goals of Care Treatment Preferences:  Code Status: Full Code      Consults:   Consults (From admission, onward)          Status Ordering Provider     Inpatient consult to Interventional Radiology  Once        Provider:  (Not yet assigned)    BIBI Bateman            No new Assessment & Plan notes have been filed under this hospital service since the last note was generated.  Service: Hospital Medicine    Final Active Diagnoses:    Diagnosis Date Noted POA    PRINCIPAL PROBLEM:  Portal hypertension [K76.6] 02/02/2024 Yes    Alcoholic cirrhosis of liver with ascites [K70.31] 02/02/2024 Yes    Ascites [R18.8] 01/31/2024 Yes    Hepatic steatosis [K76.0] 09/28/2023 Yes    Weakness of both legs [R29.898] 08/10/2023 Yes    Alcohol-induced polyneuropathy [G62.1] 05/23/2023 Yes    HTN (hypertension) [I10] 05/16/2023 Yes    Alcohol use disorder [F10.90] 05/16/2023 Yes      Problems Resolved During this Admission:       Discharged Condition: good    Disposition: Home or Self Care    Follow Up:   Follow-up Information       RESOURCES Follow up.    Why: [facility]    [phone] phone  -------------------------------------------------------------------------------  [facility]         Non-profit organization in [place], [place] Address: [address] [place] [address] Hours: Open · Closes 5?PM Phone: [phone]  ----------------------------------------------  [facility] [employer] Assistance Program (LIHEAP)         [place]  [address] [place] [address] Phone:  [phone] Fax:      [phone]      --------------------------------------------------  GENERAL ASSISTANCE - [facility] ---    DIAL      211             [facility] Follow up on 2/8/2024.    Specialty: Family Medicine  Why: 1:00 PM   - YOU WILL SEE HANNAH COLON  Contact information:  [address], [address]  [facility] [address] [phone]  Additional information:  Please park in [address] or D and use Lindsey [address]encer. [facility] [address] elevators.             Jonny Soni MD Follow up on 2/20/2024.    Specialties: Transplant, Hepatology, Gastroenterology  Why: 9:00 am  Contact information:  [address]  [facility] [address]  [phone]                           Patient Instructions:      Ambulatory referral/consult to Hepatology   Standing Status: Future   Referral Priority: Routine Referral Type: Consultation   Referral Reason: Specialty Services Required   Requested Specialty: Hepatology   Number of Visits Requested: 1     Notify your health care provider if you experience any of the following:  temperature >100.4     Notify your health care provider if you experience any of the following:  persistent nausea and vomiting or diarrhea     Notify your health care provider if you experience any of the following:  severe uncontrolled pain     Notify your health care provider if you experience any of the following:  redness, tenderness, or signs of infection (pain, swelling, redness, odor or green/yellow discharge around incision site)     Notify your health care provider if you experience any of the following:  difficulty breathing or increased cough     Notify your health care provider if you experience any of the following:  severe persistent headache     Notify your health care provider if you experience any of the following:  worsening rash     Notify your health care provider if  you experience any of the following:  persistent dizziness, light-headedness, or visual disturbances     Notify your health care provider if you experience any of the following:  increased confusion or weakness       Significant Diagnostic Studies: Labs: CMP   Recent Labs   Lab 02/01/24  1656 02/02/24  0427 02/03/24  0320    140 136   K 3.8 3.5 3.1*    106 104   CO2 23 22* 22*   GLU 90 76 82   BUN 11 11 11   CREATININE 0.8 0.8 0.8   CALCIUM 7.9* 7.6* 7.5*   PROT  --  4.7* 4.6*   ALBUMIN  --  1.8* 1.8*   BILITOT  --  0.8 0.6   ALKPHOS  --  132 125   AST  --  33 31   ALT  --  9* 9*   ANIONGAP 12 12 10    and CBC   Recent Labs   Lab 02/02/24 0427 02/03/24  0320   WBC 6.08 6.64   HGB 9.9* 9.2*   HCT 30.2* 28.7*    209       Pending Diagnostic Studies:       Procedure Component Value Units Date/Time    Cytology, Fluid/Wash/Brush [[id number]] Collected: 01/31/24 1550    Order Status: Sent Lab Status: In process Updated: 01/31/24 1614    Specimen: Body Fluid            Medications:  Reconciled Home Medications:      Medication List        START taking these medications      furosemide 40 MG tablet  Commonly known as: LASIX  Take 1 tablet (40 mg total) by mouth once daily.     spironolactone 100 MG tablet  Commonly known as: ALDACTONE  Take 1 tablet (100 mg total) by mouth once daily.            CONTINUE taking these medications      folic acid 1 MG tablet  Commonly known as: FOLVITE  Take 1 tablet (1 mg total) by mouth once daily.     potassium bicarbonate disintegrating tablet  Commonly known as: K-LYTE  Take 1 tablet (20 mEq total) by mouth 2 (two) times a day.     pregabalin 100 MG capsule  Commonly known as: LYRICA  Take 1 capsule (100 mg total) by mouth 2 (two) times daily.     traZODone 100 MG tablet  Commonly known as: DESYREL  Take 1 tablet (100 mg total) by mouth every evening.              Indwelling Lines/Drains at time of discharge:   Lines/Drains/Airways       None                   Time  spent on the discharge of patient: 35 minutes         Carmelo Lord MD  Department of Hospital Medicine  [facility]

## 2024-02-07 LAB
FINAL PATHOLOGIC DIAGNOSIS: NORMAL
Lab: NORMAL
MICROSCOPIC EXAM: NORMAL

## 2024-02-14 ENCOUNTER — TELEPHONE (OUTPATIENT)
Dept: FAMILY MEDICINE | Facility: HOSPITAL | Age: 54
End: 2024-02-14

## 2024-02-14 ENCOUNTER — OFFICE VISIT (OUTPATIENT)
Dept: FAMILY MEDICINE | Facility: HOSPITAL | Age: 54
End: 2024-02-14
Payer: MEDICAID

## 2024-02-14 VITALS
BODY MASS INDEX: 23.66 KG/M2 | DIASTOLIC BLOOD PRESSURE: 78 MMHG | HEIGHT: 66 IN | WEIGHT: 147.25 LBS | SYSTOLIC BLOOD PRESSURE: 109 MMHG | HEART RATE: 119 BPM

## 2024-02-14 DIAGNOSIS — R18.8 OTHER ASCITES: ICD-10-CM

## 2024-02-14 DIAGNOSIS — D64.9 NORMOCYTIC ANEMIA: ICD-10-CM

## 2024-02-14 DIAGNOSIS — G62.1 ALCOHOL-INDUCED POLYNEUROPATHY: ICD-10-CM

## 2024-02-14 DIAGNOSIS — K70.31 ALCOHOLIC CIRRHOSIS OF LIVER WITH ASCITES: Primary | ICD-10-CM

## 2024-02-14 DIAGNOSIS — K59.00 CONSTIPATION, UNSPECIFIED CONSTIPATION TYPE: ICD-10-CM

## 2024-02-14 DIAGNOSIS — E87.6 HYPOKALEMIA: ICD-10-CM

## 2024-02-14 DIAGNOSIS — E83.42 HYPOMAGNESEMIA: Primary | ICD-10-CM

## 2024-02-14 DIAGNOSIS — Z09 HOSPITAL DISCHARGE FOLLOW-UP: ICD-10-CM

## 2024-02-14 PROCEDURE — 99213 OFFICE O/P EST LOW 20 MIN: CPT

## 2024-02-14 RX ORDER — LANOLIN ALCOHOL/MO/W.PET/CERES
400 CREAM (GRAM) TOPICAL DAILY
Qty: 90 TABLET | Refills: 1 | Status: SHIPPED | OUTPATIENT
Start: 2024-02-14

## 2024-02-14 RX ORDER — PREGABALIN 100 MG/1
CAPSULE ORAL
Qty: 90 CAPSULE | Refills: 0 | OUTPATIENT
Start: 2024-02-14 | End: 2024-02-14

## 2024-02-14 RX ORDER — SPIRONOLACTONE 100 MG/1
200 TABLET, FILM COATED ORAL DAILY
Qty: 60 TABLET | Refills: 0 | Status: SHIPPED | OUTPATIENT
Start: 2024-02-14 | End: 2024-04-23 | Stop reason: DRUGHIGH

## 2024-02-14 RX ORDER — SPIRONOLACTONE 100 MG/1
300 TABLET, FILM COATED ORAL DAILY
Qty: 90 TABLET | Refills: 0 | Status: SHIPPED | OUTPATIENT
Start: 2024-02-14 | End: 2024-02-14

## 2024-02-14 RX ORDER — FUROSEMIDE 40 MG/1
40 TABLET ORAL DAILY
Qty: 30 TABLET | Refills: 2 | Status: SHIPPED | OUTPATIENT
Start: 2024-02-14 | End: 2024-02-21

## 2024-02-14 RX ORDER — PREGABALIN 100 MG/1
CAPSULE ORAL
Qty: 90 CAPSULE | Refills: 0 | Status: SHIPPED | OUTPATIENT
Start: 2024-02-14 | End: 2024-04-23 | Stop reason: SDUPTHER

## 2024-02-14 RX ORDER — POLYETHYLENE GLYCOL 3350 17 G/17G
17 POWDER, FOR SOLUTION ORAL DAILY
Qty: 14 EACH | Refills: 0 | Status: SHIPPED | OUTPATIENT
Start: 2024-02-14

## 2024-02-14 NOTE — TELEPHONE ENCOUNTER
Patient contacted about lab results. Mag 1.3. Magnesium supplement sent to pharmacy. Patient advised to take one daily. Voiced understanding.     Marco Sandhu DO   [facility] Family Medicine PGY-2

## 2024-02-14 NOTE — PROGRESS NOTES
"Progress Note  [facility]    Subjective:      Ember Rivera is a 54 y.o. female here for hospital follow up from 1/31- 2/3.     Per DC summary:    "Ms. Rivera presented with gross volume overload with abdominal distension and bilateral LE edema in the setting of decompensated cirrhosis with portal hypertension and ascites. Diagnostic and therapeutic paracentesis consistent with portal HTN, no SBP. Have initiated on IV diuretics with good output. Will discharge on oral lasix/spironolactone. Recent EGD on 1/11/24 at OSH without varices. Arrange for Hepatology follow up and would consider repeat BMP at this visit to monitor electrolytes on diuretic therapy (hypokalemic on date of discharge likely due to IV lasix, replaced prior to discharge). Counseled on continued alcohol abstinence."    Today she reports she is 2 months abstinent from alcohol. She is noticing increasing abdominal distension and lower extremity edema since discharge. She is also complaining of worsening neuropathic pain in BL feet despite lyrica. She is compliant with spironolactone 100 mg daily, furosemide 40 mg daily. When asked about diet, she states she eats whatever the family cooks and doubts she has been following low salt diet. She has appt with hepatology on 2/20.        Active Problem List with Overview Notes    Diagnosis Date Noted    Intractable nausea and vomiting 10/22/2023    Alcohol use disorder 05/16/2023    Hypokalemia 05/16/2023    Portal hypertension 02/02/2024    Alcoholic cirrhosis of liver with ascites 02/02/2024    Ascites 01/31/2024    Diarrhea 12/20/2023    Lactic acidosis 10/24/2023    Vomiting 10/24/2023    Hepatic steatosis 09/28/2023    Alcohol abuse 09/28/2023    Elevated LFTs 09/28/2023    Hepatomegaly 09/28/2023    Weakness of both legs 08/10/2023    Imbalance 08/10/2023    Risk for falls 08/10/2023    Weakness 08/04/2023    High anion gap metabolic acidosis 07/27/2023    Normocytic anemia 06/17/2023    " "Impaired functional mobility, balance, gait, and endurance 05/30/2023    Tobacco abuse 05/29/2023    Peripheral polyneuropathy 05/24/2023    Alcohol-induced polyneuropathy 05/23/2023    Prolonged QT interval 05/16/2023    Transaminitis 05/16/2023    Weakness of both lower extremities 05/16/2023    HTN (hypertension) 05/16/2023          ROS      Objective:   /78   Pulse (!) 119   Ht 5' 6" (1.676 m)   Wt 66.8 kg (147 lb 4.3 oz)   BMI 23.77 kg/m²      Physical Exam     Assessment:   54 y.o. with        1. Alcoholic cirrhosis of liver with ascites    2. Alcohol-induced polyneuropathy    3. Other ascites    4. Hypokalemia    5. Hospital discharge follow-up    6. Constipation, unspecified constipation type    7. Normocytic anemia         Plan:   Ember was seen today for hospital follow up.    Diagnoses and all orders for this visit:    Alcoholic cirrhosis of liver with ascites  - Feeling well but worsening ascites, BLE edema. Will increase spironolactone to 200 mg daily. Advised to take 80 mg lasix daily for next 3-5 days until edema resolved. May need to take 80 mg daily going forward. Will have patient return in one week to assess volume status.   -     CBC W/ AUTO DIFFERENTIAL; Future  -     COMPREHENSIVE METABOLIC PANEL; Future  -     Magnesium; Future  -     furosemide (LASIX) 40 MG tablet; Take 1 tablet (40 mg total) by mouth once daily. Take additional tablet for 80 mg total daily for 3-5 days if worsening abdominal distention or lower extremity edema  -     spironolactone (ALDACTONE) 100 MG tablet; Take 2 tablets (200 mg total) by mouth once daily.    Alcohol-induced polyneuropathy  - worsening neuropathy during the day. Will increase day time dose. Should also improve with diuresis as BLE significantly edematous today. Discussed sedative nature of lyrica.   -     pregabalin (LYRICA) 100 MG capsule; Take 2 capsules (200 mg total) by mouth once daily AND 1 capsule (100 mg total) every " evening.    Hypokalemia  - noted on DC summary. Will recheck CMP today     Hospital discharge follow-up    Constipation, unspecified constipation type  - No BM in two days, advised to take PRN to maintain daily BM. Does not appear encephalopathic but may consider lactulose for PRN use in future given cirrhosis.   -     polyethylene glycol (GLYCOLAX) 17 gram PwPk; Take 17 g by mouth once daily.    Normocytic anemia  - will recheck CBC today       Follow up in one week to assess edema, titrate diuretics    Marco Sandhu DO  [facility], PGY-2  10:33 AM, 02/14/2024

## 2024-02-15 NOTE — PROGRESS NOTES
I assume primary medical responsibility for this patient. I have reviewed the history, physical, and assessement & treatment plan with the resident and agree that the care is reasonable and necessary. This service has been performed by a resident with the presence of a teaching physician under the primary care exception. If necessary, an addendum of additional findings or evaluation beyond the resident documentation will be noted below.    Monitor labs closely for hypo/hyperkalemia.      Ursula Perera MD    [facility]

## 2024-02-20 ENCOUNTER — OFFICE VISIT (OUTPATIENT)
Dept: HEPATOLOGY | Facility: CLINIC | Age: 54
End: 2024-02-20
Payer: MEDICAID

## 2024-02-20 VITALS
DIASTOLIC BLOOD PRESSURE: 83 MMHG | HEIGHT: 66 IN | HEART RATE: 111 BPM | WEIGHT: 148 LBS | OXYGEN SATURATION: 99 % | SYSTOLIC BLOOD PRESSURE: 150 MMHG | BODY MASS INDEX: 23.78 KG/M2

## 2024-02-20 DIAGNOSIS — K70.31 ALCOHOLIC CIRRHOSIS OF LIVER WITH ASCITES: Primary | ICD-10-CM

## 2024-02-20 DIAGNOSIS — R18.8 OTHER ASCITES: ICD-10-CM

## 2024-02-20 PROCEDURE — 99213 OFFICE O/P EST LOW 20 MIN: CPT | Mod: PBBFAC | Performed by: INTERNAL MEDICINE

## 2024-02-20 PROCEDURE — 3079F DIAST BP 80-89 MM HG: CPT | Mod: CPTII,,, | Performed by: INTERNAL MEDICINE

## 2024-02-20 PROCEDURE — 1111F DSCHRG MED/CURRENT MED MERGE: CPT | Mod: CPTII,,, | Performed by: INTERNAL MEDICINE

## 2024-02-20 PROCEDURE — 99215 OFFICE O/P EST HI 40 MIN: CPT | Mod: S$PBB,,, | Performed by: INTERNAL MEDICINE

## 2024-02-20 PROCEDURE — 3077F SYST BP >= 140 MM HG: CPT | Mod: CPTII,,, | Performed by: INTERNAL MEDICINE

## 2024-02-20 PROCEDURE — 3008F BODY MASS INDEX DOCD: CPT | Mod: CPTII,,, | Performed by: INTERNAL MEDICINE

## 2024-02-20 PROCEDURE — 99999 PR PBB SHADOW E&M-EST. PATIENT-LVL III: CPT | Mod: PBBFAC,,, | Performed by: INTERNAL MEDICINE

## 2024-02-20 PROCEDURE — 1159F MED LIST DOCD IN RCRD: CPT | Mod: CPTII,,, | Performed by: INTERNAL MEDICINE
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
## 2024-02-20 NOTE — PATIENT INSTRUCTIONS
Continue the furosemide and spironolactone (2 pills each per day)  I will arrange a paracentesis (drainage)  Labs every 2 weeks  Clinic in 4-6 weeks

## 2024-02-21 ENCOUNTER — OFFICE VISIT (OUTPATIENT)
Dept: FAMILY MEDICINE | Facility: HOSPITAL | Age: 54
End: 2024-02-21
Payer: MEDICAID

## 2024-02-21 VITALS
OXYGEN SATURATION: 99 % | HEART RATE: 113 BPM | BODY MASS INDEX: 24.27 KG/M2 | HEIGHT: 66 IN | WEIGHT: 151 LBS | DIASTOLIC BLOOD PRESSURE: 84 MMHG | SYSTOLIC BLOOD PRESSURE: 132 MMHG

## 2024-02-21 DIAGNOSIS — K70.31 ALCOHOLIC CIRRHOSIS OF LIVER WITH ASCITES: ICD-10-CM

## 2024-02-21 DIAGNOSIS — G62.9 PERIPHERAL POLYNEUROPATHY: Primary | ICD-10-CM

## 2024-02-21 PROCEDURE — 99213 OFFICE O/P EST LOW 20 MIN: CPT

## 2024-02-21 RX ORDER — AMITRIPTYLINE HYDROCHLORIDE 25 MG/1
25 TABLET, FILM COATED ORAL NIGHTLY PRN
Qty: 60 TABLET | Refills: 1 | Status: SHIPPED | OUTPATIENT
Start: 2024-02-21 | End: 2024-05-08 | Stop reason: SDUPTHER

## 2024-02-21 RX ORDER — FUROSEMIDE 40 MG/1
80 TABLET ORAL DAILY
Qty: 60 TABLET | Refills: 2
Start: 2024-02-21 | End: 2024-03-12 | Stop reason: DRUGHIGH

## 2024-02-21 RX ORDER — CAPSAICIN 0.75 MG/G
CREAM TOPICAL 3 TIMES DAILY
Qty: 57 G | Refills: 2 | Status: SHIPPED | OUTPATIENT
Start: 2024-02-21

## 2024-02-21 NOTE — PROGRESS NOTES
Progress Note  \[facility]\"" Family Medicine    Subjective:      Ember Rivera is a 54 y.o. female here for follow up of cirrhosis w/ ascites, peripheral neuropathy         #Cirrhosis  - Seen last week with increase of diuretic regimen to spironolactone 200 mg daily, lasix 80 mg daily due to worsening ascites, BLE edema. Minimal improvement since. Seen by hepatology yesterday who placed order for outpatient IR paracentesis scheduled for next week. Q2wk labs ordered by hepatology. Has appt in late April.     #Peripheral neuropathy  -Increased lyrica from 100 mg BID to 200 mg in am and 100 mg in pm last week with minimal change. Classically has neuropathy to plantar surface of both feet which is most intense pain but also having increasing pain to BL calves. Difficulty performing ADLs, sleeping due to pain.        Active Problem List with Overview Notes    Diagnosis Date Noted    Intractable nausea and vomiting 10/22/2023    Alcohol use disorder 05/16/2023    Hypokalemia 05/16/2023    Portal hypertension 02/02/2024    Alcoholic cirrhosis of liver with ascites 02/02/2024    Ascites 01/31/2024    Diarrhea 12/20/2023    Lactic acidosis 10/24/2023    Vomiting 10/24/2023    Hepatic steatosis 09/28/2023    Alcohol abuse 09/28/2023    Elevated LFTs 09/28/2023    Hepatomegaly 09/28/2023    Weakness of both legs 08/10/2023    Imbalance 08/10/2023    Risk for falls 08/10/2023    Weakness 08/04/2023    High anion gap metabolic acidosis 07/27/2023    Normocytic anemia 06/17/2023    Impaired functional mobility, balance, gait, and endurance 05/30/2023    Tobacco abuse 05/29/2023    Peripheral polyneuropathy 05/24/2023    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   Respiratory:  Negative for shortness of  "breath.    Cardiovascular:  Negative for chest pain.   Gastrointestinal:  Negative for abdominal pain, constipation, diarrhea, nausea and vomiting.   Musculoskeletal:         BLE feet, calf pain   Neurological:  Positive for sensory change.   Psychiatric/Behavioral:  Negative for depression. The patient is not nervous/anxious.          Objective:   /84   Pulse (!) 113   Ht 5' 6" (1.676 m)   Wt 68.5 kg (151 lb 0.2 oz)   SpO2 99%   BMI 24.37 kg/m²      Physical Exam  Vitals and nursing note reviewed.   Constitutional:       Appearance: Normal appearance.   HENT:      Head: Normocephalic.   Eyes:      General: No scleral icterus.  Cardiovascular:      Rate and Rhythm: Normal rate and regular rhythm.   Abdominal:      General: There is distension.      Tenderness: There is no abdominal tenderness.   Musculoskeletal:      Right lower leg: Edema present.      Left lower leg: Edema present.   Skin:     Coloration: Skin is not jaundiced.   Neurological:      Mental Status: She is oriented to person, place, and time.   Psychiatric:         Mood and Affect: Mood normal.         Behavior: Behavior normal.          Assessment:   54 y.o. with        1. Peripheral polyneuropathy    2. Alcoholic cirrhosis of liver with ascites         Plan:   Ember was seen today for follow-up.    Diagnoses and all orders for this visit:    Peripheral polyneuropathy  -will trial elavil for neurpathic pain, insomnia. Discussed that this may not provide immediate relief and may take several weeks to have noticeable effect. Advised to stop trazodone. Will also trial capsicum, lidocaine gel. Previously seen by neurology for this issue who mentioned compounded gabapentin cream. Advised patient to consider follow up with neurology to discuss if current treatment remains ineffective. Can consider cymbalta use as well. Also discussed use of compression socks, elevation as edema may be worsening pain.   -     capsicum 0.075% (ZOSTRIX) 0.075 % " topical cream; Apply topically 3 (three) times daily.  -     LIDOcaine 4 % Gel; Apply 5 g topically 3 (three) times daily.  -     amitriptyline (ELAVIL) 25 MG tablet; Take 1 tablet (25 mg total) by mouth nightly as needed for Pain or Insomnia. Increase in 25 mg increments at intervals =1 week.    Alcoholic cirrhosis of liver with ascites  -Continue current spironolactone 200 mg daily, lasix 80 mg daily. Will await next CMP results to ensure stable lytes, renal function as well as paracentesis. If ascites begins to reaccumulate, will continue uptitration of diuretics. Following with hepatology   -     furosemide (LASIX) 40 MG tablet; Take 2 tablets (80 mg total) by mouth once daily.        Follow up in 2-3 weeks to discuss diuresis, neuropathic pain     Marco Sandhu DO  [facility] Family Medicine, PGY-2  3:21 PM, 02/21/2024    *This note was dictated using the M*Modal Fluency Direct word recognition program. There are word rescognition mistakes that are occasionally missed on review. \    The following information is provided to all patients.  This information is to help you find resources for any of the problems found today that may be affecting your health:                Living healthy guide: www.[facility].[place].gov       Understanding Diabetes: www.diabetes.org       Eating healthy: www.cdc.gov/healthyweight      CDC home safety checklist: www.cdc.gov/steadi/patient.html      Agency on Aging: www.goea.[place].gov       Alcoholics anonymous (AA): www.aa.org      Physical Activity: www.rashad.nih.gov/tc5dagx       Tobacco use: www.quitwithusla.org

## 2024-02-23 NOTE — PROGRESS NOTES
I assume primary medical responsibility for this patient. I have reviewed the history, physical, and assessement & treatment plan with the resident and agree that the care is reasonable and necessary. This service has been performed by a resident without the presence of a teaching physician under the primary care exception. If necessary, an addendum of additional findings or evaluation beyond the resident documentation will be noted below.    Agree with continued diuresis and elective paracentesis for fragile patient seeing hepatology with [facility] being evaluated for TIPS  in the future. Would benefit from advanced care planning discussion in the future.

## 2024-02-26 ENCOUNTER — LAB VISIT (OUTPATIENT)
Dept: LAB | Facility: HOSPITAL | Age: 54
End: 2024-02-26
Attending: STUDENT IN AN ORGANIZED HEALTH CARE EDUCATION/TRAINING PROGRAM
Payer: MEDICAID

## 2024-02-26 DIAGNOSIS — R18.8 OTHER ASCITES: ICD-10-CM

## 2024-02-26 LAB
AFP SERPL-MCNC: 3.4 NG/ML (ref 0–8.4)
ALBUMIN SERPL BCP-MCNC: 2.4 G/DL (ref 3.5–5.2)
ALP SERPL-CCNC: 100 U/L (ref 55–135)
ALT SERPL W/O P-5'-P-CCNC: 6 U/L (ref 10–44)
ANION GAP SERPL CALC-SCNC: 11 MMOL/L (ref 8–16)
AST SERPL-CCNC: 25 U/L (ref 10–40)
BASOPHILS # BLD AUTO: 0.05 K/UL (ref 0–0.2)
BASOPHILS NFR BLD: 0.6 % (ref 0–1.9)
BILIRUB SERPL-MCNC: 0.7 MG/DL (ref 0.1–1)
BUN SERPL-MCNC: 10 MG/DL (ref 6–20)
CALCIUM SERPL-MCNC: 8.8 MG/DL (ref 8.7–10.5)
CHLORIDE SERPL-SCNC: 104 MMOL/L (ref 95–110)
CO2 SERPL-SCNC: 24 MMOL/L (ref 23–29)
CREAT SERPL-MCNC: 0.7 MG/DL (ref 0.5–1.4)
DIFFERENTIAL METHOD BLD: ABNORMAL
EOSINOPHIL # BLD AUTO: 0.1 K/UL (ref 0–0.5)
EOSINOPHIL NFR BLD: 0.9 % (ref 0–8)
ERYTHROCYTE [DISTWIDTH] IN BLOOD BY AUTOMATED COUNT: 14 % (ref 11.5–14.5)
EST. GFR  (NO RACE VARIABLE): >60 ML/MIN/1.73 M^2
GLUCOSE SERPL-MCNC: 98 MG/DL (ref 70–110)
HCT VFR BLD AUTO: 37.4 % (ref 37–48.5)
HGB BLD-MCNC: 12 G/DL (ref 12–16)
IMM GRANULOCYTES # BLD AUTO: 0.02 K/UL (ref 0–0.04)
IMM GRANULOCYTES NFR BLD AUTO: 0.2 % (ref 0–0.5)
INR PPP: 1.2 (ref 0.8–1.2)
LYMPHOCYTES # BLD AUTO: 1.7 K/UL (ref 1–4.8)
LYMPHOCYTES NFR BLD: 20.6 % (ref 18–48)
MCH RBC QN AUTO: 30.5 PG (ref 27–31)
MCHC RBC AUTO-ENTMCNC: 32.1 G/DL (ref 32–36)
MCV RBC AUTO: 95 FL (ref 82–98)
MONOCYTES # BLD AUTO: 0.6 K/UL (ref 0.3–1)
MONOCYTES NFR BLD: 6.8 % (ref 4–15)
NEUTROPHILS # BLD AUTO: 5.7 K/UL (ref 1.8–7.7)
NEUTROPHILS NFR BLD: 70.9 % (ref 38–73)
NRBC BLD-RTO: 0 /100 WBC
PLATELET # BLD AUTO: 326 K/UL (ref 150–450)
PMV BLD AUTO: 9.3 FL (ref 9.2–12.9)
POTASSIUM SERPL-SCNC: 4.1 MMOL/L (ref 3.5–5.1)
PROT SERPL-MCNC: 6.5 G/DL (ref 6–8.4)
PROTHROMBIN TIME: 13.3 SEC (ref 9–12.5)
RBC # BLD AUTO: 3.93 M/UL (ref 4–5.4)
SODIUM SERPL-SCNC: 139 MMOL/L (ref 136–145)
WBC # BLD AUTO: 8.04 K/UL (ref 3.9–12.7)

## 2024-02-26 PROCEDURE — 85025 COMPLETE CBC W/AUTO DIFF WBC: CPT | Performed by: INTERNAL MEDICINE

## 2024-02-26 PROCEDURE — 36415 COLL VENOUS BLD VENIPUNCTURE: CPT | Performed by: INTERNAL MEDICINE

## 2024-02-26 PROCEDURE — 80053 COMPREHEN METABOLIC PANEL: CPT | Performed by: INTERNAL MEDICINE

## 2024-02-26 PROCEDURE — 82105 ALPHA-FETOPROTEIN SERUM: CPT | Performed by: INTERNAL MEDICINE

## 2024-02-26 PROCEDURE — 85610 PROTHROMBIN TIME: CPT | Performed by: INTERNAL MEDICINE

## 2024-02-27 ENCOUNTER — HOSPITAL ENCOUNTER (OUTPATIENT)
Dept: INTERVENTIONAL RADIOLOGY/VASCULAR | Facility: HOSPITAL | Age: 54
Discharge: HOME | End: 2024-02-27
Attending: INTERNAL MEDICINE
Payer: MEDICAID

## 2024-02-27 VITALS
OXYGEN SATURATION: 99 % | SYSTOLIC BLOOD PRESSURE: 146 MMHG | DIASTOLIC BLOOD PRESSURE: 85 MMHG | HEART RATE: 97 BPM | RESPIRATION RATE: 16 BRPM

## 2024-02-27 DIAGNOSIS — R18.8 OTHER ASCITES: ICD-10-CM

## 2024-02-27 PROCEDURE — 25000003 PHARM REV CODE 250

## 2024-02-27 PROCEDURE — 49082 ABD PARACENTESIS: CPT | Performed by: STUDENT IN AN ORGANIZED HEALTH CARE EDUCATION/TRAINING PROGRAM

## 2024-02-27 PROCEDURE — P9047 ALBUMIN (HUMAN), 25%, 50ML: HCPCS | Mod: JZ,JG

## 2024-02-27 PROCEDURE — 63600175 PHARM REV CODE 636 W HCPCS: Mod: JZ,JG

## 2024-02-27 PROCEDURE — C1729 CATH, DRAINAGE: HCPCS

## 2024-02-27 PROCEDURE — 49082 ABD PARACENTESIS: CPT | Mod: ,,,

## 2024-02-27 PROCEDURE — 27100111 IR PARACENTESIS NO IMAGING

## 2024-02-27 RX ORDER — ALBUMIN HUMAN 250 G/1000ML
SOLUTION INTRAVENOUS
Status: COMPLETED
Start: 2024-02-27 | End: 2024-02-27

## 2024-02-27 RX ORDER — LIDOCAINE HYDROCHLORIDE 10 MG/ML
INJECTION INFILTRATION; PERINEURAL
Status: COMPLETED | OUTPATIENT
Start: 2024-02-27 | End: 2024-02-27

## 2024-02-27 RX ADMIN — ALBUMIN (HUMAN): 25 SOLUTION INTRAVENOUS at 12:02

## 2024-02-27 RX ADMIN — LIDOCAINE HYDROCHLORIDE 10 ML: 10 INJECTION, SOLUTION INFILTRATION; PERINEURAL at 11:02

## 2024-02-27 RX ADMIN — ALBUMIN (HUMAN): 12.5 SOLUTION INTRAVENOUS at 12:02

## 2024-02-27 NOTE — PLAN OF CARE
Patient AAOx3, no distress noted, respirations even and unlabored, will continue to monitor. VSS. Acceptance of education, consents signed, H/P done. Labs reviewed. Patient in IR Room 1 for paracentesis procedure. Warm blankets applied to patient. Patient prepped and draped in sterile fashion.
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
## 2024-02-27 NOTE — PROCEDURES
Radiology Post-Procedure Note    Pre Op Diagnosis: Ascites  Post Op Diagnosis: Same    Procedure: Ultrasound Guided Paracentesis    Procedure performed by: Nicolle Baugh PA-C    Written Informed Consent Obtained: Yes  Patient declined pregnancy test. Reports there is no chance she is pregnant.   Specimen Removed: YES (yellow clear)  Estimated Blood Loss: Minimal    Findings:   Successful paracentesis from RLQ.  Albumin administered PRN per protocol.    Patient tolerated procedure well.    Nicolle Baugh PA-C  Interventional Radiology  [phone]

## 2024-02-27 NOTE — PLAN OF CARE
procedure completed. Patient tolerated well. Patient AAOx3, no distress noted, respirations even and unlabored, will continue to monitor. 6.3L ascities fluid drained. Patient tolerated well and will discharge home after albumin is infused.

## 2024-03-06 ENCOUNTER — TELEPHONE (OUTPATIENT)
Dept: HEPATOLOGY | Facility: CLINIC | Age: 54
End: 2024-03-06
Payer: MEDICAID

## 2024-03-06 NOTE — TELEPHONE ENCOUNTER
Spoke with patient informing patient Dr. Villagran would like to see her in 4/6 wks and informed patient she was scheduled at next available. Patient verbalized understanding.

## 2024-03-06 NOTE — TELEPHONE ENCOUNTER
----- Message from Nikia Hernandez sent at 3/6/2024  8:19 AM CST -----  Regarding: Appt Sooner  Contact: [phone]  Ember Rivera calling regarding Appointment Access  (message) for #  pt is calling to see if she can be seen sooner. please call pt to schedule soonest appt

## 2024-03-11 ENCOUNTER — LAB VISIT (OUTPATIENT)
Dept: LAB | Facility: HOSPITAL | Age: 54
End: 2024-03-11
Attending: STUDENT IN AN ORGANIZED HEALTH CARE EDUCATION/TRAINING PROGRAM
Payer: MEDICAID

## 2024-03-11 DIAGNOSIS — R18.8 OTHER ASCITES: ICD-10-CM

## 2024-03-11 LAB
AFP SERPL-MCNC: 3.7 NG/ML (ref 0–8.4)
ALBUMIN SERPL BCP-MCNC: 3 G/DL (ref 3.5–5.2)
ALP SERPL-CCNC: 112 U/L (ref 55–135)
ALT SERPL W/O P-5'-P-CCNC: 7 U/L (ref 10–44)
ANION GAP SERPL CALC-SCNC: 12 MMOL/L (ref 8–16)
AST SERPL-CCNC: 37 U/L (ref 10–40)
BASOPHILS # BLD AUTO: 0.03 K/UL (ref 0–0.2)
BASOPHILS NFR BLD: 0.6 % (ref 0–1.9)
BILIRUB SERPL-MCNC: 0.6 MG/DL (ref 0.1–1)
BUN SERPL-MCNC: 7 MG/DL (ref 6–20)
CALCIUM SERPL-MCNC: 9.3 MG/DL (ref 8.7–10.5)
CHLORIDE SERPL-SCNC: 108 MMOL/L (ref 95–110)
CO2 SERPL-SCNC: 21 MMOL/L (ref 23–29)
CREAT SERPL-MCNC: 0.7 MG/DL (ref 0.5–1.4)
DIFFERENTIAL METHOD BLD: NORMAL
EOSINOPHIL # BLD AUTO: 0.1 K/UL (ref 0–0.5)
EOSINOPHIL NFR BLD: 1.5 % (ref 0–8)
ERYTHROCYTE [DISTWIDTH] IN BLOOD BY AUTOMATED COUNT: 13.3 % (ref 11.5–14.5)
EST. GFR  (NO RACE VARIABLE): >60 ML/MIN/1.73 M^2
GLUCOSE SERPL-MCNC: 98 MG/DL (ref 70–110)
HCT VFR BLD AUTO: 37.7 % (ref 37–48.5)
HGB BLD-MCNC: 12.2 G/DL (ref 12–16)
IMM GRANULOCYTES # BLD AUTO: 0.02 K/UL (ref 0–0.04)
IMM GRANULOCYTES NFR BLD AUTO: 0.4 % (ref 0–0.5)
INR PPP: 1.2 (ref 0.8–1.2)
LYMPHOCYTES # BLD AUTO: 1.3 K/UL (ref 1–4.8)
LYMPHOCYTES NFR BLD: 24.2 % (ref 18–48)
MCH RBC QN AUTO: 30.4 PG (ref 27–31)
MCHC RBC AUTO-ENTMCNC: 32.4 G/DL (ref 32–36)
MCV RBC AUTO: 94 FL (ref 82–98)
MONOCYTES # BLD AUTO: 0.3 K/UL (ref 0.3–1)
MONOCYTES NFR BLD: 5 % (ref 4–15)
NEUTROPHILS # BLD AUTO: 3.6 K/UL (ref 1.8–7.7)
NEUTROPHILS NFR BLD: 68.3 % (ref 38–73)
NRBC BLD-RTO: 0 /100 WBC
PLATELET # BLD AUTO: 321 K/UL (ref 150–450)
PMV BLD AUTO: 9.2 FL (ref 9.2–12.9)
POTASSIUM SERPL-SCNC: 3.5 MMOL/L (ref 3.5–5.1)
PROT SERPL-MCNC: 6.7 G/DL (ref 6–8.4)
PROTHROMBIN TIME: 12.7 SEC (ref 9–12.5)
RBC # BLD AUTO: 4.01 M/UL (ref 4–5.4)
SODIUM SERPL-SCNC: 141 MMOL/L (ref 136–145)
WBC # BLD AUTO: 5.24 K/UL (ref 3.9–12.7)

## 2024-03-11 PROCEDURE — 85610 PROTHROMBIN TIME: CPT | Performed by: INTERNAL MEDICINE

## 2024-03-11 PROCEDURE — 36415 COLL VENOUS BLD VENIPUNCTURE: CPT | Performed by: INTERNAL MEDICINE

## 2024-03-11 PROCEDURE — 85025 COMPLETE CBC W/AUTO DIFF WBC: CPT | Performed by: INTERNAL MEDICINE

## 2024-03-11 PROCEDURE — 82105 ALPHA-FETOPROTEIN SERUM: CPT | Performed by: INTERNAL MEDICINE

## 2024-03-11 PROCEDURE — 80053 COMPREHEN METABOLIC PANEL: CPT | Performed by: INTERNAL MEDICINE

## 2024-03-12 ENCOUNTER — OFFICE VISIT (OUTPATIENT)
Dept: FAMILY MEDICINE | Facility: HOSPITAL | Age: 54
End: 2024-03-12
Payer: MEDICAID

## 2024-03-12 VITALS
HEART RATE: 115 BPM | WEIGHT: 139.13 LBS | DIASTOLIC BLOOD PRESSURE: 91 MMHG | SYSTOLIC BLOOD PRESSURE: 141 MMHG | HEIGHT: 66 IN | BODY MASS INDEX: 22.36 KG/M2

## 2024-03-12 DIAGNOSIS — F10.11 ALCOHOL USE DISORDER, MILD, IN EARLY REMISSION: Primary | ICD-10-CM

## 2024-03-12 DIAGNOSIS — K70.31 ALCOHOLIC CIRRHOSIS OF LIVER WITH ASCITES: ICD-10-CM

## 2024-03-12 DIAGNOSIS — G62.1 ALCOHOL-INDUCED POLYNEUROPATHY: ICD-10-CM

## 2024-03-12 PROCEDURE — 99214 OFFICE O/P EST MOD 30 MIN: CPT | Performed by: STUDENT IN AN ORGANIZED HEALTH CARE EDUCATION/TRAINING PROGRAM

## 2024-03-12 RX ORDER — TRAZODONE HYDROCHLORIDE 100 MG/1
100 TABLET ORAL NIGHTLY
COMMUNITY
Start: 2024-02-23

## 2024-03-12 RX ORDER — FUROSEMIDE 80 MG/1
80 TABLET ORAL 2 TIMES DAILY
Qty: 90 TABLET | Refills: 1 | Status: SHIPPED | OUTPATIENT
Start: 2024-03-12 | End: 2024-04-23 | Stop reason: DRUGHIGH

## 2024-03-14 NOTE — PROGRESS NOTES
Clinic Note  [facility] Family Medicine    Subjective:      Ember Rivera is a 54 y.o. female PMHx alcohol use disorder now in remission, fatty liver, alcohol-induced polyneuropathy, chronic alcohol-associated emesis. Patient here today for worsening ascites.    Ascites - S/p paracentesis on 2/27 and now says fluid is now back where it was prior to then. Patient has been adherent to 80 mg daily lasix since last hepatology visit on 2/20 in addition to 200 mg daily aldactone. Patient's next hepatology follow up not until 4/25. Patient currently scheduled for q2 week CMP. Patient denies fever, AMS, N/V though does endorse mild abdominal pressure. Of note, patient now states she is sober for 3 months.    Review of Systems   Constitutional:  Negative for chills and fever.   HENT:  Negative for congestion and sore throat.    Respiratory:  Negative for cough.    Cardiovascular:  Negative for chest pain and palpitations.   Gastrointestinal:  Negative for abdominal pain, diarrhea, nausea and vomiting.   Genitourinary:  Negative for dysuria.   Musculoskeletal:  Negative for joint pain and myalgias.   Neurological:  Negative for dizziness, tingling, weakness and headaches.   Psychiatric/Behavioral:  Negative for depression. The patient is not nervous/anxious.           Objective:      Vitals:    03/12/24 1018   BP: (!) 141/91   Pulse: (!) 115     Body mass index is 22.45 kg/m².      Physical Exam  Constitutional:       Appearance: Normal appearance. She is well-developed.   Cardiovascular:      Rate and Rhythm: Normal rate and regular rhythm.      Pulses: Normal pulses.   Pulmonary:      Effort: Pulmonary effort is normal.      Breath sounds: Normal breath sounds.   Abdominal:      General: There is distension.      Palpations: Abdomen is soft.      Tenderness: There is abdominal tenderness.   Musculoskeletal:         General: Normal range of motion.      Cervical back: Normal range of motion.      Right lower leg: Edema present.       Left lower leg: Edema present.   Skin:     General: Skin is warm and dry.      Capillary Refill: Capillary refill takes less than 2 seconds.      Coloration: Skin is not pale.   Neurological:      General: No focal deficit present.      Mental Status: She is alert and oriented to person, place, and time.   Psychiatric:         Mood and Affect: Mood normal.         Behavior: Behavior normal.            Assessment/Plan:        1. Alcoholic cirrhosis of liver with ascites    Patient not controlled on current regimen as witnessed by recurrence of ascites. Patient may need to continue for now with periodic paracenteses. For now will re-refer to IR and also raise patient's lasix to max recommended daily dose of 80 mg BID, with K supplementation for now. Encourage to continue with q2 week CMPs. Can consider increasing dose of aldactone if requires additional control however unlikely to improve much more if already on max lasix. For now, repeat paracentesis and increase lasix.    - Ambulatory referral/consult to Interventional RAD; Future  - potassium bicarbonate (K-LYTE) disintegrating tablet; Take 1 tablet (25 mEq total) by mouth 2 (two) times a day.  Dispense: 90 tablet; Refill: 0  - furosemide (LASIX) 80 MG tablet; Take 1 tablet (80 mg total) by mouth 2 (two) times daily.  Dispense: 90 tablet; Refill: 1      Patient discussed with attending physician, Dr. Heidi Neil MD  \[facility]\"" Family Medicine PGY-3  03/12/2024      The following information is provided to all patients.  This information is to help you find resources for any of the problems found today that may be affecting your health:                Living healthy guide: www.[facility].[place].gov       Understanding Diabetes: www.diabetes.org       Eating healthy: www.cdc.gov/healthyweight      CDC home safety checklist: www.cdc.gov/steadi/patient.html      Agency on Aging: www.goea.[place].gov       Alcoholics anonymous (AA): www.aa.org      Physical  Activity: www.rashad.nih.gov/xh6nqqj       Tobacco use: www.quitwithusla.org

## 2024-03-18 ENCOUNTER — HOSPITAL ENCOUNTER (OUTPATIENT)
Dept: INTERVENTIONAL RADIOLOGY/VASCULAR | Facility: HOSPITAL | Age: 54
Discharge: HOME | End: 2024-03-18
Attending: INTERNAL MEDICINE
Payer: MEDICAID

## 2024-03-18 VITALS
WEIGHT: 146 LBS | DIASTOLIC BLOOD PRESSURE: 86 MMHG | RESPIRATION RATE: 18 BRPM | HEART RATE: 109 BPM | HEIGHT: 66 IN | TEMPERATURE: 98 F | BODY MASS INDEX: 23.46 KG/M2 | OXYGEN SATURATION: 100 % | SYSTOLIC BLOOD PRESSURE: 167 MMHG

## 2024-03-18 DIAGNOSIS — R18.8 OTHER ASCITES: ICD-10-CM

## 2024-03-18 DIAGNOSIS — R18.8 ASCITES: ICD-10-CM

## 2024-03-18 PROCEDURE — 25000003 PHARM REV CODE 250: Performed by: PHYSICIAN ASSISTANT

## 2024-03-18 PROCEDURE — 49083 ABD PARACENTESIS W/IMAGING: CPT | Performed by: RADIOLOGY

## 2024-03-18 PROCEDURE — 49083 ABD PARACENTESIS W/IMAGING: CPT | Mod: ,,, | Performed by: RADIOLOGY

## 2024-03-18 RX ORDER — LIDOCAINE HYDROCHLORIDE 10 MG/ML
INJECTION INFILTRATION; PERINEURAL
Status: COMPLETED | OUTPATIENT
Start: 2024-03-18 | End: 2024-03-18

## 2024-03-18 RX ADMIN — LIDOCAINE HYDROCHLORIDE 5 ML: 10 INJECTION, SOLUTION INFILTRATION; PERINEURAL at 03:03

## 2024-03-18 NOTE — SEDATION DOCUMENTATION
Patient presents for paracentesis. Has had them in the past. Tolerated procedure well. 3.3L franc[place] cloudy ascites removed. No specimen orders noted. Patient discharged home out of department with own walker stable

## 2024-03-18 NOTE — PROCEDURES
Radiology Post-Procedure Note    Pre Op Diagnosis: Ascites  Post Op Diagnosis: Same    Procedure: Ultrasound Guided Paracentesis    Procedure performed by: Buffy Dumont PA-C    Written Informed Consent Obtained: Yes  Specimen Removed: no specimens sent  Estimated Blood Loss: Minimal    Findings:   Successful paracentesis.  3.3 L [place] cloudy ascites.  Albumin was not administered PRN     Patient tolerated procedure well.    Buffy Dumont PA-C
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
## 2024-03-18 NOTE — DISCHARGE SUMMARY
Interventional Radiology Short Stay Discharge Summary      Admit Date: 3/18/2024  Discharge Date: 03/18/2024     Hospital Course: Uneventful    Discharge Diagnosis: ascites    Discharge Condition: Stable    Discharge Disposition: Home    Diet: Resume prior diet    Activity: activity as tolerated    Follow-up: With referring provider    Cindy Chava, PA-C [facility]

## 2024-03-25 ENCOUNTER — LAB VISIT (OUTPATIENT)
Dept: LAB | Facility: HOSPITAL | Age: 54
End: 2024-03-25
Payer: MEDICAID

## 2024-03-25 DIAGNOSIS — R18.8 OTHER ASCITES: ICD-10-CM

## 2024-03-25 LAB
AFP SERPL-MCNC: 4.7 NG/ML (ref 0–8.4)
ALBUMIN SERPL BCP-MCNC: 3 G/DL (ref 3.5–5.2)
ALP SERPL-CCNC: 109 U/L (ref 55–135)
ALT SERPL W/O P-5'-P-CCNC: 9 U/L (ref 10–44)
ANION GAP SERPL CALC-SCNC: 12 MMOL/L (ref 8–16)
AST SERPL-CCNC: 31 U/L (ref 10–40)
BASOPHILS # BLD AUTO: 0.02 K/UL (ref 0–0.2)
BASOPHILS NFR BLD: 0.5 % (ref 0–1.9)
BILIRUB SERPL-MCNC: 0.4 MG/DL (ref 0.1–1)
BUN SERPL-MCNC: 8 MG/DL (ref 6–20)
CALCIUM SERPL-MCNC: 9.4 MG/DL (ref 8.7–10.5)
CHLORIDE SERPL-SCNC: 104 MMOL/L (ref 95–110)
CO2 SERPL-SCNC: 25 MMOL/L (ref 23–29)
CREAT SERPL-MCNC: 0.6 MG/DL (ref 0.5–1.4)
DIFFERENTIAL METHOD BLD: ABNORMAL
EOSINOPHIL # BLD AUTO: 0.1 K/UL (ref 0–0.5)
EOSINOPHIL NFR BLD: 1.4 % (ref 0–8)
ERYTHROCYTE [DISTWIDTH] IN BLOOD BY AUTOMATED COUNT: 13.6 % (ref 11.5–14.5)
EST. GFR  (NO RACE VARIABLE): >60 ML/MIN/1.73 M^2
GLUCOSE SERPL-MCNC: 82 MG/DL (ref 70–110)
HCT VFR BLD AUTO: 36 % (ref 37–48.5)
HGB BLD-MCNC: 11.8 G/DL (ref 12–16)
IMM GRANULOCYTES # BLD AUTO: 0.01 K/UL (ref 0–0.04)
IMM GRANULOCYTES NFR BLD AUTO: 0.2 % (ref 0–0.5)
INR PPP: 1.2 (ref 0.8–1.2)
LYMPHOCYTES # BLD AUTO: 1.1 K/UL (ref 1–4.8)
LYMPHOCYTES NFR BLD: 25.2 % (ref 18–48)
MCH RBC QN AUTO: 30.6 PG (ref 27–31)
MCHC RBC AUTO-ENTMCNC: 32.8 G/DL (ref 32–36)
MCV RBC AUTO: 93 FL (ref 82–98)
MONOCYTES # BLD AUTO: 0.3 K/UL (ref 0.3–1)
MONOCYTES NFR BLD: 7.6 % (ref 4–15)
NEUTROPHILS # BLD AUTO: 2.8 K/UL (ref 1.8–7.7)
NEUTROPHILS NFR BLD: 65.1 % (ref 38–73)
NRBC BLD-RTO: 0 /100 WBC
PLATELET # BLD AUTO: 227 K/UL (ref 150–450)
PMV BLD AUTO: 9.3 FL (ref 9.2–12.9)
POTASSIUM SERPL-SCNC: 3.6 MMOL/L (ref 3.5–5.1)
PROT SERPL-MCNC: 6.6 G/DL (ref 6–8.4)
PROTHROMBIN TIME: 13 SEC (ref 9–12.5)
RBC # BLD AUTO: 3.86 M/UL (ref 4–5.4)
SODIUM SERPL-SCNC: 141 MMOL/L (ref 136–145)
WBC # BLD AUTO: 4.33 K/UL (ref 3.9–12.7)

## 2024-03-25 PROCEDURE — 85025 COMPLETE CBC W/AUTO DIFF WBC: CPT | Performed by: INTERNAL MEDICINE

## 2024-03-25 PROCEDURE — 36415 COLL VENOUS BLD VENIPUNCTURE: CPT | Performed by: INTERNAL MEDICINE

## 2024-03-25 PROCEDURE — 85610 PROTHROMBIN TIME: CPT | Performed by: INTERNAL MEDICINE

## 2024-03-25 PROCEDURE — 82105 ALPHA-FETOPROTEIN SERUM: CPT | Performed by: INTERNAL MEDICINE

## 2024-03-25 PROCEDURE — 80053 COMPREHEN METABOLIC PANEL: CPT | Performed by: INTERNAL MEDICINE

## 2024-04-23 ENCOUNTER — TELEPHONE (OUTPATIENT)
Dept: INTERVENTIONAL RADIOLOGY/VASCULAR | Facility: HOSPITAL | Age: 54
End: 2024-04-23
Payer: MEDICAID

## 2024-04-23 ENCOUNTER — OFFICE VISIT (OUTPATIENT)
Dept: FAMILY MEDICINE | Facility: HOSPITAL | Age: 54
End: 2024-04-23
Payer: MEDICAID

## 2024-04-23 VITALS
DIASTOLIC BLOOD PRESSURE: 99 MMHG | HEIGHT: 66 IN | WEIGHT: 129.88 LBS | SYSTOLIC BLOOD PRESSURE: 187 MMHG | HEART RATE: 90 BPM | BODY MASS INDEX: 20.87 KG/M2

## 2024-04-23 DIAGNOSIS — S09.90XA HEAD TRAUMA, INITIAL ENCOUNTER: ICD-10-CM

## 2024-04-23 DIAGNOSIS — Z12.12 ENCOUNTER FOR SCREENING FOR COLORECTAL MALIGNANT NEOPLASM: ICD-10-CM

## 2024-04-23 DIAGNOSIS — K70.31 ALCOHOLIC CIRRHOSIS OF LIVER WITH ASCITES: Primary | ICD-10-CM

## 2024-04-23 DIAGNOSIS — Z12.31 ENCOUNTER FOR SCREENING MAMMOGRAM FOR MALIGNANT NEOPLASM OF BREAST: ICD-10-CM

## 2024-04-23 DIAGNOSIS — R42 VERTIGO: ICD-10-CM

## 2024-04-23 DIAGNOSIS — R63.4 WEIGHT LOSS: ICD-10-CM

## 2024-04-23 DIAGNOSIS — Z12.11 ENCOUNTER FOR SCREENING FOR COLORECTAL MALIGNANT NEOPLASM: ICD-10-CM

## 2024-04-23 DIAGNOSIS — S09.90XA TRAUMATIC INJURY OF HEAD, INITIAL ENCOUNTER: ICD-10-CM

## 2024-04-23 DIAGNOSIS — H91.92 HEARING LOSS OF LEFT EAR, UNSPECIFIED HEARING LOSS TYPE: ICD-10-CM

## 2024-04-23 DIAGNOSIS — G62.1 ALCOHOL-INDUCED POLYNEUROPATHY: ICD-10-CM

## 2024-04-23 DIAGNOSIS — I10 PRIMARY HYPERTENSION: ICD-10-CM

## 2024-04-23 PROCEDURE — 99214 OFFICE O/P EST MOD 30 MIN: CPT | Performed by: STUDENT IN AN ORGANIZED HEALTH CARE EDUCATION/TRAINING PROGRAM

## 2024-04-23 RX ORDER — PREGABALIN 100 MG/1
CAPSULE ORAL
Qty: 270 CAPSULE | Refills: 0 | Status: SHIPPED | OUTPATIENT
Start: 2024-04-23 | End: 2024-07-22

## 2024-04-23 RX ORDER — SPIRONOLACTONE 100 MG/1
100 TABLET, FILM COATED ORAL DAILY
Qty: 30 TABLET | Refills: 11 | Status: SHIPPED | OUTPATIENT
Start: 2024-04-23 | End: 2025-04-23

## 2024-04-23 RX ORDER — MECLIZINE HYDROCHLORIDE 25 MG/1
25 TABLET ORAL 3 TIMES DAILY PRN
Qty: 90 TABLET | Refills: 0 | Status: SHIPPED | OUTPATIENT
Start: 2024-04-23
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
## 2024-04-24 ENCOUNTER — HOSPITAL ENCOUNTER (OUTPATIENT)
Dept: INTERVENTIONAL RADIOLOGY/VASCULAR | Facility: HOSPITAL | Age: 54
Discharge: HOME | End: 2024-04-24
Attending: INTERNAL MEDICINE
Payer: MEDICAID

## 2024-04-24 VITALS
SYSTOLIC BLOOD PRESSURE: 176 MMHG | BODY MASS INDEX: 20.73 KG/M2 | HEART RATE: 87 BPM | HEIGHT: 66 IN | OXYGEN SATURATION: 99 % | RESPIRATION RATE: 16 BRPM | WEIGHT: 129 LBS | TEMPERATURE: 98 F | DIASTOLIC BLOOD PRESSURE: 98 MMHG

## 2024-04-24 DIAGNOSIS — R18.8 OTHER ASCITES: ICD-10-CM

## 2024-04-24 DIAGNOSIS — R18.8 ASCITES: ICD-10-CM

## 2024-04-24 PROCEDURE — 76705 ECHO EXAM OF ABDOMEN: CPT | Mod: TC | Performed by: RADIOLOGY

## 2024-04-24 PROCEDURE — 76705 ECHO EXAM OF ABDOMEN: CPT | Mod: 26,,, | Performed by: PHYSICIAN ASSISTANT
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
## 2024-04-24 NOTE — SEDATION DOCUMENTATION
Patient presents for paracentesis. HANNAH Dumont assessed patient with Ultrasound. Per PA, no paracentesis to be performed today. Patient aware and agreeable with plan. Patient to discharge home

## 2024-04-24 NOTE — PROCEDURES
Radiology Post-Procedure Note    Pre Op Diagnosis: Ascites  Post Op Diagnosis: Same    During ultrasound evaluation, insufficient ascites identified for safe paracentesis. No paracentesis performed.      Buffy Dumont PA-C  Interventional Radiology

## 2024-04-24 NOTE — DISCHARGE SUMMARY
Interventional Radiology Short Stay Discharge Summary      Admit Date: 4/24/2024  Discharge Date: 04/24/2024     Hospital Course: Uneventful    Discharge Diagnosis: ascites    Discharge Condition: Stable    Discharge Disposition: Home    Diet: Resume prior diet    Activity: activity as tolerated    Follow-up: With referring provider    Cindy Chava, PA-C [facility]

## 2024-04-25 ENCOUNTER — LAB VISIT (OUTPATIENT)
Dept: LAB | Facility: HOSPITAL | Age: 54
End: 2024-04-25
Payer: MEDICAID

## 2024-04-25 ENCOUNTER — OFFICE VISIT (OUTPATIENT)
Dept: HEPATOLOGY | Facility: CLINIC | Age: 54
End: 2024-04-25
Payer: MEDICAID

## 2024-04-25 VITALS
OXYGEN SATURATION: 100 % | DIASTOLIC BLOOD PRESSURE: 87 MMHG | HEART RATE: 87 BPM | HEIGHT: 66 IN | WEIGHT: 129.63 LBS | BODY MASS INDEX: 20.83 KG/M2 | SYSTOLIC BLOOD PRESSURE: 172 MMHG

## 2024-04-25 DIAGNOSIS — R29.898 WEAKNESS OF BOTH LOWER EXTREMITIES: ICD-10-CM

## 2024-04-25 DIAGNOSIS — R18.8 CIRRHOSIS OF LIVER WITH ASCITES, UNSPECIFIED HEPATIC CIRRHOSIS TYPE: Primary | ICD-10-CM

## 2024-04-25 DIAGNOSIS — K70.31 ALCOHOLIC CIRRHOSIS OF LIVER WITH ASCITES: ICD-10-CM

## 2024-04-25 DIAGNOSIS — R18.8 CIRRHOSIS OF LIVER WITH ASCITES, UNSPECIFIED HEPATIC CIRRHOSIS TYPE: ICD-10-CM

## 2024-04-25 DIAGNOSIS — K76.6 PORTAL HYPERTENSION: ICD-10-CM

## 2024-04-25 DIAGNOSIS — K74.60 CIRRHOSIS OF LIVER WITH ASCITES, UNSPECIFIED HEPATIC CIRRHOSIS TYPE: ICD-10-CM

## 2024-04-25 DIAGNOSIS — K74.60 CIRRHOSIS OF LIVER WITH ASCITES, UNSPECIFIED HEPATIC CIRRHOSIS TYPE: Primary | ICD-10-CM

## 2024-04-25 LAB
ALBUMIN SERPL BCP-MCNC: 3.6 G/DL (ref 3.5–5.2)
ALP SERPL-CCNC: 102 U/L (ref 55–135)
ALT SERPL W/O P-5'-P-CCNC: 7 U/L (ref 10–44)
ANION GAP SERPL CALC-SCNC: 9 MMOL/L (ref 8–16)
AST SERPL-CCNC: 28 U/L (ref 10–40)
BILIRUB SERPL-MCNC: 0.6 MG/DL (ref 0.1–1)
BUN SERPL-MCNC: 9 MG/DL (ref 6–20)
CALCIUM SERPL-MCNC: 9.9 MG/DL (ref 8.7–10.5)
CHLORIDE SERPL-SCNC: 108 MMOL/L (ref 95–110)
CO2 SERPL-SCNC: 25 MMOL/L (ref 23–29)
CREAT SERPL-MCNC: 0.6 MG/DL (ref 0.5–1.4)
EST. GFR  (NO RACE VARIABLE): >60 ML/MIN/1.73 M^2
GLUCOSE SERPL-MCNC: 97 MG/DL (ref 70–110)
POTASSIUM SERPL-SCNC: 3.8 MMOL/L (ref 3.5–5.1)
PROT SERPL-MCNC: 7.1 G/DL (ref 6–8.4)
SODIUM SERPL-SCNC: 142 MMOL/L (ref 136–145)

## 2024-04-25 PROCEDURE — 36415 COLL VENOUS BLD VENIPUNCTURE: CPT | Performed by: INTERNAL MEDICINE

## 2024-04-25 PROCEDURE — 99999 PR PBB SHADOW E&M-EST. PATIENT-LVL III: CPT | Mod: PBBFAC,,, | Performed by: INTERNAL MEDICINE

## 2024-04-25 PROCEDURE — 99215 OFFICE O/P EST HI 40 MIN: CPT | Mod: S$PBB,,, | Performed by: INTERNAL MEDICINE

## 2024-04-25 PROCEDURE — 3008F BODY MASS INDEX DOCD: CPT | Mod: CPTII,,, | Performed by: INTERNAL MEDICINE

## 2024-04-25 PROCEDURE — 3079F DIAST BP 80-89 MM HG: CPT | Mod: CPTII,,, | Performed by: INTERNAL MEDICINE

## 2024-04-25 PROCEDURE — G2211 COMPLEX E/M VISIT ADD ON: HCPCS | Mod: S$PBB,,, | Performed by: INTERNAL MEDICINE

## 2024-04-25 PROCEDURE — 80321 ALCOHOLS BIOMARKERS 1OR 2: CPT | Performed by: INTERNAL MEDICINE

## 2024-04-25 PROCEDURE — 3077F SYST BP >= 140 MM HG: CPT | Mod: CPTII,,, | Performed by: INTERNAL MEDICINE

## 2024-04-25 PROCEDURE — 99213 OFFICE O/P EST LOW 20 MIN: CPT | Mod: PBBFAC | Performed by: INTERNAL MEDICINE

## 2024-04-25 PROCEDURE — 1159F MED LIST DOCD IN RCRD: CPT | Mod: CPTII,,, | Performed by: INTERNAL MEDICINE

## 2024-04-25 PROCEDURE — 80053 COMPREHEN METABOLIC PANEL: CPT | Performed by: INTERNAL MEDICINE

## 2024-04-25 NOTE — Clinical Note
Trisha, can you call her and ask her to take furosemide 40 mg daily + zeinab 100 mg daily only + check CMP in 4 weeks?  thanks
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
## 2024-04-26 ENCOUNTER — TELEPHONE (OUTPATIENT)
Dept: HEPATOLOGY | Facility: CLINIC | Age: 54
End: 2024-04-26
Payer: MEDICAID

## 2024-04-26 PROBLEM — R11.2 INTRACTABLE NAUSEA AND VOMITING: Status: RESOLVED | Noted: 2023-10-22 | Resolved: 2024-04-26

## 2024-04-26 NOTE — TELEPHONE ENCOUNTER
---- Message from Dr SWATHI Soni -----  Can you call her and ask her to take furosemide 40 mg daily + zeinab 100 mg daily only + check CMP in 4 weeks?  Thanks    Call placed to the patient at [phone].  Message relayed from Dr Soni.    Patient stated I do not have any Furosemide.   Spironolactone was ordered by her PCP.  Will need a Rx sent in.    Repeat Labs in 4 weeks scheduled ON Wed 6/5/24 at 10 am at [facility].  Verbalized understanding.  Reminder placed in the mail.

## 2024-04-26 NOTE — TELEPHONE ENCOUNTER
----- Message from Jonny Soni MD sent at 4/26/2024 10:01 AM CDT -----  Trisha, can you call her and ask her to take furosemide 40 mg daily + zeinab 100 mg daily only + check CMP in 4 weeks?    thanks

## 2024-04-27 DIAGNOSIS — K70.31 ASCITES DUE TO ALCOHOLIC CIRRHOSIS: Primary | ICD-10-CM

## 2024-04-27 RX ORDER — FUROSEMIDE 40 MG/1
40 TABLET ORAL DAILY
Qty: 30 TABLET | Refills: 11 | Status: SHIPPED | OUTPATIENT
Start: 2024-04-27 | End: 2025-04-27

## 2024-04-29 ENCOUNTER — HOSPITAL ENCOUNTER (OUTPATIENT)
Dept: RADIOLOGY | Facility: HOSPITAL | Age: 54
Discharge: HOME | End: 2024-04-29
Attending: STUDENT IN AN ORGANIZED HEALTH CARE EDUCATION/TRAINING PROGRAM
Payer: MEDICAID

## 2024-04-29 DIAGNOSIS — S09.90XA HEAD TRAUMA, INITIAL ENCOUNTER: ICD-10-CM

## 2024-04-29 DIAGNOSIS — R42 VERTIGO: ICD-10-CM

## 2024-04-29 DIAGNOSIS — S09.90XA TRAUMATIC INJURY OF HEAD, INITIAL ENCOUNTER: ICD-10-CM

## 2024-04-29 LAB
CLINICAL BIOCHEMIST REVIEW: NORMAL
PLPETH BLD-MCNC: 96 NG/ML
POPETH BLD-MCNC: 204 NG/ML

## 2024-04-29 PROCEDURE — 70450 CT HEAD/BRAIN W/O DYE: CPT | Mod: 26,,, | Performed by: RADIOLOGY

## 2024-04-29 PROCEDURE — 70450 CT HEAD/BRAIN W/O DYE: CPT | Mod: TC

## 2024-05-02 ENCOUNTER — HOSPITAL ENCOUNTER (OUTPATIENT)
Dept: RADIOLOGY | Facility: HOSPITAL | Age: 54
Discharge: HOME | End: 2024-05-02
Attending: STUDENT IN AN ORGANIZED HEALTH CARE EDUCATION/TRAINING PROGRAM
Payer: MEDICAID

## 2024-05-02 VITALS — HEIGHT: 66 IN | BODY MASS INDEX: 20.73 KG/M2 | WEIGHT: 129 LBS

## 2024-05-02 DIAGNOSIS — Z12.31 ENCOUNTER FOR SCREENING MAMMOGRAM FOR MALIGNANT NEOPLASM OF BREAST: ICD-10-CM

## 2024-05-02 PROCEDURE — 77063 BREAST TOMOSYNTHESIS BI: CPT | Mod: 26,,, | Performed by: RADIOLOGY

## 2024-05-02 PROCEDURE — 77067 SCR MAMMO BI INCL CAD: CPT | Mod: TC

## 2024-05-02 PROCEDURE — 77067 SCR MAMMO BI INCL CAD: CPT | Mod: 26,,, | Performed by: RADIOLOGY

## 2024-05-08 ENCOUNTER — HOSPITAL ENCOUNTER (OUTPATIENT)
Dept: RADIOLOGY | Facility: HOSPITAL | Age: 54
Discharge: HOME | End: 2024-05-08
Attending: STUDENT IN AN ORGANIZED HEALTH CARE EDUCATION/TRAINING PROGRAM
Payer: MEDICAID

## 2024-05-08 DIAGNOSIS — G62.1 ALCOHOL-INDUCED POLYNEUROPATHY: ICD-10-CM

## 2024-05-08 DIAGNOSIS — K70.31 ALCOHOLIC CIRRHOSIS OF LIVER WITH ASCITES: ICD-10-CM

## 2024-05-08 DIAGNOSIS — G62.9 PERIPHERAL POLYNEUROPATHY: ICD-10-CM

## 2024-05-08 PROCEDURE — 77080 DXA BONE DENSITY AXIAL: CPT | Mod: 26,,, | Performed by: INTERNAL MEDICINE

## 2024-05-08 PROCEDURE — 77080 DXA BONE DENSITY AXIAL: CPT | Mod: TC

## 2024-05-09 RX ORDER — AMITRIPTYLINE HYDROCHLORIDE 25 MG/1
25 TABLET, FILM COATED ORAL NIGHTLY PRN
Qty: 60 TABLET | Refills: 1 | Status: SHIPPED | OUTPATIENT
Start: 2024-05-09 | End: 2025-05-09

## 2024-05-15 ENCOUNTER — HOSPITAL ENCOUNTER (EMERGENCY)
Facility: HOSPITAL | Age: 54
Discharge: HOME | End: 2024-05-15
Attending: EMERGENCY MEDICINE
Payer: MEDICAID

## 2024-05-15 VITALS
RESPIRATION RATE: 16 BRPM | DIASTOLIC BLOOD PRESSURE: 85 MMHG | WEIGHT: 129 LBS | TEMPERATURE: 99 F | HEART RATE: 90 BPM | OXYGEN SATURATION: 98 % | SYSTOLIC BLOOD PRESSURE: 153 MMHG | BODY MASS INDEX: 20.82 KG/M2

## 2024-05-15 DIAGNOSIS — R42 DIZZINESS: ICD-10-CM

## 2024-05-15 DIAGNOSIS — H81.10 BENIGN PAROXYSMAL POSITIONAL VERTIGO, UNSPECIFIED LATERALITY: ICD-10-CM

## 2024-05-15 DIAGNOSIS — R42 VERTIGO: Primary | ICD-10-CM

## 2024-05-15 LAB
ABO + RH BLD: NORMAL
ALBUMIN SERPL BCP-MCNC: 4.1 G/DL (ref 3.5–5.2)
ALP SERPL-CCNC: 120 U/L (ref 55–135)
ALT SERPL W/O P-5'-P-CCNC: 20 U/L (ref 10–44)
ANION GAP SERPL CALC-SCNC: 15 MMOL/L (ref 8–16)
AST SERPL-CCNC: 38 U/L (ref 10–40)
BASOPHILS # BLD AUTO: 0.04 K/UL (ref 0–0.2)
BASOPHILS NFR BLD: 0.5 % (ref 0–1.9)
BILIRUB SERPL-MCNC: 0.5 MG/DL (ref 0.1–1)
BILIRUB UR QL STRIP: NEGATIVE
BLD GP AB SCN CELLS X3 SERPL QL: NORMAL
BUN SERPL-MCNC: 7 MG/DL (ref 6–20)
CALCIUM SERPL-MCNC: 9.7 MG/DL (ref 8.7–10.5)
CHLORIDE SERPL-SCNC: 104 MMOL/L (ref 95–110)
CLARITY UR: CLEAR
CO2 SERPL-SCNC: 20 MMOL/L (ref 23–29)
COLOR UR: COLORLESS
CREAT SERPL-MCNC: 0.6 MG/DL (ref 0.5–1.4)
DIFFERENTIAL METHOD BLD: NORMAL
EOSINOPHIL # BLD AUTO: 0.1 K/UL (ref 0–0.5)
EOSINOPHIL NFR BLD: 1.3 % (ref 0–8)
ERYTHROCYTE [DISTWIDTH] IN BLOOD BY AUTOMATED COUNT: 13.6 % (ref 11.5–14.5)
EST. GFR  (NO RACE VARIABLE): >60 ML/MIN/1.73 M^2
GLUCOSE SERPL-MCNC: 93 MG/DL (ref 70–110)
GLUCOSE UR QL STRIP: NEGATIVE
HCT VFR BLD AUTO: 39.1 % (ref 37–48.5)
HGB BLD-MCNC: 13.2 G/DL (ref 12–16)
HGB UR QL STRIP: NEGATIVE
IMM GRANULOCYTES # BLD AUTO: 0.02 K/UL (ref 0–0.04)
IMM GRANULOCYTES NFR BLD AUTO: 0.2 % (ref 0–0.5)
KETONES UR QL STRIP: NEGATIVE
LEUKOCYTE ESTERASE UR QL STRIP: ABNORMAL
LYMPHOCYTES # BLD AUTO: 2.4 K/UL (ref 1–4.8)
LYMPHOCYTES NFR BLD: 26.9 % (ref 18–48)
MAGNESIUM SERPL-MCNC: 1.6 MG/DL (ref 1.6–2.6)
MCH RBC QN AUTO: 29.9 PG (ref 27–31)
MCHC RBC AUTO-ENTMCNC: 33.8 G/DL (ref 32–36)
MCV RBC AUTO: 89 FL (ref 82–98)
MICROSCOPIC COMMENT: NORMAL
MONOCYTES # BLD AUTO: 0.5 K/UL (ref 0.3–1)
MONOCYTES NFR BLD: 5.8 % (ref 4–15)
NEUTROPHILS # BLD AUTO: 5.7 K/UL (ref 1.8–7.7)
NEUTROPHILS NFR BLD: 65.3 % (ref 38–73)
NITRITE UR QL STRIP: NEGATIVE
NRBC BLD-RTO: 0 /100 WBC
PH UR STRIP: 7 [PH] (ref 5–8)
PLATELET # BLD AUTO: 205 K/UL (ref 150–450)
PMV BLD AUTO: 9.2 FL (ref 9.2–12.9)
POCT GLUCOSE: 83 MG/DL (ref 70–110)
POTASSIUM SERPL-SCNC: 3.5 MMOL/L (ref 3.5–5.1)
PROT SERPL-MCNC: 7.5 G/DL (ref 6–8.4)
PROT UR QL STRIP: NEGATIVE
RBC # BLD AUTO: 4.41 M/UL (ref 4–5.4)
SODIUM SERPL-SCNC: 139 MMOL/L (ref 136–145)
SP GR UR STRIP: <1.005 (ref 1–1.03)
SPECIMEN OUTDATE: NORMAL
SQUAMOUS #/AREA URNS HPF: 0 /HPF
URN SPEC COLLECT METH UR: ABNORMAL
UROBILINOGEN UR STRIP-ACNC: NEGATIVE EU/DL
WBC # BLD AUTO: 8.74 K/UL (ref 3.9–12.7)
WBC #/AREA URNS HPF: 4 /HPF (ref 0–5)

## 2024-05-15 PROCEDURE — 82962 GLUCOSE BLOOD TEST: CPT

## 2024-05-15 PROCEDURE — 80053 COMPREHEN METABOLIC PANEL: CPT | Performed by: NURSE PRACTITIONER

## 2024-05-15 PROCEDURE — 99284 EMERGENCY DEPT VISIT MOD MDM: CPT | Mod: 25

## 2024-05-15 PROCEDURE — 93005 ELECTROCARDIOGRAM TRACING: CPT

## 2024-05-15 PROCEDURE — 86850 RBC ANTIBODY SCREEN: CPT | Performed by: NURSE PRACTITIONER

## 2024-05-15 PROCEDURE — 83735 ASSAY OF MAGNESIUM: CPT | Performed by: EMERGENCY MEDICINE

## 2024-05-15 PROCEDURE — 85025 COMPLETE CBC W/AUTO DIFF WBC: CPT | Performed by: NURSE PRACTITIONER

## 2024-05-15 PROCEDURE — 93010 ELECTROCARDIOGRAM REPORT: CPT | Mod: ,,, | Performed by: INTERNAL MEDICINE

## 2024-05-15 PROCEDURE — 81000 URINALYSIS NONAUTO W/SCOPE: CPT | Performed by: NURSE PRACTITIONER

## 2024-05-15 NOTE — DISCHARGE INSTRUCTIONS
Please take Meclizine as prescribed and follow up with your primary care doctor as soon as possible for follow up.    Thank you for allowing me and my emergency team to take care of you here today! I hope you feel better soon. Please do not hesitate to return with any additional concerns that may arise from this or any new problem you encounter.    Our goal in the emergency department is to always give you outstanding care and exceptional service. If you receive a survey by mail or e-mail in the next week regarding your experience in our ED, we would greatly appreciate you completing it. Your feedback provides us with a way to recognize our staff who give very good care and it helps us learn how to improve when your experience was below the excellence we aspire to be!    Brook [place]au, PA-C [facility], [place], and [facility]   Emergency Room Physician Assistant

## 2024-05-15 NOTE — ED TRIAGE NOTES
"Patient presents awake, alert with c/o feeling lightheaded and weak over past several days. Denies pain or fever. States she feels this way when "her levels are low". Patient noted to have been anemic in the past but she thinks her K+ and Mg++ levels are off. Denies recent illness. No distress noted.  "
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
## 2024-05-15 NOTE — FIRST PROVIDER EVALUATION
Emergency Department TeleTriage Encounter Note      CHIEF COMPLAINT    Chief Complaint   Patient presents with    Dizziness     Pt reports dizziness and lightheaded x 2-3 days with nausea and neuropathy. Hx of abnormal labs. Pt states whenever she feels this way, he blood counts are abnormal.       VITAL SIGNS   Initial Vitals [05/15/24 1632]   BP Pulse Resp Temp SpO2   (!) 147/80 90 20 98.6 °F (37 °C) 96 %      MAP       --            ALLERGIES    Review of patient's allergies indicates:  No Known Allergies    PROVIDER TRIAGE NOTE  This is a teletriage evaluation of a 54 y.o. female presenting to the ED with c/o dizziness and lightheaded with nausea ongoing few days. Reports similar episodes with low blood count. Limited physical exam via telehealth: The patient is awake, alert, answering questions appropriately and is not in respiratory distress. Initial orders will be placed and care will be transferred to an alternate provider when patient is roomed for a full evaluation. Any additional orders and the final disposition will be determined by that provider.         ORDERS  Labs Reviewed - No data to display    ED Orders (720h ago, onward)      Start Ordered     Status Ordering Provider    05/15/24 1652 05/15/24 1651  Insert Saline lock IV  Once         Ordered DAYANARA SALINAS P.    05/15/24 1652 05/15/24 1651  CBC auto differential  STAT         Ordered DAYANARA SALINAS PAndrew    05/15/24 1652 05/15/24 1651  Comprehensive metabolic panel  STAT         Ordered DAYANARA SALINAS P.    05/15/24 1652 05/15/24 1651  Type & Screen  STAT         Ordered DAYANARA SALINAS P.    05/15/24 1652 05/15/24 1651  Urinalysis, Reflex to Urine Culture Urine, Clean Catch  STAT         Ordered DAYANARA SALINAS P.    05/15/24 1652 05/15/24 1651  Orthostatic vital signs  Once         Ordered DAYANARA SALINAS P.    05/15/24 1652 05/15/24 1651  POCT glucose  Once         Ordered DAYANARA SALINAS P.    05/15/24 1635 05/15/24 1635  EKG 12-lead  Once          Completed by LILIA ACUÑA on 5/15/2024 at  4:35 PM LILA BUSH              Virtual Visit Note: The provider triage portion of this emergency department evaluation and documentation was performed via [employer], a HIPAA-compliant telemedicine application, in concert with a tele-presenter in the room. A face to face patient evaluation with one of my colleagues will occur once the patient is placed in an emergency department room.      DISCLAIMER: This note was prepared with [employer] voice recognition transcription software. Garbled syntax, mangled pronouns, and other bizarre constructions may be attributed to that software system.

## 2024-05-20 LAB
OHS QRS DURATION: 82 MS
OHS QTC CALCULATION: 511 MS

## 2024-07-18 ENCOUNTER — TELEPHONE (OUTPATIENT)
Dept: FAMILY MEDICINE | Facility: HOSPITAL | Age: 54
End: 2024-07-18
Payer: MEDICAID

## 2024-07-18 NOTE — TELEPHONE ENCOUNTER
----- Message from Gael Dudley sent at 7/18/2024 10:08 AM CDT -----  Type:  Sooner Apoointment Request    Caller is requesting a sooner appointment.  Name of Caller:pt   When is the first available appointment?none  Symptoms:check up   Would the patient rather a call back or a response via [employer]? Call   Best Call Back Number:[phone]   Additional Information:

## 2024-07-18 NOTE — TELEPHONE ENCOUNTER
Called patient to schedule her a appointment I left a voicemail for her to call back .             Thank you Donna FUENTES

## 2024-08-15 ENCOUNTER — TELEPHONE (OUTPATIENT)
Dept: FAMILY MEDICINE | Facility: HOSPITAL | Age: 54
End: 2024-08-15
Payer: MEDICAID

## 2024-08-15 NOTE — TELEPHONE ENCOUNTER
----- Message from Galina Berman sent at 8/14/2024  3:20 PM CDT -----  Type:  In-Home PT    Who Called: Pt   Does the patient know what this is regarding?: requesting provider to order in home physical therapy   Would the patient rather a call back or a response via [facility]? Call   Best Call Back Number: [phone]   Additional Information:

## 2024-10-16 NOTE — PROGRESS NOTES
[facility]                    Pharmacy       Discharge Medication Education    Patient ACCEPTED medication education. Pharmacy has provided education on the name, indication, and possible side effects of the medication(s) prescribed, using teach-back method.     The following medications have also been discussed, during this admission.        Medication List        START taking these medications      cephALEXin 500 MG capsule  Commonly known as: KEFLEX  Take 1 capsule (500 mg total) by mouth every 6 (six) hours. for 3 days            CONTINUE taking these medications      cyanocobalamin 1000 MCG tablet  Commonly known as: VITAMIN B-12  Take 1 tablet (1,000 mcg total) by mouth once daily.     EScitalopram oxalate 10 MG tablet  Commonly known as: LEXAPRO  Take 1 tablet (10 mg total) by mouth once daily.     folic acid 1 MG tablet  Commonly known as: FOLVITE  Take 1 tablet (1 mg total) by mouth once daily.     ondansetron 4 MG Tbdl  Commonly known as: ZOFRAN-ODT  Take 1 tablet (4 mg total) by mouth every 6 (six) hours as needed (For nausea. Take only as needed.).     potassium bicarbonate disintegrating tablet  Commonly known as: K-LYTE  Take 1 tablet (20 mEq total) by mouth 2 (two) times a day.     pregabalin 100 MG capsule  Commonly known as: LYRICA  Take 1 capsule (100 mg total) by mouth 2 (two) times daily.     spironolactone 25 MG tablet  Commonly known as: ALDACTONE  Take 1 tablet (25 mg total) by mouth once daily.     THERA-M 27-0.4 mg Tab  Generic drug: multivit-min-iron-CA-FA     topiramate 25 MG tablet  Commonly known as: TOPAMAX  Take 1 tablet (25 mg total) by mouth once daily.     traZODone 100 MG tablet  Commonly known as: DESYREL  Take 1 tablet (100 mg total) by mouth every evening.               Where to Get Your Medications        These medications were sent to [facility]  [address], [place] [address]      Hours: Mon-Fri, 8a-5:30p Phone: [phone]  Please call patient's mother and advise her form is ready for . Thank you!     cephALEXin 500 MG capsule          Thank you  Sehfali Taylor, PharmD  [phone]

## 2024-10-19 ENCOUNTER — HOSPITAL ENCOUNTER (INPATIENT)
Facility: HOSPITAL | Age: 54
LOS: 4 days | Discharge: HOME | DRG: 872 | End: 2024-10-23
Attending: EMERGENCY MEDICINE | Admitting: FAMILY MEDICINE
Payer: MEDICAID

## 2024-10-19 DIAGNOSIS — R07.9 CHEST PAIN: ICD-10-CM

## 2024-10-19 DIAGNOSIS — L03.113 CELLULITIS OF RIGHT FOREARM: Primary | ICD-10-CM

## 2024-10-19 DIAGNOSIS — R21 RASH: ICD-10-CM

## 2024-10-19 PROBLEM — L03.119 CELLULITIS OF EXTREMITY: Status: ACTIVE | Noted: 2024-10-19

## 2024-10-19 PROBLEM — F17.200 TOBACCO DEPENDENCY: Status: ACTIVE | Noted: 2024-10-19

## 2024-10-19 PROBLEM — A41.9 SEPSIS WITHOUT ACUTE ORGAN DYSFUNCTION: Status: ACTIVE | Noted: 2024-10-19

## 2024-10-19 LAB
ALBUMIN SERPL BCP-MCNC: 3.4 G/DL (ref 3.5–5.2)
ALP SERPL-CCNC: 209 U/L (ref 40–150)
ALT SERPL W/O P-5'-P-CCNC: 29 U/L (ref 10–44)
AMPHET+METHAMPHET UR QL: NEGATIVE
ANION GAP SERPL CALC-SCNC: 15 MMOL/L (ref 8–16)
AST SERPL-CCNC: 88 U/L (ref 10–40)
BARBITURATES UR QL SCN>200 NG/ML: NEGATIVE
BASOPHILS # BLD AUTO: 0.04 K/UL (ref 0–0.2)
BASOPHILS NFR BLD: 0.4 % (ref 0–1.9)
BENZODIAZ UR QL SCN>200 NG/ML: NEGATIVE
BILIRUB SERPL-MCNC: 1.1 MG/DL (ref 0.1–1)
BUN SERPL-MCNC: 4 MG/DL (ref 6–20)
BZE UR QL SCN: NEGATIVE
CALCIUM SERPL-MCNC: 9 MG/DL (ref 8.7–10.5)
CANNABINOIDS UR QL SCN: NEGATIVE
CHLORIDE SERPL-SCNC: 102 MMOL/L (ref 95–110)
CO2 SERPL-SCNC: 18 MMOL/L (ref 23–29)
CREAT SERPL-MCNC: 0.6 MG/DL (ref 0.5–1.4)
CREAT UR-MCNC: 166.6 MG/DL (ref 15–325)
DIFFERENTIAL METHOD BLD: ABNORMAL
EOSINOPHIL # BLD AUTO: 0.1 K/UL (ref 0–0.5)
EOSINOPHIL NFR BLD: 0.9 % (ref 0–8)
ERYTHROCYTE [DISTWIDTH] IN BLOOD BY AUTOMATED COUNT: 12.4 % (ref 11.5–14.5)
EST. GFR  (NO RACE VARIABLE): >60 ML/MIN/1.73 M^2
GLUCOSE SERPL-MCNC: 103 MG/DL (ref 70–110)
HCT VFR BLD AUTO: 34.8 % (ref 37–48.5)
HGB BLD-MCNC: 12 G/DL (ref 12–16)
IMM GRANULOCYTES # BLD AUTO: 0.02 K/UL (ref 0–0.04)
IMM GRANULOCYTES NFR BLD AUTO: 0.2 % (ref 0–0.5)
LACTATE SERPL-SCNC: 4.2 MMOL/L (ref 0.5–2.2)
LACTATE SERPL-SCNC: 5.2 MMOL/L (ref 0.5–2.2)
LYMPHOCYTES # BLD AUTO: 1.4 K/UL (ref 1–4.8)
LYMPHOCYTES NFR BLD: 15.4 % (ref 18–48)
MCH RBC QN AUTO: 33.6 PG (ref 27–31)
MCHC RBC AUTO-ENTMCNC: 34.5 G/DL (ref 32–36)
MCV RBC AUTO: 98 FL (ref 82–98)
METHADONE UR QL SCN>300 NG/ML: NEGATIVE
MONOCYTES # BLD AUTO: 1.1 K/UL (ref 0.3–1)
MONOCYTES NFR BLD: 11.6 % (ref 4–15)
NEUTROPHILS # BLD AUTO: 6.5 K/UL (ref 1.8–7.7)
NEUTROPHILS NFR BLD: 71.5 % (ref 38–73)
NRBC BLD-RTO: 0 /100 WBC
OPIATES UR QL SCN: NEGATIVE
PCP UR QL SCN>25 NG/ML: NEGATIVE
PLATELET # BLD AUTO: 189 K/UL (ref 150–450)
PMV BLD AUTO: 8.8 FL (ref 9.2–12.9)
POTASSIUM SERPL-SCNC: 3.6 MMOL/L (ref 3.5–5.1)
PROT SERPL-MCNC: 6.9 G/DL (ref 6–8.4)
RBC # BLD AUTO: 3.57 M/UL (ref 4–5.4)
SODIUM SERPL-SCNC: 135 MMOL/L (ref 136–145)
TOXICOLOGY INFORMATION: NORMAL
WBC # BLD AUTO: 9.12 K/UL (ref 3.9–12.7)

## 2024-10-19 PROCEDURE — 96365 THER/PROPH/DIAG IV INF INIT: CPT

## 2024-10-19 PROCEDURE — 80307 DRUG TEST PRSMV CHEM ANLYZR: CPT | Performed by: EMERGENCY MEDICINE

## 2024-10-19 PROCEDURE — 85025 COMPLETE CBC W/AUTO DIFF WBC: CPT | Performed by: EMERGENCY MEDICINE

## 2024-10-19 PROCEDURE — 80053 COMPREHEN METABOLIC PANEL: CPT | Performed by: EMERGENCY MEDICINE

## 2024-10-19 PROCEDURE — 25000003 PHARM REV CODE 250

## 2024-10-19 PROCEDURE — 83605 ASSAY OF LACTIC ACID: CPT | Mod: 91 | Performed by: EMERGENCY MEDICINE

## 2024-10-19 PROCEDURE — 11000001 HC ACUTE MED/SURG PRIVATE ROOM

## 2024-10-19 PROCEDURE — 25000003 PHARM REV CODE 250: Performed by: EMERGENCY MEDICINE

## 2024-10-19 PROCEDURE — 63600175 PHARM REV CODE 636 W HCPCS: Performed by: EMERGENCY MEDICINE

## 2024-10-19 PROCEDURE — 99285 EMERGENCY DEPT VISIT HI MDM: CPT | Mod: 25

## 2024-10-19 PROCEDURE — 87040 BLOOD CULTURE FOR BACTERIA: CPT | Performed by: EMERGENCY MEDICINE

## 2024-10-19 RX ORDER — TRAZODONE HYDROCHLORIDE 100 MG/1
100 TABLET ORAL NIGHTLY
Status: DISCONTINUED | OUTPATIENT
Start: 2024-10-19 | End: 2024-10-23 | Stop reason: HOSPADM

## 2024-10-19 RX ORDER — HYDROCODONE BITARTRATE AND ACETAMINOPHEN 10; 325 MG/1; MG/1
1 TABLET ORAL EVERY 6 HOURS PRN
Status: DISCONTINUED | OUTPATIENT
Start: 2024-10-19 | End: 2024-10-23 | Stop reason: HOSPADM

## 2024-10-19 RX ORDER — SPIRONOLACTONE 100 MG/1
100 TABLET, FILM COATED ORAL DAILY
Status: DISCONTINUED | OUTPATIENT
Start: 2024-10-20 | End: 2024-10-23 | Stop reason: HOSPADM

## 2024-10-19 RX ORDER — AMITRIPTYLINE HYDROCHLORIDE 25 MG/1
25 TABLET, FILM COATED ORAL NIGHTLY PRN
Status: DISCONTINUED | OUTPATIENT
Start: 2024-10-19 | End: 2024-10-23 | Stop reason: HOSPADM

## 2024-10-19 RX ORDER — IBUPROFEN 200 MG
24 TABLET ORAL
Status: DISCONTINUED | OUTPATIENT
Start: 2024-10-19 | End: 2024-10-23 | Stop reason: HOSPADM

## 2024-10-19 RX ORDER — HYDROCODONE BITARTRATE AND ACETAMINOPHEN 5; 325 MG/1; MG/1
1 TABLET ORAL
Status: COMPLETED | OUTPATIENT
Start: 2024-10-19 | End: 2024-10-19

## 2024-10-19 RX ORDER — SODIUM CHLORIDE 0.9 % (FLUSH) 0.9 %
10 SYRINGE (ML) INJECTION EVERY 12 HOURS PRN
Status: DISCONTINUED | OUTPATIENT
Start: 2024-10-19 | End: 2024-10-23 | Stop reason: HOSPADM

## 2024-10-19 RX ORDER — ONDANSETRON HYDROCHLORIDE 2 MG/ML
4 INJECTION, SOLUTION INTRAVENOUS EVERY 8 HOURS PRN
Status: DISCONTINUED | OUTPATIENT
Start: 2024-10-19 | End: 2024-10-23 | Stop reason: HOSPADM

## 2024-10-19 RX ORDER — ACETAMINOPHEN 325 MG/1
650 TABLET ORAL EVERY 4 HOURS PRN
Status: DISCONTINUED | OUTPATIENT
Start: 2024-10-19 | End: 2024-10-23 | Stop reason: HOSPADM

## 2024-10-19 RX ORDER — IBUPROFEN 200 MG
16 TABLET ORAL
Status: DISCONTINUED | OUTPATIENT
Start: 2024-10-19 | End: 2024-10-23 | Stop reason: HOSPADM

## 2024-10-19 RX ORDER — SODIUM CHLORIDE 9 MG/ML
1000 INJECTION, SOLUTION INTRAVENOUS
Status: COMPLETED | OUTPATIENT
Start: 2024-10-19 | End: 2024-10-19

## 2024-10-19 RX ORDER — NALOXONE HCL 0.4 MG/ML
0.02 VIAL (ML) INJECTION
Status: DISCONTINUED | OUTPATIENT
Start: 2024-10-19 | End: 2024-10-23 | Stop reason: HOSPADM

## 2024-10-19 RX ORDER — ENOXAPARIN SODIUM 100 MG/ML
40 INJECTION SUBCUTANEOUS EVERY 24 HOURS
Status: DISCONTINUED | OUTPATIENT
Start: 2024-10-19 | End: 2024-10-23 | Stop reason: HOSPADM

## 2024-10-19 RX ORDER — FUROSEMIDE 40 MG/1
40 TABLET ORAL DAILY
Status: DISCONTINUED | OUTPATIENT
Start: 2024-10-20 | End: 2024-10-23 | Stop reason: HOSPADM

## 2024-10-19 RX ORDER — FOLIC ACID 1 MG/1
1 TABLET ORAL DAILY
Status: DISCONTINUED | OUTPATIENT
Start: 2024-10-20 | End: 2024-10-23 | Stop reason: HOSPADM

## 2024-10-19 RX ORDER — HYDRALAZINE HYDROCHLORIDE 20 MG/ML
10 INJECTION INTRAMUSCULAR; INTRAVENOUS
Status: ACTIVE | OUTPATIENT
Start: 2024-10-19 | End: 2024-10-20

## 2024-10-19 RX ORDER — IBUPROFEN 200 MG
1 TABLET ORAL DAILY
Status: DISCONTINUED | OUTPATIENT
Start: 2024-10-19 | End: 2024-10-22

## 2024-10-19 RX ORDER — GLUCAGON 1 MG
1 KIT INJECTION
Status: DISCONTINUED | OUTPATIENT
Start: 2024-10-19 | End: 2024-10-23 | Stop reason: HOSPADM

## 2024-10-19 RX ORDER — PREGABALIN 50 MG/1
100 CAPSULE ORAL NIGHTLY
Status: DISCONTINUED | OUTPATIENT
Start: 2024-10-19 | End: 2024-10-23 | Stop reason: HOSPADM

## 2024-10-19 RX ORDER — HYDROCODONE BITARTRATE AND ACETAMINOPHEN 5; 325 MG/1; MG/1
1 TABLET ORAL EVERY 6 HOURS PRN
Status: DISCONTINUED | OUTPATIENT
Start: 2024-10-19 | End: 2024-10-23 | Stop reason: HOSPADM

## 2024-10-19 RX ADMIN — HYDROCODONE BITARTRATE AND ACETAMINOPHEN 1 TABLET: 10; 325 TABLET ORAL at 09:10

## 2024-10-19 RX ADMIN — HYDROCODONE BITARTRATE AND ACETAMINOPHEN 1 TABLET: 5; 325 TABLET ORAL at 06:10

## 2024-10-19 RX ADMIN — SODIUM CHLORIDE 1000 ML: 9 INJECTION, SOLUTION INTRAVENOUS at 06:10

## 2024-10-19 RX ADMIN — VANCOMYCIN HYDROCHLORIDE 1500 MG: 1.5 INJECTION, POWDER, LYOPHILIZED, FOR SOLUTION INTRAVENOUS at 05:10

## 2024-10-19 RX ADMIN — TRAZODONE HYDROCHLORIDE 100 MG: 100 TABLET ORAL at 10:10

## 2024-10-19 RX ADMIN — PIPERACILLIN AND TAZOBACTAM 4.5 G: 4; .5 INJECTION, POWDER, LYOPHILIZED, FOR SOLUTION INTRAVENOUS; PARENTERAL at 08:10

## 2024-10-19 NOTE — Clinical Note
Diagnosis: Cellulitis of right forearm [[id number]]   Reason for IP Medical Treatment  (Clinical interventions that can only be accomplished in the IP setting? ) :: Failed outpatient treatment of cellulitis to forearm

## 2024-10-20 LAB
ANION GAP SERPL CALC-SCNC: 11 MMOL/L (ref 8–16)
BASOPHILS # BLD AUTO: 0.03 K/UL (ref 0–0.2)
BASOPHILS NFR BLD: 0.4 % (ref 0–1.9)
BUN SERPL-MCNC: 6 MG/DL (ref 6–20)
CALCIUM SERPL-MCNC: 8.4 MG/DL (ref 8.7–10.5)
CHLORIDE SERPL-SCNC: 104 MMOL/L (ref 95–110)
CO2 SERPL-SCNC: 21 MMOL/L (ref 23–29)
CREAT SERPL-MCNC: 0.6 MG/DL (ref 0.5–1.4)
DIFFERENTIAL METHOD BLD: ABNORMAL
EOSINOPHIL # BLD AUTO: 0.1 K/UL (ref 0–0.5)
EOSINOPHIL NFR BLD: 1 % (ref 0–8)
ERYTHROCYTE [DISTWIDTH] IN BLOOD BY AUTOMATED COUNT: 12.6 % (ref 11.5–14.5)
EST. GFR  (NO RACE VARIABLE): >60 ML/MIN/1.73 M^2
GLUCOSE SERPL-MCNC: 86 MG/DL (ref 70–110)
HCT VFR BLD AUTO: 34.2 % (ref 37–48.5)
HGB BLD-MCNC: 11.1 G/DL (ref 12–16)
IMM GRANULOCYTES # BLD AUTO: 0.02 K/UL (ref 0–0.04)
IMM GRANULOCYTES NFR BLD AUTO: 0.3 % (ref 0–0.5)
LACTATE SERPL-SCNC: 3.5 MMOL/L (ref 0.5–2.2)
LYMPHOCYTES # BLD AUTO: 1.5 K/UL (ref 1–4.8)
LYMPHOCYTES NFR BLD: 19.9 % (ref 18–48)
MAGNESIUM SERPL-MCNC: 1.3 MG/DL (ref 1.6–2.6)
MCH RBC QN AUTO: 32.7 PG (ref 27–31)
MCHC RBC AUTO-ENTMCNC: 32.5 G/DL (ref 32–36)
MCV RBC AUTO: 101 FL (ref 82–98)
MONOCYTES # BLD AUTO: 0.8 K/UL (ref 0.3–1)
MONOCYTES NFR BLD: 11.3 % (ref 4–15)
NEUTROPHILS # BLD AUTO: 4.9 K/UL (ref 1.8–7.7)
NEUTROPHILS NFR BLD: 67.1 % (ref 38–73)
NRBC BLD-RTO: 0 /100 WBC
PHOSPHATE SERPL-MCNC: 3.3 MG/DL (ref 2.7–4.5)
PLATELET # BLD AUTO: 144 K/UL (ref 150–450)
PMV BLD AUTO: 8.9 FL (ref 9.2–12.9)
POTASSIUM SERPL-SCNC: 3.7 MMOL/L (ref 3.5–5.1)
RBC # BLD AUTO: 3.39 M/UL (ref 4–5.4)
SODIUM SERPL-SCNC: 136 MMOL/L (ref 136–145)
WBC # BLD AUTO: 7.27 K/UL (ref 3.9–12.7)

## 2024-10-20 PROCEDURE — 25000003 PHARM REV CODE 250

## 2024-10-20 PROCEDURE — 63600175 PHARM REV CODE 636 W HCPCS

## 2024-10-20 PROCEDURE — 85025 COMPLETE CBC W/AUTO DIFF WBC: CPT

## 2024-10-20 PROCEDURE — 11000001 HC ACUTE MED/SURG PRIVATE ROOM

## 2024-10-20 PROCEDURE — 83605 ASSAY OF LACTIC ACID: CPT

## 2024-10-20 PROCEDURE — 80048 BASIC METABOLIC PNL TOTAL CA: CPT

## 2024-10-20 PROCEDURE — 84100 ASSAY OF PHOSPHORUS: CPT

## 2024-10-20 PROCEDURE — 83735 ASSAY OF MAGNESIUM: CPT

## 2024-10-20 PROCEDURE — 36415 COLL VENOUS BLD VENIPUNCTURE: CPT

## 2024-10-20 RX ADMIN — SODIUM CHLORIDE, POTASSIUM CHLORIDE, SODIUM LACTATE AND CALCIUM CHLORIDE 1000 ML: 600; 310; 30; 20 INJECTION, SOLUTION INTRAVENOUS at 12:10

## 2024-10-20 RX ADMIN — PIPERACILLIN AND TAZOBACTAM 4.5 G: 4; .5 INJECTION, POWDER, LYOPHILIZED, FOR SOLUTION INTRAVENOUS; PARENTERAL at 08:10

## 2024-10-20 RX ADMIN — HYDROCODONE BITARTRATE AND ACETAMINOPHEN 1 TABLET: 10; 325 TABLET ORAL at 03:10

## 2024-10-20 RX ADMIN — PIPERACILLIN AND TAZOBACTAM 4.5 G: 4; .5 INJECTION, POWDER, LYOPHILIZED, FOR SOLUTION INTRAVENOUS; PARENTERAL at 03:10

## 2024-10-20 RX ADMIN — VANCOMYCIN HYDROCHLORIDE 1000 MG: 1 INJECTION, POWDER, LYOPHILIZED, FOR SOLUTION INTRAVENOUS at 05:10

## 2024-10-20 RX ADMIN — TRAZODONE HYDROCHLORIDE 100 MG: 100 TABLET ORAL at 08:10

## 2024-10-20 RX ADMIN — HYDROCODONE BITARTRATE AND ACETAMINOPHEN 1 TABLET: 10; 325 TABLET ORAL at 09:10

## 2024-10-20 RX ADMIN — VANCOMYCIN HYDROCHLORIDE 1000 MG: 1 INJECTION, POWDER, LYOPHILIZED, FOR SOLUTION INTRAVENOUS at 07:10

## 2024-10-20 RX ADMIN — HYDROCODONE BITARTRATE AND ACETAMINOPHEN 1 TABLET: 10; 325 TABLET ORAL at 07:10

## 2024-10-20 RX ADMIN — PREGABALIN 200 MG: 50 CAPSULE ORAL at 08:10
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
## 2024-10-20 NOTE — PROGRESS NOTES
"Pharmacokinetic Initial Assessment: IV Vancomycin    Assessment/Plan:    Initiate intravenous vancomycin with loading dose of 1500 mg once followed by a maintenance dose of vancomycin 1000mg IV every 12 hours  Desired empiric serum trough concentration is 10 to 20 mcg/mL  Draw vancomycin trough level 60 min prior to fourth dose on 10/21 at approximately 0500  Pharmacy will continue to follow and monitor vancomycin.      Please contact pharmacy at extension [phone] with any questions regarding this assessment.     Thank you for the consult,   Robert Cole       Patient brief summary:  Ember Rivera is a 54 y.o. female initiated on antimicrobial therapy with IV Vancomycin for treatment of suspected skin & soft tissue infection    Drug Allergies:   Review of patient's allergies indicates:  No Known Allergies    Actual Body Weight:   59kg    Renal Function:   Estimated Creatinine Clearance: 101 mL/min (based on SCr of 0.6 mg/dL).,     Dialysis Method (if applicable):  N/A    CBC (last 72 hours):  Recent Labs   Lab Result Units 10/19/24  1634   WBC K/uL 9.12   Hemoglobin g/dL 12.0   Hematocrit % 34.8*   Platelets K/uL 189   Gran % % 71.5   Lymph % % 15.4*   Mono % % 11.6   Eosinophil % % 0.9   Basophil % % 0.4   Differential Method  Automated       Metabolic Panel (last 72 hours):  Recent Labs   Lab Result Units 10/19/24  1634 10/19/24  1847   Sodium mmol/L 135*  --    Potassium mmol/L 3.6  --    Chloride mmol/L 102  --    CO2 mmol/L 18*  --    Glucose mg/dL 103  --    BUN mg/dL 4*  --    Creatinine mg/dL 0.6  --    Creatinine, Urine mg/dL  --  166.6   Albumin g/dL 3.4*  --    Total Bilirubin mg/dL 1.1*  --    Alkaline Phosphatase U/L 209*  --    AST U/L 88*  --    ALT U/L 29  --        Drug levels (last 3 results):  No results for input(s): "VANCOMYCINRA", "VANCORANDOM", "VANCOMYCINPE", "VANCOPEAK", "VANCOMYCINTR", "VANCOTROUGH" in the last 72 hours.    Microbiologic Results:  Microbiology Results (last 7 days)       " Procedure Component Value Units Date/Time    Blood culture [[id number]] Collected: 10/19/24 1634    Order Status: Sent Specimen: Blood from Peripheral, Forearm, Left Updated: 10/19/24 2025    Blood culture [[id number]] Collected: 10/19/24 1650    Order Status: Sent Specimen: Blood from Peripheral, Hand, Left Updated: 10/19/24 2025

## 2024-10-20 NOTE — PLAN OF CARE
Patient is AAOx4. Ambulated upto bathroom with standby assistance. IV fluid given as bolus once and abx maintained. RUE rash is marked. Instructed to use call light for assistance. Bed alarm set and maintained at lowest position. Call light within reach. Critical labs communicated with MD.

## 2024-10-20 NOTE — ASSESSMENT & PLAN NOTE
Patient with known Cirrhosis with Child's class Calculator for Child's score link- https://www.Simphatic.com/calc/340/child-freitas-score-cirrhosis-mortality#creator-insights. Co-morbidities are present and inclusive of ascites and portal hypertension.  MELD-Na score calculated; MELD 3.0: 10 at 3/25/2024 10:10 AM  MELD-Na: 8 at 3/25/2024 10:10 AM  Calculated from:  Serum Creatinine: 0.6 mg/dL (Using min of 1 mg/dL) at 3/25/2024 10:10 AM  Serum Sodium: 141 mmol/L (Using max of 137 mmol/L) at 3/25/2024 10:10 AM  Total Bilirubin: 0.4 mg/dL (Using min of 1 mg/dL) at 3/25/2024 10:10 AM  Serum Albumin: 3 g/dL at 3/25/2024 10:10 AM  INR(ratio): 1.2 at 3/25/2024 10:10 AM  Age at listing (hypothetical): 54 years  Sex: Female at 3/25/2024 10:10 AM      Continue chronic meds. Etiology likely ETOH. Will avoid any hepatotoxic meds, and monitor CBC/CMP/INR for synthetic function.

## 2024-10-20 NOTE — PROGRESS NOTES
[facility] Hospital Medicine  Progress Note    Patient Name: Ember Rivera  MRN: [id number]  Patient Class: IP- Inpatient   Admission Date: 10/19/2024  Length of Stay: 1 days  Attending Physician: Russel Delacruz MD  Primary Care Provider: Young Neil MD        Subjective:     Principal Problem:Sepsis without acute organ dysfunction      HPI: Ember Rivera is a 54F w/ PMHx of alcoholic cirrhosis of the liver, portal hypertension, ascites, and tobacco dependence, [facility] after spreading of cellulitis on neck and RUE. She states the first spot on her arm popped up 10 days ago, followed by another spot on RFA and another on RH. She also had a spot appear on her R neck behind her ear two days ago which prompted her to seek additional treatment. All of theses maroon spots have developed blisters which have popped, per the patient they leaked clear fluid at the time they popped. She denies any other drainage. She also denies fever or chills, but does complain of burning pain when anything touches these areas.    ED workup significant for: CO2 18, , Tbili 1.1, LA 4.2-->5.2. all other components of CBC and chemistry were unremarkable.    She is admitted to Hospital Medicine for upper extremity cellulitis.       Interval History: no acute event overnight. Reviewed labs and vitals. High lactate, on IV abx, hypotensive, will monitor and consider fluids if needed.     Review of Systems      Constitutional:  Negative for activity change, chills and fever.   HENT: Negative.     Eyes: Negative.    Respiratory: Negative.     Cardiovascular: Negative.    Gastrointestinal:  Positive for abdominal distention.        Chronic   Endocrine: Negative.    Genitourinary: Negative.    Musculoskeletal:  Positive for arthralgias and myalgias.        Chronic   Skin:  Positive for rash.        To RUE, spread to RFA and RH, new spot on R neck. Has itching and burning pain sensation,  particularly sensitive to touch.   Allergic/Immunologic: Negative.    Neurological: Negative.    Hematological: Negative.    Psychiatric/Behavioral: Negative.        Objective:     Vital Signs (Most Recent):  Temp: 97.9 °F (36.6 °C) (10/20/24 0430)  Pulse: 81 (10/20/24 0430)  Resp: 18 (10/20/24 0706)  BP: (!) 99/53 (10/20/24 0430)  SpO2: 96 % (10/20/24 0430) Vital Signs (24h Range):  Temp:  [97.7 °F (36.5 °C)-98.9 °F (37.2 °C)] 97.9 °F (36.6 °C)  Pulse:  [81-99] 81  Resp:  [16-18] 18  SpO2:  [96 %-100 %] 96 %  BP: ()/(53-96) 99/53     Weight: 67.2 kg (148 lb 2.4 oz)  Body mass index is 23.91 kg/m².    Intake/Output Summary (Last 24 hours) at 10/20/2024 0845  Last data filed at 10/20/2024 0422  Gross per 24 hour   Intake 2250.35 ml   Output 300 ml   Net 1950.35 ml      Physical Exam      Vitals and nursing note reviewed.   Constitutional:       General: She is not in acute distress.     Appearance: Normal appearance. She is not ill-appearing.   HENT:      Head: Normocephalic and atraumatic.      Nose: Nose normal.      Mouth/Throat:      Mouth: Mucous membranes are moist.      Pharynx: Oropharynx is clear.   Eyes:      Extraocular Movements: Extraocular movements intact.      Conjunctiva/sclera: Conjunctivae normal.      Pupils: Pupils are equal, round, and reactive to light.   Cardiovascular:      Rate and Rhythm: Normal rate and regular rhythm.      Pulses: Normal pulses.      Heart sounds: Normal heart sounds. No murmur heard.     No friction rub. No gallop.   Pulmonary:      Effort: Pulmonary effort is normal. No respiratory distress.      Breath sounds: Normal breath sounds. No wheezing, rhonchi or rales.   Abdominal:      General: Bowel sounds are normal. There is distension.      Palpations: Abdomen is soft.      Tenderness: There is no abdominal tenderness. There is no guarding or rebound.   Genitourinary:     Comments: BRP  Musculoskeletal:         General: Normal range of motion.      Cervical back:  Normal range of motion and neck supple.      Right lower leg: No edema.      Left lower leg: No edema.   Skin:     General: Skin is warm and dry.      Capillary Refill: Capillary refill takes less than 2 seconds.      Findings: Erythema, lesion and rash present.      Comments: To RUE, spread to RFA and RH, new spot on R neck. Has itching and burning pain sensation, particularly sensitive to touch.   Neurological:      General: No focal deficit present.      Mental Status: She is alert and oriented to person, place, and time. Mental status is at baseline.   Psychiatric:         Mood and Affect: Mood normal.         Behavior: Behavior normal.         Thought Content: Thought content normal.         Judgment: Judgment normal.     Significant Labs: All pertinent labs within the past 24 hours have been reviewed.  CBC:   Recent Labs   Lab 10/19/24  1634 10/20/24  0139   WBC 9.12 7.27   HGB 12.0 11.1*   HCT 34.8* 34.2*    144*     CMP:   Recent Labs   Lab 10/19/24  1634 10/20/24  0139   * 136   K 3.6 3.7    104   CO2 18* 21*    86   BUN 4* 6   CREATININE 0.6 0.6   CALCIUM 9.0 8.4*   PROT 6.9  --    ALBUMIN 3.4*  --    BILITOT 1.1*  --    ALKPHOS 209*  --    AST 88*  --    ALT 29  --    ANIONGAP 15 11     Lactic Acid:   Recent Labs   Lab 10/19/24  1634 10/19/24  2052 10/20/24  0139   LACTATE 4.2* 5.2* 3.5*       Significant Imaging: I have reviewed all pertinent imaging results/findings within the past 24 hours.  I have reviewed and interpreted all pertinent imaging results/findings within the past 24 hours.    Assessment/Plan:      Active Diagnoses:    Diagnosis Date Noted POA    PRINCIPAL PROBLEM:  Sepsis without acute organ dysfunction [A41.9] 10/19/2024 Yes    Tobacco dependency [F17.200] 10/19/2024 Yes    Cellulitis of extremity [L03.119] 10/19/2024 Yes    Alcoholic cirrhosis of liver with ascites [K70.31] 02/02/2024 Yes      Problems Resolved During this Admission:     VTE Risk Mitigation  (From admission, onward)           Ordered     enoxaparin injection 40 mg  Daily         10/19/24 2023     IP VTE HIGH RISK PATIENT  Once         10/19/24 2023     Place sequential compression device  Until discontinued         10/19/24 2023                   * Sepsis without acute organ dysfunction  - LA 4.2 --> 5.2. given 1L IVF in ED, will finish 30ml/kg sepsis protocol  - repeat LA after IVF  - PRN pain and fever control  - on IV Vanc and zosyn pending cx              Cellulitis of extremity  - areas to RUE, RFA, RH, and R neck marked, monitor for spread  - see sepsis plan        Tobacco dependency  Dangers of cigarette smoking were reviewed with patient in detail. Patient was Counseled for 3-10 minutes. Nicotine replacement options were discussed. Nicotine replacement was discussed- prescribed     Alcoholic cirrhosis of liver with ascites  Patient with known Cirrhosis with Child's class Calculator for Child's score link- https://[email or web address]/calc/340/child-freitas-score-cirrhosis-mortality#creator-insights. Co-morbidities are present and inclusive of ascites and portal hypertension.  MELD-Na score calculated; MELD 3.0: 10 at 3/25/2024 10:10 AM  MELD-Na: 8 at 3/25/2024 10:10 AM  Calculated from:  Serum Creatinine: 0.6 mg/dL (Using min of 1 mg/dL) at 3/25/2024 10:10 AM  Serum Sodium: 141 mmol/L (Using max of 137 mmol/L) at 3/25/2024 10:10 AM  Total Bilirubin: 0.4 mg/dL (Using min of 1 mg/dL) at 3/25/2024 10:10 AM  Serum Albumin: 3 g/dL at 3/25/2024 10:10 AM  INR(ratio): 1.2 at 3/25/2024 10:10 AM  Age at listing (hypothetical): 54 years  Sex: Female at 3/25/2024 10:10 AM        Continue chronic meds. Etiology likely ETOH. Will avoid any hepatotoxic meds, and monitor CBC/CMP/INR for synthetic function.        Russel Delacruz MD  Department of Hospital Medicine   [facility]

## 2024-10-20 NOTE — HPI
Ember Rivera is a 54F w/ PMHx of alcoholic cirrhosis of the liver, portal hypertension, ascites, and tobacco dependence, who presented to [facility] ED from urgent care after spreading of cellulitis on neck and RUE. She states the first spot on her arm popped up 10 days ago, followed by another spot on RFA and another on RH. She also had a spot appear on her R neck behind her ear two days ago which prompted her to seek additional treatment. All of theses maroon spots have developed blisters which have popped, per the patient they leaked clear fluid at the time they popped. She denies any other drainage. She also denies fever or chills, but does complain of burning pain when anything touches these areas.    ED workup significant for: CO2 18, , Tbili 1.1, LA 4.2-->5.2. all other components of CBC and chemistry were unremarkable.    She is admitted to Hospital Medicine for upper extremity cellulitis.

## 2024-10-20 NOTE — ED NOTES
Patient PIV noted to be infiltrated at bedside hand off, fluids delayed r/t same. Per Swati RN, patient BP elevated, taken on leg. Patient stated to writer able to receive BP on either upper arm. Writer rechecked BP prior to pulling hydralazine order, BP 160s/80s (see validated vitals). Notified MD of same, told to hold hydralazine order at this time. Per MD, patient may eat and drink; given hot plate and juice per request. Placed new PIV, patient tolerated well,IV fluids restarted. No other voiced concerns from patient at this time, patient states rash only hurts when touched at this time. Call bell in reach.

## 2024-10-20 NOTE — ED NOTES
Patient inquiring about analgesic; message to Daija CHANEL r/t same. Patient states refusing lovenox and wanting to wait to take lyrica and trazodone.

## 2024-10-20 NOTE — ASSESSMENT & PLAN NOTE
- LA 4.2 --> 5.2. given 1L IVF in ED, will finish 30ml/kg sepsis protocol  - repeat LA after IVF  - PRN pain and fever control  - started on BSA in ED, will continue on admission
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
## 2024-10-20 NOTE — PROGRESS NOTES
10/19/24 2230   Admission   Initial VN Admission Questions Complete   Shift   Pain Management Interventions pain management plan reviewed with patient/caregiver   Virtual Nurse - Patient Verbalized Approval Of Camera Use;VN Rounding   Safety/Activity   Patient Rounds bed in low position;call light in patient/parent reach;visualized patient;placement of personal items at bedside   Safety Promotion/Fall Prevention Fall Risk reviewed with patient/family;side rails raised x 2;assistive device/personal item within reach   Positioning   Body Position supine   Head of Bed (HOB) Positioning HOB at 30-45 degrees     VN cued in to pt's room with permission. Admission questions completed. Plan of care reviewed with pt. Pt denies any questions or needs at this time. Call bell w/in reach. Instructed to call for needs/assist oob.

## 2024-10-21 ENCOUNTER — CLINICAL SUPPORT (OUTPATIENT)
Dept: SMOKING CESSATION | Facility: CLINIC | Age: 54
End: 2024-10-21

## 2024-10-21 DIAGNOSIS — F17.210 CIGARETTE SMOKER: Primary | ICD-10-CM

## 2024-10-21 LAB
ANION GAP SERPL CALC-SCNC: 10 MMOL/L (ref 8–16)
BASOPHILS # BLD AUTO: 0.03 K/UL (ref 0–0.2)
BASOPHILS NFR BLD: 0.7 % (ref 0–1.9)
BUN SERPL-MCNC: 6 MG/DL (ref 6–20)
CALCIUM SERPL-MCNC: 8.5 MG/DL (ref 8.7–10.5)
CHLORIDE SERPL-SCNC: 106 MMOL/L (ref 95–110)
CO2 SERPL-SCNC: 22 MMOL/L (ref 23–29)
CREAT SERPL-MCNC: 0.6 MG/DL (ref 0.5–1.4)
DIFFERENTIAL METHOD BLD: ABNORMAL
EOSINOPHIL # BLD AUTO: 0.1 K/UL (ref 0–0.5)
EOSINOPHIL NFR BLD: 1.6 % (ref 0–8)
ERYTHROCYTE [DISTWIDTH] IN BLOOD BY AUTOMATED COUNT: 12.2 % (ref 11.5–14.5)
EST. GFR  (NO RACE VARIABLE): >60 ML/MIN/1.73 M^2
GLUCOSE SERPL-MCNC: 99 MG/DL (ref 70–110)
HCT VFR BLD AUTO: 29.7 % (ref 37–48.5)
HGB BLD-MCNC: 9.9 G/DL (ref 12–16)
IMM GRANULOCYTES # BLD AUTO: 0.01 K/UL (ref 0–0.04)
IMM GRANULOCYTES NFR BLD AUTO: 0.2 % (ref 0–0.5)
LYMPHOCYTES # BLD AUTO: 1.1 K/UL (ref 1–4.8)
LYMPHOCYTES NFR BLD: 25.3 % (ref 18–48)
MAGNESIUM SERPL-MCNC: 1.5 MG/DL (ref 1.6–2.6)
MCH RBC QN AUTO: 33.6 PG (ref 27–31)
MCHC RBC AUTO-ENTMCNC: 33.3 G/DL (ref 32–36)
MCV RBC AUTO: 101 FL (ref 82–98)
MONOCYTES # BLD AUTO: 0.5 K/UL (ref 0.3–1)
MONOCYTES NFR BLD: 11.2 % (ref 4–15)
NEUTROPHILS # BLD AUTO: 2.6 K/UL (ref 1.8–7.7)
NEUTROPHILS NFR BLD: 61 % (ref 38–73)
NRBC BLD-RTO: 0 /100 WBC
PHOSPHATE SERPL-MCNC: 4.3 MG/DL (ref 2.7–4.5)
PLATELET # BLD AUTO: 101 K/UL (ref 150–450)
PMV BLD AUTO: 8.9 FL (ref 9.2–12.9)
POTASSIUM SERPL-SCNC: 3.9 MMOL/L (ref 3.5–5.1)
RBC # BLD AUTO: 2.95 M/UL (ref 4–5.4)
SODIUM SERPL-SCNC: 138 MMOL/L (ref 136–145)
VANCOMYCIN TROUGH SERPL-MCNC: 10 UG/ML (ref 10–22)
WBC # BLD AUTO: 4.3 K/UL (ref 3.9–12.7)

## 2024-10-21 PROCEDURE — 63600175 PHARM REV CODE 636 W HCPCS: Performed by: FAMILY MEDICINE

## 2024-10-21 PROCEDURE — 85025 COMPLETE CBC W/AUTO DIFF WBC: CPT

## 2024-10-21 PROCEDURE — 63600175 PHARM REV CODE 636 W HCPCS

## 2024-10-21 PROCEDURE — 80048 BASIC METABOLIC PNL TOTAL CA: CPT

## 2024-10-21 PROCEDURE — 99406 BEHAV CHNG SMOKING 3-10 MIN: CPT | Mod: ,,,

## 2024-10-21 PROCEDURE — 99999 PR PBB SHADOW E&M-EST. PATIENT-LVL I: CPT | Mod: PBBFAC,,,

## 2024-10-21 PROCEDURE — 25000003 PHARM REV CODE 250

## 2024-10-21 PROCEDURE — 36415 COLL VENOUS BLD VENIPUNCTURE: CPT | Performed by: EMERGENCY MEDICINE

## 2024-10-21 PROCEDURE — 11000001 HC ACUTE MED/SURG PRIVATE ROOM

## 2024-10-21 PROCEDURE — 80202 ASSAY OF VANCOMYCIN: CPT | Performed by: EMERGENCY MEDICINE

## 2024-10-21 PROCEDURE — 84100 ASSAY OF PHOSPHORUS: CPT

## 2024-10-21 PROCEDURE — S4991 NICOTINE PATCH NONLEGEND: HCPCS

## 2024-10-21 PROCEDURE — 25000003 PHARM REV CODE 250: Performed by: FAMILY MEDICINE

## 2024-10-21 PROCEDURE — 83735 ASSAY OF MAGNESIUM: CPT

## 2024-10-21 RX ORDER — MAGNESIUM SULFATE HEPTAHYDRATE 40 MG/ML
2 INJECTION, SOLUTION INTRAVENOUS ONCE
Status: COMPLETED | OUTPATIENT
Start: 2024-10-21 | End: 2024-10-21

## 2024-10-21 RX ORDER — LANOLIN ALCOHOL/MO/W.PET/CERES
400 CREAM (GRAM) TOPICAL ONCE
Status: COMPLETED | OUTPATIENT
Start: 2024-10-22 | End: 2024-10-21

## 2024-10-21 RX ADMIN — PIPERACILLIN AND TAZOBACTAM 4.5 G: 4; .5 INJECTION, POWDER, LYOPHILIZED, FOR SOLUTION INTRAVENOUS; PARENTERAL at 12:10

## 2024-10-21 RX ADMIN — PIPERACILLIN AND TAZOBACTAM 4.5 G: 4; .5 INJECTION, POWDER, LYOPHILIZED, FOR SOLUTION INTRAVENOUS; PARENTERAL at 09:10

## 2024-10-21 RX ADMIN — VANCOMYCIN HYDROCHLORIDE 1250 MG: 1.25 INJECTION, POWDER, LYOPHILIZED, FOR SOLUTION INTRAVENOUS at 11:10

## 2024-10-21 RX ADMIN — HYDROCODONE BITARTRATE AND ACETAMINOPHEN 1 TABLET: 10; 325 TABLET ORAL at 01:10

## 2024-10-21 RX ADMIN — PREGABALIN 200 MG: 50 CAPSULE ORAL at 09:10

## 2024-10-21 RX ADMIN — MAGNESIUM OXIDE TAB 400 MG (241.3 MG ELEMENTAL MG) 400 MG: 400 (241.3 MG) TAB at 11:10

## 2024-10-21 RX ADMIN — HYDROCODONE BITARTRATE AND ACETAMINOPHEN 1 TABLET: 10; 325 TABLET ORAL at 03:10

## 2024-10-21 RX ADMIN — HYDROCODONE BITARTRATE AND ACETAMINOPHEN 1 TABLET: 10; 325 TABLET ORAL at 08:10

## 2024-10-21 RX ADMIN — MAGNESIUM SULFATE IN WATER 2 G: 40 INJECTION, SOLUTION INTRAVENOUS at 02:10

## 2024-10-21 RX ADMIN — TRAZODONE HYDROCHLORIDE 100 MG: 100 TABLET ORAL at 08:10

## 2024-10-21 RX ADMIN — NICOTINE 1 PATCH: 14 PATCH, EXTENDED RELEASE TRANSDERMAL at 09:10

## 2024-10-21 RX ADMIN — PREGABALIN 100 MG: 50 CAPSULE ORAL at 08:10

## 2024-10-21 RX ADMIN — VANCOMYCIN HYDROCHLORIDE 1250 MG: 1.25 INJECTION, POWDER, LYOPHILIZED, FOR SOLUTION INTRAVENOUS at 05:10

## 2024-10-21 NOTE — SUBJECTIVE & OBJECTIVE
Interval History: awake and alert, reported right forearm pain is the same, th redness has expanded  Replace magnesium  Continue vanc/Zosyn pending culture. Consult ID    Review of Systems   Constitutional:  Negative for activity change, chills and fever.   Gastrointestinal:  Negative for abdominal distention.   Musculoskeletal:  Positive for arthralgias and myalgias.        Chronic   Skin:  Positive for rash.        To RUE, spread to RFA and RH, new spot on R neck. Has itching and burning pain sensation, particularly sensitive to touch.     Objective:     Vital Signs (Most Recent):  Temp: 98 °F (36.7 °C) (10/21/24 1113)  Pulse: 76 (10/21/24 1113)  Resp: 16 (10/21/24 1303)  BP: (!) 115/59 (10/21/24 1113)  SpO2: 95 % (10/21/24 1113) Vital Signs (24h Range):  Temp:  [98 °F (36.7 °C)-98.4 °F (36.9 °C)] 98 °F (36.7 °C)  Pulse:  [76-97] 76  Resp:  [16-20] 16  SpO2:  [95 %-99 %] 95 %  BP: (115-152)/(56-84) 115/59     Weight: 70 kg (154 lb 5.2 oz)  Body mass index is 24.91 kg/m².    Intake/Output Summary (Last 24 hours) at 10/21/2024 1311  Last data filed at 10/21/2024 0414  Gross per 24 hour   Intake 1208.05 ml   Output 100 ml   Net 1108.05 ml         Physical Exam  Vitals and nursing note reviewed.   Constitutional:       General: She is not in acute distress.     Appearance: Normal appearance. She is not ill-appearing.   HENT:      Head: Normocephalic and atraumatic.   Cardiovascular:      Rate and Rhythm: Normal rate and regular rhythm.      Pulses: Normal pulses.      Heart sounds: Normal heart sounds. No murmur heard.     No friction rub. No gallop.   Pulmonary:      Effort: Pulmonary effort is normal. No respiratory distress.      Breath sounds: Normal breath sounds. No wheezing or rhonchi.   Abdominal:      General: Bowel sounds are normal. There is no distension.      Palpations: Abdomen is soft.      Tenderness: There is no abdominal tenderness.   Musculoskeletal:         General: Normal range of motion.       "Cervical back: Normal range of motion and neck supple.      Right lower leg: No edema.      Left lower leg: No edema.   Skin:     General: Skin is warm and dry.      Capillary Refill: Capillary refill takes less than 2 seconds.      Findings: Erythema, lesion and rash present.      Comments: To RUBY, spread to RFA and RH, new spot on R neck. Has itching and burning pain sensation, particularly sensitive to touch.   Neurological:      General: No focal deficit present.      Mental Status: She is alert and oriented to person, place, and time. Mental status is at baseline.   Psychiatric:         Mood and Affect: Mood normal.         Behavior: Behavior normal.             Significant Labs: A1C: No results for input(s): "HGBA1C" in the last 4320 hours.  ABGs: No results for input(s): "PH", "PCO2", "HCO3", "POCSATURATED", "BE", "TOTALHB", "COHB", "METHB", "O2HB", "POCFIO2", "PO2" in the last 48 hours.  Blood Culture:   Recent Labs   Lab 10/19/24  1634 10/19/24  1650   LABBLOO No Growth to date  No Growth to date No Growth to date  No Growth to date     CBC:   Recent Labs   Lab 10/19/24  1634 10/20/24  0139 10/21/24  0457   WBC 9.12 7.27 4.30   HGB 12.0 11.1* 9.9*   HCT 34.8* 34.2* 29.7*    144* 101*     CMP:   Recent Labs   Lab 10/19/24  1634 10/20/24  0139 10/21/24  0457   * 136 138   K 3.6 3.7 3.9    104 106   CO2 18* 21* 22*    86 99   BUN 4* 6 6   CREATININE 0.6 0.6 0.6   CALCIUM 9.0 8.4* 8.5*   PROT 6.9  --   --    ALBUMIN 3.4*  --   --    BILITOT 1.1*  --   --    ALKPHOS 209*  --   --    AST 88*  --   --    ALT 29  --   --    ANIONGAP 15 11 10     Lactic Acid:   Recent Labs   Lab 10/19/24  1634 10/19/24  2052 10/20/24  0139   LACTATE 4.2* 5.2* 3.5*     Lipase: No results for input(s): "LIPASE" in the last 48 hours.  Lipid Panel: No results for input(s): "CHOL", "HDL", "LDLCALC", "TRIG", "CHOLHDL" in the last 48 hours.  Magnesium:   Recent Labs   Lab 10/20/24  0139 10/21/24  0457   MG 1.3* " "1.5*     TSH: No results for input(s): "TSH" in the last 4320 hours.  Urine Culture: No results for input(s): "LABURIN" in the last 48 hours.  Urine Studies: No results for input(s): "COLORU", "APPEARANCEUA", "PHUR", "SPECGRAV", "PROTEINUA", "GLUCUA", "KETONESU", "BILIRUBINUA", "OCCULTUA", "NITRITE", "UROBILINOGEN", "LEUKOCYTESUR", "RBCUA", "WBCUA", "BACTERIA", "SQUAMEPITHEL", "HYALINECASTS" in the last 48 hours.    Invalid input(s): "WRIGHTSUR"    Significant Imaging: I have reviewed all pertinent imaging results/findings within the past 24 hours.  "

## 2024-10-21 NOTE — PLAN OF CARE
Problem: Adult Inpatient Plan of Care  Goal: Plan of Care Review  Outcome: Progressing     Problem: Wound  Goal: Optimal Coping  Outcome: Progressing     Problem: Sepsis/Septic Shock  Goal: Optimal Coping  Outcome: Progressing     Problem: Skin or Soft Tissue Infection  Goal: Absence of Infection Signs and Symptoms  Outcome: Progressing     Problem: Fall Injury Risk  Goal: Absence of Fall and Fall-Related Injury  Outcome: Progressing     Problem: Comorbidity Management  Goal: Blood Pressure in Desired Range  Outcome: Progressing  Pt received prn pain meds. Ambulates to bathroom independently. Pt received IV abx. Family at bedside, safety maintained.

## 2024-10-21 NOTE — PROGRESS NOTES
Pharmacokinetic Assessment Follow Up: IV Vancomycin    Vancomycin serum concentration assessment(s):    The trough level was drawn correctly and can be used to guide therapy at this time. The measurement is within the desired definitive target range of 10 to 20 mcg/mL.    Vancomycin Regimen Plan:    Change regimen to Vancomycin 1250 mg IV every 12 hours with next serum trough concentration measured at 1730 prior to 4th dose on 10/22    Drug levels (last 3 results):  Recent Labs   Lab Result Units 10/21/24  0457   Vancomycin-Trough ug/mL 10.0       Pharmacy will continue to follow and monitor vancomycin.    Please contact pharmacy at extension [phone] for questions regarding this assessment.    Thank you for the consult,   Margarita Gonzalez       Patient brief summary:  Ember Rivera is a 54 y.o. female initiated on antimicrobial therapy with IV Vancomycin for treatment of sepsis/skin soft tissue infection    The patient's current regimen is 1000 mg q12h    Drug Allergies:   Review of patient's allergies indicates:  No Known Allergies    Actual Body Weight:   70 kg    Renal Function:   Estimated Creatinine Clearance: 101.5 mL/min (based on SCr of 0.6 mg/dL).,     Dialysis Method (if applicable):  N/A    CBC (last 72 hours):  Recent Labs   Lab Result Units 10/19/24  1634 10/20/24  0139 10/21/24  0457   WBC K/uL 9.12 7.27 4.30   Hemoglobin g/dL 12.0 11.1* 9.9*   Hematocrit % 34.8* 34.2* 29.7*   Platelets K/uL 189 144* 101*   Gran % % 71.5 67.1 61.0   Lymph % % 15.4* 19.9 25.3   Mono % % 11.6 11.3 11.2   Eosinophil % % 0.9 1.0 1.6   Basophil % % 0.4 0.4 0.7   Differential Method  Automated Automated Automated       Metabolic Panel (last 72 hours):  Recent Labs   Lab Result Units 10/19/24  1634 10/19/24  1847 10/20/24  0139 10/21/24  0457   Sodium mmol/L 135*  --  136 138   Potassium mmol/L 3.6  --  3.7 3.9   Chloride mmol/L 102  --  104 106   CO2 mmol/L 18*  --  21* 22*   Glucose mg/dL 103  --  86 99   BUN mg/dL 4*  --  6  6   Creatinine mg/dL 0.6  --  0.6 0.6   Creatinine, Urine mg/dL  --  166.6  --   --    Albumin g/dL 3.4*  --   --   --    Total Bilirubin mg/dL 1.1*  --   --   --    Alkaline Phosphatase U/L 209*  --   --   --    AST U/L 88*  --   --   --    ALT U/L 29  --   --   --    Magnesium mg/dL  --   --  1.3* 1.5*   Phosphorus mg/dL  --   --  3.3 4.3       Vancomycin Administrations:  vancomycin given in the last 96 hours                     vancomycin (VANCOCIN) 1,000 mg in D5W 250 mL IVPB (admixture device) (mg) 1,000 mg New Bag 10/20/24 1926     1,000 mg New Bag  0535    vancomycin 1,500 mg in D5W 250 mL IVPB (admixture device) (mg) 1,500 mg New Bag 10/19/24 1741                    Microbiologic Results:  Microbiology Results (last 7 days)       Procedure Component Value Units Date/Time    Blood culture [[id number]] Collected: 10/19/24 1634    Order Status: Completed Specimen: Blood from Peripheral, Forearm, Left Updated: 10/20/24 2213     Blood Culture, Routine No Growth to date      No Growth to date    Blood culture [[id number]] Collected: 10/19/24 1650    Order Status: Completed Specimen: Blood from Peripheral, Hand, Left Updated: 10/20/24 2212     Blood Culture, Routine No Growth to date      No Growth to date

## 2024-10-21 NOTE — PLAN OF CARE
Problem: Adult Inpatient Plan of Care  Goal: Plan of Care Review  Outcome: Progressing  Flowsheets (Taken 10/21/2024 0357)  Plan of Care Reviewed With: patient  Goal: Absence of Hospital-Acquired Illness or Injury  Outcome: Progressing

## 2024-10-21 NOTE — PROGRESS NOTES
[facility] Medicine  Progress Note    Patient Name: Ember Rivera  MRN: [id number]  Patient Class: IP- Inpatient   Admission Date: 10/19/2024  Length of Stay: 2 days  Attending Physician: Maggy Sprague*  Primary Care Provider: Young Neil MD        Subjective:     Principal Problem:Sepsis without acute organ dysfunction        HPI:  Ember Rievra is a 54F w/ PMHx of alcoholic cirrhosis of the liver, portal hypertension, ascites, and tobacco dependence, who presented to [facility] ED from urgent care after spreading of cellulitis on neck and RUE. She states the first spot on her arm popped up 10 days ago, followed by another spot on RFA and another on RH. She also had a spot appear on her R neck behind her ear two days ago which prompted her to seek additional treatment. All of theses maroon spots have developed blisters which have popped, per the patient they leaked clear fluid at the time they popped. She denies any other drainage. She also denies fever or chills, but does complain of burning pain when anything touches these areas.    ED workup significant for: CO2 18, , Tbili 1.1, LA 4.2-->5.2. all other components of CBC and chemistry were unremarkable.    She is admitted to Hospital Medicine for upper extremity cellulitis.     Overview/Hospital Course:  No notes on file    Interval History: awake and alert, reported right forearm pain is the same, th redness has expanded  Replace magnesium  Continue vanc/Zosyn pending culture. Consult ID    Review of Systems   Constitutional:  Negative for activity change, chills and fever.   Gastrointestinal:  Negative for abdominal distention.   Musculoskeletal:  Positive for arthralgias and myalgias.        Chronic   Skin:  Positive for rash.        To RUE, spread to RFA and RH, new spot on R neck. Has itching and burning pain sensation, particularly sensitive to touch.     Objective:     Vital Signs (Most Recent):  Temp: 98 °F (36.7 °C)  (10/21/24 1113)  Pulse: 76 (10/21/24 1113)  Resp: 16 (10/21/24 1303)  BP: (!) 115/59 (10/21/24 1113)  SpO2: 95 % (10/21/24 1113) Vital Signs (24h Range):  Temp:  [98 °F (36.7 °C)-98.4 °F (36.9 °C)] 98 °F (36.7 °C)  Pulse:  [76-97] 76  Resp:  [16-20] 16  SpO2:  [95 %-99 %] 95 %  BP: (115-152)/(56-84) 115/59     Weight: 70 kg (154 lb 5.2 oz)  Body mass index is 24.91 kg/m².    Intake/Output Summary (Last 24 hours) at 10/21/2024 1311  Last data filed at 10/21/2024 0414  Gross per 24 hour   Intake 1208.05 ml   Output 100 ml   Net 1108.05 ml         Physical Exam  Vitals and nursing note reviewed.   Constitutional:       General: She is not in acute distress.     Appearance: Normal appearance. She is not ill-appearing.   HENT:      Head: Normocephalic and atraumatic.   Cardiovascular:      Rate and Rhythm: Normal rate and regular rhythm.      Pulses: Normal pulses.      Heart sounds: Normal heart sounds. No murmur heard.     No friction rub. No gallop.   Pulmonary:      Effort: Pulmonary effort is normal. No respiratory distress.      Breath sounds: Normal breath sounds. No wheezing or rhonchi.   Abdominal:      General: Bowel sounds are normal. There is no distension.      Palpations: Abdomen is soft.      Tenderness: There is no abdominal tenderness.   Musculoskeletal:         General: Normal range of motion.      Cervical back: Normal range of motion and neck supple.      Right lower leg: No edema.      Left lower leg: No edema.   Skin:     General: Skin is warm and dry.      Capillary Refill: Capillary refill takes less than 2 seconds.      Findings: Erythema, lesion and rash present.      Comments: To RUE, spread to RFA and RH, new spot on R neck. Has itching and burning pain sensation, particularly sensitive to touch.   Neurological:      General: No focal deficit present.      Mental Status: She is alert and oriented to person, place, and time. Mental status is at baseline.   Psychiatric:         Mood and Affect:  "Mood normal.         Behavior: Behavior normal.             Significant Labs: A1C: No results for input(s): "HGBA1C" in the last 4320 hours.  ABGs: No results for input(s): "PH", "PCO2", "HCO3", "POCSATURATED", "BE", "TOTALHB", "COHB", "METHB", "O2HB", "POCFIO2", "PO2" in the last 48 hours.  Blood Culture:   Recent Labs   Lab 10/19/24  1634 10/19/24  1650   LABBLOO No Growth to date  No Growth to date No Growth to date  No Growth to date     CBC:   Recent Labs   Lab 10/19/24  1634 10/20/24  0139 10/21/24  0457   WBC 9.12 7.27 4.30   HGB 12.0 11.1* 9.9*   HCT 34.8* 34.2* 29.7*    144* 101*     CMP:   Recent Labs   Lab 10/19/24  1634 10/20/24  0139 10/21/24  0457   * 136 138   K 3.6 3.7 3.9    104 106   CO2 18* 21* 22*    86 99   BUN 4* 6 6   CREATININE 0.6 0.6 0.6   CALCIUM 9.0 8.4* 8.5*   PROT 6.9  --   --    ALBUMIN 3.4*  --   --    BILITOT 1.1*  --   --    ALKPHOS 209*  --   --    AST 88*  --   --    ALT 29  --   --    ANIONGAP 15 11 10     Lactic Acid:   Recent Labs   Lab 10/19/24  1634 10/19/24  2052 10/20/24  0139   LACTATE 4.2* 5.2* 3.5*     Lipase: No results for input(s): "LIPASE" in the last 48 hours.  Lipid Panel: No results for input(s): "CHOL", "HDL", "LDLCALC", "TRIG", "CHOLHDL" in the last 48 hours.  Magnesium:   Recent Labs   Lab 10/20/24  0139 10/21/24  0457   MG 1.3* 1.5*     TSH: No results for input(s): "TSH" in the last 4320 hours.  Urine Culture: No results for input(s): "LABURIN" in the last 48 hours.  Urine Studies: No results for input(s): "COLORU", "APPEARANCEUA", "PHUR", "SPECGRAV", "PROTEINUA", "GLUCUA", "KETONESU", "BILIRUBINUA", "OCCULTUA", "NITRITE", "UROBILINOGEN", "LEUKOCYTESUR", "RBCUA", "WBCUA", "BACTERIA", "SQUAMEPITHEL", "HYALINECASTS" in the last 48 hours.    Invalid input(s): "WRIGHTSUR"    Significant Imaging: I have reviewed all pertinent imaging results/findings within the past 24 hours.    Assessment/Plan:      * Sepsis without acute organ " dysfunction  - LA 4.2 --> 5.2. given 1L IVF in ED, will finish 30ml/kg sepsis protocol  - repeat LA after IVF  - PRN pain and fever control  - started on BSA in ED, will continue on admission  - IV antibiotics- vanc and zosyn  Consult ID        Cellulitis of extremity  - areas to RUE, RFA, RH, and R neck marked, monitor for spread  - see sepsis plan      Tobacco dependency  Dangers of cigarette smoking were reviewed with patient in detail. Patient was Counseled for 3-10 minutes. Nicotine replacement options were discussed. Nicotine replacement was discussed- prescribed    Alcoholic cirrhosis of liver with ascites  Patient with known Cirrhosis with Child's class Calculator for Child's score link- https://[email or web address]/calc/340/child-freitas-score-cirrhosis-mortality#creator-insights. Co-morbidities are present and inclusive of ascites and portal hypertension.  MELD-Na score calculated; MELD 3.0: 10 at 3/25/2024 10:10 AM  MELD-Na: 8 at 3/25/2024 10:10 AM  Calculated from:  Serum Creatinine: 0.6 mg/dL (Using min of 1 mg/dL) at 3/25/2024 10:10 AM  Serum Sodium: 141 mmol/L (Using max of 137 mmol/L) at 3/25/2024 10:10 AM  Total Bilirubin: 0.4 mg/dL (Using min of 1 mg/dL) at 3/25/2024 10:10 AM  Serum Albumin: 3 g/dL at 3/25/2024 10:10 AM  INR(ratio): 1.2 at 3/25/2024 10:10 AM  Age at listing (hypothetical): 54 years  Sex: Female at 3/25/2024 10:10 AM      Continue chronic meds. Etiology likely ETOH. Will avoid any hepatotoxic meds, and monitor     Ascites  stable      HTN (hypertension)  Patients blood pressure range in the last 24 hours was: BP  Min: 99/53  Max: 213/96.The patient's inpatient anti-hypertensive regimen is listed below:  Current Antihypertensives  furosemide tablet 40 mg, Daily, Oral  spironolactone tablet 100 mg, Daily, Oral    Plan  - BP is controlled, no changes needed to their regimen  -       VTE Risk Mitigation (From admission, onward)           Ordered     enoxaparin injection 40 mg  Daily          10/19/24 2023     IP VTE HIGH RISK PATIENT  Once         10/19/24 2023     Place sequential compression device  Until discontinued         10/19/24 2023                    Discharge Planning   YUN:      Code Status: Full Code   Is the patient medically ready for discharge?:     Reason for patient still in hospital (select all that apply): Patient trending condition  Discharge Plan A: Home                  Maggy N MD Celestine  Department of Hospital Medicine   [facility] S[facility]

## 2024-10-21 NOTE — PLAN OF CARE
went to meet with patient. Patient reports she is independent and lives at home with her spouse and children. She uses a cane at home. Patient does not have Home Health. She still drives, and she drove herself to the hospital. Patient's PCP is with the [facility].  to request follow-up appointment. No anticipated discharge needs at this time. Patient encouraged to call with any questions or concerns.  will continue to follow patient through transitions of care and assist with any discharge needs.     Other Contacts    Name Relation Home Work Mobile   Des Parker Spouse [phone]   Jennifer Gordon Relative   [phone]        10/21/24 1012   Discharge Assessment   Assessment Type Discharge Planning Assessment   Confirmed/corrected address, phone number and insurance Yes   Confirmed Demographics Correct on Facesheet   Source of Information patient   People in Home significant other;spouse;child(melvina), adult   Facility Arrived From: Home   Do you expect to return to your current living situation? Yes   Do you have help at home or someone to help you manage your care at home? Yes   Who are your caregiver(s) and their phone number(s)? Des Parker Spouse [phone]   Prior to hospitilization cognitive status: Alert/Oriented   Current cognitive status: Alert/Oriented   Walking or Climbing Stairs Difficulty yes   Walking or Climbing Stairs ambulation difficulty, requires equipment   Dressing/Bathing Difficulty no   Equipment Currently Used at Home cane, quad   Do you take prescription medications? Yes   Do you have prescription coverage? Yes   Do you have any problems affording any of your prescribed medications? No   Is the patient taking medications as prescribed? yes   Who is going to help you get home at discharge? Patient drove to hospital.   How do you get to doctors appointments? car, drives self   Are you on dialysis? No   Do you take coumadin?  No   Discharge Plan A Home   Discharge Plan B Home with family   DME Needed Upon Discharge  none   Discharge Plan discussed with: Patient   Transition of Care Barriers None   Physical Activity   On average, how many days per week do you engage in moderate to strenuous exercise (like a brisk walk)? Pt Declined   On average, how many minutes do you engage in exercise at this level? Pt Declined   Financial Resource Strain   How hard is it for you to pay for the very basics like food, housing, medical care, and heating? Pt Declined   Housing Stability   In the last 12 months, was there a time when you were not able to pay the mortgage or rent on time? N   At any time in the past 12 months, were you homeless or living in a shelter (including now)? N   Transportation Needs   Has the lack of transportation kept you from medical appointments, meetings, work or from getting things needed for daily living? No   Food Insecurity   Within the past 12 months, you worried that your food would run out before you got the money to buy more. Never true   Within the past 12 months, the food you bought just didn't last and you didn't have money to get more. Never true   Stress   Do you feel stress - tense, restless, nervous, or anxious, or unable to sleep at night because your mind is troubled all the time - these days? Pt Declined   Social Isolation   How often do you feel lonely or isolated from those around you?  Patient declined   Alcohol Use   Q1: How often do you have a drink containing alcohol? Pt Declined   Q2: How many drinks containing alcohol do you have on a typical day when you are drinking? Pt Declined   Q3: How often do you have six or more drinks on one occasion? Pt Declined   Utilities   In the past 12 months has the electric, gas, oil, or water company threatened to shut off services in your home? Pt Declined   Health Literacy   How often do you need to have someone help you when you read instructions, pamphlets, or  other written material from your doctor or pharmacy? Patient declines to respond   OTHER   Name(s) of People in Home Des Parker Spouse [phone]     Adriana Baugh RN    [phone]

## 2024-10-21 NOTE — ASSESSMENT & PLAN NOTE
Patient with known Cirrhosis with Child's class Calculator for Child's score link- https://www.[employer].com/calc/340/child-freitas-score-cirrhosis-mortality#creator-insights. Co-morbidities are present and inclusive of ascites and portal hypertension.  MELD-Na score calculated; MELD 3.0: 10 at 3/25/2024 10:10 AM  MELD-Na: 8 at 3/25/2024 10:10 AM  Calculated from:  Serum Creatinine: 0.6 mg/dL (Using min of 1 mg/dL) at 3/25/2024 10:10 AM  Serum Sodium: 141 mmol/L (Using max of 137 mmol/L) at 3/25/2024 10:10 AM  Total Bilirubin: 0.4 mg/dL (Using min of 1 mg/dL) at 3/25/2024 10:10 AM  Serum Albumin: 3 g/dL at 3/25/2024 10:10 AM  INR(ratio): 1.2 at 3/25/2024 10:10 AM  Age at listing (hypothetical): 54 years  Sex: Female at 3/25/2024 10:10 AM      Continue chronic meds. Etiology likely ETOH. Will avoid any hepatotoxic meds, and monitor

## 2024-10-21 NOTE — ASSESSMENT & PLAN NOTE
- LA 4.2 --> 5.2. given 1L IVF in ED, will finish 30ml/kg sepsis protocol  - repeat LA after IVF  - PRN pain and fever control  - started on BSA in ED, will continue on admission  - IV antibiotics- vanc and zosyn  Consult ID

## 2024-10-21 NOTE — ASSESSMENT & PLAN NOTE
Patients blood pressure range in the last 24 hours was: BP  Min: 99/53  Max: 213/96.The patient's inpatient anti-hypertensive regimen is listed below:  Current Antihypertensives  furosemide tablet 40 mg, Daily, Oral  spironolactone tablet 100 mg, Daily, Oral    Plan  - BP is controlled, no changes needed to their regimen  -

## 2024-10-21 NOTE — PLAN OF CARE
Problem: Adult Inpatient Plan of Care  Goal: Plan of Care Review  Outcome: Progressing     Problem: Wound  Goal: Optimal Coping  Outcome: Progressing     Problem: Sepsis/Septic Shock  Goal: Optimal Coping  Outcome: Progressing     Problem: Skin or Soft Tissue Infection  Goal: Absence of Infection Signs and Symptoms  Outcome: Progressing     Problem: Fall Injury Risk  Goal: Absence of Fall and Fall-Related Injury  Outcome: Progressing     Problem: Comorbidity Management  Goal: Blood Pressure in Desired Range  Outcome: Progressing   AAOX4. C/o RUE pain. Meds given per MAR. Ambulates to the bathroom w/out assist. Pt's concerns addressed by MD. Pending wound cultures to be back. Bed in lowest position. Call light within reach.

## 2024-10-22 ENCOUNTER — CLINICAL SUPPORT (OUTPATIENT)
Dept: SMOKING CESSATION | Facility: CLINIC | Age: 54
End: 2024-10-22

## 2024-10-22 DIAGNOSIS — F17.210 CIGARETTE SMOKER: Primary | ICD-10-CM

## 2024-10-22 PROBLEM — R21 RASH: Status: ACTIVE | Noted: 2024-10-22

## 2024-10-22 LAB
ANION GAP SERPL CALC-SCNC: 14 MMOL/L (ref 8–16)
BASOPHILS # BLD AUTO: 0.03 K/UL (ref 0–0.2)
BASOPHILS NFR BLD: 0.7 % (ref 0–1.9)
BUN SERPL-MCNC: 6 MG/DL (ref 6–20)
CALCIUM SERPL-MCNC: 9.3 MG/DL (ref 8.7–10.5)
CHLORIDE SERPL-SCNC: 104 MMOL/L (ref 95–110)
CO2 SERPL-SCNC: 23 MMOL/L (ref 23–29)
CREAT SERPL-MCNC: 0.7 MG/DL (ref 0.5–1.4)
DIFFERENTIAL METHOD BLD: ABNORMAL
EOSINOPHIL # BLD AUTO: 0.1 K/UL (ref 0–0.5)
EOSINOPHIL NFR BLD: 1.2 % (ref 0–8)
ERYTHROCYTE [DISTWIDTH] IN BLOOD BY AUTOMATED COUNT: 12.3 % (ref 11.5–14.5)
EST. GFR  (NO RACE VARIABLE): >60 ML/MIN/1.73 M^2
GLUCOSE SERPL-MCNC: 95 MG/DL (ref 70–110)
HCT VFR BLD AUTO: 30.9 % (ref 37–48.5)
HGB BLD-MCNC: 10.1 G/DL (ref 12–16)
IMM GRANULOCYTES # BLD AUTO: 0.01 K/UL (ref 0–0.04)
IMM GRANULOCYTES NFR BLD AUTO: 0.2 % (ref 0–0.5)
LYMPHOCYTES # BLD AUTO: 0.9 K/UL (ref 1–4.8)
LYMPHOCYTES NFR BLD: 23.3 % (ref 18–48)
MAGNESIUM SERPL-MCNC: 1.8 MG/DL (ref 1.6–2.6)
MCH RBC QN AUTO: 33.1 PG (ref 27–31)
MCHC RBC AUTO-ENTMCNC: 32.7 G/DL (ref 32–36)
MCV RBC AUTO: 101 FL (ref 82–98)
MONOCYTES # BLD AUTO: 0.5 K/UL (ref 0.3–1)
MONOCYTES NFR BLD: 12.6 % (ref 4–15)
NEUTROPHILS # BLD AUTO: 2.5 K/UL (ref 1.8–7.7)
NEUTROPHILS NFR BLD: 62 % (ref 38–73)
NRBC BLD-RTO: 0 /100 WBC
PHOSPHATE SERPL-MCNC: 4.5 MG/DL (ref 2.7–4.5)
PLATELET # BLD AUTO: 121 K/UL (ref 150–450)
PMV BLD AUTO: 9.5 FL (ref 9.2–12.9)
POTASSIUM SERPL-SCNC: 3.6 MMOL/L (ref 3.5–5.1)
RBC # BLD AUTO: 3.05 M/UL (ref 4–5.4)
SODIUM SERPL-SCNC: 141 MMOL/L (ref 136–145)
VANCOMYCIN TROUGH SERPL-MCNC: 17.4 UG/ML (ref 10–22)
WBC # BLD AUTO: 4.04 K/UL (ref 3.9–12.7)

## 2024-10-22 PROCEDURE — 36415 COLL VENOUS BLD VENIPUNCTURE: CPT

## 2024-10-22 PROCEDURE — 27000207 HC ISOLATION

## 2024-10-22 PROCEDURE — 80048 BASIC METABOLIC PNL TOTAL CA: CPT

## 2024-10-22 PROCEDURE — 87324 CLOSTRIDIUM AG IA: CPT

## 2024-10-22 PROCEDURE — 80202 ASSAY OF VANCOMYCIN: CPT | Performed by: FAMILY MEDICINE

## 2024-10-22 PROCEDURE — 25000003 PHARM REV CODE 250

## 2024-10-22 PROCEDURE — S4991 NICOTINE PATCH NONLEGEND: HCPCS

## 2024-10-22 PROCEDURE — 83735 ASSAY OF MAGNESIUM: CPT

## 2024-10-22 PROCEDURE — 63600175 PHARM REV CODE 636 W HCPCS: Performed by: FAMILY MEDICINE

## 2024-10-22 PROCEDURE — 99406 BEHAV CHNG SMOKING 3-10 MIN: CPT | Mod: ,,,

## 2024-10-22 PROCEDURE — 36415 COLL VENOUS BLD VENIPUNCTURE: CPT | Performed by: FAMILY MEDICINE

## 2024-10-22 PROCEDURE — 99222 1ST HOSP IP/OBS MODERATE 55: CPT | Mod: ,,, | Performed by: INTERNAL MEDICINE

## 2024-10-22 PROCEDURE — 25000003 PHARM REV CODE 250: Performed by: FAMILY MEDICINE

## 2024-10-22 PROCEDURE — 11000001 HC ACUTE MED/SURG PRIVATE ROOM

## 2024-10-22 PROCEDURE — 85025 COMPLETE CBC W/AUTO DIFF WBC: CPT

## 2024-10-22 PROCEDURE — 84100 ASSAY OF PHOSPHORUS: CPT

## 2024-10-22 PROCEDURE — 87493 C DIFF AMPLIFIED PROBE: CPT

## 2024-10-22 PROCEDURE — 63600175 PHARM REV CODE 636 W HCPCS

## 2024-10-22 PROCEDURE — 99999 PR PBB SHADOW E&M-EST. PATIENT-LVL I: CPT | Mod: PBBFAC,,,

## 2024-10-22 RX ORDER — IBUPROFEN 200 MG
1 TABLET ORAL DAILY
Status: DISCONTINUED | OUTPATIENT
Start: 2024-10-22 | End: 2024-10-23 | Stop reason: HOSPADM

## 2024-10-22 RX ADMIN — PIPERACILLIN AND TAZOBACTAM 4.5 G: 4; .5 INJECTION, POWDER, LYOPHILIZED, FOR SOLUTION INTRAVENOUS; PARENTERAL at 04:10

## 2024-10-22 RX ADMIN — PREGABALIN 100 MG: 50 CAPSULE ORAL at 08:10

## 2024-10-22 RX ADMIN — PIPERACILLIN AND TAZOBACTAM 4.5 G: 4; .5 INJECTION, POWDER, LYOPHILIZED, FOR SOLUTION INTRAVENOUS; PARENTERAL at 09:10

## 2024-10-22 RX ADMIN — PREGABALIN 200 MG: 50 CAPSULE ORAL at 08:10

## 2024-10-22 RX ADMIN — VANCOMYCIN HYDROCHLORIDE 1250 MG: 1.25 INJECTION, POWDER, LYOPHILIZED, FOR SOLUTION INTRAVENOUS at 06:10

## 2024-10-22 RX ADMIN — HYDROCODONE BITARTRATE AND ACETAMINOPHEN 1 TABLET: 10; 325 TABLET ORAL at 06:10

## 2024-10-22 RX ADMIN — HYDROCODONE BITARTRATE AND ACETAMINOPHEN 1 TABLET: 10; 325 TABLET ORAL at 07:10

## 2024-10-22 RX ADMIN — NICOTINE 1 PATCH: 21 PATCH, EXTENDED RELEASE TRANSDERMAL at 12:10

## 2024-10-22 RX ADMIN — PIPERACILLIN AND TAZOBACTAM 4.5 G: 4; .5 INJECTION, POWDER, LYOPHILIZED, FOR SOLUTION INTRAVENOUS; PARENTERAL at 10:10

## 2024-10-22 RX ADMIN — TRAZODONE HYDROCHLORIDE 100 MG: 100 TABLET ORAL at 08:10

## 2024-10-22 RX ADMIN — PIPERACILLIN AND TAZOBACTAM 4.5 G: 4; .5 INJECTION, POWDER, LYOPHILIZED, FOR SOLUTION INTRAVENOUS; PARENTERAL at 01:10

## 2024-10-22 NOTE — HPI
54F w/ PMHx of alcoholic cirrhosis of the liver, portal hypertension, ascites, and tobacco dependence, who presented to [facility] ED from urgent care after spreading of rash on neck and RUE. She states the first spot on her arm popped up 10 days ago, followed by another spot on RFA and another on RH. She also had a spot appear on her R neck behind her ear two days ago which prompted her to seek additional treatment. All of these maroon spots have developed blisters which have popped, per the patient they leaked clear fluid at the time they popped. She denies any other drainage. She also denies fever or chills but does complain of burning pain when anything touches these areas. She was started on zosyn and vanco and has improved. Rash is less painful and red.

## 2024-10-22 NOTE — PROGRESS NOTES
RAPID RESPONSE NURSE PROACTIVE ROUNDING NOTE       Notified by Jody, bedside RN, for IV placement. Pt refusing. Currently has a Right upper arm 22g. Thao, medsur charge, notified and stated let me know if the pt changes her mind.    FOLLOW UP    Call back the Rapid Response NurseDamon at [phone] for additional questions or concerns.

## 2024-10-22 NOTE — ASSESSMENT & PLAN NOTE
Patient with known Cirrhosis with Child's class Calculator for Child's score link- https://www.[employer]com/calc/340/child-freitas-score-cirrhosis-mortality#creator-insights. Co-morbidities are present and inclusive of ascites and portal hypertension.  MELD-Na score calculated; MELD 3.0: 10 at 3/25/2024 10:10 AM  MELD-Na: 8 at 3/25/2024 10:10 AM  Calculated from:  Serum Creatinine: 0.6 mg/dL (Using min of 1 mg/dL) at 3/25/2024 10:10 AM  Serum Sodium: 141 mmol/L (Using max of 137 mmol/L) at 3/25/2024 10:10 AM  Total Bilirubin: 0.4 mg/dL (Using min of 1 mg/dL) at 3/25/2024 10:10 AM  Serum Albumin: 3 g/dL at 3/25/2024 10:10 AM  INR(ratio): 1.2 at 3/25/2024 10:10 AM  Age at listing (hypothetical): 54 years  Sex: Female at 3/25/2024 10:10 AM      Continue chronic meds. Etiology likely ETOH. Will avoid any hepatotoxic meds, and monitor

## 2024-10-22 NOTE — PROGRESS NOTES
[facility] Medicine  Progress Note    Patient Name: Ember Rivera  MRN: [id number]  Patient Class: IP- Inpatient   Admission Date: 10/19/2024  Length of Stay: 3 days  Attending Physician: Juan J Guardado MD  Primary Care Provider: Young Neil MD        Subjective:     Principal Problem:Sepsis without acute organ dysfunction        HPI:  mEber Rivera is a 54F w/ PMHx of alcoholic cirrhosis of the liver, portal hypertension, ascites, and tobacco dependence, who presented to [facility] ED from urgent care after spreading of cellulitis on neck and RUE. She states the first spot on her arm popped up 10 days ago, followed by another spot on RFA and another on RH. She also had a spot appear on her R neck behind her ear two days ago which prompted her to seek additional treatment. All of theses maroon spots have developed blisters which have popped, per the patient they leaked clear fluid at the time they popped. She denies any other drainage. She also denies fever or chills, but does complain of burning pain when anything touches these areas.    ED workup significant for: CO2 18, , Tbili 1.1, LA 4.2-->5.2. all other components of CBC and chemistry were unremarkable.    She is admitted to Hospital Medicine for upper extremity cellulitis.     Overview/Hospital Course:  No notes on file    Interval History:  Patient is a and O x4 denies any fever chills admits her pain as 5/10, patient denies any chest pain or abdominal pain.    Review of Systems   Constitutional:  Negative for activity change, chills and fever.   Gastrointestinal:  Negative for abdominal distention.   Musculoskeletal:  Positive for arthralgias and myalgias.        Chronic   Skin:  Positive for rash.        To RUE, spread to RFA and RH, new spot on R neck. Has itching and burning pain sensation, particularly sensitive to touch.     Objective:     Vital Signs (Most Recent):  Temp: 98.5 °F (36.9 °C) (10/22/24 1107)  Pulse: 82 (10/22/24  1107)  Resp: 18 (10/22/24 1107)  BP: 120/61 (10/22/24 1107)  SpO2: 95 % (10/22/24 1107) Vital Signs (24h Range):  Temp:  [98.2 °F (36.8 °C)-98.5 °F (36.9 °C)] 98.5 °F (36.9 °C)  Pulse:  [80-89] 82  Resp:  [16-20] 18  SpO2:  [95 %-98 %] 95 %  BP: ()/(61-68) 120/61     Weight: 70 kg (154 lb 5.2 oz)  Body mass index is 24.91 kg/m².    Intake/Output Summary (Last 24 hours) at 10/22/2024 1155  Last data filed at 10/22/2024 0803  Gross per 24 hour   Intake 1350 ml   Output 800 ml   Net 550 ml         Physical Exam  Vitals and nursing note reviewed.   Constitutional:       General: She is not in acute distress.     Appearance: Normal appearance. She is not ill-appearing.   HENT:      Head: Normocephalic and atraumatic.   Cardiovascular:      Rate and Rhythm: Normal rate and regular rhythm.      Pulses: Normal pulses.      Heart sounds: Normal heart sounds. No murmur heard.     No friction rub. No gallop.   Pulmonary:      Effort: Pulmonary effort is normal. No respiratory distress.      Breath sounds: Normal breath sounds. No wheezing or rhonchi.   Abdominal:      General: Bowel sounds are normal. There is no distension.      Palpations: Abdomen is soft.      Tenderness: There is no abdominal tenderness.   Musculoskeletal:         General: Normal range of motion.      Cervical back: Normal range of motion and neck supple.      Right lower leg: No edema.      Left lower leg: No edema.   Skin:     General: Skin is warm and dry.      Capillary Refill: Capillary refill takes less than 2 seconds.      Findings: Erythema, lesion and rash present.      Comments: To RUE, spread to RFA and RH, new spot on R neck. Has itching and burning pain sensation, particularly sensitive to touch.   Neurological:      General: No focal deficit present.      Mental Status: She is alert and oriented to person, place, and time. Mental status is at baseline.   Psychiatric:         Mood and Affect: Mood normal.         Behavior: Behavior  normal.             Significant Labs: All pertinent labs within the past 24 hours have been reviewed.  Recent Lab Results         10/22/24  0500        Anion Gap 14       Baso # 0.03       Basophil % 0.7       BUN 6       Calcium 9.3       Chloride 104       CO2 23       Creatinine 0.7       Differential Method Automated       eGFR >60       Eos # 0.1       Eos % 1.2       Glucose 95       Gran # (ANC) 2.5       Gran % 62.0       Hematocrit 30.9       Hemoglobin 10.1       Immature Grans (Abs) 0.01  Comment: Mild elevation in immature granulocytes is non specific and   can be seen in a variety of conditions including stress response,   acute inflammation, trauma and pregnancy. Correlation with other   laboratory and clinical findings is essential.         Immature Granulocytes 0.2       Lymph # 0.9       Lymph % 23.3       Magnesium  1.8       MCH 33.1       MCHC 32.7              Mono # 0.5       Mono % 12.6       MPV 9.5       nRBC 0       Phosphorus Level 4.5       Platelet Count 121       Potassium 3.6       RBC 3.05       RDW 12.3       Sodium 141       WBC 4.04               Significant Imaging: I have reviewed all pertinent imaging results/findings within the past 24 hours.    Assessment/Plan:      * Sepsis without acute organ dysfunction  - LA 4.2 --> 5.2. given 1L IVF in ED, will finish 30ml/kg sepsis protocol  - repeat LA after IVF  - PRN pain and fever control  - started on BSA in ED, will continue on admission  - IV antibiotics- vanc and zosyn  Consult ID who agrees on current antibiotics patient is not tachycardic and afebrile.  We will continue current management        Cellulitis of extremity  - areas to RUE, RFA, RH, and R neck marked, monitor for spread  - see sepsis plan      Tobacco dependency  Dangers of cigarette smoking were reviewed with patient in detail. Patient was Counseled for 3-10 minutes. Nicotine replacement options were discussed. Nicotine replacement was discussed-  prescribed    Alcoholic cirrhosis of liver with ascites  Patient with known Cirrhosis with Child's class Calculator for Child's score link- https://www.mdcalc.com/calc/340/child-freitas-score-cirrhosis-mortality#creator-insights. Co-morbidities are present and inclusive of ascites and portal hypertension.  MELD-Na score calculated; MELD 3.0: 10 at 3/25/2024 10:10 AM  MELD-Na: 8 at 3/25/2024 10:10 AM  Calculated from:  Serum Creatinine: 0.6 mg/dL (Using min of 1 mg/dL) at 3/25/2024 10:10 AM  Serum Sodium: 141 mmol/L (Using max of 137 mmol/L) at 3/25/2024 10:10 AM  Total Bilirubin: 0.4 mg/dL (Using min of 1 mg/dL) at 3/25/2024 10:10 AM  Serum Albumin: 3 g/dL at 3/25/2024 10:10 AM  INR(ratio): 1.2 at 3/25/2024 10:10 AM  Age at listing (hypothetical): 54 years  Sex: Female at 3/25/2024 10:10 AM      Continue chronic meds. Etiology likely ETOH. Will avoid any hepatotoxic meds, and monitor     Ascites  stable on Lasix and Aldactone      HTN (hypertension)  Patients blood pressure range in the last 24 hours was: BP  Min: 97/62  Max: 213/96.The patient's inpatient anti-hypertensive regimen is listed below:  Current Antihypertensives  furosemide tablet 40 mg, Daily, Oral  spironolactone tablet 100 mg, Daily, Oral    Plan  - BP is controlled, no changes needed to their regimen  -       VTE Risk Mitigation (From admission, onward)           Ordered     enoxaparin injection 40 mg  Daily         10/19/24 2023     IP VTE HIGH RISK PATIENT  Once         10/19/24 2023     Place sequential compression device  Until discontinued         10/19/24 2023                    Discharge Planning   YUN:      Code Status: Full Code   Is the patient medically ready for discharge?:     Reason for patient still in hospital (select all that apply): Patient unstable  Discharge Plan A: Home   Discharge Delays: None known at this time              Juan J Preciado MD  Department of Hospital Medicine   [facility]

## 2024-10-22 NOTE — ASSESSMENT & PLAN NOTE
Patients blood pressure range in the last 24 hours was: BP  Min: 97/62  Max: 213/96.The patient's inpatient anti-hypertensive regimen is listed below:  Current Antihypertensives  furosemide tablet 40 mg, Daily, Oral  spironolactone tablet 100 mg, Daily, Oral    Plan  - BP is controlled, no changes needed to their regimen  -

## 2024-10-22 NOTE — PLAN OF CARE
Problem: Adult Inpatient Plan of Care  Goal: Plan of Care Review  Outcome: Progressing  Goal: Patient-Specific Goal (Individualized)  Outcome: Progressing  Goal: Absence of Hospital-Acquired Illness or Injury  Outcome: Progressing  Goal: Optimal Comfort and Wellbeing  Outcome: Progressing  Goal: Readiness for Transition of Care  Outcome: Progressing     Problem: Wound  Goal: Optimal Coping  Outcome: Progressing  Goal: Optimal Functional Ability  Outcome: Progressing  Goal: Absence of Infection Signs and Symptoms  Outcome: Progressing  Goal: Improved Oral Intake  Outcome: Progressing  Goal: Optimal Pain Control and Function  Outcome: Progressing  Goal: Skin Health and Integrity  Outcome: Progressing  Goal: Optimal Wound Healing  Outcome: Progressing     Problem: Sepsis/Septic Shock  Goal: Optimal Coping  Outcome: Progressing  Goal: Absence of Bleeding  Outcome: Progressing  Goal: Blood Glucose Level Within Targeted Range  Outcome: Progressing  Goal: Absence of Infection Signs and Symptoms  Outcome: Progressing  Goal: Optimal Nutrition Intake  Outcome: Progressing     Problem: Skin or Soft Tissue Infection  Goal: Absence of Infection Signs and Symptoms  Outcome: Progressing     Problem: Fall Injury Risk  Goal: Absence of Fall and Fall-Related Injury  Outcome: Progressing     Problem: Comorbidity Management  Goal: Blood Pressure in Desired Range  Outcome: Progressing

## 2024-10-22 NOTE — SUBJECTIVE & OBJECTIVE
Interval History:  Patient is a and O x4 denies any fever chills admits her pain as 5/10, patient denies any chest pain or abdominal pain.    Review of Systems   Constitutional:  Negative for activity change, chills and fever.   Gastrointestinal:  Negative for abdominal distention.   Musculoskeletal:  Positive for arthralgias and myalgias.        Chronic   Skin:  Positive for rash.        To RUE, spread to RFA and RH, new spot on R neck. Has itching and burning pain sensation, particularly sensitive to touch.     Objective:     Vital Signs (Most Recent):  Temp: 98.5 °F (36.9 °C) (10/22/24 1107)  Pulse: 82 (10/22/24 1107)  Resp: 18 (10/22/24 1107)  BP: 120/61 (10/22/24 1107)  SpO2: 95 % (10/22/24 1107) Vital Signs (24h Range):  Temp:  [98.2 °F (36.8 °C)-98.5 °F (36.9 °C)] 98.5 °F (36.9 °C)  Pulse:  [80-89] 82  Resp:  [16-20] 18  SpO2:  [95 %-98 %] 95 %  BP: ()/(61-68) 120/61     Weight: 70 kg (154 lb 5.2 oz)  Body mass index is 24.91 kg/m².    Intake/Output Summary (Last 24 hours) at 10/22/2024 1155  Last data filed at 10/22/2024 0803  Gross per 24 hour   Intake 1350 ml   Output 800 ml   Net 550 ml         Physical Exam  Vitals and nursing note reviewed.   Constitutional:       General: She is not in acute distress.     Appearance: Normal appearance. She is not ill-appearing.   HENT:      Head: Normocephalic and atraumatic.   Cardiovascular:      Rate and Rhythm: Normal rate and regular rhythm.      Pulses: Normal pulses.      Heart sounds: Normal heart sounds. No murmur heard.     No friction rub. No gallop.   Pulmonary:      Effort: Pulmonary effort is normal. No respiratory distress.      Breath sounds: Normal breath sounds. No wheezing or rhonchi.   Abdominal:      General: Bowel sounds are normal. There is no distension.      Palpations: Abdomen is soft.      Tenderness: There is no abdominal tenderness.   Musculoskeletal:         General: Normal range of motion.      Cervical back: Normal range of motion  and neck supple.      Right lower leg: No edema.      Left lower leg: No edema.   Skin:     General: Skin is warm and dry.      Capillary Refill: Capillary refill takes less than 2 seconds.      Findings: Erythema, lesion and rash present.      Comments: To RUBY, spread to RFA and RH, new spot on R neck. Has itching and burning pain sensation, particularly sensitive to touch.   Neurological:      General: No focal deficit present.      Mental Status: She is alert and oriented to person, place, and time. Mental status is at baseline.   Psychiatric:         Mood and Affect: Mood normal.         Behavior: Behavior normal.             Significant Labs: All pertinent labs within the past 24 hours have been reviewed.  Recent Lab Results         10/22/24  0500        Anion Gap 14       Baso # 0.03       Basophil % 0.7       BUN 6       Calcium 9.3       Chloride 104       CO2 23       Creatinine 0.7       Differential Method Automated       eGFR >60       Eos # 0.1       Eos % 1.2       Glucose 95       Gran # (ANC) 2.5       Gran % 62.0       Hematocrit 30.9       Hemoglobin 10.1       Immature Grans (Abs) 0.01  Comment: Mild elevation in immature granulocytes is non specific and   can be seen in a variety of conditions including stress response,   acute inflammation, trauma and pregnancy. Correlation with other   laboratory and clinical findings is essential.         Immature Granulocytes 0.2       Lymph # 0.9       Lymph % 23.3       Magnesium  1.8       MCH 33.1       MCHC 32.7              Mono # 0.5       Mono % 12.6       MPV 9.5       nRBC 0       Phosphorus Level 4.5       Platelet Count 121       Potassium 3.6       RBC 3.05       RDW 12.3       Sodium 141       WBC 4.04               Significant Imaging: I have reviewed all pertinent imaging results/findings within the past 24 hours.
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
## 2024-10-22 NOTE — PROGRESS NOTES
Smoking cessation education follow-up: Order obtained for 21 mg nicotine patch at pt's request.

## 2024-10-22 NOTE — ASSESSMENT & PLAN NOTE
- LA 4.2 --> 5.2. given 1L IVF in ED, will finish 30ml/kg sepsis protocol  - repeat LA after IVF  - PRN pain and fever control  - started on BSA in ED, will continue on admission  - IV antibiotics- vanc and zosyn  Consult ID who agrees on current antibiotics patient is not tachycardic and afebrile.  We will continue current management

## 2024-10-22 NOTE — ASSESSMENT & PLAN NOTE
54F w/ PMHx of alcoholic cirrhosis of the liver, portal hypertension, ascites, and tobacco dependence, who presented to [facility] ED from urgent care after spreading of painful rash on neck and RUE.   -has improved on antibiotic therapy so will continue  -however, rash is not typical of most bacterial infections given multiple areas of focus and sharp borders  -would continue antibiotics for more 10 days  -on discharge would switch to po augmentin and doxy to complete the 10 days  -no need to remain inpatient from ID perspective  -would ensure quick follow with Derm

## 2024-10-22 NOTE — CONSULTS
[facility] Surg  Wound Care    Patient Name:  Ember Rivera   MRN:  [id number]  Date: 10/22/2024  Diagnosis: Sepsis without acute organ dysfunction    History:     Past Medical History:   Diagnosis Date    Alcohol induced fatty liver     GERD (gastroesophageal reflux disease)     Guillain-[place] syndrome     Hepatic steatosis 9/28/2023    Hypertension        Social History     Socioeconomic History    Marital status:    Tobacco Use    Smoking status: Every Day     Current packs/day: 0.50     Average packs/day: 1 pack/day for 26.8 years (25.9 ttl pk-yrs)     Types: Cigarettes     Start date: 1987     Last attempt to quit: 1/1/2012    Smokeless tobacco: Never    Tobacco comments:     Pt started smoking age 17, quit in 2012, then recently relapsed. She states that she smokes 0.5 to 1 pk/day cigarettes. declines referral to Ambulatory Smoking Cessation clinic. Handout provided.   Substance and Sexual Activity    Alcohol use: Yes     Alcohol/week: 42.0 standard drinks of alcohol     Types: 42 Shots of liquor per week     Comment: Per HPI    Drug use: No    Sexual activity: Yes     Partners: Male     Social Drivers of Health     Financial Resource Strain: Patient Declined (10/21/2024)    Overall Financial Resource Strain (CARDIA)     Difficulty of Paying Living Expenses: Patient declined   Recent Concern: Financial Resource Strain - High Risk (10/19/2024)    Overall Financial Resource Strain (CARDIA)     Difficulty of Paying Living Expenses: Hard   Food Insecurity: No Food Insecurity (10/21/2024)    Hunger Vital Sign     Worried About Running Out of Food in the Last Year: Never true     Ran Out of Food in the Last Year: Never true   Recent Concern: Food Insecurity - Food Insecurity Present (10/19/2024)    Hunger Vital Sign     Worried About Running Out of Food in the Last Year: Sometimes true     Ran Out of Food in the Last Year: Sometimes true   Transportation Needs: No Transportation Needs (10/21/2024)     TRANSPORTATION NEEDS     Transportation : No   Physical Activity: Patient Declined (10/21/2024)    Exercise Vital Sign     Days of Exercise per Week: Patient declined     Minutes of Exercise per Session: Patient declined   Stress: Patient Declined (10/21/2024)    [employer] [place] [employer] - Occupational Stress Questionnaire     Feeling of Stress : Patient declined   Recent Concern: Stress - Stress Concern Present (10/19/2024)    [employer] [place] [employer] - Occupational Stress Questionnaire     Feeling of Stress : To some extent   Housing Stability: Low Risk  (10/21/2024)    Housing Stability Vital Sign     Unable to Pay for Housing in the Last Year: No     Homeless in the Last Year: No   Recent Concern: Housing Stability - High Risk (10/19/2024)    Housing Stability Vital Sign     Unable to Pay for Housing in the Last Year: Yes     Homeless in the Last Year: No       Precautions:     Allergies as of 10/19/2024    (No Known Allergies)       WO Assessment Details/Treatment     RUE-maroon coloration, noted to be cellulitis, pink areas-previous blister     R neck-2 red discolorations in a circular pattern       Recommendations discussed with pt, nurse and Dr. Rosas  Per Dr. Rosas, wounds are to remain open to air  10/22/2024

## 2024-10-22 NOTE — NURSING
New Results - Micro  Sorted by update time  Updated   Order  10/21/24 2212  Blood culture  Collected: 10/19/24 1634  Preliminary result  Specimen: Blood from Peripheral, Forearm, Left     Blood Culture, Routine No Growth to date P Blood Culture, Routine No Growth to date P  Blood Culture, Routine No Growth to date P         10/21/24 2212  Blood culture  Collected: 10/19/24 1650  Preliminary result  Specimen: Blood from Peripheral, Hand, Left       Blood Culture, Routine No Growth to date P Blood Culture, Routine No Growth to date P  Blood Culture, Routine No Growth to date P

## 2024-10-22 NOTE — NURSING
Assumed care of patient from LILLIAN Vera, patient is AAOX4, room air, vitals WNL, IV antibiotics, stand by assistance to bathroom, rash on right side of neck arm and hand, LEVI, decreased hearing bilateral but worse on left, c/o 9/10 pain, PRN meds given when available, all safety precautions are in place, call bell and tray table are within reach of the patient and no additional questions or concerns from the patient at this time.

## 2024-10-22 NOTE — PLAN OF CARE
went to meet with patient on morning rounds. No anticipated discharge needs at this time. IV antibiotics continued. ID consulted, will follow-up on recommendations. [facility] follow-up scheduled. Patient encouraged to call with any questions or concerns.  will continue to follow patient through transitions of care and assist with any discharge needs.     Other Contacts    Name Relation Home Work Mobile   Des Parker [phone]   Jennifer Gordon   [phone]     Future Appointments   Date Time Provider Department Center   10/31/2024  9:00 AM Manjit Wynn PA-C [facility]         10/22/24 1024   Discharge Reassessment   Assessment Type Discharge Planning Reassessment   Did the patient's condition or plan change since previous assessment? No   Discharge Plan discussed with: Patient   Discharge Plan A Home   Discharge Plan B Home with family   DME Needed Upon Discharge  none   Transition of Care Barriers None   Why the patient remains in the hospital Requires continued medical care   Post-Acute Status   Hospital Resources/Appts/Education Provided Appointments scheduled and added to AVS   Discharge Delays None known at this time     Adriana Baugh RN    [phone]

## 2024-10-22 NOTE — PLAN OF CARE
Problem: Adult Inpatient Plan of Care  Goal: Plan of Care Review  Outcome: Progressing     Problem: Wound  Goal: Optimal Coping  Outcome: Progressing     Problem: Sepsis/Septic Shock  Goal: Optimal Coping  Outcome: Progressing     Problem: Skin or Soft Tissue Infection  Goal: Absence of Infection Signs and Symptoms  Outcome: Progressing     Problem: Fall Injury Risk  Goal: Absence of Fall and Fall-Related Injury  Outcome: Progressing     Problem: Comorbidity Management  Goal: Blood Pressure in Desired Range  Outcome: Progressing   AAOX4. C/o RUE burning pain. Ambulates to the bathroom w/out assist. On iv abx. Pt's concerns addressed by MD in the am. Bed in lowest position. Call light within reach.
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
## 2024-10-23 ENCOUNTER — CLINICAL SUPPORT (OUTPATIENT)
Dept: SMOKING CESSATION | Facility: CLINIC | Age: 54
End: 2024-10-23

## 2024-10-23 VITALS
SYSTOLIC BLOOD PRESSURE: 165 MMHG | TEMPERATURE: 98 F | DIASTOLIC BLOOD PRESSURE: 79 MMHG | RESPIRATION RATE: 16 BRPM | BODY MASS INDEX: 24.8 KG/M2 | OXYGEN SATURATION: 97 % | HEART RATE: 93 BPM | HEIGHT: 66 IN | WEIGHT: 154.31 LBS

## 2024-10-23 DIAGNOSIS — F17.210 CIGARETTE SMOKER: Primary | ICD-10-CM

## 2024-10-23 LAB
ANION GAP SERPL CALC-SCNC: 10 MMOL/L (ref 8–16)
BUN SERPL-MCNC: 8 MG/DL (ref 6–20)
C DIFF GDH STL QL: POSITIVE
C DIFF TOX A+B STL QL IA: NEGATIVE
CALCIUM SERPL-MCNC: 9.5 MG/DL (ref 8.7–10.5)
CHLORIDE SERPL-SCNC: 104 MMOL/L (ref 95–110)
CO2 SERPL-SCNC: 25 MMOL/L (ref 23–29)
CREAT SERPL-MCNC: 0.8 MG/DL (ref 0.5–1.4)
ERYTHROCYTE [DISTWIDTH] IN BLOOD BY AUTOMATED COUNT: 12.5 % (ref 11.5–14.5)
EST. GFR  (NO RACE VARIABLE): >60 ML/MIN/1.73 M^2
GLUCOSE SERPL-MCNC: 129 MG/DL (ref 70–110)
HCT VFR BLD AUTO: 31.4 % (ref 37–48.5)
HGB BLD-MCNC: 10.2 G/DL (ref 12–16)
MAGNESIUM SERPL-MCNC: 1.9 MG/DL (ref 1.6–2.6)
MCH RBC QN AUTO: 33.1 PG (ref 27–31)
MCHC RBC AUTO-ENTMCNC: 32.5 G/DL (ref 32–36)
MCV RBC AUTO: 102 FL (ref 82–98)
PHOSPHATE SERPL-MCNC: 4.1 MG/DL (ref 2.7–4.5)
PLATELET # BLD AUTO: 127 K/UL (ref 150–450)
PMV BLD AUTO: 9.3 FL (ref 9.2–12.9)
POTASSIUM SERPL-SCNC: 4 MMOL/L (ref 3.5–5.1)
RBC # BLD AUTO: 3.08 M/UL (ref 4–5.4)
SODIUM SERPL-SCNC: 139 MMOL/L (ref 136–145)
WBC # BLD AUTO: 4.49 K/UL (ref 3.9–12.7)

## 2024-10-23 PROCEDURE — 80048 BASIC METABOLIC PNL TOTAL CA: CPT

## 2024-10-23 PROCEDURE — 63600175 PHARM REV CODE 636 W HCPCS

## 2024-10-23 PROCEDURE — 25000003 PHARM REV CODE 250

## 2024-10-23 PROCEDURE — 84100 ASSAY OF PHOSPHORUS: CPT

## 2024-10-23 PROCEDURE — 83735 ASSAY OF MAGNESIUM: CPT

## 2024-10-23 PROCEDURE — 85027 COMPLETE CBC AUTOMATED: CPT

## 2024-10-23 PROCEDURE — 99406 BEHAV CHNG SMOKING 3-10 MIN: CPT | Mod: ,,,

## 2024-10-23 PROCEDURE — 36415 COLL VENOUS BLD VENIPUNCTURE: CPT

## 2024-10-23 RX ORDER — MUPIROCIN 20 MG/G
OINTMENT TOPICAL 2 TIMES DAILY
COMMUNITY
Start: 2024-10-15

## 2024-10-23 RX ORDER — DOXYCYCLINE 100 MG/1
100 CAPSULE ORAL 2 TIMES DAILY
Qty: 18 CAPSULE | Refills: 0 | Status: SHIPPED | OUTPATIENT
Start: 2024-10-23 | End: 2024-11-01

## 2024-10-23 RX ORDER — AMOXICILLIN AND CLAVULANATE POTASSIUM 875; 125 MG/1; MG/1
1 TABLET, FILM COATED ORAL EVERY 12 HOURS
Qty: 18 TABLET | Refills: 0 | Status: SHIPPED | OUTPATIENT
Start: 2024-10-23 | End: 2024-11-01

## 2024-10-23 RX ADMIN — PREGABALIN 200 MG: 50 CAPSULE ORAL at 08:10

## 2024-10-23 RX ADMIN — PIPERACILLIN AND TAZOBACTAM 4.5 G: 4; .5 INJECTION, POWDER, LYOPHILIZED, FOR SOLUTION INTRAVENOUS; PARENTERAL at 06:10

## 2024-10-23 RX ADMIN — HYDROCODONE BITARTRATE AND ACETAMINOPHEN 1 TABLET: 5; 325 TABLET ORAL at 11:10

## 2024-10-23 RX ADMIN — VANCOMYCIN HYDROCHLORIDE 1000 MG: 1 INJECTION, POWDER, LYOPHILIZED, FOR SOLUTION INTRAVENOUS at 09:10

## 2024-10-23 NOTE — NURSING
Assumed care of patient from LILLIAN Vera, patient is AAOX4, room air, vitals WNL, IV antibiotics, independent to bathroom, rash on right side of neck arm, LEVI, decreased hearing bilateral but worse on left, c/o 5/10 pain, PRN meds given when available, all safety precautions are in place, call bell and tray table are within reach of the patient and no additional questions or concerns from the patient at this time.

## 2024-10-23 NOTE — PLAN OF CARE
VIRTUAL NURSE:  Discharge orders noted; additional clinical references attached.  and pharmacy tech notified.  Patient's discharge instruction packet given by bedside RN.    Cued into patient's room.  Permission received per patient to turn camera to view patient.  Introduced as VN that will be instructing her on discharge instructions.  Educated patient on reason for admission; medications to hold, continue, and start; care of rash; appointment to follow-up with doctor, and when to return to ED.  Number given for 24/7 Nurse Line.  Education per flowsheet.  Opportunity given for questions and questions answered.  LILLIAN Hernandez notified of completion of discharge education and awaiting pharmacy tech to deliver new meds.      Problem: Adult Inpatient Plan of Care  Goal: Plan of Care Review  10/23/2024 1218 by Clementina Sidhu RN  Outcome: Met  10/23/2024 1026 by Clementina Sidhu RN  Outcome: Progressing  Goal: Patient-Specific Goal (Individualized)  Outcome: Met  Goal: Absence of Hospital-Acquired Illness or Injury  Outcome: Met  Goal: Optimal Comfort and Wellbeing  Outcome: Met  Goal: Readiness for Transition of Care  Outcome: Met     Problem: Wound  Goal: Optimal Coping  Outcome: Met  Goal: Optimal Functional Ability  Outcome: Met  Goal: Absence of Infection Signs and Symptoms  Outcome: Met  Goal: Improved Oral Intake  Outcome: Met  Goal: Optimal Pain Control and Function  Outcome: Met  Goal: Skin Health and Integrity  Outcome: Met  Goal: Optimal Wound Healing  Outcome: Met     Problem: Sepsis/Septic Shock  Goal: Optimal Coping  Outcome: Met  Goal: Absence of Bleeding  Outcome: Met  Goal: Blood Glucose Level Within Targeted Range  Outcome: Met  Goal: Absence of Infection Signs and Symptoms  Outcome: Met  Goal: Optimal Nutrition Intake  Outcome: Met     Problem: Skin or Soft Tissue Infection  Goal: Absence of Infection Signs and Symptoms  Outcome: Met     Problem: Fall Injury Risk  Goal: Absence of Fall  and Fall-Related Injury  Outcome: Met     Problem: Comorbidity Management  Goal: Blood Pressure in Desired Range  Outcome: Met     Problem: Infection  Goal: Absence of Infection Signs and Symptoms  Outcome: Met

## 2024-10-23 NOTE — PROGRESS NOTES
Smoking cessation education follow-up: Pt denies nicotine withdrawal symptoms with 21 mg patch in use. Handout and contact information for this office provied.

## 2024-10-23 NOTE — PLAN OF CARE
[facility] follow-up scheduled. Derm consulted noted.  also placed Ambulatory Referral to Dermatology for follow-up. No anticipated discharge needs at this time.  will continue to follow patient through transitions of care and assist with any discharge needs.      spoke with patient at bedside. I updated her on follow-up appointments scheduled. Derm appointment requested. Per patient, MD team spoke with Derm MD already. Patient declined follow-up. They discussed her care and what was needed for discharge.  updated. No further Case Management needs.    Per MD note: Virtual dermatology was consulted who suggested this can be dermatitis secondary to pruritus from her liver cirrhosis.  Patient is discharged home on oral antibiotics to follow up with the primary care doctor within 3-5 days of discharge    Other Contacts    Name Relation Home Work Mobile   Des Parker [phone]   Jennifer Gordon   [phone]     Future Appointments   Date Time Provider Department Center   10/31/2024  9:00 AM Manjit Wynn PA-C [facility]     [facility] [place] [address]      Next Steps: Follow up  Instructions: Ambulatory Referral Placed. Phone#[phone]        10/23/24 1016   Final Note   Assessment Type Final Discharge Note   Anticipated Discharge Disposition Home   Hospital Resources/Appts/Education Provided Appointments scheduled and added to AVS   Post-Acute Status   Discharge Delays None known at this time     Adriana Baugh RN    [phone]

## 2024-10-23 NOTE — DISCHARGE SUMMARY
[facility] Medicine  Discharge Summary      Patient Name: Ember Rivera  MRN: [id number]  NAVIN: [id number]  Patient Class: IP- Inpatient  Admission Date: 10/19/2024  Hospital Length of Stay: 4 days  Discharge Date and Time:  10/23/2024 1:25 PM  Attending Physician: Juan J Guardado MD   Discharging Provider: Juan J Preciado MD  Primary Care Provider: Young Neil MD    Primary Care Team: Networked reference to record PCT     HPI:   Ember Rivera is a 54F w/ PMHx of alcoholic cirrhosis of the liver, portal hypertension, ascites, and tobacco dependence, who presented to [facility] ED from urgent care after spreading of cellulitis on neck and RUE. She states the first spot on her arm popped up 10 days ago, followed by another spot on RFA and another on RH. She also had a spot appear on her R neck behind her ear two days ago which prompted her to seek additional treatment. All of theses maroon spots have developed blisters which have popped, per the patient they leaked clear fluid at the time they popped. She denies any other drainage. She also denies fever or chills, but does complain of burning pain when anything touches these areas.    ED workup significant for: CO2 18, , Tbili 1.1, LA 4.2-->5.2. all other components of CBC and chemistry were unremarkable.    She is admitted to Hospital Medicine for upper extremity cellulitis.     * No surgery found *      Hospital Course:   Patient was admitted for cellulitis, started on vanc and Zosyn improved within 3 days ID consulted recommended total of 10 days of antibiotic through 11 1 of Augmentin and doxycycline, virtual dermatology was consulted who suggested this can be dermatitis secondary to pruritus from her liver cirrhosis.  Patient is discharged home on oral antibiotics to follow up with the primary care doctor within 3-5 days of discharge.    Goals of Care Treatment Preferences:  Code Status: Full Code         Consults:   Consults (From admission,  "onward)          Status Ordering Provider     Inpatient consult to Infectious Diseases  Once        Provider:  (Not yet assigned)    Completed RASHAUN CHUN     Inpatient consult to Social Work/Case Management  Once        Provider:  (Not yet assigned)    Completed RASHAUN CHUN     Pharmacy to dose Vancomycin consult  Once        Provider:  (Not yet assigned)   Placed in "And" Linked Group    Acknowledged RUI SCALES            No new Assessment & Plan notes have been filed under this hospital service since the last note was generated.  Service: Hospital Medicine    Final Active Diagnoses:    Diagnosis Date Noted POA    PRINCIPAL PROBLEM:  Sepsis without acute organ dysfunction [A41.9] 10/19/2024 Yes    Rash [R21] 10/22/2024 Yes    Tobacco dependency [F17.200] 10/19/2024 Yes    Cellulitis of extremity [L03.119] 10/19/2024 Yes    Alcoholic cirrhosis of liver with ascites [K70.31] 02/02/2024 Yes    Ascites [R18.8] 01/31/2024 Yes    HTN (hypertension) [I10] 05/16/2023 Yes      Problems Resolved During this Admission:       Discharged Condition: fair    Disposition:     Follow Up:    Patient Instructions:      Ambulatory referral/consult to Dermatology   Standing Status: Future   Referral Priority: Urgent Referral Type: Consultation   Referral Reason: Specialty Services Required   Requested Specialty: Dermatology   Number of Visits Requested: 1       Significant Diagnostic Studies: N/A    Pending Diagnostic Studies:       Procedure Component Value Units Date/Time    Basic metabolic panel [[id number]] Collected: 10/23/24 1126    Order Status: Sent Lab Status: No result     Specimen: Blood     CBC Without Differential [[id number]] Collected: 10/23/24 1126    Order Status: Sent Lab Status: No result     Specimen: Blood     Magnesium [[id number]] Collected: 10/23/24 1126    Order Status: Sent Lab Status: No result     Specimen: Blood     Phosphorus [[id number]] Collected: 10/23/24 1126    Order " Status: Sent Lab Status: No result     Specimen: Blood            Medications:  Reconciled Home Medications:      Medication List        START taking these medications      amoxicillin-clavulanate 875-125mg 875-125 mg per tablet  Commonly known as: AUGMENTIN  Take 1 tablet by mouth every 12 (twelve) hours. for 9 days     doxycycline 100 MG Cap  Commonly known as: VIBRAMYCIN  Take 1 capsule (100 mg total) by mouth 2 (two) times daily. for 9 days     LIDOcaine 4 % Gel  Apply 5 g topically 3 (three) times daily.     polyethylene glycol 17 gram Pwpk  Commonly known as: GLYCOLAX  Take 17 g by mouth once daily.            CONTINUE taking these medications      amitriptyline 25 MG tablet  Commonly known as: ELAVIL  Take 1 tablet (25 mg total) by mouth nightly as needed for Pain or Insomnia. Increase in 25 mg increments at intervals =1 week.     capsicum 0.075% 0.075 % topical cream  Commonly known as: ZOSTRIX  Apply topically 3 (three) times daily.     folic acid 1 MG tablet  Commonly known as: FOLVITE  Take 1 tablet (1 mg total) by mouth once daily.     furosemide 40 MG tablet  Commonly known as: LASIX  Take 1 tablet (40 mg total) by mouth once daily.     magnesium oxide 400 mg (241.3 mg magnesium) tablet  Commonly known as: MAG-OX  Take 1 tablet (400 mg total) by mouth once daily.     meclizine 25 mg tablet  Commonly known as: ANTIVERT  Take 1 tablet (25 mg total) by mouth 3 (three) times daily as needed for Dizziness or Nausea.     potassium bicarbonate disintegrating tablet  Commonly known as: K-LYTE  Take 1 tablet (25 mEq total) by mouth 2 (two) times a day.     pregabalin 100 MG capsule  Commonly known as: LYRICA  Take 2 capsules (200 mg total) by mouth once daily AND 1 capsule (100 mg total) every evening.     spironolactone 100 MG tablet  Commonly known as: ALDACTONE  Take 1 tablet (100 mg total) by mouth once daily.     traZODone 100 MG tablet  Commonly known as: DESYREL  Take 100 mg by mouth every evening.             ASK your doctor about these medications      mupirocin 2 % ointment  Commonly known as: BACTROBAN  Apply topically 2 (two) times daily.              Indwelling Lines/Drains at time of discharge:   Lines/Drains/Airways       None                   Time spent on the discharge of patient: 45 minutes         Juan J Preciado MD  Department of Hospital Medicine  [facility]

## 2024-10-23 NOTE — NURSING
VIRTUAL NURSE:  Discharge orders noted; additional clinical references attached.  and pharmacy tech notified.  Patient's discharge instruction packet given by bedside RN.    Cued into patient's room.  Permission received per patient to turn camera to view patient.  Introduced as VN that will be instructing her on discharge instructions.  Educated patient on reason for admission; medications to hold, continue, and start; care of rash; appointment to follow-up with doctor, and when to return to ED.  Number given for 24/7 Nurse Line.  Education per flowsheet.  Opportunity given for questions and questions answered.  LILLIAN Hernandez notified of completion of discharge education and awaiting pharmacy tech to deliver new meds.

## 2024-10-23 NOTE — PROGRESS NOTES
Pharmacokinetic Assessment Follow Up: IV Vancomycin    Vancomycin serum concentration assessment(s):    The trough level was drawn correctly and can be used to guide therapy at this time. The measurement is above the desired definitive target range of 10 to 15 mcg/mL.    Vancomycin Regimen Plan:    Change regimen to Vancomycin 1000 mg IV every 12 hours with next serum trough concentration measured at 0900 prior to fourth dose on 10/24/2024    Drug levels (last 3 results):  Recent Labs   Lab Result Units 10/21/24  0457 10/22/24  1753   Vancomycin-Trough ug/mL 10.0 17.4       Pharmacy will continue to follow and monitor vancomycin.    Please contact pharmacy at extension [phone] for questions regarding this assessment.    Thank you for the consult,   Alissa Bonner       Patient brief summary:  Ember Rivera is a 54 y.o. female initiated on antimicrobial therapy with IV Vancomycin for treatment of skin & soft tissue infection    The patient's current regimen is vancomycin 1000 mg ivpb q12h    Drug Allergies:   Review of patient's allergies indicates:  No Known Allergies    Actual Body Weight:   70 kg    Renal Function:   Estimated Creatinine Clearance: 87 mL/min (based on SCr of 0.7 mg/dL).,     Dialysis Method (if applicable):  N/A    CBC (last 72 hours):  Recent Labs   Lab Result Units 10/20/24  0139 10/21/24  0457 10/22/24  0500   WBC K/uL 7.27 4.30 4.04   Hemoglobin g/dL 11.1* 9.9* 10.1*   Hematocrit % 34.2* 29.7* 30.9*   Platelets K/uL 144* 101* 121*   Gran % % 67.1 61.0 62.0   Lymph % % 19.9 25.3 23.3   Mono % % 11.3 11.2 12.6   Eosinophil % % 1.0 1.6 1.2   Basophil % % 0.4 0.7 0.7   Differential Method  Automated Automated Automated       Metabolic Panel (last 72 hours):  Recent Labs   Lab Result Units 10/20/24  0139 10/21/24  0457 10/22/24  0500   Sodium mmol/L 136 138 141   Potassium mmol/L 3.7 3.9 3.6   Chloride mmol/L 104 106 104   CO2 mmol/L 21* 22* 23   Glucose mg/dL 86 99 95   BUN mg/dL 6 6 6    Creatinine mg/dL 0.6 0.6 0.7   Magnesium mg/dL 1.3* 1.5* 1.8   Phosphorus mg/dL 3.3 4.3 4.5       Vancomycin Administrations:  vancomycin given in the last 96 hours                     vancomycin 1,250 mg in D5W 250 mL IVPB (admixture device) (mg) 1,250 mg New Bag 10/22/24 1848     1,250 mg New Bag  0633     1,250 mg New Bag 10/21/24 2316     1,250 mg New Bag  0556    vancomycin (VANCOCIN) 1,000 mg in D5W 250 mL IVPB (admixture device) (mg) 1,000 mg New Bag 10/20/24 1926     1,000 mg New Bag  0535    vancomycin 1,500 mg in D5W 250 mL IVPB (admixture device) (mg) 1,500 mg New Bag 10/19/24 1741                    Microbiologic Results:  Microbiology Results (last 7 days)       Procedure Component Value Units Date/Time    Clostridium difficile EIA [[id number]]     Order Status: No result Specimen: Stool     Blood culture [[id number]] Collected: 10/19/24 1634    Order Status: Completed Specimen: Blood from Peripheral, Forearm, Left Updated: 10/21/24 2212     Blood Culture, Routine No Growth to date      No Growth to date      No Growth to date    Blood culture [[id number]] Collected: 10/19/24 1650    Order Status: Completed Specimen: Blood from Peripheral, Hand, Left Updated: 10/21/24 2212     Blood Culture, Routine No Growth to date      No Growth to date      No Growth to date

## 2024-10-23 NOTE — PLAN OF CARE
Problem: Adult Inpatient Plan of Care  Goal: Plan of Care Review  Outcome: Progressing  Goal: Patient-Specific Goal (Individualized)  Outcome: Progressing  Goal: Absence of Hospital-Acquired Illness or Injury  Outcome: Progressing  Goal: Optimal Comfort and Wellbeing  Outcome: Progressing  Goal: Readiness for Transition of Care  Outcome: Progressing     Problem: Wound  Goal: Optimal Coping  Outcome: Progressing  Goal: Optimal Functional Ability  Outcome: Progressing  Goal: Absence of Infection Signs and Symptoms  Outcome: Progressing  Goal: Improved Oral Intake  Outcome: Progressing  Goal: Optimal Pain Control and Function  Outcome: Progressing  Goal: Skin Health and Integrity  Outcome: Progressing  Goal: Optimal Wound Healing  Outcome: Progressing     Problem: Sepsis/Septic Shock  Goal: Optimal Coping  Outcome: Progressing  Goal: Absence of Bleeding  Outcome: Progressing  Goal: Blood Glucose Level Within Targeted Range  Outcome: Progressing  Goal: Absence of Infection Signs and Symptoms  Outcome: Progressing  Goal: Optimal Nutrition Intake  Outcome: Progressing     Problem: Skin or Soft Tissue Infection  Goal: Absence of Infection Signs and Symptoms  Outcome: Progressing     Problem: Fall Injury Risk  Goal: Absence of Fall and Fall-Related Injury  Outcome: Progressing     Problem: Comorbidity Management  Goal: Blood Pressure in Desired Range  Outcome: Progressing     Problem: Infection  Goal: Absence of Infection Signs and Symptoms  Outcome: Progressing

## 2024-10-24 LAB
BACTERIA BLD CULT: NORMAL
BACTERIA BLD CULT: NORMAL
C DIFF TOX GENS STL QL NAA+PROBE: POSITIVE

## 2024-10-24 RX ORDER — LACTOBACILLUS COMBINATION NO.4 3B CELL
1 CAPSULE ORAL DAILY
Qty: 30 CAPSULE | Refills: 0 | Status: SHIPPED | OUTPATIENT
Start: 2024-10-24

## 2024-10-24 RX ORDER — VANCOMYCIN HYDROCHLORIDE 125 MG/1
125 CAPSULE ORAL EVERY 6 HOURS
Qty: 40 CAPSULE | Refills: 0 | Status: SHIPPED | OUTPATIENT
Start: 2024-10-24 | End: 2024-11-03

## 2024-10-29 ENCOUNTER — OFFICE VISIT (OUTPATIENT)
Dept: FAMILY MEDICINE | Facility: HOSPITAL | Age: 54
End: 2024-10-29
Payer: MEDICAID

## 2024-10-29 VITALS
HEIGHT: 66 IN | BODY MASS INDEX: 24.1 KG/M2 | SYSTOLIC BLOOD PRESSURE: 164 MMHG | OXYGEN SATURATION: 99 % | WEIGHT: 149.94 LBS | DIASTOLIC BLOOD PRESSURE: 81 MMHG | HEART RATE: 88 BPM

## 2024-10-29 DIAGNOSIS — G62.9 PERIPHERAL POLYNEUROPATHY: ICD-10-CM

## 2024-10-29 DIAGNOSIS — F10.90 ALCOHOL USE DISORDER: ICD-10-CM

## 2024-10-29 DIAGNOSIS — L03.113 CELLULITIS OF RIGHT UPPER EXTREMITY: Primary | ICD-10-CM

## 2024-10-29 DIAGNOSIS — K70.31 ALCOHOLIC CIRRHOSIS OF LIVER WITH ASCITES: ICD-10-CM

## 2024-10-29 DIAGNOSIS — Z12.2 SCREENING FOR LUNG CANCER: ICD-10-CM

## 2024-10-29 DIAGNOSIS — D53.9 MACROCYTIC ANEMIA: ICD-10-CM

## 2024-10-29 DIAGNOSIS — E83.42 HYPOMAGNESEMIA: ICD-10-CM

## 2024-10-29 DIAGNOSIS — Z12.11 ENCOUNTER FOR SCREENING FOR MALIGNANT NEOPLASM OF COLON: ICD-10-CM

## 2024-10-29 DIAGNOSIS — I10 PRIMARY HYPERTENSION: ICD-10-CM

## 2024-10-29 DIAGNOSIS — G62.1 ALCOHOL-INDUCED POLYNEUROPATHY: ICD-10-CM

## 2024-10-29 DIAGNOSIS — F10.10 ALCOHOL ABUSE: ICD-10-CM

## 2024-10-29 DIAGNOSIS — K70.31 ASCITES DUE TO ALCOHOLIC CIRRHOSIS: ICD-10-CM

## 2024-10-29 DIAGNOSIS — D53.9 MACROCYTIC ANEMIA: Primary | ICD-10-CM

## 2024-10-29 PROCEDURE — 99214 OFFICE O/P EST MOD 30 MIN: CPT

## 2024-10-29 RX ORDER — LANOLIN ALCOHOL/MO/W.PET/CERES
400 CREAM (GRAM) TOPICAL DAILY
Qty: 90 TABLET | Refills: 1 | Status: SHIPPED | OUTPATIENT
Start: 2024-10-29

## 2024-10-29 RX ORDER — FOLIC ACID 1 MG/1
1 TABLET ORAL DAILY
Qty: 60 TABLET | Refills: 2 | Status: SHIPPED | OUTPATIENT
Start: 2024-10-29

## 2024-10-29 RX ORDER — SULFAMETHOXAZOLE AND TRIMETHOPRIM 800; 160 MG/1; MG/1
1 TABLET ORAL 2 TIMES DAILY
COMMUNITY
Start: 2024-10-15

## 2024-10-29 RX ORDER — ACAMPROSATE CALCIUM 333 MG/1
666 TABLET, DELAYED RELEASE ORAL 3 TIMES DAILY
Qty: 180 TABLET | Refills: 0 | Status: SHIPPED | OUTPATIENT
Start: 2024-10-29 | End: 2024-11-28

## 2024-10-29 RX ORDER — HYDROXYZINE HYDROCHLORIDE 25 MG/1
25 TABLET, FILM COATED ORAL 4 TIMES DAILY
COMMUNITY
Start: 2024-10-15

## 2024-10-29 RX ORDER — PREGABALIN 100 MG/1
CAPSULE ORAL
Qty: 270 CAPSULE | Refills: 0 | Status: SHIPPED | OUTPATIENT
Start: 2024-10-29 | End: 2025-01-27

## 2024-10-29 RX ORDER — MULTIVITAMIN
1 TABLET ORAL DAILY
Qty: 90 TABLET | Refills: 1 | Status: SHIPPED | OUTPATIENT
Start: 2024-10-29

## 2024-10-29 RX ORDER — TRAZODONE HYDROCHLORIDE 100 MG/1
100 TABLET ORAL NIGHTLY
Qty: 10 TABLET | Refills: 0 | Status: SHIPPED | OUTPATIENT
Start: 2024-10-29

## 2024-10-29 RX ORDER — FUROSEMIDE 40 MG/1
40 TABLET ORAL DAILY
Qty: 30 TABLET | Refills: 11 | Status: SHIPPED | OUTPATIENT
Start: 2024-10-29 | End: 2025-10-29

## 2024-10-29 RX ORDER — AMITRIPTYLINE HYDROCHLORIDE 25 MG/1
25 TABLET, FILM COATED ORAL NIGHTLY PRN
Qty: 60 TABLET | Refills: 1 | Status: SHIPPED | OUTPATIENT
Start: 2024-10-29 | End: 2025-10-29

## 2024-10-29 RX ORDER — PREGABALIN 100 MG/1
CAPSULE ORAL
Qty: 270 CAPSULE | Refills: 0 | Status: SHIPPED | OUTPATIENT
Start: 2024-10-29 | End: 2024-10-29

## 2024-10-29 RX ORDER — SPIRONOLACTONE 100 MG/1
100 TABLET, FILM COATED ORAL DAILY
Qty: 30 TABLET | Refills: 11 | Status: SHIPPED | OUTPATIENT
Start: 2024-10-29 | End: 2025-10-29

## 2024-10-29 RX ORDER — LACTOBACILLUS COMBINATION NO.4 3B CELL
1 CAPSULE ORAL DAILY
Qty: 30 CAPSULE | Refills: 0 | Status: SHIPPED | OUTPATIENT
Start: 2024-10-29

## 2024-10-30 ENCOUNTER — PATIENT MESSAGE (OUTPATIENT)
Dept: FAMILY MEDICINE | Facility: HOSPITAL | Age: 54
End: 2024-10-30
Payer: MEDICAID

## 2024-11-05 NOTE — PROGRESS NOTES
"Progress Note  [facility] Family Medicine    Subjective:      Ember Rivera is a 54 y.o. female here for hospital follow up for cellulitis which has since resolved. She reports abdominal distension/ascites 2/2 cirrhosis controlled despite not taking diuretics for several weeks as she has been out.       Active Problem List with Overview Notes    Diagnosis Date Noted    Alcohol use disorder 05/16/2023    Hypokalemia 05/16/2023    Rash 10/22/2024    Tobacco dependency 10/19/2024    Cellulitis of extremity 10/19/2024    Sepsis without acute organ dysfunction 10/19/2024    Portal hypertension 02/02/2024    Alcoholic cirrhosis of liver with ascites 02/02/2024    Ascites 01/31/2024    Diarrhea 12/20/2023    Lactic acidosis 10/24/2023    Vomiting 10/24/2023    Hepatic steatosis 09/28/2023    Alcohol abuse 09/28/2023    Elevated LFTs 09/28/2023    Hepatomegaly 09/28/2023    Weakness of both legs 08/10/2023    Imbalance 08/10/2023    Risk for falls 08/10/2023    Weakness 08/04/2023    High anion gap metabolic acidosis 07/27/2023    Normocytic anemia 06/17/2023    Impaired functional mobility, balance, gait, and endurance 05/30/2023    Tobacco abuse 05/29/2023    Peripheral polyneuropathy 05/24/2023    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   Cardiovascular:  Negative for chest pain.   Gastrointestinal:  Negative for abdominal pain, blood in stool, constipation, diarrhea, melena, nausea and vomiting.   Musculoskeletal:  Negative for falls.         Objective:   BP (!) 164/81 (BP Location: Right arm, Patient Position: Sitting)   Pulse 88   Ht 5' 6" (1.676 m)   Wt 68 kg (149 lb 14.6 oz)   LMP  (LMP Unknown) Comment: every 3 months  SpO2 99%   BMI 24.20 kg/m²      Physical Exam  Vitals and nursing note " reviewed.   Constitutional:       Appearance: Normal appearance.   Cardiovascular:      Rate and Rhythm: Normal rate and regular rhythm.   Pulmonary:      Effort: Pulmonary effort is normal.      Breath sounds: Normal breath sounds.   Abdominal:      General: There is no distension.      Tenderness: There is no abdominal tenderness.   Musculoskeletal:      Right lower leg: No edema.      Left lower leg: No edema.   Neurological:      Mental Status: She is alert and oriented to person, place, and time.          Assessment:   54 y.o. with        1. Macrocytic anemia    2. Alcohol abuse    3. Ascites due to alcoholic cirrhosis    4. Alcoholic cirrhosis of liver with ascites    5. Primary hypertension    6. Alcohol-induced polyneuropathy    7. Peripheral polyneuropathy    8. Encounter for screening for malignant neoplasm of colon    9. Screening for lung cancer    10. Alcohol use disorder    11. Hypomagnesemia         Plan:   Ember was seen today for hospital follow up.    Diagnoses and all orders for this visit:    Macrocytic anemia  - likely secondary to chronic illness, Cirrhosis, continue multivitamin, folic acid   -     CBC W/ AUTO DIFFERENTIAL; Future    Alcohol abuse  - continue   -     folic acid (FOLVITE) 1 MG tablet; Take 1 tablet (1 mg total) by mouth once daily.    Ascites due to alcoholic cirrhosis  - controlled, adivsed to resume below meds if recurs. Due for follow up with hepatology   -     furosemide (LASIX) 40 MG tablet; Take 1 tablet (40 mg total) by mouth once daily.  -     spironolactone (ALDACTONE) 100 MG tablet; Take 1 tablet (100 mg total) by mouth once daily.    Alcohol-induced polyneuropathy  -    uncontrolled, will trial addition of elavil   -     traZODone (DESYREL) 100 MG tablet; Take 1 tablet (100 mg total) by mouth every evening.  -     pregabalin (LYRICA) 100 MG capsule; Take 2 capsules (200 mg total) by mouth once daily AND 1 capsule (100 mg total) every evening.  -     amitriptyline  (ELAVIL) 25 MG tablet; Take 1 tablet (25 mg total) by mouth nightly as needed for Pain or Insomnia. Increase in 25 mg increments at intervals =1 week.    Encounter for screening for malignant neoplasm of colon  -     Ambulatory referral/consult to Endo Procedure ; Future    Screening for lung cancer  - greater than 20 pack years   -     CT Chest Lung Screening Low Dose; Future    Alcohol use disorder  - patient reports relapse of alcohol use a couple time over last few months where she has a couple alcohol beverages to cope with stress. Will trial acamprosate as well as recommending outpatient rehab/therapy. Naltrexone contraindicated given severity of liver disease  -     acamprosate (CAMPRAL) 333 mg tablet; Take 2 tablets (666 mg total) by mouth 3 (three) times daily.    Hypomagnesemia  - chronic, continue current regimen   -     magnesium oxide (MAG-OX) 400 mg (241.3 mg magnesium) tablet; Take 1 tablet (400 mg total) by mouth once daily.    Follow up in 2-4 weeks for alcohol use disorder    Marco Sandhu DO  \[facility] [place] [facility]\"" Family Medicine, PGY-3  3:16 PM, 11/05/2024    *This note was dictated using the M*Modal Fluency Direct word recognition program. There are word rescognition mistakes that are occasionally missed on review. \    The following information is provided to all patients.  This information is to help you find resources for any of the problems found today that may be affecting your health:                Living healthy guide: www.[facility].[place].gov       Understanding Diabetes: www.diabetes.org       Eating healthy: www.cdc.gov/healthyweight      CDC home safety checklist: www.cdc.gov/steadi/patient.html      Agency on Aging: www.goea.[place].[facility]       Alcoholics anonymous (AA): www.aa.org      Physical Activity: www.rashad.nih.gov/yz0sgyv       Tobacco use: www.quitwithusla.org

## 2024-11-06 NOTE — Clinical Note
Called and left a voicemail for mom reminding her of procedure and arrival times for tomorrow's procedure with Dr. Sherman. Reviewed NPO guidelines in voicemail as well. Left call back number and instructions in the message should mom have any additional questions or concerns before 4:30pm today.    Diagnosis: Hypokalemia [747365]   Future Attending Provider: TERRELL BAILEY [[id number]]   Admitting Provider:: CORAZON ACKERMAN [[id number]]

## 2024-12-12 ENCOUNTER — HOSPITAL ENCOUNTER (EMERGENCY)
Facility: HOSPITAL | Age: 54
Discharge: HOME | End: 2024-12-12
Attending: EMERGENCY MEDICINE
Payer: MEDICAID

## 2024-12-12 VITALS
SYSTOLIC BLOOD PRESSURE: 164 MMHG | BODY MASS INDEX: 24.11 KG/M2 | HEIGHT: 66 IN | RESPIRATION RATE: 18 BRPM | DIASTOLIC BLOOD PRESSURE: 79 MMHG | OXYGEN SATURATION: 99 % | TEMPERATURE: 98 F | HEART RATE: 97 BPM | WEIGHT: 150 LBS

## 2024-12-12 DIAGNOSIS — S22.31XA CLOSED FRACTURE OF ONE RIB OF RIGHT SIDE, INITIAL ENCOUNTER: Primary | ICD-10-CM

## 2024-12-12 DIAGNOSIS — R07.89 CHEST WALL PAIN: ICD-10-CM

## 2024-12-12 DIAGNOSIS — T14.90XA INJURY: ICD-10-CM
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
## 2024-12-12 PROCEDURE — 99285 EMERGENCY DEPT VISIT HI MDM: CPT | Mod: 25

## 2024-12-12 PROCEDURE — 93010 ELECTROCARDIOGRAM REPORT: CPT | Mod: ,,, | Performed by: STUDENT IN AN ORGANIZED HEALTH CARE EDUCATION/TRAINING PROGRAM

## 2024-12-12 PROCEDURE — 96372 THER/PROPH/DIAG INJ SC/IM: CPT | Performed by: NURSE PRACTITIONER

## 2024-12-12 PROCEDURE — 25000003 PHARM REV CODE 250: Performed by: NURSE PRACTITIONER

## 2024-12-12 PROCEDURE — 93005 ELECTROCARDIOGRAM TRACING: CPT

## 2024-12-12 PROCEDURE — 63600175 PHARM REV CODE 636 W HCPCS: Performed by: NURSE PRACTITIONER

## 2024-12-12 RX ORDER — LIDOCAINE 50 MG/G
1 PATCH TOPICAL DAILY
Qty: 7 PATCH | Refills: 0 | Status: SHIPPED | OUTPATIENT
Start: 2024-12-12 | End: 2024-12-19

## 2024-12-12 RX ORDER — LIDOCAINE 50 MG/G
1 PATCH TOPICAL
Status: DISCONTINUED | OUTPATIENT
Start: 2024-12-12 | End: 2024-12-12 | Stop reason: HOSPADM

## 2024-12-12 RX ORDER — OXYCODONE AND ACETAMINOPHEN 5; 325 MG/1; MG/1
1 TABLET ORAL EVERY 6 HOURS PRN
Qty: 8 TABLET | Refills: 0 | Status: SHIPPED | OUTPATIENT
Start: 2024-12-12 | End: 2024-12-14

## 2024-12-12 RX ORDER — IBUPROFEN 400 MG/1
400 TABLET ORAL EVERY 6 HOURS PRN
Qty: 20 TABLET | Refills: 0 | Status: SHIPPED | OUTPATIENT
Start: 2024-12-12

## 2024-12-12 RX ORDER — OXYCODONE AND ACETAMINOPHEN 5; 325 MG/1; MG/1
1 TABLET ORAL
Status: COMPLETED | OUTPATIENT
Start: 2024-12-12 | End: 2024-12-12

## 2024-12-12 RX ORDER — KETOROLAC TROMETHAMINE 30 MG/ML
30 INJECTION, SOLUTION INTRAMUSCULAR; INTRAVENOUS
Status: COMPLETED | OUTPATIENT
Start: 2024-12-12 | End: 2024-12-12

## 2024-12-12 RX ADMIN — LIDOCAINE 1 PATCH: 50 PATCH CUTANEOUS at 07:12

## 2024-12-12 RX ADMIN — KETOROLAC TROMETHAMINE 30 MG: 30 INJECTION, SOLUTION INTRAMUSCULAR; INTRAVENOUS at 07:12

## 2024-12-12 RX ADMIN — OXYCODONE HYDROCHLORIDE AND ACETAMINOPHEN 1 TABLET: 5; 325 TABLET ORAL at 05:12

## 2024-12-12 NOTE — FIRST PROVIDER EVALUATION
Emergency Department TeleTriage Encounter Note      CHIEF COMPLAINT    Chief Complaint   Patient presents with    Fall     Patient reports to the ED complaining of a mechanical fall onto grass that happened earlier today. Patient complaining of right sided chest pain, back pain, and rib pain that worsens when she moves her right arm. Ambulatory with a cane, appears uncomfortable.       VITAL SIGNS   Initial Vitals [12/12/24 1658]   BP Pulse Resp Temp SpO2   (!) 159/88 90 20 98.4 °F (36.9 °C) 99 %      MAP       --            ALLERGIES    Review of patient's allergies indicates:  No Known Allergies    PROVIDER TRIAGE NOTE  Verbal consent for the teletriage evaluation was given by the patient at the start of the evaluation.  All efforts will be made to maintain patient's privacy during the evaluation.      This is a teletriage evaluation of a 54 y.o. female presenting to the ED with c/o trip and fall PTA; c/o right rib/chest pain.  No LOC. Limited physical exam via telehealth: The patient is awake, alert, answering questions appropriately and is not in respiratory distress.  As the Teletriage provider, I performed an initial assessment and ordered appropriate labs and imaging studies, if any, to facilitate the patient's care once placed in the ED. Once a room is available, care and a full evaluation will be completed by an alternate ED provider.  Any additional orders and the final disposition will be determined by that provider.  All imaging and labs will not be followed-up by the Teletriage Team, including myself.         ORDERS  Labs Reviewed - No data to display    ED Orders (720h ago, onward)      Start Ordered     Status Ordering Provider    12/12/24 1712 12/12/24 1711  X-Ray Ribs 2 View Right  1 time imaging         Ordered JODY COHEN    12/12/24 1708 12/12/24 1707  X-Ray Chest PA And Lateral  1 time imaging         Acknowledged LILA BUSH    12/12/24 1706 12/12/24 1705  EKG 12-lead  Once          Completed by JANESSA NYE on 12/12/2024 at  5:05 PM BIBI HURLEY              Virtual Visit Note: The provider triage portion of this emergency department evaluation and documentation was performed via [employer], a HIPAA-compliant telemedicine application, in concert with a tele-presenter in the room. A face to face patient evaluation with one of my colleagues will occur once the patient is placed in an emergency department room.      DISCLAIMER: This note was prepared with [employer] voice recognition transcription software. Garbled syntax, mangled pronouns, and other bizarre constructions may be attributed to that software system.

## 2024-12-13 LAB
OHS QRS DURATION: 80 MS
OHS QTC CALCULATION: 493 MS
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
## 2024-12-13 NOTE — ED NOTES
Patient provided with and educated on discharge instructions, prescriptions, incentive spirometer and follow up care. Patient verbalized understanding. Advised to return to ED as needed. Stable and ambulatory upon departure.

## 2025-02-03 ENCOUNTER — CLINICAL SUPPORT (OUTPATIENT)
Dept: ENDOSCOPY | Facility: HOSPITAL | Age: 55
End: 2025-02-03
Payer: MEDICAID

## 2025-02-03 ENCOUNTER — TELEPHONE (OUTPATIENT)
Dept: ENDOSCOPY | Facility: HOSPITAL | Age: 55
End: 2025-02-03

## 2025-02-03 DIAGNOSIS — Z12.11 ENCOUNTER FOR SCREENING FOR MALIGNANT NEOPLASM OF COLON: ICD-10-CM

## 2025-08-22 ENCOUNTER — HOSPITAL ENCOUNTER (EMERGENCY)
Facility: HOSPITAL | Age: 55
Discharge: HOME | End: 2025-08-22
Attending: EMERGENCY MEDICINE
Payer: MEDICAID

## 2025-08-22 VITALS
HEIGHT: 66 IN | OXYGEN SATURATION: 98 % | DIASTOLIC BLOOD PRESSURE: 64 MMHG | WEIGHT: 149 LBS | TEMPERATURE: 98 F | HEART RATE: 70 BPM | RESPIRATION RATE: 14 BRPM | BODY MASS INDEX: 23.95 KG/M2 | SYSTOLIC BLOOD PRESSURE: 147 MMHG

## 2025-08-22 DIAGNOSIS — R11.2 NAUSEA AND VOMITING, UNSPECIFIED VOMITING TYPE: ICD-10-CM

## 2025-08-22 DIAGNOSIS — R06.02 SHORTNESS OF BREATH: ICD-10-CM

## 2025-08-22 DIAGNOSIS — R07.89 CHEST PRESSURE: ICD-10-CM

## 2025-08-22 DIAGNOSIS — N39.0 URINARY TRACT INFECTION WITHOUT HEMATURIA, SITE UNSPECIFIED: Primary | ICD-10-CM

## 2025-08-22 LAB
ABSOLUTE EOSINOPHIL (OHS): 0.04 K/UL
ABSOLUTE MONOCYTE (OHS): 0.38 K/UL (ref 0.3–1)
ABSOLUTE NEUTROPHIL COUNT (OHS): 3.71 K/UL (ref 1.8–7.7)
ALBUMIN SERPL BCP-MCNC: 2.9 G/DL (ref 3.5–5.2)
ALP SERPL-CCNC: 138 UNIT/L (ref 40–150)
ALT SERPL W/O P-5'-P-CCNC: 18 UNIT/L (ref 10–44)
ANION GAP (OHS): 15 MMOL/L (ref 8–16)
AST SERPL-CCNC: 121 UNIT/L (ref 11–45)
BACTERIA #/AREA URNS AUTO: ABNORMAL /HPF
BASOPHILS # BLD AUTO: 0.02 K/UL
BASOPHILS NFR BLD AUTO: 0.4 %
BILIRUB SERPL-MCNC: 6 MG/DL (ref 0.1–1)
BILIRUB UR QL STRIP.AUTO: ABNORMAL
BUN SERPL-MCNC: 6 MG/DL (ref 6–20)
CALCIUM SERPL-MCNC: 8.6 MG/DL (ref 8.7–10.5)
CHLORIDE SERPL-SCNC: 103 MMOL/L (ref 95–110)
CLARITY UR: CLEAR
CO2 SERPL-SCNC: 22 MMOL/L (ref 23–29)
COLOR UR AUTO: YELLOW
CREAT SERPL-MCNC: 0.5 MG/DL (ref 0.5–1.4)
CREAT SERPL-MCNC: 0.6 MG/DL (ref 0.5–1.4)
ERYTHROCYTE [DISTWIDTH] IN BLOOD BY AUTOMATED COUNT: 14.8 % (ref 11.5–14.5)
GFR SERPLBLD CREATININE-BSD FMLA CKD-EPI: >60 ML/MIN/1.73/M2
GLUCOSE SERPL-MCNC: 104 MG/DL (ref 70–110)
GLUCOSE UR QL STRIP: NEGATIVE
HCT VFR BLD AUTO: 29.7 % (ref 37–48.5)
HGB BLD-MCNC: 10.1 GM/DL (ref 12–16)
HGB UR QL STRIP: NEGATIVE
HYALINE CASTS UR QL AUTO: 0 /LPF (ref 0–1)
IMM GRANULOCYTES # BLD AUTO: 0.04 K/UL (ref 0–0.04)
IMM GRANULOCYTES NFR BLD AUTO: 0.8 % (ref 0–0.5)
KETONES UR QL STRIP: NEGATIVE
LEUKOCYTE ESTERASE UR QL STRIP: ABNORMAL
LIPASE SERPL-CCNC: 35 U/L (ref 4–60)
LYMPHOCYTES # BLD AUTO: 0.63 K/UL (ref 1–4.8)
MAGNESIUM SERPL-MCNC: 1.2 MG/DL (ref 1.6–2.6)
MCH RBC QN AUTO: 34 PG (ref 27–31)
MCHC RBC AUTO-ENTMCNC: 34 G/DL (ref 32–36)
MCV RBC AUTO: 100 FL (ref 82–98)
MICROSCOPIC COMMENT: ABNORMAL
NITRITE UR QL STRIP: NEGATIVE
NT-PROBNP SERPL-MCNC: 207 PG/ML
NUCLEATED RBC (/100WBC) (OHS): 0 /100 WBC
PH UR STRIP: 7 [PH]
PLATELET # BLD AUTO: 59 K/UL (ref 150–450)
PMV BLD AUTO: 9.7 FL (ref 9.2–12.9)
POTASSIUM SERPL-SCNC: 3.1 MMOL/L (ref 3.5–5.1)
PROT SERPL-MCNC: 7.5 GM/DL (ref 6–8.4)
PROT UR QL STRIP: ABNORMAL
RBC # BLD AUTO: 2.97 M/UL (ref 4–5.4)
RBC #/AREA URNS AUTO: 8 /HPF (ref 0–4)
RELATIVE EOSINOPHIL (OHS): 0.8 %
RELATIVE LYMPHOCYTE (OHS): 13.1 % (ref 18–48)
RELATIVE MONOCYTE (OHS): 7.9 % (ref 4–15)
RELATIVE NEUTROPHIL (OHS): 77 % (ref 38–73)
SAMPLE: NORMAL
SODIUM SERPL-SCNC: 140 MMOL/L (ref 136–145)
SP GR UR STRIP: >=1.03
SQUAMOUS #/AREA URNS AUTO: 19 /HPF
TROPONIN I SERPL HS-MCNC: 5 NG/L
UROBILINOGEN UR STRIP-ACNC: >=8 EU/DL
WBC # BLD AUTO: 4.82 K/UL (ref 3.9–12.7)
WBC #/AREA URNS AUTO: 78 /HPF (ref 0–5)
WBC CLUMPS UR QL AUTO: ABNORMAL

## 2025-08-22 PROCEDURE — 93010 ELECTROCARDIOGRAM REPORT: CPT | Mod: ,,, | Performed by: INTERNAL MEDICINE

## 2025-08-22 PROCEDURE — 96366 THER/PROPH/DIAG IV INF ADDON: CPT

## 2025-08-22 PROCEDURE — 96375 TX/PRO/DX INJ NEW DRUG ADDON: CPT

## 2025-08-22 PROCEDURE — 81003 URINALYSIS AUTO W/O SCOPE: CPT

## 2025-08-22 PROCEDURE — 83880 ASSAY OF NATRIURETIC PEPTIDE: CPT | Performed by: EMERGENCY MEDICINE

## 2025-08-22 PROCEDURE — 83735 ASSAY OF MAGNESIUM: CPT | Performed by: EMERGENCY MEDICINE

## 2025-08-22 PROCEDURE — 83690 ASSAY OF LIPASE: CPT

## 2025-08-22 PROCEDURE — 87088 URINE BACTERIA CULTURE: CPT

## 2025-08-22 PROCEDURE — 85025 COMPLETE CBC W/AUTO DIFF WBC: CPT

## 2025-08-22 PROCEDURE — 82565 ASSAY OF CREATININE: CPT

## 2025-08-22 PROCEDURE — 96365 THER/PROPH/DIAG IV INF INIT: CPT

## 2025-08-22 PROCEDURE — 93005 ELECTROCARDIOGRAM TRACING: CPT

## 2025-08-22 PROCEDURE — 25500020 PHARM REV CODE 255: Performed by: EMERGENCY MEDICINE

## 2025-08-22 PROCEDURE — 84484 ASSAY OF TROPONIN QUANT: CPT | Performed by: EMERGENCY MEDICINE

## 2025-08-22 PROCEDURE — 99285 EMERGENCY DEPT VISIT HI MDM: CPT | Mod: 25

## 2025-08-22 PROCEDURE — 63600175 PHARM REV CODE 636 W HCPCS

## 2025-08-22 PROCEDURE — 82040 ASSAY OF SERUM ALBUMIN: CPT

## 2025-08-22 PROCEDURE — 96367 TX/PROPH/DG ADDL SEQ IV INF: CPT

## 2025-08-22 RX ORDER — POTASSIUM CHLORIDE 20 MEQ/1
40 TABLET, EXTENDED RELEASE ORAL
Status: DISCONTINUED | OUTPATIENT
Start: 2025-08-22 | End: 2025-08-22

## 2025-08-22 RX ORDER — ONDANSETRON HYDROCHLORIDE 2 MG/ML
4 INJECTION, SOLUTION INTRAVENOUS
Status: COMPLETED | OUTPATIENT
Start: 2025-08-22 | End: 2025-08-22

## 2025-08-22 RX ORDER — CEPHALEXIN 500 MG/1
500 CAPSULE ORAL EVERY 12 HOURS
Qty: 14 CAPSULE | Refills: 0 | Status: SHIPPED | OUTPATIENT
Start: 2025-08-22 | End: 2025-08-29

## 2025-08-22 RX ORDER — MAGNESIUM SULFATE HEPTAHYDRATE 40 MG/ML
2 INJECTION, SOLUTION INTRAVENOUS ONCE
Status: COMPLETED | OUTPATIENT
Start: 2025-08-22 | End: 2025-08-22

## 2025-08-22 RX ORDER — PROCHLORPERAZINE EDISYLATE 5 MG/ML
10 INJECTION INTRAMUSCULAR; INTRAVENOUS
Status: COMPLETED | OUTPATIENT
Start: 2025-08-22 | End: 2025-08-22

## 2025-08-22 RX ORDER — POTASSIUM CHLORIDE 7.45 MG/ML
10 INJECTION INTRAVENOUS
Status: COMPLETED | OUTPATIENT
Start: 2025-08-22 | End: 2025-08-22

## 2025-08-22 RX ORDER — ONDANSETRON 4 MG/1
4 TABLET, FILM COATED ORAL EVERY 6 HOURS
Qty: 12 TABLET | Refills: 0 | Status: SHIPPED | OUTPATIENT
Start: 2025-08-22

## 2025-08-22 RX ORDER — KETOROLAC TROMETHAMINE 30 MG/ML
15 INJECTION, SOLUTION INTRAMUSCULAR; INTRAVENOUS
Status: COMPLETED | OUTPATIENT
Start: 2025-08-22 | End: 2025-08-22

## 2025-08-22 RX ORDER — CEFTRIAXONE 1 G/1
1 INJECTION, POWDER, FOR SOLUTION INTRAMUSCULAR; INTRAVENOUS
Status: COMPLETED | OUTPATIENT
Start: 2025-08-22 | End: 2025-08-22

## 2025-08-22 RX ORDER — POTASSIUM CHLORIDE 7.45 MG/ML
20 INJECTION INTRAVENOUS
Status: DISCONTINUED | OUTPATIENT
Start: 2025-08-22 | End: 2025-08-22

## 2025-08-22 RX ORDER — CEFTRIAXONE 1 G/1
1 INJECTION, POWDER, FOR SOLUTION INTRAMUSCULAR; INTRAVENOUS
Status: DISCONTINUED | OUTPATIENT
Start: 2025-08-22 | End: 2025-08-22

## 2025-08-22 RX ORDER — MORPHINE SULFATE 4 MG/ML
4 INJECTION, SOLUTION INTRAMUSCULAR; INTRAVENOUS
Refills: 0 | Status: COMPLETED | OUTPATIENT
Start: 2025-08-22 | End: 2025-08-22

## 2025-08-22 RX ADMIN — KETOROLAC TROMETHAMINE 15 MG: 30 INJECTION, SOLUTION INTRAMUSCULAR; INTRAVENOUS at 09:08

## 2025-08-22 RX ADMIN — IOHEXOL 75 ML: 350 INJECTION, SOLUTION INTRAVENOUS at 10:08

## 2025-08-22 RX ADMIN — MAGNESIUM SULFATE HEPTAHYDRATE 2 G: 40 INJECTION, SOLUTION INTRAVENOUS at 01:08

## 2025-08-22 RX ADMIN — POTASSIUM CHLORIDE 10 MEQ: 7.46 INJECTION, SOLUTION INTRAVENOUS at 02:08

## 2025-08-22 RX ADMIN — PROCHLORPERAZINE EDISYLATE 10 MG: 5 INJECTION INTRAMUSCULAR; INTRAVENOUS at 01:08

## 2025-08-22 RX ADMIN — ONDANSETRON 4 MG: 2 INJECTION INTRAMUSCULAR; INTRAVENOUS at 09:08

## 2025-08-22 RX ADMIN — MORPHINE SULFATE 4 MG: 4 INJECTION INTRAVENOUS at 01:08

## 2025-08-22 RX ADMIN — CEFTRIAXONE SODIUM 1 G: 1 INJECTION, POWDER, FOR SOLUTION INTRAMUSCULAR; INTRAVENOUS at 02:08

## 2025-08-23 LAB
OHS QRS DURATION: 88 MS
OHS QTC CALCULATION: 483 MS

## 2025-08-26 LAB — BACTERIA UR CULT: ABNORMAL
